# Patient Record
Sex: FEMALE | Race: BLACK OR AFRICAN AMERICAN | NOT HISPANIC OR LATINO | Employment: OTHER | URBAN - METROPOLITAN AREA
[De-identification: names, ages, dates, MRNs, and addresses within clinical notes are randomized per-mention and may not be internally consistent; named-entity substitution may affect disease eponyms.]

---

## 2015-03-16 LAB
HPV, HIGH-RISK: NEGATIVE
PAP SMEAR: NORMAL

## 2017-02-16 ENCOUNTER — HOSPITAL ENCOUNTER (EMERGENCY)
Facility: HOSPITAL | Age: 32
Discharge: HOME | End: 2017-02-17
Attending: EMERGENCY MEDICINE
Payer: MEDICARE

## 2017-02-16 DIAGNOSIS — J20.8 ACUTE VIRAL BRONCHITIS: Primary | ICD-10-CM

## 2017-02-16 LAB
B-HCG UR QL: NEGATIVE
BASOPHILS # BLD AUTO: 0.02 K/UL
BASOPHILS NFR BLD: 0.3 %
BILIRUB UR QL STRIP: NEGATIVE
CLARITY UR REFRACT.AUTO: ABNORMAL
COLOR UR AUTO: YELLOW
CTP QC/QA: YES
DIFFERENTIAL METHOD: ABNORMAL
EOSINOPHIL # BLD AUTO: 0 K/UL
EOSINOPHIL NFR BLD: 0.1 %
ERYTHROCYTE [DISTWIDTH] IN BLOOD BY AUTOMATED COUNT: 13.7 %
GLUCOSE UR QL STRIP: NEGATIVE
HCT VFR BLD AUTO: 43.2 %
HGB BLD-MCNC: 14.4 G/DL
HGB UR QL STRIP: NEGATIVE
KETONES UR QL STRIP: NEGATIVE
LEUKOCYTE ESTERASE UR QL STRIP: ABNORMAL
LYMPHOCYTES # BLD AUTO: 0.8 K/UL
LYMPHOCYTES NFR BLD: 12.3 %
MCH RBC QN AUTO: 29.9 PG
MCHC RBC AUTO-ENTMCNC: 33.3 %
MCV RBC AUTO: 90 FL
MICROSCOPIC COMMENT: NORMAL
MONOCYTES # BLD AUTO: 0.5 K/UL
MONOCYTES NFR BLD: 7.3 %
NEUTROPHILS # BLD AUTO: 5.4 K/UL
NEUTROPHILS NFR BLD: 79.6 %
NITRITE UR QL STRIP: NEGATIVE
PH UR STRIP: 7 [PH] (ref 5–8)
PLATELET # BLD AUTO: 210 K/UL
PMV BLD AUTO: 11 FL
PROT UR QL STRIP: NEGATIVE
RBC # BLD AUTO: 4.82 M/UL
RBC #/AREA URNS AUTO: 1 /HPF (ref 0–4)
SP GR UR STRIP: 1.01 (ref 1–1.03)
SQUAMOUS #/AREA URNS AUTO: 8 /HPF
URN SPEC COLLECT METH UR: ABNORMAL
UROBILINOGEN UR STRIP-ACNC: NEGATIVE EU/DL
WBC # BLD AUTO: 6.84 K/UL
WBC #/AREA URNS AUTO: 2 /HPF (ref 0–5)

## 2017-02-16 PROCEDURE — 99284 EMERGENCY DEPT VISIT MOD MDM: CPT | Mod: 25

## 2017-02-16 PROCEDURE — 96374 THER/PROPH/DIAG INJ IV PUSH: CPT

## 2017-02-16 PROCEDURE — 81001 URINALYSIS AUTO W/SCOPE: CPT

## 2017-02-16 PROCEDURE — 87400 INFLUENZA A/B EACH AG IA: CPT

## 2017-02-16 PROCEDURE — 63600175 PHARM REV CODE 636 W HCPCS: Performed by: EMERGENCY MEDICINE

## 2017-02-16 PROCEDURE — 85025 COMPLETE CBC W/AUTO DIFF WBC: CPT

## 2017-02-16 PROCEDURE — 80048 BASIC METABOLIC PNL TOTAL CA: CPT

## 2017-02-16 PROCEDURE — 25000003 PHARM REV CODE 250: Performed by: EMERGENCY MEDICINE

## 2017-02-16 PROCEDURE — 81025 URINE PREGNANCY TEST: CPT | Performed by: EMERGENCY MEDICINE

## 2017-02-16 PROCEDURE — 99285 EMERGENCY DEPT VISIT HI MDM: CPT | Mod: ,,, | Performed by: EMERGENCY MEDICINE

## 2017-02-16 PROCEDURE — 96361 HYDRATE IV INFUSION ADD-ON: CPT

## 2017-02-16 RX ORDER — KETOROLAC TROMETHAMINE 30 MG/ML
15 INJECTION, SOLUTION INTRAMUSCULAR; INTRAVENOUS
Status: COMPLETED | OUTPATIENT
Start: 2017-02-16 | End: 2017-02-16

## 2017-02-16 RX ORDER — ACETAMINOPHEN 500 MG
1000 TABLET ORAL
Status: COMPLETED | OUTPATIENT
Start: 2017-02-16 | End: 2017-02-16

## 2017-02-16 RX ADMIN — ACETAMINOPHEN 1000 MG: 500 TABLET, FILM COATED ORAL at 11:02

## 2017-02-16 RX ADMIN — KETOROLAC TROMETHAMINE 15 MG: 30 INJECTION, SOLUTION INTRAMUSCULAR at 11:02

## 2017-02-16 RX ADMIN — SODIUM CHLORIDE 1000 ML: 0.9 INJECTION, SOLUTION INTRAVENOUS at 11:02

## 2017-02-16 NOTE — ED AVS SNAPSHOT
[facility]  [address]  [facility] [address]               Prisca Zhu   2017 10:31 PM   ED    Description:  Female : 1985   Department:  [facility]           Your Care was Coordinated By:     Provider Role From To    Matthieu Tejada MD Attending Provider 17 [id number] --      Reason for Visit     Flu-Like Symptoms           Diagnoses this Visit        Comments    Acute viral bronchitis    -  Primary       ED Disposition     None           To Do List           Follow-up Information     Follow up with [facility].    Specialty:  Emergency Medicine    Why:  If symptoms worsen    Contact information:    [address]  [facility] [id number] [phone]        Schedule an appointment as soon as possible for a visit with Cristi Ledbetter - Internal Medicine.    Specialty:  Internal Medicine    Contact information:    [address] [facility] [id number] [phone]    Additional information:    [facility]      [facility]     [facility] -  Assistance  Registered nurses in the [facility] provide clinical advisement, health education, appointment booking, and other advisory services.  Call for this free service at [phone].             Medications           Message regarding Medications     Verify the changes and/or additions to your medication regime listed below are the same as discussed with your clinician today.  If any of these changes or additions are incorrect, please notify your healthcare provider.        These medications were administered today        Dose Freq    sodium chloride 0.9% bolus 1,000 mL 1,000 mL Once    Sig: Inject 1,000 mLs into the vein once.    Class: Normal    Route: Intravenous    acetaminophen tablet 1,000 mg 1,000 mg ED 1 Time    Sig: Take 2 tablets (1,000 mg total) by mouth ED 1  "Time.    Class: Normal    Route: Oral    ketorolac injection 15 mg 15 mg ED 1 Time    Sig: Inject 15 mg into the vein ED 1 Time.    Class: Normal    Route: Intravenous           Verify that the below list of medications is an accurate representation of the medications you are currently taking.  If none reported, the list may be blank. If incorrect, please contact your healthcare provider. Carry this list with you in case of emergency.           Current Medications            Clinical Reference Information           Your Vitals Were     BP Pulse Temp Resp Height Weight    134/73 (BP Location: Left arm, Patient Position: Sitting, BP Method: Automatic) 104 100.7 °F (38.2 °C) (Oral) 20 4' 9" (1.448 m) 99.8 kg (220 lb)    SpO2 BMI             97% 47.61 kg/m2         Allergies as of 2/17/2017     No Known Allergies      Immunizations Administered on Date of Encounter - 2/17/2017     None      ED Micro, Lab, POCT     Start Ordered       Status Ordering Provider    02/16/17 2308 02/16/17 2307  CBC auto differential  STAT      Final result     02/16/17 2308 02/16/17 2307  Basic metabolic panel  STAT      Final result     02/16/17 2308 02/16/17 2307  Urinalysis - Clean Catch  STAT      Final result     02/16/17 2308 02/16/17 2307  Influenza antigen Nasopharyngeal Swab  STAT      Final result     02/16/17 2308 02/16/17 2307  POCT urine pregnancy  Once      Final result     02/16/17 2307 02/16/17 2307  Urinalysis Microscopic  Once      Final result       ED Imaging Orders     Start Ordered       Status Ordering Provider    02/16/17 2308 02/16/17 2307  X-Ray Chest PA And Lateral  1 time imaging      Final result         Discharge Instructions         Bronchitis, Viral (Adult)    You have a viral bronchitis. Bronchitis is inflammation and swelling of the lining of the lungs. This is often caused by an infection. Symptoms include a dry, hacking cough that is worse at night. The cough may bring up yellow-green mucus. You may also " feel short of breath or wheeze. Other symptoms may include tiredness, chest discomfort, and chills.  Bronchitis that is caused by a virus is not treated with antibiotics. Instead, medicines may be given to help relieve symptoms. Symptoms can last up to 2 weeks, although the cough may last much longer.  This illness is contagious during the first few days and is spread through the air by coughing and sneezing, or by direct contact (touching the sick person and then touching your own eyes, nose, or mouth).  Most viral illnesses resolve within 10 to 14 days with rest and simple home remedies, although they may sometimes last for several weeks.  Home care  · If symptoms are severe, rest at home for the first 2 to 3 days. When you go back to your usual activities, don't let yourself get too tired.  · Do not smoke. Also avoid being exposed to secondhand smoke.  · You may use over-the-counter medicine to control fever or pain, unless another pain medicine was prescribed. (Note: If you have chronic liver or kidney disease or have ever had a stomach ulcer or gastrointestinal bleeding, talk with your healthcare provider before using these medicines. Also talk to your provider if you are taking medicine to prevent blood clots.) Aspirin should never be given to anyone younger than 18 years of age who is ill with a viral infection or fever. It may cause severe liver or brain damage.  · Your appetite may be poor, so a light diet is fine. Avoid dehydration by drinking 6 to 8 glasses of fluids per day (such as water, soft drinks, sports drinks, juices, tea, or soup). Extra fluids will help loosen secretions in the nose and lungs.  · Over-the-counter cough, cold, and sore-throat medicines will not shorten the length of the illness, but they may help to reduce symptoms. (Note: Do not use decongestants if you have high blood pressure.)  Follow-up care  Follow up with your healthcare provider, or as advised. If you had an X-ray or ECG  (electrocardiogram), a specialist will review it. You will be notified of any new findings that may affect your care.  Note: If you are age 65 or older, or if you have a chronic lung disease or condition that affects your immune system, or you smoke, talk to your healthcare provider about having pneumococcal vaccinations and a yearly influenza vaccination (flu shot).  When to seek medical advice  Call your healthcare provider right away if any of these occur:  · Fever of 100.4°F (38°C) or higher  · Coughing up increased amounts of colored sputum  · Weakness, drowsiness, headache, facial pain, ear pain, or a stiff neck  Call 911, or get immediate medical care  Contact emergency services right away if any of these occur:  · Coughing up blood  · Worsening weakness, drowsiness, headache, or stiff neck  · Trouble breathing, wheezing, or pain with breathing  Date Last Reviewed: 9/13/2015  © 5265-9275 [employer]. [address] [place] [address]. All rights reserved. This information is not intended as a substitute for professional medical care. Always follow your healthcare professional's instructions.          [facility] Sign-Up     Activating your [facility] account is as easy as 1-2-3!     1) Visit [email or web address], select Sign Up Now, enter this activation code and your date of birth, then select Next.  [email or web address]  Expires: 4/3/2017 12:20 AM      2) Create a username and password to use when you visit [facility] in the future and select a security question in case you lose your password and select Next.    3) Enter your e-mail address and click Sign Up!    Additional Information  If you have questions, please e-mail [email or web address] or call [phone] to talk to our [facility] staff. Remember, [facility] is NOT to be used for urgent needs. For medical emergencies, dial 911.          [facility] complies with applicable Federal civil rights laws and does not  discriminate on the basis of race, color, national origin, age, disability, or sex.        Language Assistance Services     ATTENTION: Language assistance services are available, free of charge. Please call [phone].      ATENCIÓN: Si chele morales, tiene a cervantes disposición servicios gratuitos de asistencia lingüística. Llame al [phone].     CHÚ Ý: N?u b?n nói Ti?ng Vi?t, có các d?ch v? h? tr? ngôn ng? mi?n phí dành cho b?n. G?i s? [phone].

## 2017-02-17 VITALS
TEMPERATURE: 100 F | OXYGEN SATURATION: 97 % | DIASTOLIC BLOOD PRESSURE: 57 MMHG | RESPIRATION RATE: 18 BRPM | HEART RATE: 92 BPM | BODY MASS INDEX: 47.46 KG/M2 | HEIGHT: 57 IN | SYSTOLIC BLOOD PRESSURE: 107 MMHG | WEIGHT: 220 LBS

## 2017-02-17 LAB
ANION GAP SERPL CALC-SCNC: 8 MMOL/L
BUN SERPL-MCNC: 9 MG/DL
CALCIUM SERPL-MCNC: 9.5 MG/DL
CHLORIDE SERPL-SCNC: 102 MMOL/L
CO2 SERPL-SCNC: 22 MMOL/L
CREAT SERPL-MCNC: 1 MG/DL
EST. GFR  (AFRICAN AMERICAN): >60 ML/MIN/1.73 M^2
EST. GFR  (NON AFRICAN AMERICAN): >60 ML/MIN/1.73 M^2
FLUAV AG SPEC QL IA: NEGATIVE
FLUBV AG SPEC QL IA: NEGATIVE
GLUCOSE SERPL-MCNC: 87 MG/DL
POTASSIUM SERPL-SCNC: 3.9 MMOL/L
SODIUM SERPL-SCNC: 132 MMOL/L
SPECIMEN SOURCE: NORMAL

## 2017-02-17 NOTE — ED PROVIDER NOTES
Encounter Date: 2/16/2017    SCRIBE #1 NOTE: I, Loretta Trujillo, am scribing for, and in the presence of,  Dr. Tejada. I have scribed the entire note.       History     Chief Complaint   Patient presents with    Flu-Like Symptoms     Reports temp of 102 PTA, cough, generalized body aches, and chills. Hx of osteogenesis imperfecta.     Review of patient's allergies indicates:  No Known Allergies  HPI Comments: Time seen by provider: 10:59 PM    This is a 31 y.o. female with history of osteogenesis imperfecta who presents with complaint of acute cough, diaphoresis, and myalgias beginning this morning. Pt notes she has not received the flu shot. Pt endorses fever, weakness, and chills.     The history is provided by the patient.     Past Medical History   Diagnosis Date    OI (osteogenesis imperfecta)      No past medical history pertinent negatives.  History reviewed. No pertinent past surgical history.  No family history on file.  Social History   Substance Use Topics    Smoking status: Never Smoker    Smokeless tobacco: None    Alcohol use No     Review of Systems   Constitutional: Positive for chills, diaphoresis and fever.   HENT: Negative for nosebleeds.    Eyes: Negative for visual disturbance.   Respiratory: Positive for cough.    Cardiovascular: Negative for chest pain.   Gastrointestinal: Negative for abdominal pain.   Genitourinary: Negative for dysuria.   Musculoskeletal: Positive for myalgias.   Skin: Negative for rash.   Neurological: Positive for weakness.       Physical Exam   Initial Vitals   BP Pulse Resp Temp SpO2   02/16/17 2106 02/16/17 2106 02/16/17 2106 02/16/17 2106 02/16/17 2106   139/79 110 20 102.7 °F (39.3 °C) 95 %     Physical Exam    Nursing note and vitals reviewed.  Constitutional: She appears well-developed and well-nourished. She is not diaphoretic. No distress.   Pt looks uncomfortable.    HENT:   Head: Normocephalic and atraumatic.   Mouth/Throat: Oropharynx is clear and moist.    Eyes: Conjunctivae and EOM are normal. Pupils are equal, round, and reactive to light.   Neck: Normal range of motion. Neck supple. No JVD present.   Cardiovascular: Normal rate, regular rhythm and normal heart sounds.   No murmur heard.  Pulmonary/Chest: Breath sounds normal. No respiratory distress. She has no wheezes. She has no rhonchi. She has no rales.   Abdominal: Soft. Bowel sounds are normal. She exhibits no distension and no mass. There is no tenderness. There is no rebound and no guarding.   Musculoskeletal: Normal range of motion. She exhibits no edema or tenderness.   Extremities are atraumatic.   Neurological: She is alert and oriented to person, place, and time. She has normal strength. No cranial nerve deficit or sensory deficit.   Skin: Skin is warm and dry. No rash noted.   Psychiatric: She has a normal mood and affect.         ED Course   Procedures  Labs Reviewed   CBC W/ AUTO DIFFERENTIAL - Abnormal; Notable for the following:        Result Value    Lymph # 0.8 (*)     Gran% 79.6 (*)     Lymph% 12.3 (*)     All other components within normal limits   BASIC METABOLIC PANEL - Abnormal; Notable for the following:     Sodium 132 (*)     CO2 22 (*)     All other components within normal limits   URINALYSIS - Abnormal; Notable for the following:     Appearance, UA Hazy (*)     Leukocytes, UA Trace (*)     All other components within normal limits   INFLUENZA A AND B ANTIGEN   URINALYSIS MICROSCOPIC   POCT URINE PREGNANCY          X-Rays:   Independently Interpreted Readings:   Chest X-Ray: Normal heart, normal lungs.      Medical Decision Making:   History:   Old Medical Records: I decided to obtain old medical records.  Initial Assessment:   Pt with cough, congestion, and fever. Will screen for flu and PNA. Doubt meningitis. Reviewed labs, normal white count, normal chemistries.     Flu test negative. Viral syndrome, will discharge home.   Independently Interpreted Test(s):   I have ordered and  independently interpreted X-rays - see prior notes.  Clinical Tests:   Lab Tests: Ordered and Reviewed  Radiological Study: Ordered and Reviewed            Scribe Attestation:   Scribe #1: I performed the above scribed service and the documentation accurately describes the services I performed. I attest to the accuracy of the note.    Attending Attestation:           Physician Attestation for Scribe:  Physician Attestation Statement for Scribe #1: I, Dr. Tejada, reviewed documentation, as scribed by Loretta Trujillo in my presence, and it is both accurate and complete.         Attending ED Notes:   12:25 AM   Reassessed. Pt feels much better. Fever is down and breathing comfortably. Will discharge home.           ED Course     Clinical Impression:   The encounter diagnosis was Acute viral bronchitis.    Disposition:   Disposition: Discharged  Condition: Stable       Matthieu Tejada MD  02/17/17 0106

## 2017-02-17 NOTE — DISCHARGE INSTRUCTIONS
Bronchitis, Viral (Adult)    You have a viral bronchitis. Bronchitis is inflammation and swelling of the lining of the lungs. This is often caused by an infection. Symptoms include a dry, hacking cough that is worse at night. The cough may bring up yellow-green mucus. You may also feel short of breath or wheeze. Other symptoms may include tiredness, chest discomfort, and chills.  Bronchitis that is caused by a virus is not treated with antibiotics. Instead, medicines may be given to help relieve symptoms. Symptoms can last up to 2 weeks, although the cough may last much longer.  This illness is contagious during the first few days and is spread through the air by coughing and sneezing, or by direct contact (touching the sick person and then touching your own eyes, nose, or mouth).  Most viral illnesses resolve within 10 to 14 days with rest and simple home remedies, although they may sometimes last for several weeks.  Home care  · If symptoms are severe, rest at home for the first 2 to 3 days. When you go back to your usual activities, don't let yourself get too tired.  · Do not smoke. Also avoid being exposed to secondhand smoke.  · You may use over-the-counter medicine to control fever or pain, unless another pain medicine was prescribed. (Note: If you have chronic liver or kidney disease or have ever had a stomach ulcer or gastrointestinal bleeding, talk with your healthcare provider before using these medicines. Also talk to your provider if you are taking medicine to prevent blood clots.) Aspirin should never be given to anyone younger than 18 years of age who is ill with a viral infection or fever. It may cause severe liver or brain damage.  · Your appetite may be poor, so a light diet is fine. Avoid dehydration by drinking 6 to 8 glasses of fluids per day (such as water, soft drinks, sports drinks, juices, tea, or soup). Extra fluids will help loosen secretions in the nose and lungs.  · Over-the-counter  cough, cold, and sore-throat medicines will not shorten the length of the illness, but they may help to reduce symptoms. (Note: Do not use decongestants if you have high blood pressure.)  Follow-up care  Follow up with your healthcare provider, or as advised. If you had an X-ray or ECG (electrocardiogram), a specialist will review it. You will be notified of any new findings that may affect your care.  Note: If you are age 65 or older, or if you have a chronic lung disease or condition that affects your immune system, or you smoke, talk to your healthcare provider about having pneumococcal vaccinations and a yearly influenza vaccination (flu shot).  When to seek medical advice  Call your healthcare provider right away if any of these occur:  · Fever of 100.4°F (38°C) or higher  · Coughing up increased amounts of colored sputum  · Weakness, drowsiness, headache, facial pain, ear pain, or a stiff neck  Call 911, or get immediate medical care  Contact emergency services right away if any of these occur:  · Coughing up blood  · Worsening weakness, drowsiness, headache, or stiff neck  · Trouble breathing, wheezing, or pain with breathing  Date Last Reviewed: 9/13/2015  © 1430-0469 [employer]. [address] [place] [address], [place] [address]. All rights reserved. This information is not intended as a substitute for professional medical care. Always follow your healthcare professional's instructions.

## 2017-02-17 NOTE — ED TRIAGE NOTES
Pt reports she woke up today with coughing and sweating. Pt reports her body is aching and she is freezing.

## 2017-04-04 ENCOUNTER — HOSPITAL ENCOUNTER (OUTPATIENT)
Dept: RADIOLOGY | Facility: HOSPITAL | Age: 32
Discharge: HOME | End: 2017-04-04
Attending: SURGERY
Payer: MEDICARE

## 2017-04-04 DIAGNOSIS — Z12.31 SCREENING MAMMOGRAM FOR HIGH-RISK PATIENT: ICD-10-CM

## 2017-04-04 DIAGNOSIS — N63.0 LUMP OR MASS IN BREAST: ICD-10-CM

## 2017-04-04 PROCEDURE — 76641 ULTRASOUND BREAST COMPLETE: CPT | Mod: 26,RT,, | Performed by: RADIOLOGY

## 2017-04-04 PROCEDURE — 76641 ULTRASOUND BREAST COMPLETE: CPT | Mod: TC,RT

## 2017-04-04 PROCEDURE — 77066 DX MAMMO INCL CAD BI: CPT | Mod: 26,,, | Performed by: RADIOLOGY

## 2017-04-04 PROCEDURE — 77062 BREAST TOMOSYNTHESIS BI: CPT | Mod: 26,,, | Performed by: RADIOLOGY

## 2017-04-04 PROCEDURE — 77066 DX MAMMO INCL CAD BI: CPT | Mod: TC

## 2018-03-13 ENCOUNTER — HOSPITAL ENCOUNTER (EMERGENCY)
Facility: HOSPITAL | Age: 33
Discharge: HOME | End: 2018-03-13
Attending: EMERGENCY MEDICINE
Payer: MEDICARE

## 2018-03-13 VITALS
BODY MASS INDEX: 47.46 KG/M2 | RESPIRATION RATE: 16 BRPM | HEIGHT: 57 IN | HEART RATE: 60 BPM | WEIGHT: 220 LBS | TEMPERATURE: 98 F | OXYGEN SATURATION: 98 % | DIASTOLIC BLOOD PRESSURE: 76 MMHG | SYSTOLIC BLOOD PRESSURE: 121 MMHG

## 2018-03-13 DIAGNOSIS — M25.512 LEFT SHOULDER PAIN: Primary | ICD-10-CM

## 2018-03-13 LAB
B-HCG UR QL: NEGATIVE
CTP QC/QA: YES

## 2018-03-13 PROCEDURE — 25000003 PHARM REV CODE 250: Performed by: PHYSICIAN ASSISTANT

## 2018-03-13 PROCEDURE — 63600175 PHARM REV CODE 636 W HCPCS: Performed by: PHYSICIAN ASSISTANT

## 2018-03-13 PROCEDURE — 81025 URINE PREGNANCY TEST: CPT | Performed by: PHYSICIAN ASSISTANT

## 2018-03-13 PROCEDURE — 96372 THER/PROPH/DIAG INJ SC/IM: CPT

## 2018-03-13 PROCEDURE — 99284 EMERGENCY DEPT VISIT MOD MDM: CPT | Mod: 25

## 2018-03-13 RX ORDER — ACETAMINOPHEN 500 MG
1000 TABLET ORAL
Status: COMPLETED | OUTPATIENT
Start: 2018-03-13 | End: 2018-03-13

## 2018-03-13 RX ORDER — METHOCARBAMOL 500 MG/1
500 TABLET, FILM COATED ORAL 3 TIMES DAILY
Qty: 15 TABLET | Refills: 0 | Status: SHIPPED | OUTPATIENT
Start: 2018-03-13 | End: 2018-03-18

## 2018-03-13 RX ORDER — TRAMADOL HYDROCHLORIDE 50 MG/1
50 TABLET ORAL EVERY 6 HOURS PRN
Qty: 12 TABLET | Refills: 0 | Status: SHIPPED | OUTPATIENT
Start: 2018-03-13 | End: 2018-03-23

## 2018-03-13 RX ORDER — IBUPROFEN 600 MG/1
600 TABLET ORAL EVERY 6 HOURS PRN
Qty: 20 TABLET | Refills: 0 | Status: SHIPPED | OUTPATIENT
Start: 2018-03-13 | End: 2019-01-29

## 2018-03-13 RX ORDER — KETOROLAC TROMETHAMINE 30 MG/ML
10 INJECTION, SOLUTION INTRAMUSCULAR; INTRAVENOUS
Status: COMPLETED | OUTPATIENT
Start: 2018-03-13 | End: 2018-03-13

## 2018-03-13 RX ORDER — IBUPROFEN 200 MG
200 TABLET ORAL EVERY 6 HOURS PRN
COMMUNITY
End: 2019-02-27

## 2018-03-13 RX ADMIN — ACETAMINOPHEN 1000 MG: 500 TABLET ORAL at 06:03

## 2018-03-13 RX ADMIN — KETOROLAC TROMETHAMINE 10 MG: 30 INJECTION, SOLUTION INTRAMUSCULAR; INTRAVENOUS at 03:03

## 2018-03-13 NOTE — ED PROVIDER NOTES
Encounter Date: 3/13/2018    SCRIBE #1 NOTE: I, Zane Gonzalez, am scribing for, and in the presence of,  Joe Mccrary PA-C. I have scribed the following portions of the note - Other sections scribed: HPI and ROS.       History     Chief Complaint   Patient presents with    Shoulder Pain     left shoulder pain after lifting arm above head at work.     CC: Shoulder Pain    HPI: 32 y.o. female with history of osteogenesis imperfecta presents to ED c/o left shoulder pain. Patient reports that she hurt the shoulder while lifting a box. She has dislocated the shoulder in the past. Denies fracture. No further complaints. She is allergic to penicillin.           Review of patient's allergies indicates:   Allergen Reactions    Pcn [penicillins] Shortness Of Breath     Past Medical History:   Diagnosis Date    OI (osteogenesis imperfecta)      Past Surgical History:   Procedure Laterality Date    FRACTURE SURGERY      femur     History reviewed. No pertinent family history.  Social History   Substance Use Topics    Smoking status: Never Smoker    Smokeless tobacco: Never Used    Alcohol use No     Review of Systems   Constitutional: Negative for chills and diaphoresis.   Eyes: Negative for visual disturbance.   Respiratory: Negative for cough.    Gastrointestinal: Negative for abdominal pain, diarrhea, nausea and vomiting.   Genitourinary: Negative for dysuria.   Musculoskeletal:        (+) left shoulder pain   Neurological: Negative for headaches.       Physical Exam     Initial Vitals [03/13/18 1438]   BP Pulse Resp Temp SpO2   (!) 166/90 (!) 58 16 98 °F (36.7 °C) 100 %      MAP       115.33         Physical Exam    Vitals reviewed.  Constitutional: She appears well-developed and well-nourished. She is not diaphoretic. No distress.   HENT:   Head: Normocephalic and atraumatic.   Right Ear: External ear normal.   Left Ear: External ear normal.   Nose: Nose normal.   Eyes: Conjunctivae are normal. No scleral icterus.    Neck: Normal range of motion. Neck supple.   Cardiovascular: Normal rate, regular rhythm, normal heart sounds and intact distal pulses.   Pulmonary/Chest: Breath sounds normal. No respiratory distress. She has no wheezes. She has no rhonchi. She has no rales. She exhibits no tenderness.   Musculoskeletal:        Left shoulder: She exhibits decreased range of motion, tenderness, crepitus and pain.   Neurological: She is alert and oriented to person, place, and time.   Skin: Skin is warm and dry.         ED Course   Procedures  Labs Reviewed   POCT URINE PREGNANCY          X-Rays:   Independently Interpreted Readings:   Other Readings:  CT shoulder shows no obvious fracture or destructive osseous process.    Medical Decision Making:   Initial Assessment:   32-year-old female with osteogenesis imperfecta complains of left shoulder pain after lifting above her head yesterday.  She reports frequent popping noises when she moves her shoulder, which was present prior to lifting yesterday.  She denies numbness or weakness in the arm but has reduced range of motion secondary to pain.  She presents well-appearing in no distress, slightly hypertensive with otherwise reassuring vital signs.  She has tenderness to the anterior aspect of the glenohumeral joint with slight opening of the glenohumeral joint palpated with movement of the shoulder.  Her arm is neurovascularly intact.  There is no edema or erythema.  Differential Diagnosis:   Shoulder pathologic fracture, dislocation, rotator cuff injury, tendinopathy, arthritis  ED Management:  X-rays and CT are negative for fracture.  Exam without evidence for infection.  Patient will need orthopedic follow-up, and likely an MRI.  She was treated with analgesics.  She has an orthopedic surgeon that she will make an appointment with.  She was given a sling for comfort.  She was discharged with return precautions.  She verbalized understanding and agreed with plan.            Scribe  Attestation:   Scribe #1: I performed the above scribed service and the documentation accurately describes the services I performed. I attest to the accuracy of the note.    Attending Attestation:           Physician Attestation for Scribe:  Physician Attestation Statement for Scribe #1: I, Joe Mccrary PA-C, reviewed documentation, as scribed by Zane Gonzalez in my presence, and it is both accurate and complete.                    Clinical Impression:   The encounter diagnosis was Left shoulder pain.                           Joe Mccrary PA-C  03/13/18 1824

## 2018-03-13 NOTE — ED TRIAGE NOTES
"Pt with c/o left shoulder pain that increased today after picking up a box at work. Pt states, "My shoulder keeps popping out of place."   "

## 2019-01-22 ENCOUNTER — CLINICAL SUPPORT (OUTPATIENT)
Dept: BARIATRICS | Facility: CLINIC | Age: 34
End: 2019-01-22
Payer: MEDICARE

## 2019-01-22 VITALS — HEIGHT: 57 IN | WEIGHT: 231.25 LBS | BODY MASS INDEX: 49.89 KG/M2

## 2019-01-22 PROCEDURE — 99499 NO LOS: ICD-10-PCS | Mod: S$PBB,,, | Performed by: DIETITIAN, REGISTERED

## 2019-01-22 PROCEDURE — 99999 PR PBB SHADOW E&M-EST. PATIENT-LVL II: ICD-10-PCS | Mod: PBBFAC,,, | Performed by: DIETITIAN, REGISTERED

## 2019-01-22 PROCEDURE — 99212 OFFICE O/P EST SF 10 MIN: CPT | Mod: PBBFAC | Performed by: DIETITIAN, REGISTERED

## 2019-01-22 PROCEDURE — 99999 PR PBB SHADOW E&M-EST. PATIENT-LVL II: CPT | Mod: PBBFAC,,, | Performed by: DIETITIAN, REGISTERED

## 2019-01-22 PROCEDURE — 99499 UNLISTED E&M SERVICE: CPT | Mod: S$PBB,,, | Performed by: DIETITIAN, REGISTERED

## 2019-01-22 NOTE — PROGRESS NOTES
"NUTRITIONAL CONSULT    Referring Physician: Guillermo Villanueva M.D.  Reason for MNT Referral: Initial assessment for laparoscopic Symone-en-Y work-up    PAST MEDICAL HISTORY:   33 y.o. female  Body mass index is 50.04 kg/m²..  Weight history includes Same weight between 220 to 230 since 16 year, loss a few pounds and then regain weight back  Dieting attempts include apple cider vinegar, then binge eat at night, OTC diet pills, working out-has membership to gym but does not go any more.    Past Medical History:   Diagnosis Date    OI (osteogenesis imperfecta)        CLINICAL DATA:  33 y.o.-year-old Black or  female.  Height: 4'9"  Weight: 231 lbs  IBW: 119 lbs  BMI: 50.04  The patient's goal weight (50% EBW): 175 lbs  Personal goal weight: Under 200 lbs    Goal for Bariatric Surgery: to improve health, to improve quality of life and to lose weight    NUTRITIONAL NEEDS:  1200 Calories  Per day  65- -81 Grams Protein (1.2 -1.5 gms/kg IBW per day)    NUTRITION & HEALTH HISTORY:  Greatest challenge: fast foods and binging at night    Current diet recall:   Breakfast: banana, apple, apple juice  Snack: BK Roby burger, fries and coke  Lunch: No lunch  Dinner: Red beans rice soft drink  Snacks: cookies cream-bag (1/2 bag)    Current Diet:  Meal pattern: Eat more in evening   Protein supplements:   Snacking: at night when trying to diet binges on sweets at night / day  Vegetables: Likes a variety. Eats rarely.  Fruits: Likes a variety. Eats 2-3 times per week.  Beverages: soda, sugary beverages, sweet tea and whole milk  Dining out: Weekly.once a week Mostly fast food.  Cooking at home: Weekly. 3 times per week and eat leftoverMostly baked, grilled, smothered, fried and pasta meat, fish, starchy CHO and pasta , red beans etc.    Exercise:  Past exercise: Adequate walk into track- last year    Current exercise: None  Restrictions to exercise: none    Vitamins / Minerals / Herbs: none  Labs:   no recent  Food " Allergies:   none  Social:  Disabled.  Lives with 2 children 9 years old and 5 years old.  Grocery shopping and food prep self  Patient believes the household will be supportive after surgery.  Mother is also in work up for bariatric surgery  Alcohol: weekend- daiquir or amaretto, or hard liquir whot.  Smoking: None.    ASSESSMENT:  · Patient reports attempts at weight loss, only to regain lost weight.  · Patient demonstrated knowledge of healthy eating behaviors and exercise patterns; admits to not eating healthy and not exercising at this point.  · Patient states willingness to change lifestyle and make behavior modifications as evidenced by good exercise in past.    Insurance requires  0 medically supervised diet prior to consideration for bariatric surgery.    Barriers to Education: none    Stage of change: determination    NUTRITION DIAGNOSIS:    Obesity related to Excessive carbohydrate intake, Excessive calorie intake, Inadequate protein intake and Physical inactivity as evidence by BMI.    BARIATRIC DIET DISCUSSION/PLAN:  Discussed diet after surgery and related to patient's food record.  Reviewed nutrition guidelines for before and after surgery.  Answered all questions.  Work on Bariatric Nutrition Checklist.  Work on gradually cutting back on starchy CHO in the diet.  Begin trying various protein supplements to determine preference.  1200-calorie diet.  5-6 meals per day.  Reduce frequency of dining out.  More grocery shopping and meal preparation at home.  Increase exercise.  Return to clinic.    RECOMMENDATIONS:  Patient is a potential candidate for bariatric surgery.    Needs at least 1 additional visit(s) with RD.    Patient verbalized understanding.    Expect good  compliance after surgery at this time.    Communicated nutrition plan with bariatric team.    SESSION TIME:  60 minutes

## 2019-01-22 NOTE — PATIENT INSTRUCTIONS
Bariatric Diet Grocery List      High in Protein:   ? Canned tuna or chicken (packed in water)  ? Lean ground turkey breast or ground round  ? Turkey or chicken (no skin)  ? Lean pork or beef   ? Scrambled, poached, or boiled eggs  ? Baked or broiled fish and seafood (not fried!)  ? Beans, edamame and lentils  ? Low fat deli meats: turkey, chicken, ham, roast beef  ? 1% or Skim Milk, Lactaid, or Soymilk  ? Low-fat cheese, cottage cheese, mozzarella string cheese, ricotta  ? Light yogurt, FF/SF frozen yogurt, custard, SF pudding  ? Protein drinks and protein bars with 0-4 grams sugar     Fruits, Vegetables and Snacks   - Green beans, broccoli, cauliflower, spinach, asparagus, carrots, lima beans, yellow squash, zucchini, etc.  - Apple, pineapple, peach, grapes, banana, watermelon, oranges, etc.  - Fruit canned in its own juice or in water (not in syrup)  - Raw veggies  - Lettuce: dark greens like spinach and Fabián  - Unsalted Nuts  - Cesar Links beef jerky     Fluids:   Skim/1% milk, Lactaid, Soymilk  Sugar-free beverages  (decaf and non-carbonated)  Decaf coffee & decaf tea   Water       Eating healthy on the go:     Fast Food Restaurant/Item Calories Protein    [employer] Premium Grilled Chicken Classic [place], no bun < 350 28   [employer] Premium Salads with Grilled Chicken, no dressing  190-290 27-30   M[employer]ories Ultimate Chicken [place], no bun < 390 34   Mallories Half-Size Salads 290-440 19-23   [employer]ories Large [place]  270 19   [employer] Double Hamburger, no bun < 330 19   [employer] TENDERGRILL Chicken [place], no bun or sanchez < 360 36   [employer] Veggie Burger, no bun or sanchez < 320 22   [employer] Garden Fresh Salad Chicken Caesar with TENDERGRILL and dressing   (*Use ½ dressing packet to reduce calories)  490 41   Chick-kirby-A Chargrilled Chicken [place], no bun < 310 29   Chick-kirby-A Salads with Grilled Chicken, no dressing  (*Not the Diego Salad)  180-330 25-29   Sonic Jr. Asher, no bun < 330 15   Sonic  Classic [place] Glide Grilled, no bun  < 450 32   [place] Naked Tenders (3) with Regular Green Beans, no sauce 210 28   [facility]s Club [place], no sanchez 277 37   Chipotle Chicken, Steak, or Carnitas Salad with Black or [place] Beans, Tomato Salsa, and Fajita Veggies on Bed of Lettuce  335-360 33-42     Sit Down Restaurant/Item Calories Protein    Applebees Under 550 Calories Entrees, no potatoes  410-490 43-56   Applebees Weight Watchers Entrees, no potatoes 300-490 22-53   [employer]bees 7 oz. House Sirloin with seasonal vegetables 285-310 36-40   Applebees Bowl of [place] 400 29   [employer] Simple&Fit Spinach, Mushroom, and Tomato Omelette  330 29   [employer] Simple&Fit Grilled Balsamic-Glazed Chicken  440 39   [place] Garden Grilled Chicken [place] with Sweet Red Wine Sauce 472 37   [employer] Garden Sicilian Meatballs 360 23   [place] Garden Herb-Grilled [place] 480 56   Josues Fit Fare Omelette  390 34     1200- 1500 calorie Sample meal plan   80-120g protein per day.   Protein drinks and bars: 0-4 grams sugar   Drink lots of water throughout the day and exercise!   MENU # 1   Breakfast: 2 eggs, 1 turkey sausage hanna, 1 apple   Snack: P3 protein pack  Lunch: 2 roll-ups (sliced turkey, low-fat sliced cheese, mustard), 12 baby carrots dipped in 1 Tbsp natural peanut butter   Mid-Day Snack: ¼ cup unsalted almonds, ½ cup fruit   Dinner: 1 chicken thigh simmered in tomato sauce + 2 Tbsp mozzarella cheese, ½ cup black beans, 1/2 cup steamed carrots   Evening Snack: 1/2 cup grapes with 4 cubes low-fat cheddar cheese   ___________________________________________________   MENU # 2   Breakfast: 200 calorie protein drink   Mid-morning snack : ¼ cup unsalted nuts, medium banana   Lunch: 3oz tuna or chicken salad made with 2 tbsp light sanchez, over salad with tomatoes and cucumbers.   Snack: low-fat cheese stick   Dinner: 3oz hamburger hanna, slice of low-fat cheese, 1 cup yellow squash and zucchini sauteed in nonstick  cookspray  Snack: 6oz light yogurt   _______________________________________________________   Menu #3   Breakfast: 6oz plain Greek yogurt + fruit (½ banana OR ½ cup fruit - pineapple, blueberries, strawberries, peach), may add Splenda to ashwini.   Lunch: Grilled chicken breast w/ slice low-fat pepper tarun cheese, 1/2 cup grilled/baked asparagus and small salad with Salad Spritzer.   Mid-Day snack: 200 calorie protein drink   Dinner: 4oz Grilled fish, ½ cup white beans, ½ cup cooked spinach   Evening Snack: fudgsicle no-sugar-added   Menu # 4   Breakfast: 1 scoop vanilla protein powder + 4oz skim milk + 4oz coffee   Snack: Pure protein bar   Lunch: 2 Lettuce tacos: 3oz seasoned ground turkey wrapped in a Fabián lettuce leaves with 1 Tbsp shredded cheese and dollop low-fat sour cream   Snack: ½ cup cottage cheese, ½ cup pineapple chunks   Dinner: Shrimp omelet: 2 eggs, ½ cup shrimp, green onions, and shredded cheese   ______________________________________________________   Menu #5   Breakfast: 1 cup low-fat cottage cheese, ½ cup peaches (no sugar added)   Snack: 1 apple, 4 cubes cheese   Lunch: 3oz baked pork chop, 1 cup okra and tomato stew   Snack: 1/2 cup black beans + salsa + dollop light sour cream   Dinner: Caprese chicken salad: 3 oz chicken breast, 1oz fresh mozzarella, sliced tomato, 1 Tbsp olive oil, basil   Snack: sugar-free popsicle   _______________________________________________________  Menu #6   Breakfast: ½ cup part-skim ricotta cheese, 2 Tbsp sugar-free strawberry preserves, sprinkle of slivered almonds   Snack: 1 orange + string cheese  Lunch: 3 oz canned chicken, 1oz shredded cheddar cheese, ½ cup black beans, 2 Tbsp salsa   Snack: 200 calorie Protein drink   Dinner: 4 oz grilled salmon steak, over mixed green salad with 2 tbsp light dressing   _______________________________________________________  Menu #7  Breakfast: crust-less quiche (bake 1 egg mixed w/ low fat cheese, broccoli and low  sodium ham in a muffin cup until cooked inside)  Snack: 1 light yogurt  Lunch: protein shake (1 scoop powder + 8 oz skim milk, blended w/ ice)  Snack: small apple w/ 2 tbsp PB2 (powdered peanut butter)  Dinner: 2 Turkey meatballs w/ low sugar marinara sauce, 1/2 cup spiralized zucchini (sauteed on stove top w/ nonstick cookspray)

## 2019-01-29 ENCOUNTER — INITIAL CONSULT (OUTPATIENT)
Dept: BARIATRICS | Facility: CLINIC | Age: 34
End: 2019-01-29
Payer: MEDICARE

## 2019-01-29 ENCOUNTER — CLINICAL SUPPORT (OUTPATIENT)
Dept: BARIATRICS | Facility: CLINIC | Age: 34
End: 2019-01-29
Payer: MEDICARE

## 2019-01-29 VITALS
HEIGHT: 57 IN | WEIGHT: 233.44 LBS | SYSTOLIC BLOOD PRESSURE: 106 MMHG | HEART RATE: 83 BPM | BODY MASS INDEX: 50.36 KG/M2 | DIASTOLIC BLOOD PRESSURE: 68 MMHG

## 2019-01-29 DIAGNOSIS — Q78.2 OSTEOPETROSIS: ICD-10-CM

## 2019-01-29 DIAGNOSIS — E66.01 CLASS 3 SEVERE OBESITY DUE TO EXCESS CALORIES WITHOUT SERIOUS COMORBIDITY WITH BODY MASS INDEX (BMI) OF 50.0 TO 59.9 IN ADULT: Primary | ICD-10-CM

## 2019-01-29 DIAGNOSIS — G89.29 CHRONIC LEFT SHOULDER PAIN: ICD-10-CM

## 2019-01-29 DIAGNOSIS — M25.512 CHRONIC LEFT SHOULDER PAIN: ICD-10-CM

## 2019-01-29 DIAGNOSIS — Z79.899 OTHER LONG TERM (CURRENT) DRUG THERAPY: ICD-10-CM

## 2019-01-29 DIAGNOSIS — Q78.0 OI (OSTEOGENESIS IMPERFECTA): ICD-10-CM

## 2019-01-29 DIAGNOSIS — M77.9 TENDONITIS: ICD-10-CM

## 2019-01-29 PROCEDURE — 99499 UNLISTED E&M SERVICE: CPT | Mod: S$PBB,,, | Performed by: DIETITIAN, REGISTERED

## 2019-01-29 PROCEDURE — 99999 PR PBB SHADOW E&M-EST. PATIENT-LVL II: ICD-10-PCS | Mod: PBBFAC,,, | Performed by: DIETITIAN, REGISTERED

## 2019-01-29 PROCEDURE — 99205 OFFICE O/P NEW HI 60 MIN: CPT | Mod: S$PBB,,, | Performed by: NURSE PRACTITIONER

## 2019-01-29 PROCEDURE — 99212 OFFICE O/P EST SF 10 MIN: CPT | Mod: PBBFAC,27 | Performed by: DIETITIAN, REGISTERED

## 2019-01-29 PROCEDURE — 99999 PR PBB SHADOW E&M-EST. PATIENT-LVL IV: ICD-10-PCS | Mod: PBBFAC,,, | Performed by: NURSE PRACTITIONER

## 2019-01-29 PROCEDURE — 99499 NO LOS: ICD-10-PCS | Mod: S$PBB,,, | Performed by: DIETITIAN, REGISTERED

## 2019-01-29 PROCEDURE — 99214 OFFICE O/P EST MOD 30 MIN: CPT | Mod: PBBFAC | Performed by: NURSE PRACTITIONER

## 2019-01-29 PROCEDURE — 99999 PR PBB SHADOW E&M-EST. PATIENT-LVL II: CPT | Mod: PBBFAC,,, | Performed by: DIETITIAN, REGISTERED

## 2019-01-29 PROCEDURE — 99999 PR PBB SHADOW E&M-EST. PATIENT-LVL IV: CPT | Mod: PBBFAC,,, | Performed by: NURSE PRACTITIONER

## 2019-01-29 PROCEDURE — 99205 PR OFFICE/OUTPT VISIT, NEW, LEVL V, 60-74 MIN: ICD-10-PCS | Mod: S$PBB,,, | Performed by: NURSE PRACTITIONER

## 2019-01-29 NOTE — LETTER
[facility]  [address]  [place] [address]  Phone: [phone]  Fax: [phone] January 29, 2019      Little Nieves MD  [address] [place] [address]    Patient: Prisca Zhu   MR Number: [id number]   YOB: 1985   Date of Visit: 1/29/2019     Dear Dr. Nieves:    Thank you for referring Prisca Zhu to me for evaluation. Below are the relevant portions of my assessment and plan of care.    DIAGNOSIS:  1. Morbid Obesity with Body mass index is 50.52 kg/m². and difficulty with weight loss.  2. Co-morbidities: OI  3. PHQ9 13     PLAN:  The patient is a potential candidate for bariatric surgery. She is interested in laparoscopic Symone-en-Y with Dr. Hahn. The surgery and post-op care were discussed in detail with the patient. All questions were answered.     She understands that bariatric surgery is a tool to aid in weight loss and that she needs to be committed to the diet and exercise post-operatively for successful weight loss.  We discussed expected weight loss outcomes after surgery which is 50% of the excess weight on her frame. It was discussed with the patient that bariatric surgery is not the easy way out and that it will take plenty of dedication on the patient's part to be successful. We also discussed the possibility of weight regain if the patient strays from the diet guidelines or exercise requirements. The patient verbalized understanding and wishes to proceed with the work-up.     ORDERS:  1. Chest X-Ray, EKG and EGD  2. Psychological Consult, Bariatric Dietician Consult.  3. Bariatric Labs: BMP, CBC, Folate Serum, H. pylori, HgA1C, Hepatic Panel/LFT, Iron & TIBC, Lipid Profile, Magnesium, Phosphate, T3, T4, TSH, Free T4, Vitamin B12, and Vitamin B1.  4. Discussed the possibility of hyper-fertility surrounding 2 week liquid diet before and after surgery: advised patient to use 2 forms of birth control during this time period to avoid  conception.  Patient verbalized understanding and will discuss options with her GYN.  5. No NSAIDS after surgery due to increased risk of stomach ulcers. Talk to your prescribing provider about alternative options for your pain.  6. Avoid greasy foods.     60 minute visit, over 50% of time spent counseling patient face to face on surgical options, risks, benefits, expected diet, recommended exercise regimen, and expected weight loss.     If you have questions, please do not hesitate to call me. I look forward to following Prisca along with you.    Sincerely,        DEANDRA Bonilla   Section of Bariatric Surgery  Department of Surgery  [facility]    NP/afw

## 2019-01-29 NOTE — PROGRESS NOTES
BARIATRIC NEW PATIENT EVALUATION    CHIEF COMPLAINT:   Morbid Obesity Body mass index is 50.52 kg/m². and difficulty with weight loss.     HISTORY OF PRESENT ILLNESS:  Prisca Zhu  is a 33 y.o. female presenting for morbid obesity Body mass index is 50.52 kg/m². and difficulty with weight loss. The patient has tried switching from white bread and rice to wheat bread and brown rice, Lipozene, apple cider vinegar. She reports she has not tried portion control or low CHO diets. Wt difficulty began in childhood. Her highest wt is 235 pounds.     She experiences heartburn twice weekly only when she eats greasy foods, treated effectively with Tums. If she eliminates these foods she does not have any symptoms. States she continues to eat them because they are good.    PAST MEDICAL HISTORY:  Past Medical History:   Diagnosis Date    Anemia     BMI 50.0-59.9, adult     History of blood transfusion     Obesity     OI (osteogenesis imperfecta)     Osteopetrosis     Tendonitis      PAST SURGICAL HISTORY:  Past Surgical History:   Procedure Laterality Date     SECTION      ,     FEMUR FRACTURE SURGERY Bilateral     hardware/rods in both legs    FRACTURE SURGERY      femur     FAMILY HISTORY:  Family History   Problem Relation Age of Onset    Hypertension Mother     Hyperlipidemia Mother     Asthma Mother     Anxiety disorder Mother     No Known Problems Father      SOCIAL HISTORY:  Social History     Socioeconomic History    Marital status: Single     Spouse name: Not on file    Number of children: Not on file    Years of education: Not on file    Highest education level: Not on file   Social Needs    Financial resource strain: Not on file    Food insecurity - worry: Not on file    Food insecurity - inability: Not on file    Transportation needs - medical: Not on file    Transportation needs - non-medical: Not on file   Occupational History    Not on file   Tobacco Use     "Smoking status: Never Smoker    Smokeless tobacco: Never Used   Substance and Sexual Activity    Alcohol use: No    Drug use: Yes    Sexual activity: Yes     Partners: Male     Birth control/protection: None, IUD   Other Topics Concern    Not on file   Social History Narrative    Disabled 2/2 chronic left shoulder pain     MEDICATIONS:  Current Outpatient Medications   Medication Sig Dispense Refill    ibuprofen (ADVIL,MOTRIN) 200 MG tablet Take 200 mg by mouth every 6 (six) hours as needed for Pain.       No current facility-administered medications for this visit.        ALLERGIES:  Review of patient's allergies indicates:   Allergen Reactions    Pcn [penicillins] Shortness Of Breath       ROS:  Review of Systems   Constitutional: Negative for chills, fever and malaise/fatigue.   Eyes: Negative for blurred vision and double vision.   Respiratory: Negative for cough, shortness of breath and wheezing.         No SOB climbing stairs or walking   Cardiovascular: Negative for chest pain and palpitations.   Gastrointestinal: Negative for abdominal pain, blood in stool, constipation, diarrhea, heartburn, melena, nausea and vomiting.   Genitourinary: Negative for dysuria, frequency and urgency.   Musculoskeletal: Positive for back pain and joint pain (left shoulder pain). Negative for myalgias and neck pain.   Neurological: Negative for dizziness, tingling and headaches.   Psychiatric/Behavioral: Negative for depression and suicidal ideas. The patient is not nervous/anxious.        PE:  Vitals:    01/29/19 1316   BP: 106/68   Pulse: 83   Weight: 105.9 kg (233 lb 7.5 oz)   Height: 4' 9" (1.448 m)       Physical Exam   Constitutional: She is oriented to person, place, and time. She appears well-developed and well-nourished. No distress.   Morbidly obese   HENT:   Head: Normocephalic and atraumatic.   Eyes: Conjunctivae are normal. Right eye exhibits no discharge. Left eye exhibits no discharge. No scleral icterus. "   Neck: Neck supple.   Cardiovascular: Normal rate, regular rhythm, normal heart sounds and intact distal pulses. Exam reveals no gallop and no friction rub.   No murmur heard.  Pulmonary/Chest: Effort normal and breath sounds normal. No respiratory distress. She has no wheezes. She has no rales.   Abdominal: Soft. Bowel sounds are normal. She exhibits no distension and no mass. There is no tenderness. There is no rebound and no guarding. No hernia.   Musculoskeletal: Normal range of motion. She exhibits no edema.   Neurological: She is alert and oriented to person, place, and time.   Skin: Skin is warm, dry and intact. Capillary refill takes less than 2 seconds. No rash noted. She is not diaphoretic. No erythema. No pallor.   Psychiatric: She has a normal mood and affect. Her speech is normal and behavior is normal. Judgment and thought content normal.   Nursing note and vitals reviewed.       DIAGNOSIS:  1. Morbid Obesity with Body mass index is 50.52 kg/m². and difficulty with weight loss.  2. Co-morbidities: OI  3. PHQ9 13    PLAN:  The patient is a potential candidate for Bariatric Surgery. she is interested in laparoscopic Symone-en-Y with Dr. Hahn. The surgery and post-op care were discussed in detail with the patient. All questions were answered.    she understands that bariatric surgery is a tool to aid in weight loss and that she needs to be committed to the diet and exercise post-operatively for successful weight loss.  Discussed expected weight loss outcomes after surgery which is 50% of the excess weight on her frame. Discussed with patient that bariatric surgery is not the easy way out and that it will take plenty of dedication on the patient's part to be successful. Also discussed the possibility of weight regain if the patient strays from the diet guidelines or exercise requirements. Patient verbalized understanding and wishes to proceed with the work-up.    ORDERS:  1. Chest X-Ray, EKG and  EGD  2. Psychological Consult, Bariatric Dietician Consult.  3. Bariatric Labs: BMP, CBC, Folate Serum, H. pylori, HgA1C, Hepatic Panel/LFT, Iron & TIBC, Lipid Profile, Magnesium, Phosphate, T3, T4, TSH, Free T4, Vitamin B12, and Vitamin B1.  4. Discussed the possibility of hyper-fertility surrounding 2 week liquid diet before and after surgery: advised patient to use 2 forms of birth control during this time period to avoid conception.  Patient verbalized understanding and will discuss options with her GYN.  5. No NSAIDS after surgery due to increased risk of stomach ulcers. Talk to your prescribing provider about alternative options for your pain.  6. Avoid greasy foods.    Primary Physician: Little Nieves MD  RTC: As scheduled.    60 minute visit, over 50% of time spent counseling patient face to face on surgical options, risks, benefits, expected diet, recommended exercise regimen, and expected weight loss.

## 2019-01-29 NOTE — PROGRESS NOTES
NUTRITION NOTE    Referring Physician: Ramon Hahn M.D.  Reason for MNT Referral: Follow up MSD for possible Gastric Bypass    Patient presents for 2nd visit for MSD with weight gain over the past month; Pt has not made any changes since last visit    CLINICAL DATA:  33 y.o. female.    Current Weight: 233.5 lbs  Weight Change Since Initial Visit: Gain of 2 lbs  Ideal Body Weight: 119 lbs  There is no height or weight on file to calculate BMI.    NUTRITIONAL NEEDS:  1200 Calories  Per day  65- -81 Grams Protein (1.2 -1.5 gms/kg IBW per day)  CURRENT DIET:  Regular diet.  Diet Recall: Food records are not present.  Breakfast: Skips at times  Lunch: skips  Dinner: skips  Snacks: crackers, vitamin water,   Other day pizza and rice    Diet Includes: snacking at times throughout the day with no meal consumed or take out pizza  Meal Pattern: Irregular.  Protein Supplements: 0 per day.  Snacking: Excess. Snacks include starches or sugary beverages.  Vegetables: Likes a variety. Eats rarely.  Fruits: Likes a variety. Eats rarely.  Beverages: whole milk and Koolaid  Dining Out:  Weekly. once a week Mostly take-out. Chinese  Cooking at home: Weekly. Twice a week Mostly pasta pasta and chicken or shrimp or crabmeat.    CURRENT EXERCISE:  None  Will join gym with mom    Vitamins / Minerals / Herbs:   none    Food Allergies:   None reported    Social:  Disabled.  Alcohol: None.  Smoking: None.    ASSESSMENT:  Patient demonstrates some willingness to change lifestyle habits as evidenced by being able to name which starchy carbs she should avoid.    Doing poorly with working on greatest challenges (irregular meal patterns).    Barriers to Education:  physical or mental condition  Stage of Change:  determination    NUTRITION DIAGNOSIS:  Obesity related to Food and nutrition related knowledge deficit, Excessive carbohydrate intake, Inadequate protein intake and Physical inactivity as evidence by BMI.  Status: Same    PLAN:  Pt  is potential candidate for surgery.    Diet: Adjust diet plan.  Work on Bariatric Nutrition Checklist.  Work on expanding variety of vegetables.  Work on gradually cutting back on starchy CHO in the diet.  1200-calorie diet.  1500-calorie diet.  5-6 meals per day.  Start including protein supplements in the diet plan daily.  Reduce frequency of dining out.  More grocery shopping and meal preparation at home.  Increase exercise.  Return to clinic.    Exercise: BEGIN.    Behavior Modification: Begin to document food & activity logs daily.      Return to clinic in one month.  Needs additional visit(s) with RD.    Communicated nutrition plan with bariatric team.    SESSION TIME:  30 minutes

## 2019-01-29 NOTE — PATIENT INSTRUCTIONS
Prior to surgery you will need to complete:  - Dietitian consult and follow up appointments as needed  - Labs  - Chest X-ray  - EKG  - Psychological evaluation, Please call psychiatry [phone] to schedule  - EGD    In preparation for bariatric surgery, please complete the following:   · Discuss your current medications with your primary care provider, remember your medications will need to be crushed, chewable, or in liquid form for the first 3-6 months after a gastric bypass or sleeve.  For a gastric band, your medications will need to be crushed indefinitely.    · If you take a blood thinner such as: Coumadin (warfarin), Pradaxa (dabigatran), or Plavix (clopidogrel), you will need to speak with your prescribing provider on how or if this medication can be stopped before surgery.   · If you take a medication for depression or anxiety, you will need to begin crushing or opening the capsule 1-3 months prior to surgery.  Remember to discuss this with the psychologist or psychiatrist that you see.   · If you take medication for arthritis on a daily basis that is considered a non-steroidal anti-inflammatory (NSAID), please discuss with your prescribing physician an alternative medication.  After having gastric bypass or gastric sleeve, this group of medications is not appropriate to take due to increased risk of bleeding stomach ulcers.  · Discussed the possibility of hyper-fertility surrounding 2 week liquid diet before and after surgery: advised patient to use 2 forms of birth control during this time period to avoid conception.  Patient verbalized understanding and will discuss options with her GYN.  · Discussed the possibility of hyper-fertility surrounding 2 week liquid diet before and after surgery: advised patient to use 2 forms of birth control during this time period to avoid conception.  Patient verbalized understanding and will discuss options with her GYN.        DEFINITIONS  Appointments: Pre-scheduled  meetings or consultations with any physician, advanced practice provider, dietitian, or psychologist, and labs, imaging studies, sleep studies, etc.   Late cancellation: Cancelling an appointment 24-48 hours prior to scheduled time.  No-Show appointment:  is when    You do NOT arrive to your appointment at the time its scheduled.   You call to cancel or cancel via [employer] less than 24 hours in advance of your scheduled appointment   You show up 15 minutes AFTER your scheduled appointment time without any notification of being late.     POLICY  1. You are allowed up to 3 cancellations for appointments.    After 3 cancellations your case will be placed on hold for 2 months and after that time you can resume the program.   2. You are allowed only 1 no-show for an appointment.    You will be re-scheduled one time and if there is a 2nd no-show at any point, your case will be placed on hold for 3 months.  After 3 months you can resume the program.     3. Upon resuming the program after being placed on hold for either above mentioned reasons, if you have a late cancel or no show for any appointment, the bariatric team will review if youre an appropriate candidate for surgery at the monthly meeting.

## 2019-01-29 NOTE — PATIENT INSTRUCTIONS
Bariatric Diet Grocery List      High in Protein:   ? Canned tuna or chicken (packed in water)  ? Lean ground turkey breast or ground round  ? Turkey or chicken (no skin)  ? Lean pork or beef   ? Scrambled, poached, or boiled eggs  ? Baked or broiled fish and seafood (not fried!)  ? Beans, edamame and lentils  ? Low fat deli meats: turkey, chicken, ham, roast beef  ? 1% or Skim Milk, Lactaid, or Soymilk  ? Low-fat cheese, cottage cheese, mozzarella string cheese, ricotta  ? Light yogurt, FF/SF frozen yogurt, custard, SF pudding  ? Protein drinks and protein bars with 0-4 grams sugar     Fruits, Vegetables and Snacks   - Green beans, broccoli, cauliflower, spinach, asparagus, carrots, lima beans, yellow squash, zucchini, etc.  - Apple, pineapple, peach, grapes, banana, watermelon, oranges, etc.  - Fruit canned in its own juice or in water (not in syrup)  - Raw veggies  - Lettuce: dark greens like spinach and Fabián  - Unsalted Nuts  - Cesar Links beef jerky     Fluids:   Skim/1% milk, Lactaid, Soymilk  Sugar-free beverages  (decaf and non-carbonated)  Decaf coffee & decaf tea   Water     Eating healthy on the go:     Fast Food Restaurant/Item Calories Protein    Xochitl (So-Shee) [employer] Premium Grilled Chicken Classic [place], no bun < 350 28   Xochitl (So[employer]Shee) [employer] Premium Salads with Grilled Chicken, no dressing  190-290 27-30   Mallories Ultimate Chicken Trail Side, no bun < 390 34   Mallories Half-Size Salads 290-440 19-23   Mallories Large [place]  270 19   [employer] Double Hamburger, no bun < 330 19   [employer] TENDERGRILL Chicken [place], no bun or sanchez < 360 36   [employer] Veggie Burger, no bun or sanchez < 320 22   [employer] Garden Fresh Salad Chicken Caesar with TENDERGRILL and dressing   (*Use ½ dressing packet to reduce calories)  490 41   Chick-kirby-A Chargrilled Chicken [place], no bun < 310 29   Chick-kirby-A Salads with Grilled Chicken, no dressing  (*Not the Diego Salad)  180-330 25-29   Sonic Jr. Asher, no bun < 330 15   Sonic  Classic Chiken Anita Grilled, no bun  < 450 32   P[place] Naked Tenders (3) with Regular Green Beans, no sauce 210 28   [facility]s Club [place], no sanchez 277 37   Chipotle Chicken, Steak, or Carnitas Salad with Black or [place] Beans, Tomato Salsa, and Fajita Veggies on Bed of Lettuce  335-360 33-42     Sit Down Restaurant/Item Calories Protein    Applebees Under 550 Calories Entrees, no potatoes  [phone] 43-56   Applebees Weight Watchers Entrees, no potatoes 300-490 22-53   Applebees 7 oz. House Sirloin with seasonal vegetables 285-310 36-40   Applebees Bowl of [place] 400 29   [employer] Simple&Fit Spinach, Mushroom, and Tomato Omelette  330 29   [employer] Simple[employer]Fit Grilled Balsamic-Glazed Chicken  440 39   [employer] Grilled Chicken Sp[place]ini with Sweet Red Wine Sauce 472 37   [employer] Sicilian Meatballs 360 23   [employer] Herb-Grilled [place] 480 56   Josues Fit Fare Omelette  390 34     1200- 1500 calorie Sample meal plan   80-120g protein per day.   Protein drinks and bars: 0-4 grams sugar   Drink lots of water throughout the day and exercise!   MENU # 1   Breakfast: 2 eggs, 1 turkey sausage hanna, 1 apple   Snack: P3 protein pack  Lunch: 2 roll-ups (sliced turkey, low-fat sliced cheese, mustard), 12 baby carrots dipped in 1 Tbsp natural peanut butter   Mid-Day Snack: ¼ cup unsalted almonds, ½ cup fruit   Dinner: 1 chicken thigh simmered in tomato sauce + 2 Tbsp mozzarella cheese, ½ cup black beans, 1/2 cup steamed carrots   Evening Snack: 1/2 cup grapes with 4 cubes low-fat cheddar cheese   ___________________________________________________   MENU # 2   Breakfast: 200 calorie protein drink   Mid-morning snack : ¼ cup unsalted nuts, medium banana   Lunch: 3oz tuna or chicken salad made with 2 tbsp light sanchez, over salad with tomatoes and cucumbers.   Snack: low-fat cheese stick   Dinner: 3oz hamburger hanna, slice of low-fat cheese, 1 cup yellow squash and zucchini sauteed in nonstick  cookspray  Snack: 6oz light yogurt   _______________________________________________________   Menu #3   Breakfast: 6oz plain Greek yogurt + fruit (½ banana OR ½ cup fruit - pineapple, blueberries, strawberries, peach), may add Splenda to ashwini.   Lunch: Grilled chicken breast w/ slice low-fat pepper tarun cheese, 1/2 cup grilled/baked asparagus and small salad with Salad Spritzer.   Mid-Day snack: 200 calorie protein drink   Dinner: 4oz Grilled fish, ½ cup white beans, ½ cup cooked spinach   Evening Snack: fudgsicle no-sugar-added   Menu # 4   Breakfast: 1 scoop vanilla protein powder + 4oz skim milk + 4oz coffee   Snack: Pure protein bar   Lunch: 2 Lettuce tacos: 3oz seasoned ground turkey wrapped in a Fabián lettuce leaves with 1 Tbsp shredded cheese and dollop low-fat sour cream   Snack: ½ cup cottage cheese, ½ cup pineapple chunks   Dinner: Shrimp omelet: 2 eggs, ½ cup shrimp, green onions, and shredded cheese   ______________________________________________________   Menu #5   Breakfast: 1 cup low-fat cottage cheese, ½ cup peaches (no sugar added)   Snack: 1 apple, 4 cubes cheese   Lunch: 3oz baked pork chop, 1 cup okra and tomato stew   Snack: 1/2 cup black beans + salsa + dollop light sour cream   Dinner: Caprese chicken salad: 3 oz chicken breast, 1oz fresh mozzarella, sliced tomato, 1 Tbsp olive oil, basil   Snack: sugar-free popsicle   _______________________________________________________  Menu #6   Breakfast: ½ cup part-skim ricotta cheese, 2 Tbsp sugar-free strawberry preserves, sprinkle of slivered almonds   Snack: 1 orange + string cheese  Lunch: 3 oz canned chicken, 1oz shredded cheddar cheese, ½ cup black beans, 2 Tbsp salsa   Snack: 200 calorie Protein drink   Dinner: 4 oz grilled salmon steak, over mixed green salad with 2 tbsp light dressing   _______________________________________________________  Menu #7  Breakfast: crust-less quiche (bake 1 egg mixed w/ low fat cheese, broccoli and low  sodium ham in a muffin cup until cooked inside)  Snack: 1 light yogurt  Lunch: protein shake (1 scoop powder + 8 oz skim milk, blended w/ ice)  Snack: small apple w/ 2 tbsp PB2 (powdered peanut butter)  Dinner: 2 Turkey meatballs w/ low sugar marinara sauce, 1/2 cup spiralized zucchini (sauteed on stove top w/ nonstick cookspray)    Eating Protein after Bariatric Surgery  After having Bariatric Surgery it can get confusing which foods are the best to eat, especially when it comes to getting in enough protein when you are on the go. Here are a few ideas on how to get in the best quality of protein when youre having a busy day.    Protein bars can be a great way to get in the calories and protein you need. NOTICE: Protein bars are high in calories and should be used as a meal replacement. You should purchase protein bars with at least 20g of protein and no more then 4g of sugar. A few bars that meet these guidelines are:    Product Name Serving Size Calories Protein Sugar   Atkins Advantage 1 bar (1.6 oz) 150-200 kcal 10-15g protein 1g sugar   Pure Protein 1  bar   (1.76 oz) 180 kcal   19g protein 2g sugar   Think Thin 1 bar     (2.1 oz) 240 kcal 20g protein 0g sugar   EAS Myoplex Carb Control 1 bar    (2.46 oz ) 260 kcal 25g protein 1g sugar   Power Bar Protein Plus Reduced Sugar 1 bar     (2.57 oz) 270 kcal 22g protein 1g sugar   Protein Revolution 1 bar       (2.75 oz) 280 kcal 32g protein 2g sugar   MET-RX Protein Plus 1 bar     (3.0 oz) 300 kcal 32g protein 3g sugar   Pure Protein 1 bar    (2.75 oz) 310 kcal 31g protein 3g sugar   Detour Lean Muscle 1 bar     (3.2 oz) 370 kcal 32g protein, 3g sugar

## 2019-02-04 ENCOUNTER — HOSPITAL ENCOUNTER (OUTPATIENT)
Dept: CARDIOLOGY | Facility: CLINIC | Age: 34
Discharge: HOME | End: 2019-02-04
Payer: MEDICARE

## 2019-02-04 ENCOUNTER — HOSPITAL ENCOUNTER (OUTPATIENT)
Dept: RADIOLOGY | Facility: HOSPITAL | Age: 34
Discharge: HOME | End: 2019-02-04
Attending: NURSE PRACTITIONER
Payer: MEDICARE

## 2019-02-04 DIAGNOSIS — E66.01 CLASS 3 SEVERE OBESITY DUE TO EXCESS CALORIES WITHOUT SERIOUS COMORBIDITY WITH BODY MASS INDEX (BMI) OF 50.0 TO 59.9 IN ADULT: ICD-10-CM

## 2019-02-04 DIAGNOSIS — Z79.899 OTHER LONG TERM (CURRENT) DRUG THERAPY: ICD-10-CM

## 2019-02-04 PROCEDURE — 93005 ELECTROCARDIOGRAM TRACING: CPT | Mod: PBBFAC | Performed by: INTERNAL MEDICINE

## 2019-02-04 PROCEDURE — 93010 EKG 12-LEAD: ICD-10-PCS | Mod: S$PBB,,, | Performed by: INTERNAL MEDICINE

## 2019-02-04 PROCEDURE — 71046 X-RAY EXAM CHEST 2 VIEWS: CPT | Mod: 26,,, | Performed by: RADIOLOGY

## 2019-02-04 PROCEDURE — 71046 X-RAY EXAM CHEST 2 VIEWS: CPT | Mod: TC,FY

## 2019-02-04 PROCEDURE — 71046 XR CHEST PA AND LATERAL: ICD-10-PCS | Mod: 26,,, | Performed by: RADIOLOGY

## 2019-02-04 PROCEDURE — 93010 ELECTROCARDIOGRAM REPORT: CPT | Mod: S$PBB,,, | Performed by: INTERNAL MEDICINE

## 2019-02-20 ENCOUNTER — TELEPHONE (OUTPATIENT)
Dept: BARIATRICS | Facility: CLINIC | Age: 34
End: 2019-02-20

## 2019-02-20 NOTE — TELEPHONE ENCOUNTER
Called patient to obtain a preferred pharmacy. Patient understands to  her RX and how to take it.

## 2019-02-22 ENCOUNTER — DOCUMENTATION ONLY (OUTPATIENT)
Dept: BARIATRICS | Facility: CLINIC | Age: 34
End: 2019-02-22

## 2019-02-26 RX ORDER — CLARITHROMYCIN 500 MG/1
500 TABLET, FILM COATED ORAL EVERY 12 HOURS
Qty: 28 TABLET | Refills: 0 | Status: SHIPPED | OUTPATIENT
Start: 2019-02-26 | End: 2019-03-12

## 2019-02-26 RX ORDER — METRONIDAZOLE 500 MG/1
500 TABLET ORAL EVERY 8 HOURS
Qty: 42 TABLET | Refills: 0 | Status: SHIPPED | OUTPATIENT
Start: 2019-02-26 | End: 2019-03-12

## 2019-02-26 RX ORDER — OMEPRAZOLE 20 MG/1
20 CAPSULE, DELAYED RELEASE ORAL 2 TIMES DAILY
Qty: 28 CAPSULE | Refills: 0 | Status: SHIPPED | OUTPATIENT
Start: 2019-02-26 | End: 2019-03-12

## 2019-02-26 NOTE — PROGRESS NOTES
Spoke with pt regarding + H pylori and treatment regimen. Requested she call when complete for stool testing instructions.   She denies pregnancy. Notes she has an IUD.   She reports that she has been having lower abd pain that is new since her surgery consult. She reports nausea in the mornings with light vomiting. She denies constipation, diarrhea or fevers. She is sexually active. She was directed to see her PCP for evaluation, ER if urgent. We discussed and she verbalized that she is not to take any H pylori treatment if she is pregnant. She reports she will rule out pregnancy before starting any treatment. She also reports plan to see PCP today.

## 2019-02-27 ENCOUNTER — OFFICE VISIT (OUTPATIENT)
Dept: FAMILY MEDICINE | Facility: CLINIC | Age: 34
End: 2019-02-27
Payer: MEDICARE

## 2019-02-27 VITALS
HEART RATE: 83 BPM | OXYGEN SATURATION: 97 % | TEMPERATURE: 99 F | BODY MASS INDEX: 51.45 KG/M2 | WEIGHT: 237.75 LBS | DIASTOLIC BLOOD PRESSURE: 80 MMHG | SYSTOLIC BLOOD PRESSURE: 124 MMHG

## 2019-02-27 DIAGNOSIS — J01.90 ACUTE RHINOSINUSITIS: ICD-10-CM

## 2019-02-27 DIAGNOSIS — E55.9 VITAMIN D DEFICIENCY: ICD-10-CM

## 2019-02-27 DIAGNOSIS — A04.8 HELICOBACTER PYLORI INFECTION: Primary | ICD-10-CM

## 2019-02-27 PROCEDURE — 99213 OFFICE O/P EST LOW 20 MIN: CPT | Mod: PBBFAC,PO | Performed by: INTERNAL MEDICINE

## 2019-02-27 PROCEDURE — 99204 PR OFFICE/OUTPT VISIT, NEW, LEVL IV, 45-59 MIN: ICD-10-PCS | Mod: S$PBB,,, | Performed by: INTERNAL MEDICINE

## 2019-02-27 PROCEDURE — 99999 PR PBB SHADOW E&M-EST. PATIENT-LVL III: ICD-10-PCS | Mod: PBBFAC,,, | Performed by: INTERNAL MEDICINE

## 2019-02-27 PROCEDURE — 99204 OFFICE O/P NEW MOD 45 MIN: CPT | Mod: S$PBB,,, | Performed by: INTERNAL MEDICINE

## 2019-02-27 PROCEDURE — 99999 PR PBB SHADOW E&M-EST. PATIENT-LVL III: CPT | Mod: PBBFAC,,, | Performed by: INTERNAL MEDICINE

## 2019-02-27 RX ORDER — ERGOCALCIFEROL 1.25 MG/1
50000 CAPSULE ORAL
Qty: 4 CAPSULE | Refills: 2 | Status: SHIPPED | OUTPATIENT
Start: 2019-02-27 | End: 2020-01-20

## 2019-02-27 RX ORDER — PROMETHAZINE HYDROCHLORIDE AND DEXTROMETHORPHAN HYDROBROMIDE 6.25; 15 MG/5ML; MG/5ML
5 SYRUP ORAL EVERY 12 HOURS PRN
Qty: 180 ML | Refills: 0 | Status: SHIPPED | OUTPATIENT
Start: 2019-02-27 | End: 2019-03-09

## 2019-02-27 NOTE — Clinical Note
Eze Olivares - could you contact her re: scheduling a Pap with me or Eliz in a few weeks? (she does not utilize [employer] to view messages currently) - sorry I forgot to schedule her previously!JESSICA

## 2019-02-27 NOTE — PROGRESS NOTES
Subjective:     Chief Complaint  Chief Complaint   Patient presents with    Sore Throat    Cough    Abdominal Pain    abnormal lab results       HPI  Prisca Zhu is a 33 y.o. female with medical diagnoses as listed in the medical history and problem list that presents for multiple complaints. Patient is new to me.      H pylori gastritis   Several month history of epigastric and right sided abdominal pain/discomfort accompanied by nausea, no bowel changes  She had a stool test recently which was + for H pylori and treatment regimen for eradication was prescribed - has not started as of yet    Cough with some fever/chills of 1 days' duration  Took Tylenol Cold/Flu     Patient Care Team:  Mathew Carpenter MD as PCP - General (Internal Medicine)      PAST MEDICAL HISTORY:  Past Medical History:   Diagnosis Date    Anemia     BMI 50.0-59.9, adult     History of blood transfusion     Obesity     OI (osteogenesis imperfecta)     Osteopetrosis     Tendonitis        PAST SURGICAL HISTORY:  Past Surgical History:   Procedure Laterality Date     SECTION      ,     FEMUR FRACTURE SURGERY Bilateral     hardware/rods in both legs    FRACTURE SURGERY      femur       SOCIAL HISTORY:  Social History     Socioeconomic History    Marital status: Single     Spouse name: Not on file    Number of children: Not on file    Years of education: Not on file    Highest education level: Not on file   Social Needs    Financial resource strain: Not on file    Food insecurity - worry: Not on file    Food insecurity - inability: Not on file    Transportation needs - medical: Not on file    Transportation needs - non-medical: Not on file   Occupational History    Not on file   Tobacco Use    Smoking status: Never Smoker    Smokeless tobacco: Never Used   Substance and Sexual Activity    Alcohol use: No    Drug use: Yes    Sexual activity: Yes     Partners: Male     Birth control/protection:  None, IUD   Other Topics Concern    Not on file   Social History Narrative    Disabled 2/2 chronic left shoulder pain       FAMILY HISTORY:  Family History   Problem Relation Age of Onset    Hypertension Mother     Hyperlipidemia Mother     Asthma Mother     Anxiety disorder Mother     No Known Problems Father        ALLERGIES AND MEDICATIONS: updated and reviewed.  Review of patient's allergies indicates:   Allergen Reactions    Pcn [penicillins] Shortness Of Breath     Current Outpatient Medications   Medication Sig Dispense Refill    clarithromycin (BIAXIN) 500 MG tablet Take 1 tablet (500 mg total) by mouth every 12 (twelve) hours. Please take with other medications: Amoxicillin and Omeprazole for 14 days 28 tablet 0    ergocalciferol (ERGOCALCIFEROL) 50,000 unit Cap Take 1 capsule (50,000 Units total) by mouth every 7 days. 4 capsule 2    metroNIDAZOLE (FLAGYL) 500 MG tablet Take 1 tablet (500 mg total) by mouth every 8 (eight) hours. Take with clarithromycin and omeprazole. for 14 days 42 tablet 0    omeprazole (PRILOSEC) 20 MG capsule Take 1 capsule (20 mg total) by mouth 2 (two) times daily. Take with amoxil and biaxin for 14 days 28 capsule 0    promethazine-dextromethorphan (PROMETHAZINE-DM) 6.25-15 mg/5 mL Syrp Take 5 mLs by mouth every 12 (twelve) hours as needed. 180 mL 0     No current facility-administered medications for this visit.          ROS:  Review of Systems   Constitutional: Negative for chills, diaphoresis and fever.   HENT: Positive for congestion, rhinorrhea, sneezing and sore throat. Negative for ear pain, sinus pressure and sinus pain.    Eyes: Negative for discharge and redness.   Respiratory: Positive for cough.    Cardiovascular: Negative for chest pain.   Gastrointestinal: Positive for abdominal pain. Negative for nausea and vomiting.   Musculoskeletal: Negative for arthralgias and myalgias.   Allergic/Immunologic: Negative for environmental allergies.   Neurological:  Negative for headaches.       Objective:       Physical Exam  Vitals:    02/27/19 1046   BP: 124/80   BP Location: Left arm   Patient Position: Sitting   BP Method: Large (Manual)   Pulse: 83   Temp: 99.2 °F (37.3 °C)   TempSrc: Oral   SpO2: 97%   Weight: 107.9 kg (237 lb 12.3 oz)    Body mass index is 51.45 kg/m².  Weight: 107.9 kg (237 lb 12.3 oz)       Physical Exam   Constitutional: She appears well-developed. No distress.   HENT:   Head: Normocephalic and atraumatic.   Mouth/Throat: Oropharynx is clear and moist.   Eyes: Conjunctivae and EOM are normal. Pupils are equal, round, and reactive to light.   Neck: Normal range of motion. Neck supple. No thyromegaly present.   Cardiovascular: Normal rate, normal heart sounds and intact distal pulses.   No murmur heard.  Pulmonary/Chest: Effort normal and breath sounds normal.   Abdominal: She exhibits no mass. There is tenderness (epigastric, periumbilical, LUQ). There is no rebound and no guarding.   Musculoskeletal: She exhibits no edema or deformity.   Lymphadenopathy:     She has no cervical adenopathy.   Neurological: She is alert. No cranial nerve deficit.   Skin: Skin is warm and dry.   Psychiatric: She has a normal mood and affect. Her behavior is normal.   Vitals reviewed.          Assessment:     1. Helicobacter pylori infection    2. Acute rhinosinusitis    3. BMI 50.0-59.9, adult    4. Vitamin D deficiency      Plan:     Prisca was seen today for sore throat, cough, abdominal pain and abnormal lab results.    Diagnoses and all orders for this visit:    Helicobacter pylori infection  Positive stool antigen with corresponding symptoms of gastritis/dyspepsia  Patient prescribed eradication regimen (triple therapy) for 2 weeks    Acute rhinosinusitis  Counseled regarding treatment of likely upper respiratory viral syndrome with increased fluid intake/hydration, OTC decongestants, mucolytic therapy and analgesics (e.g., ibuprofen, acetaminophen). Encouraged to  call/return to clinic if symptoms persist/worsening beyond 48-72 hours approximately.  Anti-tussive prescribed  -     promethazine-dextromethorphan (PROMETHAZINE-DM) 6.25-15 mg/5 mL Syrp; Take 5 mLs by mouth every 12 (twelve) hours as needed.    BMI 50.0-59.9, adult  Body mass index is 51.45 kg/m².  We discussed dietary interventions for the management of weight and reduction of risk for chronic metabolic disease  Following with Bariatric Surgery - considering       Vitamin D deficiency  25-OH vitamin D level 14 ng/dL  3 month vitamin D course prescribed  -     ergocalciferol (ERGOCALCIFEROL) 50,000 unit Cap; Take 1 capsule (50,000 Units total) by mouth every 7 days.      Health Maintenance       Date Due Completion Date    TETANUS VACCINE 08/04/2001 ---    Pap Smear with HPV Cotest 08/04/2004 ---            Health Maintenance reviewed and addressed as per orders - due for Pap screening    Follow-up if symptoms worsen or fail to improve.    The patient expressed understanding and no barriers to adherence were identified.     1. The patient indicates understanding of these issues and agrees with the plan. Brief care plan is updated and reviewed with the patient as applicable.     2. The patient is given an After Visit Summary that lists all medications with directions, allergies, orders placed during this encounter and follow-up instructions.     3. I have reviewed the patient's medical information including past medical, family, and social history sections including the medications and allergies.     4. We discussed the patient's current medications. I reconciled the patient's medication list and prepared and supplied needed refills.       Mathew Carpenter MD  Internal Medicine-Pediatrics

## 2019-02-28 ENCOUNTER — CLINICAL SUPPORT (OUTPATIENT)
Dept: BARIATRICS | Facility: CLINIC | Age: 34
End: 2019-02-28
Payer: MEDICARE

## 2019-02-28 ENCOUNTER — TELEPHONE (OUTPATIENT)
Dept: FAMILY MEDICINE | Facility: CLINIC | Age: 34
End: 2019-02-28

## 2019-02-28 ENCOUNTER — LAB VISIT (OUTPATIENT)
Dept: LAB | Facility: HOSPITAL | Age: 34
End: 2019-02-28
Payer: MEDICARE

## 2019-02-28 ENCOUNTER — TELEPHONE (OUTPATIENT)
Dept: BARIATRICS | Facility: CLINIC | Age: 34
End: 2019-02-28

## 2019-02-28 VITALS — HEIGHT: 57 IN | BODY MASS INDEX: 50.66 KG/M2 | WEIGHT: 234.81 LBS

## 2019-02-28 DIAGNOSIS — R11.0 NAUSEA: ICD-10-CM

## 2019-02-28 DIAGNOSIS — R11.10 VOMITING, INTRACTABILITY OF VOMITING NOT SPECIFIED, PRESENCE OF NAUSEA NOT SPECIFIED, UNSPECIFIED VOMITING TYPE: ICD-10-CM

## 2019-02-28 DIAGNOSIS — R10.30 LOWER ABDOMINAL PAIN: Primary | ICD-10-CM

## 2019-02-28 DIAGNOSIS — R10.30 LOWER ABDOMINAL PAIN: ICD-10-CM

## 2019-02-28 LAB — HCG INTACT+B SERPL-ACNC: <1.2 MIU/ML

## 2019-02-28 PROCEDURE — 99999 PR PBB SHADOW E&M-EST. PATIENT-LVL I: ICD-10-PCS | Mod: PBBFAC,,, | Performed by: DIETITIAN, REGISTERED

## 2019-02-28 PROCEDURE — 36415 COLL VENOUS BLD VENIPUNCTURE: CPT

## 2019-02-28 PROCEDURE — 99499 NO LOS: ICD-10-PCS | Mod: S$PBB,,, | Performed by: DIETITIAN, REGISTERED

## 2019-02-28 PROCEDURE — 99211 OFF/OP EST MAY X REQ PHY/QHP: CPT | Mod: PBBFAC | Performed by: DIETITIAN, REGISTERED

## 2019-02-28 PROCEDURE — 99499 UNLISTED E&M SERVICE: CPT | Mod: S$PBB,,, | Performed by: DIETITIAN, REGISTERED

## 2019-02-28 PROCEDURE — 84702 CHORIONIC GONADOTROPIN TEST: CPT

## 2019-02-28 PROCEDURE — 99999 PR PBB SHADOW E&M-EST. PATIENT-LVL I: CPT | Mod: PBBFAC,,, | Performed by: DIETITIAN, REGISTERED

## 2019-02-28 NOTE — TELEPHONE ENCOUNTER
Called pt to see if she is able to make her appt due to being sick.  Pt is on her way with her mother

## 2019-02-28 NOTE — TELEPHONE ENCOUNTER
----- Message from Mathew Carpenter MD sent at 2/27/2019 10:54 PM CST -----  Eze Olivares - could you contact her re: scheduling a Pap with me or Eliz in a few weeks? (she does not utilize [employer] to view messages currently) - sorry I forgot to schedule her previously!    JESSICA

## 2019-02-28 NOTE — TELEPHONE ENCOUNTER
Contact patient and scheduled her a  Well women exam appointment for 03/20/2019 at 9:40 a.m. Patient confirmed appointment date and time.

## 2019-02-28 NOTE — PROGRESS NOTES
NUTRITION NOTE    Referring Physician: Guillermo Villanueva M.D.  Reason for MNT Referral: Pre op follow up for possible Gastric Bypass for nutritional education.    Patient presents for 3rd visit for MSD with weight gain over the past month; Pt has been ill with URI and feeling nauseated in the morning.  Pt reports she took a home pregnancy test that was negative however when she was pregnant with her son, the OTC pregnancy test came back negative.  PA ordered blood pregnancy test    CLINICAL DATA:  33 y.o. female.    Current Weight: 235 lbs  Weight Change Since Initial Visit: Gain of 4 lbs  Ideal Body Weight: 119 lbs  Body mass index is 50.81 kg/m².    NUTRITIONAL NEEDS:  1200 Calories  Per day  65- -81 Grams Protein (1.2 -1.5 gms/kg IBW per day)    CURRENT DIET:  Regular diet.  Diet Recall: Food records are not present.  Breakfast: skips  Lunch: Shrimp tacos with bread  Dinner: Macaroni  Snacks: pt denies snacking    Diet Includes: high carb low protein meals usually 1 or 2 per day  Meal Pattern: Same.  Protein Supplements: 0 per day.  Snacking: Adequate. Snacks include none.  Vegetables: Likes a few. Eats rarely.  Fruits: Likes a variety. Eats daily.  Beverages: water, sugar-free beverages and diet V8 splash  Dining Out:  Weekly. none Mostly take-out.  Cooking at home: Weekly.twice Mostly baked and grilled meat, fish and vegetables in stew chicken.  rice    CURRENT EXERCISE:  Adequate   Elliptical 2 times per week 1 hr    Vitamins / Minerals / Herbs:   Vitamin D    Food Allergies:   None reported    Social:  Disabled.  Alcohol: None.  Smoking: None.    ASSESSMENT:  Patient demonstrates no  willingness to change lifestyle habits as evidenced by weight gain and no exercise.    Doing poorly with working on greatest challenges (starchy CHO and irregular meal patterns).    Barriers to Education:  none  Stage of Change:  determination    NUTRITION DIAGNOSIS:  Obesity related to Food and nutrition related knowledge  deficit, Excessive carbohydrate intake, Excessive calorie intake, Inadequate protein intake and Physical inactivity as evidence by BMI.  Status: Same    PLAN:  Pt is potential candidate for surgery.    Diet: Adjust diet plan.  Start including protein supplements in the diet plan daily.  More grocery shopping and meal preparation at home.  Increase exercise.   Reduce amount of starchy carbs in diet  Exercise: Start.    Behavior Modification: Begin to document food & activity logs daily.        Return to clinic in one month.  Needs additional visit(s) with RD.    Communicated nutrition plan with bariatric team.    SESSION TIME:  30 minutes

## 2019-03-01 ENCOUNTER — TELEPHONE (OUTPATIENT)
Dept: BARIATRICS | Facility: CLINIC | Age: 34
End: 2019-03-01

## 2019-03-01 NOTE — TELEPHONE ENCOUNTER
Please call patient.   Blood test does not indicate pregnancy.   She may take H pylori treatment.

## 2019-03-01 NOTE — TELEPHONE ENCOUNTER
Called pt but it was silent, no ringing. Went straight to voicemail. L/m to c/b . Stressed in message its nothing to be concerned about

## 2019-03-02 ENCOUNTER — HOSPITAL ENCOUNTER (EMERGENCY)
Facility: HOSPITAL | Age: 34
Discharge: HOME | End: 2019-03-02
Attending: EMERGENCY MEDICINE
Payer: MEDICARE

## 2019-03-02 VITALS
HEART RATE: 70 BPM | SYSTOLIC BLOOD PRESSURE: 118 MMHG | DIASTOLIC BLOOD PRESSURE: 78 MMHG | RESPIRATION RATE: 18 BRPM | OXYGEN SATURATION: 99 %

## 2019-03-02 DIAGNOSIS — N73.9 PELVIC INFLAMMATORY DISEASE: ICD-10-CM

## 2019-03-02 DIAGNOSIS — R05.9 COUGH: ICD-10-CM

## 2019-03-02 DIAGNOSIS — A04.8 HELICOBACTER PYLORI (H. PYLORI) INFECTION: Primary | ICD-10-CM

## 2019-03-02 DIAGNOSIS — J06.9 VIRAL URI WITH COUGH: ICD-10-CM

## 2019-03-02 LAB
B-HCG UR QL: NEGATIVE
BACTERIA #/AREA URNS HPF: NORMAL /HPF
BILIRUB UR QL STRIP: NEGATIVE
CLARITY UR: ABNORMAL
COLOR UR: ABNORMAL
CTP QC/QA: YES
GLUCOSE UR QL STRIP: NEGATIVE
HGB UR QL STRIP: NEGATIVE
KETONES UR QL STRIP: ABNORMAL
LEUKOCYTE ESTERASE UR QL STRIP: ABNORMAL
MICROSCOPIC COMMENT: NORMAL
NITRITE UR QL STRIP: NEGATIVE
PH UR STRIP: 5 [PH] (ref 5–8)
PROT UR QL STRIP: NEGATIVE
SP GR UR STRIP: 1.02 (ref 1–1.03)
SQUAMOUS #/AREA URNS HPF: 4 /HPF
URN SPEC COLLECT METH UR: ABNORMAL
UROBILINOGEN UR STRIP-ACNC: ABNORMAL EU/DL
WBC #/AREA URNS HPF: 2 /HPF (ref 0–5)

## 2019-03-02 PROCEDURE — 99284 EMERGENCY DEPT VISIT MOD MDM: CPT | Mod: 25

## 2019-03-02 PROCEDURE — 87491 CHLMYD TRACH DNA AMP PROBE: CPT

## 2019-03-02 PROCEDURE — 81025 URINE PREGNANCY TEST: CPT | Performed by: EMERGENCY MEDICINE

## 2019-03-02 PROCEDURE — 81000 URINALYSIS NONAUTO W/SCOPE: CPT

## 2019-03-02 PROCEDURE — 87086 URINE CULTURE/COLONY COUNT: CPT

## 2019-03-02 PROCEDURE — 96365 THER/PROPH/DIAG IV INF INIT: CPT

## 2019-03-02 PROCEDURE — 63600175 PHARM REV CODE 636 W HCPCS: Performed by: EMERGENCY MEDICINE

## 2019-03-02 PROCEDURE — 25000003 PHARM REV CODE 250: Performed by: EMERGENCY MEDICINE

## 2019-03-02 RX ORDER — PNV NO.95/FERROUS FUM/FOLIC AC 28MG-0.8MG
100 TABLET ORAL EVERY MORNING
COMMUNITY
End: 2023-08-22 | Stop reason: SDUPTHER

## 2019-03-02 RX ORDER — AZITHROMYCIN 250 MG/1
1000 TABLET, FILM COATED ORAL
Status: COMPLETED | OUTPATIENT
Start: 2019-03-02 | End: 2019-03-02

## 2019-03-02 RX ADMIN — CEFTRIAXONE 1 G: 1 INJECTION, SOLUTION INTRAVENOUS at 10:03

## 2019-03-02 RX ADMIN — AZITHROMYCIN 1000 MG: 250 TABLET, FILM COATED ORAL at 10:03

## 2019-03-03 LAB
C TRACH DNA SPEC QL NAA+PROBE: NOT DETECTED
N GONORRHOEA DNA SPEC QL NAA+PROBE: NOT DETECTED

## 2019-03-03 NOTE — ED PROVIDER NOTES
"Encounter Date: 3/2/2019    This is a SORT/MSE of a 33 y.o. female presenting to the ED with c/o hematuria with abdominal pain. Receiving abx for 'infection' possibly H. Pylori on records review.Care will be transferred to an alternate provider when patient is roomed for a full evaluation and final disposition. CONNIE Kilgore, FNP-C 3/2/2019 7:15 PM    SCRIBE #1 NOTE: I, Mukesh Barron, am scribing for, and in the presence of,  Boom Wiley MD. I have scribed the following portions of the note - Other sections scribed: HPI, ROS.       History     Chief Complaint   Patient presents with    Hematuria     pt complains of seeing blood in urine, pain & burning w/ urination all since this am    Abdominal Pain     pt states she is being treated by pcp with antibiotics for " infection in her stomac"     CC: Abdominal Pain; Hematuria; Dysuria    HPI: This 33 y.o female with medical hx of Obesity, Anemia, Blood transfusion,  presents to the ED via personal transport c/o acute onset, lower abdominal pain, dysuria, hematuria, and decreased frequency that began this morning (3/2/19). Pt reports that she was recently dx stomach infection 2 days ago and was placed on antibiotics (Clarithromycin and Flagyl) which she reports compliance with. In addition to her sx, she states that she has been experiencing cold-like sx of subjective fever, chills, bi-temporal HA, and N/V (x2 episodes) that all began today as well. No cough, ear pain, sore throat, CP, SOB, diarrhea, or rashes.      The history is provided by the patient. No  was used.     Review of patient's allergies indicates:   Allergen Reactions    Pcn [penicillins] Shortness Of Breath     Past Medical History:   Diagnosis Date    Anemia     BMI 50.0-59.9, adult     History of blood transfusion     Obesity     OI (osteogenesis imperfecta)     Osteopetrosis     Tendonitis      Past Surgical History:   Procedure Laterality Date    "  SECTION      2016    FEMUR FRACTURE SURGERY Bilateral     hardware/rods in both legs    FRACTURE SURGERY      femur    WISDOM TOOTH EXTRACTION       Family History   Problem Relation Age of Onset    Hypertension Mother     Hyperlipidemia Mother     Asthma Mother     Anxiety disorder Mother     No Known Problems Father      Social History     Tobacco Use    Smoking status: Never Smoker    Smokeless tobacco: Never Used   Substance Use Topics    Alcohol use: No    Drug use: Yes     Review of Systems   Constitutional: Positive for chills and fever.   HENT: Negative for congestion, ear pain, rhinorrhea and sore throat.    Eyes: Negative for pain and visual disturbance.   Respiratory: Negative for cough and shortness of breath.    Cardiovascular: Negative for chest pain.   Gastrointestinal: Positive for abdominal pain (lower region), nausea and vomiting (x2 episodes today). Negative for diarrhea.   Genitourinary: Positive for dysuria, frequency (decreased) and hematuria.   Musculoskeletal: Negative for back pain and neck pain.   Skin: Negative for rash.   Neurological: Positive for headaches (bi-temporal).   All other systems reviewed and are negative.      Physical Exam     Initial Vitals [19]   BP Pulse Resp Temp SpO2   133/77 87 18 -- 100 %      MAP       --         Physical Exam  The patient was examined specifically for the following:   General:No significant distress, Good color, Warm and dry. Head and neck:Scalp atraumatic, Neck supple. Neurological:Appropriate conversation, Gross motor deficits. Eyes:Conjugate gaze, Clear corneas. ENT: No epistaxis. Cardiac: Regular rate and rhythm, Grossly normal heart tones. Pulmonary: Wheezing, Rales. Gastrointestinal: Abdominal tenderness, Abdominal distention. Musculoskeletal: Extremity deformity, Apparent pain with range of motion of the joints. Skin: Rash.   The findings on examination were normal except for the following:  The patient  has mild suprapubic abdominal tenderness.  Lungs are clear.  The heart tones are normal.  The abdomen is soft.  Extremities are nontender.   ED Course   Procedures  Labs Reviewed   URINALYSIS - Abnormal; Notable for the following components:       Result Value    Appearance, UA Cloudy (*)     Ketones, UA 1+ (*)     Urobilinogen, UA 2.0-3.0 (*)     Leukocytes, UA 1+ (*)     All other components within normal limits   CULTURE, URINE    Narrative:     Indicated criteria for high risk culture:->Other  Other (specify):->On antibiotics with dysuria and hematuria   C. TRACHOMATIS/N. GONORRHOEAE BY AMP DNA    Narrative:     Resulting Location->[facility]   URINALYSIS MICROSCOPIC   POCT URINE PREGNANCY          Imaging Results    None       Medical decision making:  Given the above, this patient presents to the emergency room with midline suprapubic pain that is worse with coughing.  The patient has scant yellow discharge on pelvic examination from a closed os.  There is uterine and cervical motion tenderness. I am concerned for pelvic inflammatory disease.  I will treat the patient with IV Rocephin I will treat with Zithromax in the emergency room.  I will have the patient continue her clarithromycin and Flagyl as an outpatient.  I will have her follow up with OBGYN.  She is on those antibiotics for Helicobacter pylori.  I will have her return if she gets worse or if new problems develop.  She has a cough and negative chest x-ray.  I do not find any evidence of pneumonia.  I specifically doubt peritonitis appendicitis.  The pelvic discomfort is midline bilateral and symmetrical. I did not find any evidence of hematuria or urinary tract infection.                Scribe Attestation:   Scribe #1: I performed the above scribed service and the documentation accurately describes the services I performed. I attest to the accuracy of the note.    Attending Attestation:           Physician Attestation for Scribe:  Physician Attestation  Statement for Scribe #1: I, Boom Wiley MD, reviewed documentation, as scribed by Mukesh Barron in my presence, and it is both accurate and complete.                    Clinical Impression:       ICD-10-CM ICD-9-CM   1. Helicobacter pylori (H. pylori) infection A04.8 041.86   2. Cough R05 786.2   3. Viral URI with cough J06.9 465.9    B97.89    4. Pelvic inflammatory disease N73.9 614.9                                Boom Wiley MD  03/04/19 2132

## 2019-03-03 NOTE — DISCHARGE INSTRUCTIONS
No sex until you are evaluated by OBGYN doctor.  Antibiotics as directed.  Please continue your clarithromycin and Flagyl.  Return immediately if you get worse or if new problems develop.  Phenergan DM for cough.    Tylenol for pain. Rest..  Rest.

## 2019-03-03 NOTE — ED TRIAGE NOTES
Patient presents to the ER via personal vehicle. Patient presents with blood in urine and abdominal pain (sharp). Reports burning with urination. States she was diagnosed with an infection in her stomach and was placed on antibiotics then started having a cold, and blood in her urine that started today. 7/10 pain

## 2019-03-04 LAB — BACTERIA UR CULT: NO GROWTH

## 2019-03-06 ENCOUNTER — PATIENT OUTREACH (OUTPATIENT)
Dept: ADMINISTRATIVE | Facility: HOSPITAL | Age: 34
End: 2019-03-06

## 2019-03-06 RX ORDER — PHENOL/SODIUM PHENOLATE
AEROSOL, SPRAY (ML) MUCOUS MEMBRANE
Refills: 0 | COMMUNITY
Start: 2019-02-26 | End: 2019-08-07 | Stop reason: SDUPTHER

## 2019-03-20 ENCOUNTER — OFFICE VISIT (OUTPATIENT)
Dept: FAMILY MEDICINE | Facility: CLINIC | Age: 34
End: 2019-03-20
Payer: MEDICARE

## 2019-03-20 VITALS
BODY MASS INDEX: 50.56 KG/M2 | HEIGHT: 57 IN | SYSTOLIC BLOOD PRESSURE: 100 MMHG | DIASTOLIC BLOOD PRESSURE: 72 MMHG | HEART RATE: 71 BPM | OXYGEN SATURATION: 96 % | TEMPERATURE: 99 F | WEIGHT: 234.38 LBS

## 2019-03-20 DIAGNOSIS — R10.2 PELVIC PAIN: ICD-10-CM

## 2019-03-20 DIAGNOSIS — Z12.4 ENCOUNTER FOR PAP SMEAR OF CERVIX WITH HPV DNA COTESTING: Primary | ICD-10-CM

## 2019-03-20 DIAGNOSIS — Z23 NEED FOR TDAP VACCINATION: ICD-10-CM

## 2019-03-20 DIAGNOSIS — T83.32XS MALPOSITIONED INTRAUTERINE DEVICE (IUD), SEQUELA: ICD-10-CM

## 2019-03-20 DIAGNOSIS — Z11.3 SCREENING FOR STD (SEXUALLY TRANSMITTED DISEASE): ICD-10-CM

## 2019-03-20 PROCEDURE — 87480 CANDIDA DNA DIR PROBE: CPT

## 2019-03-20 PROCEDURE — 88175 CYTOPATH C/V AUTO FLUID REDO: CPT

## 2019-03-20 PROCEDURE — 87624 HPV HI-RISK TYP POOLED RSLT: CPT

## 2019-03-20 PROCEDURE — 99213 OFFICE O/P EST LOW 20 MIN: CPT | Mod: 25,S$GLB,, | Performed by: INTERNAL MEDICINE

## 2019-03-20 PROCEDURE — 99999 PR PBB SHADOW E&M-EST. PATIENT-LVL IV: ICD-10-PCS | Mod: PBBFAC,,, | Performed by: INTERNAL MEDICINE

## 2019-03-20 PROCEDURE — 99213 PR OFFICE/OUTPT VISIT, EST, LEVL III, 20-29 MIN: ICD-10-PCS | Mod: 25,S$GLB,, | Performed by: INTERNAL MEDICINE

## 2019-03-20 PROCEDURE — 99999 PR PBB SHADOW E&M-EST. PATIENT-LVL IV: CPT | Mod: PBBFAC,,, | Performed by: INTERNAL MEDICINE

## 2019-03-20 PROCEDURE — 99214 OFFICE O/P EST MOD 30 MIN: CPT | Mod: PBBFAC,PO | Performed by: INTERNAL MEDICINE

## 2019-03-20 RX ORDER — AMOXICILLIN 500 MG
CAPSULE ORAL EVERY MORNING
COMMUNITY

## 2019-03-20 NOTE — PROGRESS NOTES
"Subjective:       Prisca Zhu is a 33 y.o. woman who comes in today for a  pap smear. Her most recent Pap smear was in 2015 and showed no abnormalities. Previous abnormal Pap smears: yes - 8/2015. Contraception: mirena - states difficult to locate strings presently    The following portions of the patient's history were reviewed and updated as appropriate: allergies, current medications, past family history, past medical history, past social history, past surgical history and problem list.    Review of Systems  Gastrointestinal: positive for abdominal pain  Genitourinary:positive for vaginal discharge     Objective:      /72 (BP Location: Right arm, Patient Position: Sitting, BP Method: Large (Manual))   Pulse 71   Temp 98.6 °F (37 °C) (Oral)   Ht 4' 9" (1.448 m)   Wt 106.3 kg (234 lb 5.6 oz)   LMP  (LMP Unknown)   SpO2 96%   BMI 50.71 kg/m²   Pelvic Exam: external genitalia normal, no adnexal masses or tenderness, positive findings: cervical polyp and vagina normal without discharge. Pap smear obtained.     Assessment:      Screening pap smear.    Pelvic pain  Likely malpositioned IUD    Plan:      Follow up in 3 - 5 years, or as indicated by Pap results.      Gynecology referral for IUD retrieval and replacement  "

## 2019-03-21 PROBLEM — R10.2 PELVIC PAIN: Status: ACTIVE | Noted: 2019-03-21

## 2019-03-21 PROBLEM — T83.32XA MALPOSITIONED INTRAUTERINE DEVICE: Status: ACTIVE | Noted: 2019-03-21

## 2019-03-21 LAB
BACTERIAL VAGINOSIS DNA: NEGATIVE
CANDIDA GLABRATA DNA: NEGATIVE
CANDIDA KRUSEI DNA: NEGATIVE
CANDIDA RRNA VAG QL PROBE: NEGATIVE
T VAGINALIS RRNA GENITAL QL PROBE: NEGATIVE

## 2019-03-22 LAB
HPV HR 12 DNA CVX QL NAA+PROBE: NEGATIVE
HPV16 AG SPEC QL: NEGATIVE
HPV18 DNA SPEC QL NAA+PROBE: NEGATIVE

## 2019-03-29 ENCOUNTER — TELEPHONE (OUTPATIENT)
Dept: BARIATRICS | Facility: CLINIC | Age: 34
End: 2019-03-29

## 2019-03-29 NOTE — TELEPHONE ENCOUNTER
Check on diet.  Pt reports she has been keeping starchy carbs to a limit in her diet    Beverages include water, diet V-8 splash and protein water.  Scheduled appt for 4/8/19 at noon.  ----- Message from Maribel Noel RD sent at 3/28/2019  3:25 PM CDT -----  Call to schedule next appt

## 2019-04-02 ENCOUNTER — ANESTHESIA EVENT (OUTPATIENT)
Dept: ENDOSCOPY | Facility: HOSPITAL | Age: 34
End: 2019-04-02
Payer: MEDICARE

## 2019-04-02 ENCOUNTER — ANESTHESIA (OUTPATIENT)
Dept: ENDOSCOPY | Facility: HOSPITAL | Age: 34
End: 2019-04-02
Payer: MEDICARE

## 2019-04-02 ENCOUNTER — HOSPITAL ENCOUNTER (OUTPATIENT)
Facility: HOSPITAL | Age: 34
Discharge: HOME | End: 2019-04-02
Attending: INTERNAL MEDICINE | Admitting: INTERNAL MEDICINE
Payer: MEDICARE

## 2019-04-02 VITALS
HEART RATE: 75 BPM | DIASTOLIC BLOOD PRESSURE: 58 MMHG | HEIGHT: 57 IN | WEIGHT: 230 LBS | RESPIRATION RATE: 20 BRPM | OXYGEN SATURATION: 97 % | BODY MASS INDEX: 49.62 KG/M2 | TEMPERATURE: 98 F | SYSTOLIC BLOOD PRESSURE: 117 MMHG

## 2019-04-02 DIAGNOSIS — K21.9 GASTROESOPHAGEAL REFLUX DISEASE, ESOPHAGITIS PRESENCE NOT SPECIFIED: Primary | ICD-10-CM

## 2019-04-02 DIAGNOSIS — K21.9 GERD (GASTROESOPHAGEAL REFLUX DISEASE): ICD-10-CM

## 2019-04-02 LAB
B-HCG UR QL: NEGATIVE
CTP QC/QA: YES

## 2019-04-02 PROCEDURE — D9220A PRA ANESTHESIA: ICD-10-PCS | Mod: CRNA,,, | Performed by: NURSE ANESTHETIST, CERTIFIED REGISTERED

## 2019-04-02 PROCEDURE — D9220A PRA ANESTHESIA: Mod: CRNA,,, | Performed by: NURSE ANESTHETIST, CERTIFIED REGISTERED

## 2019-04-02 PROCEDURE — 81025 URINE PREGNANCY TEST: CPT | Performed by: INTERNAL MEDICINE

## 2019-04-02 PROCEDURE — 88305 TISSUE EXAM BY PATHOLOGIST: CPT | Performed by: PATHOLOGY

## 2019-04-02 PROCEDURE — 88341 IMHCHEM/IMCYTCHM EA ADD ANTB: CPT | Performed by: PATHOLOGY

## 2019-04-02 PROCEDURE — 43239 EGD BIOPSY SINGLE/MULTIPLE: CPT | Mod: ,,, | Performed by: INTERNAL MEDICINE

## 2019-04-02 PROCEDURE — 43239 PR EGD, FLEX, W/BIOPSY, SGL/MULTI: ICD-10-PCS | Mod: ,,, | Performed by: INTERNAL MEDICINE

## 2019-04-02 PROCEDURE — 88341 PR IHC OR ICC EACH ADD'L SINGLE ANTIBODY  STAINPR: ICD-10-PCS | Mod: 26,,, | Performed by: PATHOLOGY

## 2019-04-02 PROCEDURE — 88365 INSITU HYBRIDIZATION (FISH): CPT | Mod: 26,,, | Performed by: PATHOLOGY

## 2019-04-02 PROCEDURE — 27201012 HC FORCEPS, HOT/COLD, DISP: Performed by: INTERNAL MEDICINE

## 2019-04-02 PROCEDURE — 88342 IMHCHEM/IMCYTCHM 1ST ANTB: CPT | Mod: 26,,, | Performed by: PATHOLOGY

## 2019-04-02 PROCEDURE — 88305 TISSUE SPECIMEN TO PATHOLOGY - SURGERY: ICD-10-PCS | Mod: 26,,, | Performed by: PATHOLOGY

## 2019-04-02 PROCEDURE — 37000009 HC ANESTHESIA EA ADD 15 MINS: Performed by: INTERNAL MEDICINE

## 2019-04-02 PROCEDURE — 63600175 PHARM REV CODE 636 W HCPCS: Performed by: NURSE ANESTHETIST, CERTIFIED REGISTERED

## 2019-04-02 PROCEDURE — 88365 TISSUE SPECIMEN TO PATHOLOGY - SURGERY: ICD-10-PCS | Mod: 26,,, | Performed by: PATHOLOGY

## 2019-04-02 PROCEDURE — 43239 EGD BIOPSY SINGLE/MULTIPLE: CPT | Performed by: INTERNAL MEDICINE

## 2019-04-02 PROCEDURE — 88341 IMHCHEM/IMCYTCHM EA ADD ANTB: CPT | Mod: 26,,, | Performed by: PATHOLOGY

## 2019-04-02 PROCEDURE — 25000003 PHARM REV CODE 250: Performed by: INTERNAL MEDICINE

## 2019-04-02 PROCEDURE — D9220A PRA ANESTHESIA: ICD-10-PCS | Mod: ANES,,, | Performed by: ANESTHESIOLOGY

## 2019-04-02 PROCEDURE — D9220A PRA ANESTHESIA: Mod: ANES,,, | Performed by: ANESTHESIOLOGY

## 2019-04-02 PROCEDURE — 37000008 HC ANESTHESIA 1ST 15 MINUTES: Performed by: INTERNAL MEDICINE

## 2019-04-02 PROCEDURE — 25000003 PHARM REV CODE 250: Performed by: NURSE ANESTHETIST, CERTIFIED REGISTERED

## 2019-04-02 PROCEDURE — 88342 TISSUE SPECIMEN TO PATHOLOGY - SURGERY: ICD-10-PCS | Mod: 26,,, | Performed by: PATHOLOGY

## 2019-04-02 RX ORDER — GLYCOPYRROLATE 0.2 MG/ML
INJECTION INTRAMUSCULAR; INTRAVENOUS
Status: DISCONTINUED | OUTPATIENT
Start: 2019-04-02 | End: 2019-04-02

## 2019-04-02 RX ORDER — SODIUM CHLORIDE 9 MG/ML
INJECTION, SOLUTION INTRAVENOUS CONTINUOUS
Status: DISCONTINUED | OUTPATIENT
Start: 2019-04-02 | End: 2019-04-02 | Stop reason: HOSPADM

## 2019-04-02 RX ORDER — PROPOFOL 10 MG/ML
VIAL (ML) INTRAVENOUS
Status: DISCONTINUED | OUTPATIENT
Start: 2019-04-02 | End: 2019-04-02

## 2019-04-02 RX ORDER — SODIUM CHLORIDE 0.9 % (FLUSH) 0.9 %
3 SYRINGE (ML) INJECTION
Status: DISCONTINUED | OUTPATIENT
Start: 2019-04-02 | End: 2019-04-02 | Stop reason: HOSPADM

## 2019-04-02 RX ORDER — LIDOCAINE HCL/PF 100 MG/5ML
SYRINGE (ML) INTRAVENOUS
Status: DISCONTINUED | OUTPATIENT
Start: 2019-04-02 | End: 2019-04-02

## 2019-04-02 RX ORDER — PROPOFOL 10 MG/ML
VIAL (ML) INTRAVENOUS CONTINUOUS PRN
Status: DISCONTINUED | OUTPATIENT
Start: 2019-04-02 | End: 2019-04-02

## 2019-04-02 RX ADMIN — GLYCOPYRROLATE 0.1 MG: 0.2 INJECTION, SOLUTION INTRAMUSCULAR; INTRAVENOUS at 09:04

## 2019-04-02 RX ADMIN — LIDOCAINE HYDROCHLORIDE 100 MG: 20 INJECTION, SOLUTION INTRAVENOUS at 09:04

## 2019-04-02 RX ADMIN — SODIUM CHLORIDE: 0.9 INJECTION, SOLUTION INTRAVENOUS at 09:04

## 2019-04-02 RX ADMIN — PROPOFOL 50 MG: 10 INJECTION, EMULSION INTRAVENOUS at 10:04

## 2019-04-02 RX ADMIN — PROPOFOL 150 MCG/KG/MIN: 10 INJECTION, EMULSION INTRAVENOUS at 09:04

## 2019-04-02 NOTE — TRANSFER OF CARE
"Anesthesia Transfer of Care Note    Patient: Prisca Zhu    Procedure(s) Performed: Procedure(s) (LRB):  ESOPHAGOGASTRODUODENOSCOPY (EGD) (N/A)    Patient location: PACU    Anesthesia Type: general    Transport from OR: Transported from OR on 2-3 L/min O2 by NC with adequate spontaneous ventilation    Post pain: adequate analgesia    Post assessment: no apparent anesthetic complications    Post vital signs: stable    Level of consciousness: sedated    Nausea/Vomiting: no nausea/vomiting    Complications: none    Transfer of care protocol was followed      Last vitals:   Visit Vitals  /67 (BP Location: Left arm, Patient Position: Lying)   Pulse 70   Temp 36.7 °C (98 °F) (Temporal)   Resp 17   Ht 4' 9" (1.448 m)   Wt 104.3 kg (230 lb)   SpO2 100%   Breastfeeding? No   BMI 49.77 kg/m²     "

## 2019-04-02 NOTE — H&P
Short Stay Endoscopy History and Physical    PCP - Mathew Carpenter MD  Referring Physician - Segun Dominguez MD  [address] [place] [address]    Procedure - EGD  ASA - per anesthesia  Mallampati - per anesthesia  History of Anesthesia problems - no  Family history Anesthesia problems -  no   Plan of anesthesia - General    HPI  33 y.o. female with morbid obesity, here for presurgical evaluation with EGD. GERD. Never had EGD in the past.    Reason for procedure: Presurgical evaluation for bariatric surgery.    ROS:  Constitutional: No fevers, chills, No weight loss  CV: No chest pain  Pulm: No cough, No shortness of breath  GI: see HPI    Medical History:  has a past medical history of Anemia, BMI 50.0-59.9, adult, GERD (gastroesophageal reflux disease) (2019), History of blood transfusion (), Obesity, OI (osteogenesis imperfecta), Osteopetrosis, and Tendonitis.    Surgical History:  has a past surgical history that includes Fracture surgery;  section; Femur fracture surgery (Bilateral); and [place] tooth extraction.    Family History: family history includes Anxiety disorder in her mother; Asthma in her mother; Hyperlipidemia in her mother; Hypertension in her mother; No Known Problems in her father., see HPI    Social History:  reports that she has never smoked. She has never used smokeless tobacco. She reports that she has current or past drug history. She reports that she does not drink alcohol.    Review of patient's allergies indicates:   Allergen Reactions    Pcn [penicillins] Shortness Of Breath       Medications:   Medications Prior to Admission   Medication Sig Dispense Refill Last Dose    CALCIUM ORAL Take by mouth.   Taking    cyanocobalamin (VITAMIN B-12) 100 MCG tablet Take 100 mcg by mouth once daily.   Taking    ergocalciferol (ERGOCALCIFEROL) 50,000 unit Cap Take 1 capsule (50,000 Units total) by mouth every 7 days. 4 capsule 2 Taking    fish oil-omega-3 fatty  acids 300-1,000 mg capsule Take by mouth once daily.   Taking    omeprazole 20 mg TbEC   0 Taking       Physical Exam:    Vital Signs: There were no vitals filed for this visit.    General Appearance: Well appearing in no acute distress  Abdomen: Soft, non tender, non distended with normal bowel sounds, no masses    Labs:  Lab Results   Component Value Date    WBC 5.98 02/04/2019    HGB 13.7 02/04/2019    HCT 42.2 02/04/2019     02/04/2019    CHOL 176 02/04/2019    TRIG 71 02/04/2019    HDL 43 02/04/2019    ALT 13 02/04/2019    AST 19 02/04/2019     02/04/2019    K 3.8 02/04/2019     02/04/2019    CREATININE 0.8 02/04/2019    BUN 10 02/04/2019    CO2 28 02/04/2019    TSH 0.762 02/04/2019    HGBA1C 5.3 02/04/2019       I have explained the risks and benefits of this endoscopic procedure to the patient including but not limited to bleeding, inflammation, infection, perforation, and death.      Ephraim Babb MD

## 2019-04-02 NOTE — ANESTHESIA PREPROCEDURE EVALUATION
04/02/2019  Prisca Zhu is a 33 y.o., female.    Anesthesia Evaluation    I have reviewed the Patient Summary Reports.    I have reviewed the Nursing Notes.   I have reviewed the Medications.     Review of Systems  Anesthesia Hx:  No problems with previous Anesthesia  History of prior surgery of interest to airway management or planning: Denies Family Hx of Anesthesia complications.   Denies Personal Hx of Anesthesia complications.   Hematology/Oncology:  Hematology Normal   Oncology Normal     EENT/Dental:EENT/Dental Normal   Cardiovascular:  Cardiovascular Normal Exercise tolerance: good  ECG has been reviewed.    Pulmonary:  Pulmonary Normal    Renal/:  Renal/ Normal     Hepatic/GI:   GERD    Musculoskeletal:  Musculoskeletal Normal    Neurological:  Neurology Normal    Endocrine:  Endocrine Normal    Dermatological:  Skin Normal    Psych:  Psychiatric Normal           Physical Exam  General:  Morbid Obesity    Airway/Jaw/Neck:  Airway Findings: Mouth Opening: Normal Tongue: Normal  General Airway Assessment: Adult  Mallampati: II  TM Distance: Normal, at least 6 cm        Eyes/Ears/Nose:  EYES/EARS/NOSE FINDINGS: Normal   Dental:  DENTAL FINDINGS: Normal   Chest/Lungs:  Chest/Lungs Clear    Heart/Vascular:  Heart Findings: Normal Heart murmur: negative Vascular Findings: Normal    Abdomen:  Abdomen Findings: Normal    Musculoskeletal:  Musculoskeletal Findings: Normal   Skin:  Skin Findings: Normal    Mental Status:  Mental Status Findings: Normal        Anesthesia Plan  Type of Anesthesia, risks & benefits discussed:  Anesthesia Type:  general  Patient's Preference:   Intra-op Monitoring Plan: standard ASA monitors  Intra-op Monitoring Plan Comments:   Post Op Pain Control Plan:   Post Op Pain Control Plan Comments:   Induction:   IV  Beta Blocker:  Patient is not currently on a Beta-Blocker  (No further documentation required).       Informed Consent: Patient understands risks and agrees with Anesthesia plan.  Questions answered. Anesthesia consent signed with patient.  ASA Score: 3     Day of Surgery Review of History & Physical:    H&P update referred to the provider.         Ready For Surgery From Anesthesia Perspective.

## 2019-04-02 NOTE — DISCHARGE INSTRUCTIONS
Upper GI Endoscopy     During endoscopy, a long, flexible tube is used to view the inside of your upper GI tract.      Upper GI endoscopy allows your healthcare provider to look directly into the beginning of your gastrointestinal (GI) tract. The esophagus, stomach, and duodenum (the first part of the small intestine) make up the upper GI tract.   Before the exam  Follow these and any other instructions you are given before your endoscopy. If you dont follow the healthcare providers instructions carefully, the test may need to be canceled or done over:  · Don't eat or drink anything after midnight the night before your exam. If your exam is in the afternoon, drink only clear liquids in the morning. Don't eat or drink anything for 8 hours before the exam. In some cases, you may be able to take medicines with sips of water until 2 hours before the procedure. Speak with your healthcare provider about this.   · Bring your X-rays and any other test results you have.  · Because you will be sedated, arrange for an adult to drive you home after the exam.  · Tell your healthcare provider before the exam if you are taking any medicines or have any medical problems.  The procedure  Here is what to expect:  · You will lie on the endoscopy table. Usually patients lie on the left side.  · You will be monitored and given oxygen.  · Your throat may be numbed with a spray or gargle. You are given medicine through an intravenous (IV) line that will help you relax and remain comfortable. You may be awake or asleep during the procedure.  · The healthcare provider will put the endoscope in your mouth and down your esophagus. It is thinner than most pieces of food that you swallow. It will not affect your breathing. The medicine helps keep you from gagging.  · Air is put into your GI tract to expand it. It can make you burp.  · During the procedure, the healthcare provider can take biopsies (tissue samples), remove abnormalities, such  as polyps, or treat abnormalities through a variety of devices placed through the endoscope. You will not feel this.   · The endoscope carries images of your upper GI tract to a video screen. If you are awake, you may be able to look at the images.  · After the procedure is done, you will rest for a time. An adult must drive you home.  When to call your healthcare provider  Contact your healthcare provider if you have:  · Black or tarry stools, or blood in your stool  · Fever  · Pain in your belly that does not go away  · Nausea and vomiting, or vomiting blood   Date Last Reviewed: 7/1/2016 © 2000-2017 [employer]. [address] [place] [address], [place] [address]. All rights reserved. This information is not intended as a substitute for professional medical care. Always follow your healthcare professional's instructions.        Helicobacter Pylori Antibody  Does this test have other names?  H. pylori  What is this test?  This test measures the levels of Helicobacter pylori (H. pylori) antibodies in your blood.  H. pylori are bacteria that can invade your gut. H. pylori infection is one of the major causes of peptic ulcer disease. This happens when inflammation caused by the bacteria affects the mucus coating of your stomach or duodenum, the first section of your small intestine. This leads to sores called peptic ulcers on this lining.  This test can help your healthcare provider find out whether your peptic ulcers are caused by H. pylori. If antibodies are present, it may mean that they are there to fight H. pylori bacteria. Although H. pylori bacteria are a leading cause of peptic ulcers, these ulcers may also develop from taking too many nonsteroidal anti-inflammatory drugs.  Why do I need this test?  You may need this test if your healthcare provider suspects that you have peptic ulcer disease. Signs and symptoms include:  · Burning sensation in your abdomen  · Tenderness in your abdomen  · Gnawing pain  in your abdomen  · Intestinal bleeding  What other tests might I have along with this test?  Your healthcare provider may also order other tests to look for the actual presence of the H. pylori bacteria. These tests might include a stool sample test or an endoscopy, in which a thin tube with a camera on the end is passed down your throat and into your upper gastrointestinal tract. Using special instruments, your healthcare provider can then remove a small piece of tissue to look for H. pylori.  What do my test results mean?  Many things may affect your lab test results. These include the method each lab uses to do the test. Even if your test results are different from the normal value, you may not have a problem. To learn what the results mean for you, talk with your healthcare provider.  Normal results are negative, meaning that no H. pylori antibodies were found and that you don't have an infection with these bacteria.  Positive results mean that H. pylori antibodies were found. You don't necessarily have an infection with H. pylori, however. H. pylori antibodies may linger in your body long after the bacteria have been wiped out.  How is this test done?  The test requires a blood sample, which is drawn through a needle from a vein in your arm.  Does this test pose any risks?  Taking a blood sample with a needle carries risks that include bleeding, infection, bruising, or feeling dizzy. When the needle pricks your arm, you may feel a slight stinging sensation or pain. Afterward, the site may be slightly sore.  What might affect my test results?  Past infection with H. pylori can affect your results, giving you a false-positive.  How do I get ready for this test?  You don't need to prepare for this test. But be sure your healthcare provider knows about all medicines, herbs, vitamins, and supplements you are taking. This includes medicines that don't need a prescription and any illicit drugs you may use.  ©  7607-5257 [employer]. [address] [place] [address], [place] [address]. All rights reserved. This information is not intended as a substitute for professional medical care. Always follow your healthcare professional's instructions.        Discharge Instructions: After Your Surgery  Youve just had surgery. During surgery, you were given medicine called anesthesia to keep you relaxed and free of pain. After surgery, you may have some pain or nausea. This is common. Here are some tips for feeling better and getting well after surgery.     Stay on schedule with your medicine.   Going home  Your healthcare provider will show you how to take care of yourself when you go home. He or she will also answer your questions. Have an adult family member or friend drive you home. For the first 24 hours after your surgery:  · Do not drive or use heavy equipment.  · Do not make important decisions or sign legal papers.  · Do not drink alcohol.  · Have someone stay with you, if needed. He or she can watch for problems and help keep you safe.  Be sure to go to all follow-up visits with your healthcare provider. And rest after your surgery for as long as your healthcare provider tells you to.  Coping with pain  If you have pain after surgery, pain medicine will help you feel better. Take it as told, before pain becomes severe. Also, ask your healthcare provider or pharmacist about other ways to control pain. This might be with heat, ice, or relaxation. And follow any other instructions your surgeon or nurse gives you.  Tips for taking pain medicine  To get the best relief possible, remember these points:  · Pain medicines can upset your stomach. Taking them with a little food may help.  · Most pain relievers taken by mouth need at least 20 to 30 minutes to start to work.  · Taking medicine on a schedule can help you remember to take it. Try to time your medicine so that you can take it before starting an activity. This might  be before you get dressed, go for a walk, or sit down for dinner.  · Constipation is a common side effect of pain medicines. Call your healthcare provider before taking any medicines such as laxatives or stool softeners to help ease constipation. Also ask if you should skip any foods. Drinking lots of fluids and eating foods such as fruits and vegetables that are high in fiber can also help. Remember, do not take laxatives unless your surgeon has prescribed them.  · Drinking alcohol and taking pain medicine can cause dizziness and slow your breathing. It can even be deadly. Do not drink alcohol while taking pain medicine.  · Pain medicine can make you react more slowly to things. Do not drive or run machinery while taking pain medicine.  Your healthcare provider may tell you to take acetaminophen to help ease your pain. Ask him or her how much you are supposed to take each day. Acetaminophen or other pain relievers may interact with your prescription medicines or other over-the-counter (OTC) medicines. Some prescription medicines have acetaminophen and other ingredients. Using both prescription and OTC acetaminophen for pain can cause you to overdose. Read the labels on your OTC medicines with care. This will help you to clearly know the list of ingredients, how much to take, and any warnings. It may also help you not take too much acetaminophen. If you have questions or do not understand the information, ask your pharmacist or healthcare provider to explain it to you before you take the OTC medicine.  Managing nausea  Some people have an upset stomach after surgery. This is often because of anesthesia, pain, or pain medicine, or the stress of surgery. These tips will help you handle nausea and eat healthy foods as you get better. If you were on a special food plan before surgery, ask your healthcare provider if you should follow it while you get better. These tips may help:  · Do not push yourself to eat. Your body  will tell you when to eat and how much.  · Start off with clear liquids and soup. They are easier to digest.  · Next try semi-solid foods, such as mashed potatoes, applesauce, and gelatin, as you feel ready.  · Slowly move to solid foods. Dont eat fatty, rich, or spicy foods at first.  · Do not force yourself to have 3 large meals a day. Instead eat smaller amounts more often.  · Take pain medicines with a small amount of solid food, such as crackers or toast, to avoid nausea.     Call your surgeon if  · You still have pain an hour after taking medicine. The medicine may not be strong enough.  · You feel too sleepy, dizzy, or groggy. The medicine may be too strong.  · You have side effects like nausea, vomiting, or skin changes, such as rash, itching, or hives.       If you have obstructive sleep apnea  You were given anesthesia medicine during surgery to keep you comfortable and free of pain. After surgery, you may have more apnea spells because of this medicine and other medicines you were given. The spells may last longer than usual.   At home:  · Keep using the continuous positive airway pressure (CPAP) device when you sleep. Unless your healthcare provider tells you not to, use it when you sleep, day or night. CPAP is a common device used to treat obstructive sleep apnea.  · Talk with your provider before taking any pain medicine, muscle relaxants, or sedatives. Your provider will tell you about the possible dangers of taking these medicines.  Date Last Reviewed: 12/1/2016  © 0880-5321 [employer]. [address] [place] [address], [place] [address]. All rights reserved. This information is not intended as a substitute for professional medical care. Always follow your healthcare professional's instructions.

## 2019-04-02 NOTE — PLAN OF CARE
Patient arrived from Endoscopy with DAVID Gamez RN and GUS Ramos CRNA.  Patient stable.  Report received at this time.  Assumed care of patient at this time.

## 2019-04-02 NOTE — ANESTHESIA POSTPROCEDURE EVALUATION
Anesthesia Post Evaluation    Patient: Prisca Zhu    Procedure(s) Performed: Procedure(s) (LRB):  ESOPHAGOGASTRODUODENOSCOPY (EGD) (N/A)    Final Anesthesia Type: general  Patient location during evaluation: PACU  Patient participation: Yes- Able to Participate  Level of consciousness: awake and alert and oriented  Post-procedure vital signs: reviewed and stable  Pain management: adequate  Airway patency: patent  PONV status at discharge: No PONV  Anesthetic complications: no      Cardiovascular status: blood pressure returned to baseline  Respiratory status: unassisted, room air and spontaneous ventilation  Hydration status: euvolemic  Follow-up not needed.          Vitals Value Taken Time   /65 4/2/2019 11:17 AM   Temp 36.6 °C (97.9 °F) 4/2/2019 10:15 AM   Pulse 61 4/2/2019 11:24 AM   Resp 30 4/2/2019 11:24 AM   SpO2 99 % 4/2/2019 11:24 AM   Vitals shown include unvalidated device data.      No case tracking events are documented in the log.      Pain/Blake Score: Blake Score: 10 (4/2/2019 10:45 AM)

## 2019-04-02 NOTE — PLAN OF CARE
Patient and patient's boyfriend received discharge instructions and prescriptions.  Patient and patient's boyfriendverbalized understanding of all instructions given and all questions were addressed prior to patient's discharge.  Patient's vital signs are stable and within patient's baseline.  Patient tolerated clear liquids PO.  Patient denies pain.  Patient denies nausea and vomiting at this time.  Patient meets all criteria for discharge at this time.  All required consents present in patient's chart upon patient's discharge.

## 2019-04-02 NOTE — [employer] PATIENT INSTRUCTIONS
Discharge Summary/Instructions after an Endoscopic Procedure  Patient Name: Prisca Zhu  Patient MRN: [id number]  Patient YOB: 1985 Tuesday, April 02, 2019  Segun Dominguez MD  RESTRICTIONS:  During your procedure today, you received medications for sedation.  These   medications may affect your judgment, balance and coordination.  Therefore,   for 24 hours, you have the following restrictions:   - DO NOT drive a car, operate machinery, make legal/financial decisions,   sign important papers or drink alcohol.    ACTIVITY:  Today: no heavy lifting, straining or running due to procedural   sedation/anesthesia.  The following day: return to full activity including work.  DIET:  Eat and drink normally unless instructed otherwise.     TREATMENT FOR COMMON SIDE EFFECTS:  - Mild abdominal pain, nausea, belching, bloating or excessive gas:  rest,   eat lightly and use a heating pad.  - Sore Throat: treat with throat lozenges and/or gargle with warm salt   water.  - Because air was used during the procedure, expelling large amounts of air   from your rectum or belching is normal.  - If a bowel prep was taken, you may not have a bowel movement for 1-3 days.    This is normal.  SYMPTOMS TO WATCH FOR AND REPORT TO YOUR PHYSICIAN:  1. Abdominal pain or bloating, other than gas cramps.  2. Chest pain.  3. Back pain.  4. Signs of infection such as: chills or fever occurring within 24 hours   after the procedure.  5. Rectal bleeding, which would show as bright red, maroon, or black stools.   (A tablespoon of blood from the rectum is not serious, especially if   hemorrhoids are present.)  6. Vomiting.  7. Weakness or dizziness.  GO DIRECTLY TO THE NEAREST EMERGENCY ROOM IF YOU HAVE ANY OF THE FOLLOWING:      Difficulty breathing              Chills and/or fever over 101 F   Persistent vomiting and/or vomiting blood   Severe abdominal pain   Severe chest pain   Black, tarry stools   Bleeding- more than one  tablespoon   Any other symptom or condition that you feel may need urgent attention  Your doctor recommends these additional instructions:  If any biopsies were taken, your doctors clinic will contact you in 1 to 2   weeks with any results.  - Patient has a contact number available for emergencies.  The signs and   symptoms of potential delayed complications were discussed with the   patient.  Return to normal activities tomorrow.  Written discharge   instructions were provided to the patient.   - Discharge patient to home.   - Resume previous diet.   - Continue present medications.   - Await pathology results.   For questions, problems or results please call your physician - Segun Dominguez MD at Work:  [phone].  [facility], EMERGENCY ROOM PHONE NUMBER: [phone]  IF A COMPLICATION OR EMERGENCY SITUATION ARISES AND YOU ARE UNABLE TO REACH   YOUR PHYSICIAN - GO DIRECTLY TO THE EMERGENCY ROOM.  Segun Dominguez MD  4/2/2019 10:12:37 AM  This report has been verified and signed electronically.  [employer]

## 2019-04-04 ENCOUNTER — DOCUMENTATION ONLY (OUTPATIENT)
Dept: BARIATRICS | Facility: CLINIC | Age: 34
End: 2019-04-04

## 2019-04-08 ENCOUNTER — TELEPHONE (OUTPATIENT)
Dept: BARIATRICS | Facility: CLINIC | Age: 34
End: 2019-04-08

## 2019-04-10 ENCOUNTER — TELEPHONE (OUTPATIENT)
Dept: GASTROENTEROLOGY | Facility: HOSPITAL | Age: 34
End: 2019-04-10

## 2019-04-10 NOTE — TELEPHONE ENCOUNTER
Attempted to contact patient to inform her about biopsy results and positive H.pylori treatment. No response.  Will attempt later

## 2019-04-11 ENCOUNTER — OFFICE VISIT (OUTPATIENT)
Dept: OBSTETRICS AND GYNECOLOGY | Facility: CLINIC | Age: 34
End: 2019-04-11
Payer: MEDICARE

## 2019-04-11 VITALS
BODY MASS INDEX: 50.08 KG/M2 | WEIGHT: 232.13 LBS | SYSTOLIC BLOOD PRESSURE: 122 MMHG | DIASTOLIC BLOOD PRESSURE: 73 MMHG | HEIGHT: 57 IN

## 2019-04-11 DIAGNOSIS — T83.32XA INTRAUTERINE CONTRACEPTIVE DEVICE THREADS LOST, INITIAL ENCOUNTER: Primary | ICD-10-CM

## 2019-04-11 PROCEDURE — 99999 PR PBB SHADOW E&M-EST. PATIENT-LVL III: ICD-10-PCS | Mod: PBBFAC,,, | Performed by: OBSTETRICS & GYNECOLOGY

## 2019-04-11 PROCEDURE — 99999 PR PBB SHADOW E&M-EST. PATIENT-LVL III: CPT | Mod: PBBFAC,,, | Performed by: OBSTETRICS & GYNECOLOGY

## 2019-04-11 PROCEDURE — 3008F BODY MASS INDEX DOCD: CPT | Mod: CPTII,S$GLB,, | Performed by: OBSTETRICS & GYNECOLOGY

## 2019-04-11 PROCEDURE — 58301 PR REMOVE, INTRAUTERINE DEVICE: ICD-10-PCS | Mod: 52,S$GLB,, | Performed by: OBSTETRICS & GYNECOLOGY

## 2019-04-11 PROCEDURE — 99215 OFFICE O/P EST HI 40 MIN: CPT | Mod: 25,S$GLB,, | Performed by: OBSTETRICS & GYNECOLOGY

## 2019-04-11 PROCEDURE — 58301 REMOVE INTRAUTERINE DEVICE: CPT | Mod: 52,S$GLB,, | Performed by: OBSTETRICS & GYNECOLOGY

## 2019-04-11 PROCEDURE — 99215 PR OFFICE/OUTPT VISIT, EST, LEVL V, 40-54 MIN: ICD-10-PCS | Mod: 25,S$GLB,, | Performed by: OBSTETRICS & GYNECOLOGY

## 2019-04-11 PROCEDURE — 3008F PR BODY MASS INDEX (BMI) DOCUMENTED: ICD-10-PCS | Mod: CPTII,S$GLB,, | Performed by: OBSTETRICS & GYNECOLOGY

## 2019-04-11 NOTE — PROGRESS NOTES
"History & Physical  Gynecology      SUBJECTIVE:     Chief Complaint: Pelvic Pain and Contraception (wants it out)       History of Present Illness:  Ms. Zhu is a 34 y/o female who presents to clinic c/o pelvic pain and lost IUD strings. She reports that she has been having abdominal and pelvic pain for the last few months. She was recently treated for H.Pylori with abx and had EGD. She then reports that she has then saw her PCP for pap smear and IUD stings were not seen. She reports that she was seen at [facility] and had imaging which showed IUD in uterus but still no strings were found. She did not want to have procedure done with them to remove IUD.     She reports today that she continues to have "pinching like" midline pelvic pain. Patient denies vaginal discharge or bleeding. She denies nausea or vomiting. She would like to have IUD removed.       Review of patient's allergies indicates:   Allergen Reactions    Pcn [penicillins] Shortness Of Breath       Past Medical History:   Diagnosis Date    Anemia     BMI 50.0-59.9, adult     GERD (gastroesophageal reflux disease) 2019    History of blood transfusion 2001    Obesity     OI (osteogenesis imperfecta)     Osteopetrosis     Tendonitis      Past Surgical History:   Procedure Laterality Date     SECTION      , 2016    ESOPHAGOGASTRODUODENOSCOPY (EGD) N/A 2019    [facility]    FEMUR FRACTURE SURGERY Bilateral     hardware/rods in both legs    FRACTURE SURGERY      femur    WISDOM TOOTH EXTRACTION       OB History        2    Para        Term                AB        Living   2       SAB        TAB        Ectopic        Multiple        Live Births                   Family History   Problem Relation Age of Onset    Hypertension Mother     Hyperlipidemia Mother     Asthma Mother     Anxiety disorder Mother     No Known Problems Father      Social History "     Tobacco Use    Smoking status: Never Smoker    Smokeless tobacco: Never Used   Substance Use Topics    Alcohol use: No    Drug use: Never       Current Outpatient Medications   Medication Sig    CALCIUM ORAL Take by mouth.    cyanocobalamin (VITAMIN B-12) 100 MCG tablet Take 100 mcg by mouth once daily.    ergocalciferol (ERGOCALCIFEROL) 50,000 unit Cap Take 1 capsule (50,000 Units total) by mouth every 7 days.    fish oil-omega-3 fatty acids 300-1,000 mg capsule Take by mouth once daily.    omeprazole 20 mg TbEC      No current facility-administered medications for this visit.          Review of Systems:  Review of Systems   Constitutional: Negative for chills and fever.   Eyes: Negative for visual disturbance.   Respiratory: Negative for cough and wheezing.    Cardiovascular: Negative for chest pain and palpitations.   Gastrointestinal: Negative for abdominal pain, nausea and vomiting.   Genitourinary: Positive for pelvic pain. Negative for dysuria, frequency, hematuria, vaginal bleeding, vaginal discharge and vaginal pain.   Neurological: Negative for headaches.        OBJECTIVE:     Physical Exam:  Physical Exam   Constitutional: She is oriented to person, place, and time. She appears well-developed and well-nourished.   Cardiovascular: Normal rate.   Pulmonary/Chest: Effort normal. No respiratory distress.   Abdominal: Soft. She exhibits no distension. There is no tenderness.   Genitourinary:   Genitourinary Comments: No string visualized at cervical os. Pap brush used in the endometrial  Canal to evaluate if strings were displaced in the cervical canal but unsuccessful.      Musculoskeletal: Normal range of motion.   Neurological: She is alert and oriented to person, place, and time.   Skin: Skin is warm and dry.   Psychiatric: She has a normal mood and affect.   Nursing note and vitals reviewed.      Time Out performed  Cervix clean with betadine  Under US guidance Kesha clamp was used in the  cervical canal to grasp what appeared to be IUD on US  Procedure stopped as patient was unable to tolerate    ASSESSMENT:       ICD-10-CM ICD-9-CM    1. Intrauterine contraceptive device threads lost, initial encounter T83.32XA 996.32 US Pelvis Complete Non OB      Plan:      Prisca was seen today for pelvic pain and contraception.    Diagnoses and all orders for this visit:    Intrauterine contraceptive device threads lost, initial encounter  -     US Pelvis Complete Non OB; Future  - Discussed with patient that we will proceed with imaging to locate the IUD after which we can then proceed to book case in OR to retrieve displaced IUD.  - She agreed as she does not want to do while in office. All questions answered        Orders Placed This Encounter   Procedures    US Pelvis Complete Non OB       Follow up for s/p TVUS.    Counseling time: 30 minutes    Ashley Greenberg

## 2019-04-11 NOTE — LETTER
April 11, 2019      Mathew Carpenter MD  [address] [facility] [place] [place] [address]           [facility] [place] - OB/ GYN  [address] [facility] [address]  [facility] [address]  Phone: [phone]          Patient: Prisca Zhu   MR Number: [id number]   YOB: 1985   Date of Visit: 4/11/2019       Dear Dr. Mathew Carpenter:    Thank you for referring Prisca Zhu to me for evaluation. Attached you will find relevant portions of my assessment and plan of care.    If you have questions, please do not hesitate to call me. I look forward to following Prisca Zhu along with you.    Sincerely,    Ashley Greenberg MD    Enclosure  CC:  No Recipients    If you would like to receive this communication electronically, please contact [email or web address] or [phone] to request more information on [email or web address] Link access.    For providers and/or their staff who would like to refer a patient to [facility], please contact us through our one-stop-shop provider referral line, [facility], at [phone].    If you feel you have received this communication in error or would no longer like to receive these types of communications, please e-mail [email or web address]

## 2019-04-11 NOTE — PATIENT INSTRUCTIONS
Pelvic Laparoscopy    Laparoscopy is a type of surgery done using very small incisions. This type of surgery is possible because of the laparoscope (a long, slender tool with a camera and light). It lets your surgeon see inside the stomach. To perform the surgery, special instruments are inserted into the stomach through small incisions. Pelvic laparoscopy is often used to diagnose and treat the causes of pelvic problems, such as pain and infertility. Laparoscopy often involves:  · A short hospital stay. (You can most likely go home the same day.)  · A quick recovery  · Minimal anesthesia  · Small external scars  · Mild to moderate postoperative pain  Getting ready  To prepare for surgery:  · Tell your surgeon about any medicines you take. Include herbs, supplements, and over-the-counter medicines. You may need to stop taking certain medicines, such as aspirin, for 7 to 10 days before surgery.  · Do not eat or drink anything after the midnight before surgery.  · Arrange for a ride home after surgery.  Before the procedure  You will most likely be given general anesthesia to make you sleep during the procedure. A catheter may be inserted to drain urine from the bladder.   How pelvic laparoscopy is done  One or more small (quarter- or half-inch) incisions are made near the navel or the pubic hairline, or on either side of the lower belly. The laparoscope is inserted through an incision. It sends images to a video screen, allowing the surgeon a close-up view of the organs. Gas is used to inflate the belly, allowing the surgeon room to see and work. Depending on what is found, surgery to treat the problem may be done at this time.  After the procedure  · Youll be taken to a post-op area to wake up and recover from anesthesia.  · You may feel some shoulder pain. This is due to irritation from the gas used to inflate the belly.  · You may have some discharge from the vagina. If so, ask the nurse for a pad.  · You will  be asked to walk around to improve breathing and blood flow.  · If you had a catheter, it will most likely be removed before you go home.  · You can go home as soon as you recover from anesthesia and your condition is stable.  Your recovery  Recovery time depends on which procedure was done through the laparoscope. Your recovery from pelvic laparoscopy may take up to 6 weeks. If it was a simple procedure, like tubal ligation, then 2 weeks is reasonable. For laparoscopic hysterectomies, the recovery may be closer to 6 weeks. While you recover, be sure to follow your healthcare providers instructions. During this time:  · Take pain medicine as prescribed.  · Start eating solid food when you feel ready. To avoid constipation, eat fruits, vegetables, and whole grains. Drink plenty of fluids.  · Dont lift anything heavy until your healthcare provider says its safe.  · Take it easy for a few days. Ask your healthcare provider when you can return to work, exercise, and sex.  · Arrange for a follow-up visit with your healthcare provider to discuss the results of the procedure.  Call your healthcare provider  Contact your healthcare provider right away if you have any of the following:  · Have chills, or a fever of 100.4°F (38°C) or higher, or as directed by your healthcare provider  · Notice that the incision is red, swollen, or draining  · Have heavy, bright-red vaginal bleeding or a smelly discharge  · Have difficulty urinating  · Experience severe belly pain or bloating  · Have leg pain, redness, or swelling  · Have persistent nausea or vomiting  · Are not improving daily  · Fainting  · Trouble breathing   Date Last Reviewed: 12/1/2016 © 2000-2017 [email or web address]. [address] [place] [address], [place] [address]. All rights reserved. This information is not intended as a substitute for professional medical care. Always follow your healthcare professional's instructions.        Hysteroscopy    Hysteroscopy is a  procedure that is done to see inside your uterus. It can help find the cause of problems in the uterus. This helps your health care provider decide on the best treatment. In some cases, it can be used to perform treatment. Hysteroscopy may be done in your health care provider's office or in the hospital.  Why might I need hysteroscopy?  Hysteroscopy may be done based on the results of other tests. It can help find the cause of problems. These can include:  · Unusually heavy or long menstrual periods  · Bleeding between periods  · Postmenopausal bleeding  · Trouble becoming pregnant (infertility) or carrying a pregnancy to term  · To locate an intrauterine device (IUD)  · To perform sterilization  What are the risks and complications of hysteroscopy?  Problems with the procedure are rare. But all procedures have risks. Risks of hysteroscopy include:  · Infection  · Bleeding  · Tearing of the uterine wall  · Damage to internal organs  · Scarring of the uterus  · Fluid overload  · Problems with anesthesia (the medication that prevents pain during the procedure)  How do I get ready for hysteroscopy?  · Tell your health care provider if you have any health problems. These include diabetes, heart disease, or bleeding problems.  · Tell your health care provider about all the medicines you take. This includes any over-the-counter medications, herbs, or supplements.  · You may be told not to use vaginal creams or medication. And you may be told not to have sex or douche.  · You may be told not to eat or drink the night before the procedure.  · You may be tested for pregnancy and infection.  · You may be asked to sign a consent form.  · You may be given a pain reliever to take an hour before the procedure. This helps relieve cramping that may occur.  What happens during a hysteroscopy?  · Youll lie on an exam table with your feet in stirrups.  · You may be given general anesthesia or medicines to help you relax or sleep. In  some cases, an IV line will be put into a vein in your arm or hand. This line is then used to give fluids and medicines.  · A tool called a speculum is inserted into the vagina to hold it open. A tool called a dilator may be used to widen the cervix.  · Numbing medicine may be applied to the cervix.  · The hysteroscope (a long, thin lighted tube) is inserted through the vagina and into the uterus. It is used to see inside the uterus. Images of the uterus are viewed on a monitor.  · A gas or fluid may be injected into the uterus to expand it.  · Other tools may be put through the hysteroscope. These are used to take tissue samples, remove growths, or place implants for the purpose of sterilization.  What happens after hysteroscopy?  · You may have cramps and bleeding for 24 hours after the procedure. This is normal. Use pads instead of tampons.  · Do not douche or use tampons until your health care provider says its OK.  · Do not use any vaginal medicines until you are told its OK.  · Ask your health care provider when its OK to have sex again.  When should I call my health care provider?  Call your health care provider if you have:  · Heavy bleeding (more than 1 pad an hour for 2 or more hours)  · A fever over 100.4°F (38.0°C)  · Increasing abdominal pain or tenderness  · Foul-smelling discharge   Follow-up care  Schedule a follow-up visit with your health care provider. Based on the results of your test, you may need more treatment. Be sure to follow instructions and keep your appointments.  Date Last Reviewed: 5/12/2015 © 2000-2017 [employer]. [address] [place] [address], [place] [address]. All rights reserved. This information is not intended as a substitute for professional medical care. Always follow your healthcare professional's instructions.

## 2019-04-23 ENCOUNTER — TELEPHONE (OUTPATIENT)
Dept: GASTROENTEROLOGY | Facility: CLINIC | Age: 34
End: 2019-04-23

## 2019-04-23 NOTE — PROGRESS NOTES
Please notify patient, the biopsies were positive for H.pylori. Treatment per bariatrics department/PCP.

## 2019-04-23 NOTE — TELEPHONE ENCOUNTER
----- Message from Segun Dominguez MD sent at 4/23/2019  2:10 PM CDT -----  Please notify patient, the biopsies were positive for H.pylori. Treatment per bariatrics department/PCP.

## 2019-04-24 ENCOUNTER — HOSPITAL ENCOUNTER (OUTPATIENT)
Dept: RADIOLOGY | Facility: HOSPITAL | Age: 34
Discharge: HOME | End: 2019-04-24
Attending: OBSTETRICS & GYNECOLOGY
Payer: MEDICARE

## 2019-04-24 DIAGNOSIS — T83.32XA INTRAUTERINE CONTRACEPTIVE DEVICE THREADS LOST, INITIAL ENCOUNTER: ICD-10-CM

## 2019-04-24 PROCEDURE — 76830 TRANSVAGINAL US NON-OB: CPT | Mod: 26,,, | Performed by: RADIOLOGY

## 2019-04-24 PROCEDURE — 76830 TRANSVAGINAL US NON-OB: CPT | Mod: TC

## 2019-04-24 PROCEDURE — 76856 US PELVIS COMPLETE WITH TRANSVAG FOR IUD: ICD-10-PCS | Mod: 26,,, | Performed by: RADIOLOGY

## 2019-04-24 PROCEDURE — 76856 US EXAM PELVIC COMPLETE: CPT | Mod: 26,,, | Performed by: RADIOLOGY

## 2019-04-24 PROCEDURE — 76830 US PELVIS COMPLETE WITH TRANSVAG FOR IUD: ICD-10-PCS | Mod: 26,,, | Performed by: RADIOLOGY

## 2019-04-29 ENCOUNTER — OFFICE VISIT (OUTPATIENT)
Dept: PSYCHIATRY | Facility: CLINIC | Age: 34
End: 2019-04-29
Payer: MEDICARE

## 2019-04-29 ENCOUNTER — DOCUMENTATION ONLY (OUTPATIENT)
Dept: BARIATRICS | Facility: CLINIC | Age: 34
End: 2019-04-29

## 2019-04-29 DIAGNOSIS — Q78.2 OSTEOPETROSIS: ICD-10-CM

## 2019-04-29 DIAGNOSIS — Z01.818 PREOP EXAMINATION: Primary | ICD-10-CM

## 2019-04-29 DIAGNOSIS — M25.512 CHRONIC LEFT SHOULDER PAIN: ICD-10-CM

## 2019-04-29 DIAGNOSIS — E66.01 MORBID OBESITY DUE TO EXCESS CALORIES: ICD-10-CM

## 2019-04-29 DIAGNOSIS — M77.9 TENDONITIS: ICD-10-CM

## 2019-04-29 DIAGNOSIS — K21.9 GASTROESOPHAGEAL REFLUX DISEASE, ESOPHAGITIS PRESENCE NOT SPECIFIED: ICD-10-CM

## 2019-04-29 DIAGNOSIS — G89.29 CHRONIC LEFT SHOULDER PAIN: ICD-10-CM

## 2019-04-29 DIAGNOSIS — Q78.0 OI (OSTEOGENESIS IMPERFECTA): ICD-10-CM

## 2019-04-29 PROCEDURE — 96138 PSYCL/NRPSYC TECH 1ST: CPT | Mod: S$GLB,,, | Performed by: PSYCHOLOGIST

## 2019-04-29 PROCEDURE — 99499 RISK ADDL DX/OHS AUDIT: ICD-10-PCS | Mod: S$GLB,,, | Performed by: PSYCHOLOGIST

## 2019-04-29 PROCEDURE — 96131 PSYCL TST EVAL PHYS/QHP EA: CPT | Mod: S$GLB,,, | Performed by: PSYCHOLOGIST

## 2019-04-29 PROCEDURE — 99499 UNLISTED E&M SERVICE: CPT | Mod: S$GLB,,, | Performed by: PSYCHOLOGIST

## 2019-04-29 PROCEDURE — 96139 PSYCL/NRPSYC TST TECH EA: CPT | Mod: S$GLB,,, | Performed by: PSYCHOLOGIST

## 2019-04-29 PROCEDURE — 96139 PR PSYCH/NEUROPSYCH TEST ADMIN/SCORING, BY TECH, 2+ TESTS, EA ADDTL 30 MIN: ICD-10-PCS | Mod: S$GLB,,, | Performed by: PSYCHOLOGIST

## 2019-04-29 PROCEDURE — 90791 PR PSYCHIATRIC DIAGNOSTIC EVALUATION: ICD-10-PCS | Mod: S$GLB,,, | Performed by: PSYCHOLOGIST

## 2019-04-29 PROCEDURE — 96131 PR PSYCHOLOGIC TEST EVAL SVCS, EA ADDTL HR: ICD-10-PCS | Mod: S$GLB,,, | Performed by: PSYCHOLOGIST

## 2019-04-29 PROCEDURE — 96130 PR PSYCHOLOGIC TEST EVAL SVCS, 1ST HR: ICD-10-PCS | Mod: S$GLB,,, | Performed by: PSYCHOLOGIST

## 2019-04-29 PROCEDURE — 99999 PR PBB SHADOW E&M-EST. PATIENT-LVL I: CPT | Mod: PBBFAC,,, | Performed by: PSYCHOLOGIST

## 2019-04-29 PROCEDURE — 90791 PSYCH DIAGNOSTIC EVALUATION: CPT | Mod: S$GLB,,, | Performed by: PSYCHOLOGIST

## 2019-04-29 PROCEDURE — 96138 PR PSYCH/NEUROPSYCH TEST ADMIN/SCORING, BY TECH, 2+ TESTS, 1ST 30 MIN: ICD-10-PCS | Mod: S$GLB,,, | Performed by: PSYCHOLOGIST

## 2019-04-29 PROCEDURE — 99999 PR PBB SHADOW E&M-EST. PATIENT-LVL I: ICD-10-PCS | Mod: PBBFAC,,, | Performed by: PSYCHOLOGIST

## 2019-04-29 PROCEDURE — 96130 PSYCL TST EVAL PHYS/QHP 1ST: CPT | Mod: S$GLB,,, | Performed by: PSYCHOLOGIST

## 2019-04-29 NOTE — PSYCH TESTING
[facility] [address] [place]  [address]  [phone]    REPORT OF PSYCHOLOGICAL TESTING    NAME: Prisca Zhu  OC #: [id number]  : 1985    REFERRED BY:  Ramon Hahn Jr., M.D.    EVALUATED BY:  Briseyda Hill, Ph.D., Clinical Psychologist  LAURIE Chandler Psychometrician    DATES OF EVALUATION: 2019, 2019    EVALUATION PROCEDURES AND TIMES:  Conducted by Psychologist (2 hours):  Integration of patient data, interpretation of standardized test results and clinical data, clinical decision-making, treatment planning and report, and interactive feedback to the patient  CPT Codes:  92248 - 1 hour; 92326 - 1 hour  Conducted by Technician (2 hours, 12 mnutes):  Psychological test administration and scoring by technician, two or more tests, any method:  [place] Multiphasic Personality Inventory - 2 - Restructured Form (MMPI-2-RF); [facility] Diagnostic (MBMD)  CPT Codes:  48161 - 30 minutes; 11322 - 30 minutes, 58171 - 30 minutes, 51061 - 30 minutes (3 additional units)    EVALUATION FINDINGS:  Before this evaluation was initiated, the purposes and process of the assessment and the limits of confidentiality were discussed with the patient who expressed understanding of these issues and orally consented to proceed with the evaluation.    Ms. Prisca Zhu is a 32-year-old Black single female who is pursuing bariatric surgery to improve her health and quality of life.  She denied past psychiatric history and treatment.  She denied any history of suicidality or non-suicidal self-injury.  She does not report current psychiatric problems, adjustment issues, or eating disorder symptoms.      Ms. Zhu has struggled with weight since childhood.  Unhealthy foods and lack of routine meals have contributed to her weight gain over the years.  She acknowledged problems with late-night eating and emotional eating that are in early remission. She  is working on increasing her coping mechanisms for stress.  She has tried many weight loss methods on her own with some success, and believes that her biggest weight loss challenge is lack of motivation.  Her motivation for seeking surgery now is to reduce pain.  Her postsurgical goals include being more active with her children, reducing pain, and playing sports.    At her initial consultation with DEANDRA Bonilla, on 01/29/2019, her BMI was 50.52.  Her medical comorbidities include: OI (osteogenesis imperfecta); Osteopetrosis; Tendonitis; Other long term (current) drug therapy; Chronic left shoulder pain; GERD.  She completed a nutritional consultation with Maribel Noel RD, bariatric nutritionist, and reports that she has made many changes to her diet since beginning the program (with 4 lb. weight gain).  She has a good understanding regarding the risks and benefits of the procedure and appears motivated for change.  She was fully cooperative and engaged in the assessment process.  The psychometrician, Dimple Egan, indicated that Ms. Zhu did not appear to have problems with testing.    Ms. Zhu produced an interpretable MMPI-2-RF profile. Of note, validity scale results suggest Ms. Zhu tended to portray herself in an overly positive and well-adjusted presentation, which may indicate an underestimation of problems assessed by this test.  This profile should be interpreted with these issues in mind.  Ms. Zhu reports somatic complaints including neurological symptoms, physical health concerns, head pain, and developing physical symptoms in response to stress.  Although there are no indications of emotional problems, disordered thinking, or behavioral issues, these problems cannot be ruled-out due to indications of under-reporting described earlier.     The MBMD indicated that Ms. Zhu responded in a manner suggesting efforts to present a socially acceptable image with resistance  to admit personal problems.  Although scoring adjustments were made to correct for these tendencies, the following profile should be interpreted with these issues in mind.  Ms. Zhu is reporting heightened suspiciousness toward others, and may resist accepting the viewpoints of the healthcare team.  She can be quick-tempered and quarrelsome at times.  Typically she is confident and self-assured, and usually goes out of her way to exhibit responsible and efficient behavior.  It is important for her to conform to healthcare standards.  Based on her responses, her spiritual tara is likely to help her respond to demands of medical treatment.  Patients with similar profiles may experience slower recoveries due to suspiciousness, overeating, and lack of routine exercise; however, these issues may be mitigated by her strong spiritual tara.  Her responses suggest that, compared to other patients seeking bariatric surgery, she is likely to experience an improved quality of life after surgery, and she is likely to follow through on making behavioral changes that will lead to optimal surgery results.  Test data suggests Ms. Zhu would find it helpful to engage in pre-surgery psychosocial evaluation and supportive counseling.  She may possibly benefit from post-surgery bariatric support group, physical rehabilitation, and/or nutritional instruction plan.  Of note, Ms. Zhu reports that she trusts her healthcare providers and does not feel suspicious of them.    DIAGNOSTIC IMPRESSIONS:    ICD-10-CM ICD-9-CM   1. Preop examination Z01.818 V72.84   2. Morbid obesity due to excess calories E66.01 278.01   3. OI (osteogenesis imperfecta) Q78.0 756.51   4. Osteopetrosis Q78.2 756.52   5. Gastroesophageal reflux disease, esophagitis presence not specified K21.9 530.81   6. Tendonitis M77.9 726.90   7. Chronic left shoulder pain M25.512 719.41    G89.29 338.29   8. BMI 50.0-59.9, adult Z68.43 V85.43       SUMMARY AND  RECOMMENDATIONS:  Ms. Zhu has a long history of weight problems and is pursuing bariatric surgery at this time in an effort to improve her health and quality of life.  Test results should be considered interpretable, and indicate that she reports somatic complaints and interpersonal guardedness but no current psychiatric symptoms or adjustment problems.  In the clinical interview, Ms. Zhu denied past psychiatric history and treatment.  She reports good social support, demonstrates several characteristics that make her a good candidate for surgery, and testing suggests that she will benefit in multiple ways from bariatric surgery.  She does not appear to have significant problems with comprehension or health literacy, and provided adequate responses to interview questions and testing.  She may be more concrete than abstract, and would likely benefit from straightforward instructions and plans.  Overall, she appears to be a fine candidate for bariatric surgery.  Test results show NO overt psychological contraindications for surgery.

## 2019-04-29 NOTE — Clinical Note
CLEAR.  This patient is psychologically cleared to proceed with bariatric surgery.  She does not appear to have significant problems with comprehension or health literacy, and provided adequate responses to interview questions and testing.  She acknowledged a history of special education due to missing school for health reasons.  The psychometrician did not indicate any significant problems with comprehension during testing (but did with her mother).  Of note, she may be more concrete than abstract, and would likely benefit from straightforward instructions and plans.  Please do not hesitate to contact me with any questions or concerns.  JR Ede.

## 2019-04-29 NOTE — PROGRESS NOTES
Psychiatry Initial Visit (PhD/LCSW)   Diagnostic Interview - CPT 02769     Date: 04/29/2019    Site: [facility]     Referral source: Ramon Hahn Jr., M.D.    Clinical status of patient: Outpatient     Ms. Prisca Zhu, a 32-year-old Black single female, presented for initial evaluation visit. Before this evaluation was initiated, the purposes and process of the assessment and the limits of confidentiality were discussed with the patient who expressed understanding of these issues and orally consented to proceed with the evaluation.     Chief complaint/reason for encounter: Routine psychological evaluation prior to bariatric surgery.     Type of surgery sought:  Symone-en-Y Bypass    History of present illness:  Ms. Prisca Zhu is a 32-year-old Black single female who is pursuing bariatric surgery to improve her health and quality of life.  She denied past psychiatric history and treatment.  She denied any history of suicidality or non-suicidal self-injury.  She does not report current psychiatric problems, adjustment issues, or eating disorder symptoms.  She has attempted making positive lifestyle changes in anticipation for surgery, with some benefit.  The patient has a Body Mass Index of 50.52 as documented by the referring provider.    Ms. Zhu has struggled with weight since childhood.  She reports, Me being inside all the time and not outside like most kids, I turned to eating.  Factors that have contributed to her weight gain over the years include:  Fast food, junk food, chips, processed food, and lack of routine meals.  She acknowledged problems with late-night eating at 11:00 PM or 12:00 AM (large meals).  She stopped late-night eating when she entered the bariatric program (she cuts off her food intake by 8:00 PM).  She denied problems with grazing.  Ms. Zhu acknowledges that she is an emotional eater, with de-stressing as her primary emotional trigger to overeat.  The  last time she engaged in emotional eating was four months ago.  She is working on increasing her coping mechanisms for stress, including going to the gym.  She has tried many weight loss methods on her own (i.e., diet pills, liposine, Hydroxycut, exercise, elliptical at home) with some success (weight loss with regain), and believes that her biggest weight loss challenge is lack of motivation.  Her motivation for seeking surgery now is myself - Im ready because its putting a toll on my body because its constantly in pain.  Her postsurgical goals include being more active with her children, reducing pain, and playing sports.    Ms. Zhu has met with bariatric nutritionist Maribel Noel RD, and reports that she has made the following lifestyle changes since beginning the bariatric program:  Cutting out late-night eating, reducing sweets and sugar, eating more eggs, increasing protein bars, increasing vegetables, and exercising (with 4 lb. weight gain).  She must continue meeting with Ms. Noel to demonstrate the implementation of lifestyle changes prior to clearance for bariatric surgery.  She chose the gastric bypass because it will help me not to eat so much on my portion size it seems easier than the other surgeries.  She understood that she needs to focus on protein intake after surgery, which includes fish, eggs, vegetables, bars, shakes.  She noted that she will need to stay away from snacks, sugars, grains, processed food, rice after surgery.  She hopes to lose 50 lb. and expects it will take six months.  She plans to take whatever time is needed to recover after surgery.  Her sister will be available to help her during recovery.    Medical History:  OI (osteogenesis imperfecta); Osteopetrosis; Tendonitis; Other long term (current) drug therapy; Chronic left shoulder pain; GERD    Current medications and drug reactions:  see medication list.    Pain:  7/10 (my back, as  usual)    Psychiatric Symptoms:  Depression:  Denied.  She denied episodes of depressed mood or depression-related anhedonia.  Based on her reports, her symptoms do not meet full criteria for Major Depressive Disorder.  Loraine/Hypomania:  Denied.  She denied periods of elevated mood or abnormally increased energy or goal-directed activity.  Anxiety:  Denied.  She denied experiencing excessive, exaggerated anxiety that was unmanageable.  Based on her reports, her symptoms do not meet full criteria for Generalized Anxiety Disorder.  Thoughts:  Denied delusions, hallucinations.  Suicidal thoughts/behaviors:  Denied.  Self-injury:  Denied.  Sleep:  Denied problems.  Cognitive functioning:  Denied problems.    Current psychosocial stressors:  The pain - my bodys pain it affects the way I work and the way I want to do things my bones start to hurt.  Report of coping skills:  I just deal with it.  I ignore it.  I dont complain about how Im feeling and stuff like that.  If other people complaining about how they feel a certain way, I just encourage them.  But me, I see they have more problems than I do!  She enjoys traveling, taking the kids to the beach, and playing sports.  Support system:  My family - my sister, my brothers.  Strengths and liabilities:  Strength: Patient accepts guidance/feedback, Strength: Patient is expressive/articulate., Strength: Patient is motivated for change., Strength: Patient has positive support network., Strength: Patient has reasonable judgment., Strength: Patient is stable., Liability: Patient has poor health., Liability: Patient lacks coping skills.    Current and past substance use:  Alcohol: Denied current use; denied history of abuse or dependency.   Drugs: Denied current use; denied history of abuse or dependency.  Tobacco: None.   Caffeine: She drinks one cup of coffee each week.  She drinks four cups of sweet and unsweet tea each day.    Current Psychiatric  Treatment:  Medications:  Denied.  Psychotherapy:  Denied.    Psychiatric History:  Previous diagnosis:  Denied.  Previous hospitalizations:  Denied.  History of outpatient treatment:  Denied.  Previous suicide attempt:  Denied.  Family history of psychiatric illness:  She reports her mother has anxiety and depression.    Trauma history:  Denied.    History of eating disorders:  History of bulimia:  She denied recurrent episodes of binge eating and inappropriate compensatory behaviors.  History of binge-eating episodes:  When she engaged in late-night eating, she would eat a large plate of chicken, rice, gravy, and cornbread and a large bowl of ice cream and cake.  This would be the only meal she had during the day after completing work and house chores.  She described herself as a fast eater in general, but was not eating more rapidly than typical for her.  She did eat past the point of being full in which she was uncomfortably full; however, she initially started eating when she was hungry.  She did not eat alone due to embarrassment.  She denied negative emotions afterwards.  The last time she engaged in one of these episodes was two months ago before she started the bariatric program. It appears she does not meet criteria for a binge eating episode due to insufficient symptoms (only one criterion symptom).    Social history (marriage, employment, etc.):  Ms. Zhu was born in [place], [place] and raised in [place] by her biological mother along with three siblings, of whom she is the third oldest.  She described her childhood as good.  She denied childhood trauma, abuse, and neglect.  She did good in school.  She was enrolled in special education course in middle school because I was breaking my bones a lot I was mostly in hospitals in the years I was in a wheelchair also.  She was never held back in any grade.  She denied problems at school like detentions, suspensions, or expulsions.  She  earned a high school diploma.  She stopped working at [employer] in the [employer] and in [employer] work three months ago because of my bones hurting, and Ive been going to different doctors for my dislocated back and pinched nerves.  She has been on disability since she was a child (due to OI).  Finances are stable for her.  She is currently dating her boyfriend of three years.  She has two children (ages 10 and 5).  She currently lives with her children.  Buddhism is not important to her.    Legal history: Denied.    Mental Status Exam:   General appearance:  appears stated age, neatly dressed, well groomed  Speech:  normal rate and tone  Level of cooperation:  cooperative  Thought processes:  logical, goal-directed  Mood:  euthymic (Its good)  Thought content:  no illusions, no visual hallucinations, no auditory hallucinations, no delusions, no active or passive homicidal thoughts, no active or passive suicidal ideation, no obsessions, no compulsions, no violence  Affect:  appropriate  Orientation:  oriented to person, place, and date (04/28/2019)  Memory:  Recent memory:  3 of 3 objects after brief delay.    Remote memory - intact  Attention span and concentration:  spelled HOUSE forward only (backwards S-[id number])  Fund of general knowledge: 3 of 4 recent presidents   Abstract reasoning:    Similarities: concrete.  (pencil and pen:  both start with a P)  Proverbs: concrete. (spilled milk:  you can always get more)  Judgment and insight: fair  Language:  intact    Diagnostic Impression:    ICD-10-CM ICD-9-CM   1. Preop examination Z01.818 V72.84   2. Morbid obesity due to excess calories E66.01 278.01   3. OI (osteogenesis imperfecta) Q78.0 756.51   4. Osteopetrosis Q78.2 756.52   5. Gastroesophageal reflux disease, esophagitis presence not specified K21.9 530.81   6. Tendonitis M77.9 726.90   7. Chronic left shoulder pain M25.512 719.41    G89.29 338.29   8. BMI 50.0-59.9, adult Z68.43  V85.43       Summary/Conclusion:   There are no overt psychological contraindications for proceeding with bariatric surgery.  The patient has no significant psychiatric history, and reports no current psychiatric problems or major adjustment issues.  The patient has reasonable expectations for surgery, good social support, and has already begun making appropriate lifestyle changes in anticipation for surgery. The patient has verbalized appropriate awareness and commitment to the necessary behavioral changes associated with bariatric surgery and appears willing to comply with long-term lifestyle changes. There are no recommendations for psychological treatment at this time. The patient is aware of resources available should her needs change in the future.    Recommendations:    Clear   This patient is psychologically cleared to proceed with bariatric surgery.  She does not appear to have significant problems with comprehension or health literacy, and provided adequate responses to interview questions and testing.  She may be more concrete than abstract, and would likely benefit from straightforward instructions and plans.     Crush medications for a minimum of one month    Crush medications for a minimum of three months    Crush medications for a minimum of six months    Recommendations    Conditions need to be met    Not a candidate        Please see Psychological Testing report available in Notes tab of Chart Review in Epic for results of psychological testing.      Length of Session:  45 minutes

## 2019-05-01 ENCOUNTER — TELEPHONE (OUTPATIENT)
Dept: BARIATRICS | Facility: CLINIC | Age: 34
End: 2019-05-01

## 2019-05-02 ENCOUNTER — TELEPHONE (OUTPATIENT)
Dept: BARIATRICS | Facility: CLINIC | Age: 34
End: 2019-05-02

## 2019-05-03 ENCOUNTER — TELEPHONE (OUTPATIENT)
Dept: BARIATRICS | Facility: CLINIC | Age: 34
End: 2019-05-03

## 2019-05-03 NOTE — TELEPHONE ENCOUNTER
Called pt to set up next appt.  No answer. Left msg----- Message from Brant Ugarte sent at 5/3/2019  9:45 AM CDT -----  Contact: Pt  Needs Advice    Reason for call:The Pt states that she was told by you to call you and make an appt.        Communication Preference:[phone]    Additional Information:

## 2019-05-06 ENCOUNTER — TELEPHONE (OUTPATIENT)
Dept: BARIATRICS | Facility: CLINIC | Age: 34
End: 2019-05-06

## 2019-05-06 NOTE — TELEPHONE ENCOUNTER
Called and left another msg for pt concerning scheduling a  nutrition appt to see if she can be cleared.

## 2019-05-07 ENCOUNTER — TELEPHONE (OUTPATIENT)
Dept: BARIATRICS | Facility: CLINIC | Age: 34
End: 2019-05-07

## 2019-05-07 NOTE — TELEPHONE ENCOUNTER
"Called patient to inquire about an appointment scheduled with the note attached "stas". Patient states she is looking for surgery and someone told her she needed an appt. Informed patient that she does not see us again until she is post op and she has an appt with RD that was scheduled correctly. Patient understands that the appt is canceled and will come in for the RD visit only.   "

## 2019-05-09 ENCOUNTER — OFFICE VISIT (OUTPATIENT)
Dept: OBSTETRICS AND GYNECOLOGY | Facility: CLINIC | Age: 34
End: 2019-05-09
Payer: MEDICARE

## 2019-05-09 VITALS
BODY MASS INDEX: 50.7 KG/M2 | HEIGHT: 57 IN | WEIGHT: 235 LBS | SYSTOLIC BLOOD PRESSURE: 115 MMHG | DIASTOLIC BLOOD PRESSURE: 68 MMHG

## 2019-05-09 DIAGNOSIS — N89.8 VAGINAL IRRITATION: ICD-10-CM

## 2019-05-09 DIAGNOSIS — T83.32XD INTRAUTERINE CONTRACEPTIVE DEVICE THREADS LOST, SUBSEQUENT ENCOUNTER: Primary | ICD-10-CM

## 2019-05-09 PROCEDURE — 3008F BODY MASS INDEX DOCD: CPT | Mod: CPTII,S$GLB,, | Performed by: OBSTETRICS & GYNECOLOGY

## 2019-05-09 PROCEDURE — 99215 PR OFFICE/OUTPT VISIT, EST, LEVL V, 40-54 MIN: ICD-10-PCS | Mod: S$GLB,,, | Performed by: OBSTETRICS & GYNECOLOGY

## 2019-05-09 PROCEDURE — 99999 PR PBB SHADOW E&M-EST. PATIENT-LVL III: ICD-10-PCS | Mod: PBBFAC,,, | Performed by: OBSTETRICS & GYNECOLOGY

## 2019-05-09 PROCEDURE — 3008F PR BODY MASS INDEX (BMI) DOCUMENTED: ICD-10-PCS | Mod: CPTII,S$GLB,, | Performed by: OBSTETRICS & GYNECOLOGY

## 2019-05-09 PROCEDURE — 87510 GARDNER VAG DNA DIR PROBE: CPT

## 2019-05-09 PROCEDURE — 99999 PR PBB SHADOW E&M-EST. PATIENT-LVL III: CPT | Mod: PBBFAC,,, | Performed by: OBSTETRICS & GYNECOLOGY

## 2019-05-09 PROCEDURE — 99215 OFFICE O/P EST HI 40 MIN: CPT | Mod: S$GLB,,, | Performed by: OBSTETRICS & GYNECOLOGY

## 2019-05-09 PROCEDURE — 87480 CANDIDA DNA DIR PROBE: CPT

## 2019-05-09 NOTE — PATIENT INSTRUCTIONS
Vaginal Infection: Yeast (Candidiasis)  Yeast infection occurs when yeast in the vagina increase and attacks the vaginal tissues. Yeast is a type of fungus. These infections are often caused by a type of yeast called Candida albicans. Other species of yeast can also cause infections. Factors that may make infection more likely include recent antibiotic use, douching, or increased sex. Yeast infections are more common in women who have diabetes, or are obese or pregnant, or have a weak immune system.  Symptoms of yeast infection  · Clumpy or thin, white discharge, which may look like cottage cheese  · No odor or minimal odor  · Severe vaginal itching or burning  · Burning with urination  · Swelling, redness of vulva  · Pain during sex  Treating yeast infection  Yeast infection is treated with a vaginal antifungal cream. In some cases, antifungal pills are prescribed instead. During treatment:  · Finish all of your medicine, even if your symptoms go away.  · Apply the cream before going to bed. Lie flat after applying so that it doesn't drip out.  · Do not douche or use tampons.  · Don't rely on a diaphragm or condoms, since the cream may weaken them.  · Avoid intercourse if advised by your healthcare provider.     Should I treat a yeast infection myself?  Discuss with your healthcare provider whether you should use over-the-counter medicines to treat a yeast infection. Self-treatment may depend on whether:  · You've had a yeast infection in the past.  · You're at risk for STDs.  Call your healthcare provider if symptoms do not go away or come back after treatment.   Date Last Reviewed: 3/1/2017  © 5675-4373 [employer]. [address] [place] [address], [place] [address]. All rights reserved. This information is not intended as a substitute for professional medical care. Always follow your healthcare professional's instructions.

## 2019-05-09 NOTE — PROGRESS NOTES
History & Physical  Gynecology      SUBJECTIVE:     Chief Complaint: surgery consult and Routine Prenatal Visit       History of Present Illness:  Ms. Zhu is a 32 y/o female who present to discuss surgery. She was initially seen on 2019 c/o pelvic pain and lost IUD. She reports that she had IUD in place and but no strings in cervix. Removal was attempted in office but patient was unable to tolerate procedure. TVUS was done to ensure IUD was in uterus. Patient would like to move forth with EUA/IUD removal/Hysteroscopy D&C in the OR.     Patient also reports that she thinks that she has a vaginal infection. She reports that she has been having vaginal irritation and discharge for the last few weeks. She reports that she would like to have discharge checked.     TVUS  FINDINGS:  Uterus measures 10.7 x 3.8 x 6.1 cm.  Uterus demonstrates no evidence of uterine fibroids.  Endometrium measures 7 mm.  Intrauterine device is visualized in satisfactory position.  A small nabothian cyst is noted measuring 1.9 cm.    Right ovary is normal in appearance measuring 2.9 x 2.1 x 2.9 cm with normal vascular flow.    Left ovary is normal in appearance measuring 3.2 x 2.1 x 3.0 cm with normal vascular flow.    No evidence of free fluid.      Impression       IUD in satisfactory position.         Review of patient's allergies indicates:   Allergen Reactions    Pcn [penicillins] Shortness Of Breath       Past Medical History:   Diagnosis Date    Anemia     BMI 50.0-59.9, adult     GERD (gastroesophageal reflux disease) 2019    History of blood transfusion     Obesity     OI (osteogenesis imperfecta)     Osteopetrosis     Tendonitis      Past Surgical History:   Procedure Laterality Date     SECTION      ,     ESOPHAGOGASTRODUODENOSCOPY (EGD) N/A 2019    Performed by [facility]un [facility] at [facility] (2ND FLR)    FEMUR FRACTURE SURGERY Bilateral     hardware/rods in both legs     FRACTURE SURGERY      femur    WISDOM TOOTH EXTRACTION       OB History        2    Para        Term                AB        Living   2       SAB        TAB        Ectopic        Multiple        Live Births                   Family History   Problem Relation Age of Onset    Hypertension Mother     Hyperlipidemia Mother     Asthma Mother     Anxiety disorder Mother     No Known Problems Father      Social History     Tobacco Use    Smoking status: Never Smoker    Smokeless tobacco: Never Used   Substance Use Topics    Alcohol use: No    Drug use: Never       Current Outpatient Medications   Medication Sig    CALCIUM ORAL Take by mouth.    cyanocobalamin (VITAMIN B-12) 100 MCG tablet Take 100 mcg by mouth once daily.    ergocalciferol (ERGOCALCIFEROL) 50,000 unit Cap Take 1 capsule (50,000 Units total) by mouth every 7 days.    fish oil-omega-3 fatty acids 300-1,000 mg capsule Take by mouth once daily.    omeprazole 20 mg TbEC      No current facility-administered medications for this visit.      Review of Systems:  Review of Systems   Constitutional: Negative for appetite change, chills and fever.   Respiratory: Negative for cough and wheezing.    Cardiovascular: Negative for chest pain and palpitations.   Gastrointestinal: Negative for abdominal pain, nausea and vomiting.   Genitourinary: Positive for pelvic pain, vaginal discharge and vaginal odor. Negative for dysuria, frequency, hematuria, vaginal bleeding and vaginal pain.        OBJECTIVE:     Physical Exam:  Physical Exam   Constitutional: She is oriented to person, place, and time. She appears well-developed and well-nourished.   Cardiovascular: Normal rate.   Pulmonary/Chest: Effort normal.   Genitourinary: Vaginal discharge found.   Genitourinary Comments: No IUD strings seen nor could they be extracted using pap brush in cervix   Neurological: She is alert and oriented to person, place, and time.   Skin: Skin is warm and dry.    Psychiatric: She has a normal mood and affect.   Nursing note and vitals reviewed.    ASSESSMENT:       ICD-10-CM ICD-9-CM    1. Intrauterine contraceptive device threads lost, subsequent encounter T83.32XD V58.89 Case Request Operating Room: HYSTEROSCOPY, WITH DILATION AND CURETTAGE OF UTERUS     996.32    2. Vaginal irritation N89.8 623.9 Vaginosis Screen by DNA Probe      Plan:      Prisca was seen today for surgery consult and routine prenatal visit.    Diagnoses and all orders for this visit:    Intrauterine contraceptive device threads lost, subsequent encounter  -     Case Request Operating Room: HYSTEROSCOPY, WITH DILATION AND CURETTAGE OF UTERUS.  - TVUS report and pictures reviewed with patient   - Discussed with patient that loss of IUD strings in not uncommon and that IUD is still functional although strings are not visible. Patient reports pain and would like IUD removed at this time    Vaginal irritation  -     Vaginosis Screen by DNA Probe  - Discussed with patient how douching/body wash/scented soaps/bubble baths can shift her vaginal marshal and natural vaginal pH which can cause overgrowth of yeast or gardnerella which is the bacteria that causes BV. Discussed with patient to use only mild, unscented soaps such as Dove unscented and/or Dial and water to wash her vagina.     Orders Placed This Encounter   Procedures    Vaginosis Screen by DNA Probe       Follow up in about 2 weeks (around 5/23/2019) for preop .    Counseling time: 15 minutes    Ashley Greenberg

## 2019-05-10 ENCOUNTER — LAB VISIT (OUTPATIENT)
Dept: LAB | Facility: HOSPITAL | Age: 34
End: 2019-05-10
Payer: MEDICARE

## 2019-05-10 ENCOUNTER — CLINICAL SUPPORT (OUTPATIENT)
Dept: BARIATRICS | Facility: CLINIC | Age: 34
End: 2019-05-10
Payer: MEDICARE

## 2019-05-10 VITALS — WEIGHT: 234.13 LBS | BODY MASS INDEX: 50.51 KG/M2 | HEIGHT: 57 IN

## 2019-05-10 DIAGNOSIS — R11.0 NAUSEA: ICD-10-CM

## 2019-05-10 DIAGNOSIS — E66.01 MORBID OBESITY: ICD-10-CM

## 2019-05-10 DIAGNOSIS — Z79.899 OTHER LONG TERM (CURRENT) DRUG THERAPY: ICD-10-CM

## 2019-05-10 DIAGNOSIS — Z71.3 NUTRITIONAL COUNSELING: ICD-10-CM

## 2019-05-10 DIAGNOSIS — K21.9 GASTROESOPHAGEAL REFLUX DISEASE, ESOPHAGITIS PRESENCE NOT SPECIFIED: ICD-10-CM

## 2019-05-10 DIAGNOSIS — E66.01 MORBID OBESITY WITH BODY MASS INDEX OF 50 OR HIGHER: ICD-10-CM

## 2019-05-10 DIAGNOSIS — Q78.0 OI (OSTEOGENESIS IMPERFECTA): ICD-10-CM

## 2019-05-10 DIAGNOSIS — Z01.818 PREOP TESTING: ICD-10-CM

## 2019-05-10 LAB
ALBUMIN SERPL BCP-MCNC: 3.7 G/DL (ref 3.5–5.2)
ALP SERPL-CCNC: 79 U/L (ref 55–135)
ALT SERPL W/O P-5'-P-CCNC: 22 U/L (ref 10–44)
ANION GAP SERPL CALC-SCNC: 9 MMOL/L (ref 8–16)
AST SERPL-CCNC: 22 U/L (ref 10–40)
BACTERIAL VAGINOSIS DNA: NEGATIVE
BASOPHILS # BLD AUTO: 0.03 K/UL (ref 0–0.2)
BASOPHILS NFR BLD: 0.3 % (ref 0–1.9)
BILIRUB SERPL-MCNC: 0.4 MG/DL (ref 0.1–1)
BUN SERPL-MCNC: 10 MG/DL (ref 6–20)
CALCIUM SERPL-MCNC: 9.6 MG/DL (ref 8.7–10.5)
CANDIDA GLABRATA DNA: NEGATIVE
CANDIDA KRUSEI DNA: NEGATIVE
CANDIDA RRNA VAG QL PROBE: POSITIVE
CHLORIDE SERPL-SCNC: 105 MMOL/L (ref 95–110)
CO2 SERPL-SCNC: 23 MMOL/L (ref 23–29)
CREAT SERPL-MCNC: 0.8 MG/DL (ref 0.5–1.4)
DIFFERENTIAL METHOD: ABNORMAL
EOSINOPHIL # BLD AUTO: 0.1 K/UL (ref 0–0.5)
EOSINOPHIL NFR BLD: 1.4 % (ref 0–8)
ERYTHROCYTE [DISTWIDTH] IN BLOOD BY AUTOMATED COUNT: 13.5 % (ref 11.5–14.5)
EST. GFR  (AFRICAN AMERICAN): >60 ML/MIN/1.73 M^2
EST. GFR  (NON AFRICAN AMERICAN): >60 ML/MIN/1.73 M^2
ESTIMATED AVG GLUCOSE: 103 MG/DL (ref 68–131)
GLUCOSE SERPL-MCNC: 78 MG/DL (ref 70–110)
HBA1C MFR BLD HPLC: 5.2 % (ref 4–5.6)
HCT VFR BLD AUTO: 44.4 % (ref 37–48.5)
HGB BLD-MCNC: 13.8 G/DL (ref 12–16)
IMM GRANULOCYTES # BLD AUTO: 0.03 K/UL (ref 0–0.04)
IMM GRANULOCYTES NFR BLD AUTO: 0.3 % (ref 0–0.5)
LYMPHOCYTES # BLD AUTO: 2.4 K/UL (ref 1–4.8)
LYMPHOCYTES NFR BLD: 25.9 % (ref 18–48)
MCH RBC QN AUTO: 28.8 PG (ref 27–31)
MCHC RBC AUTO-ENTMCNC: 31.1 G/DL (ref 32–36)
MCV RBC AUTO: 93 FL (ref 82–98)
MONOCYTES # BLD AUTO: 0.6 K/UL (ref 0.3–1)
MONOCYTES NFR BLD: 6.7 % (ref 4–15)
NEUTROPHILS # BLD AUTO: 6 K/UL (ref 1.8–7.7)
NEUTROPHILS NFR BLD: 65.4 % (ref 38–73)
NRBC BLD-RTO: 0 /100 WBC
PLATELET # BLD AUTO: 261 K/UL (ref 150–350)
PMV BLD AUTO: 10.5 FL (ref 9.2–12.9)
POTASSIUM SERPL-SCNC: 3.8 MMOL/L (ref 3.5–5.1)
PROT SERPL-MCNC: 8.1 G/DL (ref 6–8.4)
RBC # BLD AUTO: 4.79 M/UL (ref 4–5.4)
SODIUM SERPL-SCNC: 137 MMOL/L (ref 136–145)
T VAGINALIS RRNA GENITAL QL PROBE: NEGATIVE
WBC # BLD AUTO: 9.12 K/UL (ref 3.9–12.7)

## 2019-05-10 PROCEDURE — 97803 MED NUTRITION INDIV SUBSEQ: CPT | Mod: S$GLB,,, | Performed by: DIETITIAN, REGISTERED

## 2019-05-10 PROCEDURE — 85025 COMPLETE CBC W/AUTO DIFF WBC: CPT

## 2019-05-10 PROCEDURE — 36415 COLL VENOUS BLD VENIPUNCTURE: CPT

## 2019-05-10 PROCEDURE — 99499 UNLISTED E&M SERVICE: CPT | Mod: S$GLB,,, | Performed by: DIETITIAN, REGISTERED

## 2019-05-10 PROCEDURE — 99999 PR PBB SHADOW E&M-EST. PATIENT-LVL I: CPT | Mod: PBBFAC,,, | Performed by: DIETITIAN, REGISTERED

## 2019-05-10 PROCEDURE — 83036 HEMOGLOBIN GLYCOSYLATED A1C: CPT

## 2019-05-10 PROCEDURE — 80053 COMPREHEN METABOLIC PANEL: CPT

## 2019-05-10 PROCEDURE — 97803 PR MED NUTR THER, SUBSQ, INDIV, EA 15 MIN: ICD-10-PCS | Mod: S$GLB,,, | Performed by: DIETITIAN, REGISTERED

## 2019-05-10 PROCEDURE — 99499 NO LOS: ICD-10-PCS | Mod: S$GLB,,, | Performed by: DIETITIAN, REGISTERED

## 2019-05-10 PROCEDURE — 99999 PR PBB SHADOW E&M-EST. PATIENT-LVL I: ICD-10-PCS | Mod: PBBFAC,,, | Performed by: DIETITIAN, REGISTERED

## 2019-05-10 NOTE — PROGRESS NOTES
NUTRITION NOTE    Referring Physician: Guillermo Villanueva M.D.  Reason for MNT Referral: Nutrition follow up pending Gastric Bypass    Patient presents for 4th visit for [facility] with weight loss over the past month; total weight loss by making numerous dietary and lifestyle changes.  Pt has made significant changes to her meal plan.  Eating high protein low carb foods and incorporating multiple vegetables and some fruit into her meal plan daily.  Pt is exercising on a regular basis.    CLINICAL DATA:  33 y.o. female.    Current Weight: 234 lbs  Weight Change Since Initial Visit: Gain of 4 lbs  Ideal Body Weight: 119 lbs  Body mass index is 50.66 kg/m².    NUTRITIONAL NEEDS:  1200 Calories  Per day  65- -81 Grams Protein (1.2 -1.5 gms/kg IBW per day)  CURRENT DIET:    Reduced-calorie diet.  Diet Recall: Food records are not present.    Diet Includes: High protein low fat salads and 2 protein shakes per day  Meal Pattern: Improved.  Salad with avocado, spinach or lettuce, garlic powder, mushrooms and cucumber  With shredded chicken or beef  Protein Supplements: 2 per day.  Powdered protein shake frozen carrots, pineapple, kale, spinach, ice and some water- 1 scoop genepro  1 in morning and 1 in afternoon  Frozen vegetables with cheese and fish  Snacking: Adequate. Snacks include healthy choices.  Vegetables: Likes a variety. Eats daily.  Fruits: Likes a variety. Eats daily.  Beverages: water  Dining Out:  none Mostly none.  Cooking at home: Daily. Mostly baked and grilled meat, fish and vegetables.    CURRENT EXERCISE:  Adequate walk at park and gym elliptical and weights most day    Vitamins / Minerals / Herbs:   Fish oil   Vitamin D  Multivitamin    Food Allergies:   None reported    Social:  No work.  Alcohol: None.  Smoking: None.    ASSESSMENT:  Patient demonstrates all willingness to change lifestyle habits as evidenced by weight loss, good exercise, dietary changes, including protein drinks, increased fruits,  increased vegetables, reduced dining out, more food preparation at jose, healthier cooking at home and healthier snacking at home.    Doing well with working on greatest challenges (starchy CHO and irregular meal patterns).    Barriers to Education:  none  Stage of Change:  action    NUTRITION DIAGNOSIS:  Obesity related to Excessive calorie intake as evidence by BMI.  Status: Improved    PLAN:  Pt is good candidate for surgery.    Diet: Maintain diet plan.  Start shopping for bariatric vitamins & minerals.    Exercise: Maintain.    Behavior Modification: Begin to document food & activity logs daily.      Communicated nutrition plan with bariatric team.    SESSION TIME:  30 minutes

## 2019-05-18 ENCOUNTER — HOSPITAL ENCOUNTER (EMERGENCY)
Facility: HOSPITAL | Age: 34
Discharge: HOME | End: 2019-05-18
Attending: EMERGENCY MEDICINE
Payer: MEDICARE

## 2019-05-18 VITALS
DIASTOLIC BLOOD PRESSURE: 80 MMHG | WEIGHT: 220 LBS | TEMPERATURE: 98 F | OXYGEN SATURATION: 96 % | HEART RATE: 62 BPM | SYSTOLIC BLOOD PRESSURE: 116 MMHG | RESPIRATION RATE: 16 BRPM | BODY MASS INDEX: 47.46 KG/M2 | HEIGHT: 57 IN

## 2019-05-18 DIAGNOSIS — K57.90 DIVERTICULOSIS OF INTESTINE WITHOUT BLEEDING, UNSPECIFIED INTESTINAL TRACT LOCATION: ICD-10-CM

## 2019-05-18 DIAGNOSIS — R10.32 LLQ PAIN: Primary | ICD-10-CM

## 2019-05-18 LAB
ALBUMIN SERPL BCP-MCNC: 3.7 G/DL (ref 3.5–5.2)
ALP SERPL-CCNC: 78 U/L (ref 55–135)
ALT SERPL W/O P-5'-P-CCNC: 24 U/L (ref 10–44)
ANION GAP SERPL CALC-SCNC: 7 MMOL/L (ref 8–16)
AST SERPL-CCNC: 26 U/L (ref 10–40)
B-HCG UR QL: NEGATIVE
BACTERIA GENITAL QL WET PREP: ABNORMAL
BASOPHILS # BLD AUTO: 0.02 K/UL (ref 0–0.2)
BASOPHILS NFR BLD: 0.2 % (ref 0–1.9)
BILIRUB SERPL-MCNC: 0.3 MG/DL (ref 0.1–1)
BILIRUB UR QL STRIP: NEGATIVE
BUN SERPL-MCNC: 9 MG/DL (ref 6–20)
CALCIUM SERPL-MCNC: 9.6 MG/DL (ref 8.7–10.5)
CHLORIDE SERPL-SCNC: 106 MMOL/L (ref 95–110)
CLARITY UR: CLEAR
CLUE CELLS VAG QL WET PREP: ABNORMAL
CO2 SERPL-SCNC: 26 MMOL/L (ref 23–29)
COLOR UR: YELLOW
CREAT SERPL-MCNC: 0.9 MG/DL (ref 0.5–1.4)
CTP QC/QA: YES
DIFFERENTIAL METHOD: NORMAL
EOSINOPHIL # BLD AUTO: 0.2 K/UL (ref 0–0.5)
EOSINOPHIL NFR BLD: 2 % (ref 0–8)
ERYTHROCYTE [DISTWIDTH] IN BLOOD BY AUTOMATED COUNT: 14 % (ref 11.5–14.5)
EST. GFR  (AFRICAN AMERICAN): >60 ML/MIN/1.73 M^2
EST. GFR  (NON AFRICAN AMERICAN): >60 ML/MIN/1.73 M^2
FILAMENT FUNGI VAG WET PREP-#/AREA: ABNORMAL
GLUCOSE SERPL-MCNC: 78 MG/DL (ref 70–110)
GLUCOSE UR QL STRIP: NEGATIVE
HCT VFR BLD AUTO: 42.9 % (ref 37–48.5)
HGB BLD-MCNC: 14 G/DL (ref 12–16)
HGB UR QL STRIP: NEGATIVE
KETONES UR QL STRIP: NEGATIVE
LEUKOCYTE ESTERASE UR QL STRIP: NEGATIVE
LIPASE SERPL-CCNC: 35 U/L (ref 4–60)
LYMPHOCYTES # BLD AUTO: 2.5 K/UL (ref 1–4.8)
LYMPHOCYTES NFR BLD: 27.7 % (ref 18–48)
MCH RBC QN AUTO: 28.9 PG (ref 27–31)
MCHC RBC AUTO-ENTMCNC: 32.6 G/DL (ref 32–36)
MCV RBC AUTO: 89 FL (ref 82–98)
MONOCYTES # BLD AUTO: 0.7 K/UL (ref 0.3–1)
MONOCYTES NFR BLD: 7.3 % (ref 4–15)
NEUTROPHILS # BLD AUTO: 5.5 K/UL (ref 1.8–7.7)
NEUTROPHILS NFR BLD: 62.6 % (ref 38–73)
NITRITE UR QL STRIP: NEGATIVE
PH UR STRIP: 5 [PH] (ref 5–8)
PLATELET # BLD AUTO: 288 K/UL (ref 150–350)
PMV BLD AUTO: 10.2 FL (ref 9.2–12.9)
POTASSIUM SERPL-SCNC: 4.4 MMOL/L (ref 3.5–5.1)
PROT SERPL-MCNC: 7.9 G/DL (ref 6–8.4)
PROT UR QL STRIP: NEGATIVE
RBC # BLD AUTO: 4.84 M/UL (ref 4–5.4)
SODIUM SERPL-SCNC: 139 MMOL/L (ref 136–145)
SP GR UR STRIP: 1.02 (ref 1–1.03)
SPECIMEN SOURCE: ABNORMAL
T VAGINALIS GENITAL QL WET PREP: ABNORMAL
URN SPEC COLLECT METH UR: NORMAL
UROBILINOGEN UR STRIP-ACNC: NEGATIVE EU/DL
WBC # BLD AUTO: 8.87 K/UL (ref 3.9–12.7)
WBC #/AREA VAG WET PREP: ABNORMAL
YEAST GENITAL QL WET PREP: ABNORMAL

## 2019-05-18 PROCEDURE — 81025 URINE PREGNANCY TEST: CPT | Performed by: PHYSICIAN ASSISTANT

## 2019-05-18 PROCEDURE — 87491 CHLMYD TRACH DNA AMP PROBE: CPT

## 2019-05-18 PROCEDURE — 99285 EMERGENCY DEPT VISIT HI MDM: CPT | Mod: 25

## 2019-05-18 PROCEDURE — 80053 COMPREHEN METABOLIC PANEL: CPT

## 2019-05-18 PROCEDURE — 25000003 PHARM REV CODE 250: Performed by: PHYSICIAN ASSISTANT

## 2019-05-18 PROCEDURE — 81003 URINALYSIS AUTO W/O SCOPE: CPT

## 2019-05-18 PROCEDURE — 25500020 PHARM REV CODE 255: Performed by: PHYSICIAN ASSISTANT

## 2019-05-18 PROCEDURE — 96375 TX/PRO/DX INJ NEW DRUG ADDON: CPT

## 2019-05-18 PROCEDURE — 87210 SMEAR WET MOUNT SALINE/INK: CPT

## 2019-05-18 PROCEDURE — 85025 COMPLETE CBC W/AUTO DIFF WBC: CPT

## 2019-05-18 PROCEDURE — 83690 ASSAY OF LIPASE: CPT

## 2019-05-18 PROCEDURE — 63600175 PHARM REV CODE 636 W HCPCS: Performed by: PHYSICIAN ASSISTANT

## 2019-05-18 PROCEDURE — 96374 THER/PROPH/DIAG INJ IV PUSH: CPT

## 2019-05-18 PROCEDURE — 96361 HYDRATE IV INFUSION ADD-ON: CPT

## 2019-05-18 RX ORDER — IBUPROFEN 600 MG/1
600 TABLET ORAL EVERY 6 HOURS PRN
Qty: 20 TABLET | Refills: 0 | Status: SHIPPED | OUTPATIENT
Start: 2019-05-18 | End: 2019-05-23

## 2019-05-18 RX ORDER — KETOROLAC TROMETHAMINE 30 MG/ML
15 INJECTION, SOLUTION INTRAMUSCULAR; INTRAVENOUS
Status: COMPLETED | OUTPATIENT
Start: 2019-05-18 | End: 2019-05-18

## 2019-05-18 RX ORDER — DICYCLOMINE HYDROCHLORIDE 20 MG/1
20 TABLET ORAL 4 TIMES DAILY
Qty: 28 TABLET | Refills: 0 | Status: SHIPPED | OUTPATIENT
Start: 2019-05-18 | End: 2019-05-25

## 2019-05-18 RX ORDER — MORPHINE SULFATE 10 MG/ML
4 INJECTION INTRAMUSCULAR; INTRAVENOUS; SUBCUTANEOUS
Status: COMPLETED | OUTPATIENT
Start: 2019-05-18 | End: 2019-05-18

## 2019-05-18 RX ADMIN — MORPHINE SULFATE 4 MG: 10 INJECTION INTRAVENOUS at 02:05

## 2019-05-18 RX ADMIN — SODIUM CHLORIDE 1000 ML: 0.9 INJECTION, SOLUTION INTRAVENOUS at 12:05

## 2019-05-18 RX ADMIN — IOHEXOL 100 ML: 350 INJECTION, SOLUTION INTRAVENOUS at 01:05

## 2019-05-18 RX ADMIN — KETOROLAC TROMETHAMINE 15 MG: 30 INJECTION, SOLUTION INTRAMUSCULAR; INTRAVENOUS at 12:05

## 2019-05-18 NOTE — DISCHARGE INSTRUCTIONS
Take until ibuprofen as prescribed for year abdominal pain.  Call your OBGYN and inform her the ER having severe abdominal pain in1-2 days.  Your CT showed diverticulosis without evidence of infection today.  You should follow up with GI.  Follow up with primary care.  Return to ER for worsening symptoms or as needed

## 2019-05-18 NOTE — ED TRIAGE NOTES
Patient presents to the ED via personal tranapsortation with boyfriend and son. Patient reports lower abdominal stabbing pains that has worsened over the last 2 days. Denies fever, chills, vomiting, diarrhea, constipation. Denies GI hx.

## 2019-05-18 NOTE — ED PROVIDER NOTES
Encounter Date: 2019       History     Chief Complaint   Patient presents with    Abdominal Pain     Patient reports sharp, left lower abdominal pains that started about a week ago. Patient denies fever, chills, nausea, vomiting, diarrhea, constipation. Denies GI hx.     CC: Abdominal Pain    HPI: 32 y/o female with history of GERD, anemia, osteogenesis imperfecta and IUD displacement scheduled for hysteroscopy on 2019 with Dr. Guzman presenting for evaluation of 1 day history of severe, sharp 9/10 constant left lower quadrant pain. Patient reports she has had this pain intermittently for the past year. Recently diagnosed with IUD (placed in ) displacement after having pelvic cramping and hematuria. However, she reports pain is not similar.  She denies fever, chills, nausea vomiting, diarrhea, urinary symptoms, pelvic pain, vaginal discharge, concern for STI vaginal bleeding.  No attempted treatment        Review of patient's allergies indicates:   Allergen Reactions    Pcn [penicillins] Shortness Of Breath     Past Medical History:   Diagnosis Date    Anemia     BMI 50.0-59.9, adult     GERD (gastroesophageal reflux disease) 2019    History of blood transfusion 2001    Obesity     OI (osteogenesis imperfecta)     Osteopetrosis     Tendonitis      Past Surgical History:   Procedure Laterality Date     SECTION      , 2016    ESOPHAGOGASTRODUODENOSCOPY (EGD) N/A 2019    Performed by Segun Dominguez MD at [facility] ([address] [place] [address])    FEMUR FRACTURE SURGERY Bilateral     hardware/rods in both legs    FRACTURE SURGERY      femur    WISDOM TOOTH EXTRACTION       Family History   Problem Relation Age of Onset    Hypertension Mother     Hyperlipidemia Mother     Asthma Mother     Anxiety disorder Mother     No Known Problems Father      Social History     Tobacco Use    Smoking status: Never Smoker    Smokeless tobacco: Never Used   Substance Use Topics    Alcohol use:  No    Drug use: Never     Review of Systems   Constitutional: Negative for chills and fever.   HENT: Negative for congestion and sore throat.    Eyes: Negative for redness.   Respiratory: Negative for cough.    Gastrointestinal: Positive for abdominal pain. Negative for blood in stool, constipation, diarrhea, nausea and vomiting.   Genitourinary: Negative for dysuria, frequency, hematuria, pelvic pain, urgency and vaginal discharge.   Musculoskeletal: Negative for back pain and neck pain.   Skin: Negative for rash.   Neurological: Negative for speech difficulty.   Psychiatric/Behavioral: Negative for confusion.       Physical Exam     Initial Vitals [05/18/19 1146]   BP Pulse Resp Temp SpO2   109/68 80 18 98.3 °F (36.8 °C) 100 %      MAP       --         Physical Exam    Nursing note and vitals reviewed.  Constitutional: She appears well-developed and well-nourished.   HENT:   Head: Normocephalic.   Right Ear: External ear normal.   Left Ear: External ear normal.   Nose: Nose normal.   Mouth/Throat: Oropharynx is clear and moist.   Eyes: Conjunctivae are normal.   Cardiovascular: Normal rate and regular rhythm. Exam reveals no gallop and no friction rub.    No murmur heard.  Pulmonary/Chest: Breath sounds normal. She has no wheezes. She has no rhonchi. She has no rales.   Abdominal: Soft. Bowel sounds are normal. She exhibits no distension. There is tenderness in the suprapubic area and left lower quadrant. There is guarding. There is no rigidity, no rebound, no CVA tenderness, no tenderness at McBurney's point and negative Bains's sign.   Palpation of suprapubic region causes pain to LLQ     Genitourinary:   Genitourinary Comments: Chaperoned by Hien Matias RN:   Scant amount of thin white discharge in vaginal vault. No cervical friability. No adnexal ttp or masses. No CMT.    Musculoskeletal: Normal range of motion.   Lymphadenopathy:     She has no cervical adenopathy.   Neurological: She is alert. She has  normal strength. No cranial nerve deficit or sensory deficit.   Skin: Skin is warm and dry.   Psychiatric: She has a normal mood and affect.         ED Course   Procedures  Labs Reviewed   COMPREHENSIVE METABOLIC PANEL - Abnormal; Notable for the following components:       Result Value    Anion Gap 7 (*)     All other components within normal limits   VAGINAL SCREEN - Abnormal; Notable for the following components:    WBC - Vaginal Screen Few (*)     Bacteria - Vaginal Screen Few (*)     All other components within normal limits   C. TRACHOMATIS/N. GONORRHOEAE BY AMP DNA   CBC W/ AUTO DIFFERENTIAL   LIPASE   URINALYSIS, REFLEX TO URINE CULTURE    Narrative:     Preferred Collection Type->Urine, Clean Catch   POCT URINE PREGNANCY          Imaging Results          CT Abdomen Pelvis With Contrast (Final result)  Result time 05/18/19 14:26:10    Final result by Jesús Fulton MD (05/18/19 14:26:10)                 Impression:      No acute findings.    Diverticulosis coli.      Electronically signed by: Jesús Fulton MD  Date:    05/18/2019  Time:    14:26             Narrative:    EXAMINATION:  CT ABDOMEN PELVIS WITH CONTRAST    CLINICAL HISTORY:  LLQ pain, suspect diverticulitis;    TECHNIQUE:  Low dose axial images, sagittal and coronal reformations were obtained from the lung bases to the pubic symphysis following the IV administration of 100 mL of Omnipaque 350    FINDINGS:  The visualized portion of the base of the lungs, visualized portion of the heart, and spleen are unremarkable.  There is a mild hiatal hernia however the stomach is otherwise unremarkable.  The pancreas and gallbladder are unremarkable.  The liver is significant for a subcentimeter focus of enhancement within the medial segment of the left lobe too small to characterize.  The adrenal glands, visualized portion of the aorta, and kidneys are unremarkable.  The bladder is unremarkable.  The uterus contains an IUD.  The bowel is significant for  diverticulosis coli.  The appendix has a normal appearance.  There is bilateral femoral fixation hardware.                                 Medical Decision Making:   Initial Assessment:   33-year-old female with history of IUD displacement presenting for evaluation of constant severe left lower quadrant pain since yesterday.  She denies any fever, chills, nausea vomiting, diarrhea, constipation, urinary symptoms.  She denies any pelvic pain or bleeding.  Exam above.  Significant tenderness to palpation in the left lower quadrant and suprapubic region with peritoneal signs.  Doubt ovarian torsion as patient appears comfortable and is not in pain other than with palpation.  IV toradol given for pain.   UPT is negative.  UA is negative for infection  CBC without leukocytosis doubt electrolyte abnormality.  Lipase within normal limits.  1330 pain now intermittent 7/10.  Patient  requesting additional medications for pain.  Morphine ordered.    Appears comfortable prior to discharge.   Ct negative for acute abnormality.   Pt reports pain is intermittent now occurs onnly with palpation of LLQ or movement.     Pelvic exam without evidence of PID, TOA or cervicitis.  Vaginal screen is negative.    Discussed findings with patient.  May be secondary to viral syndrome, secondary to IUD displacement.  No evidence of uterine perforation or emergent or surgical complications at this time.    Will have her return to the ER for worsening symptoms or as needed.    Discussed patient with Dr. Wiley who agrees with the assessment plan.                          Clinical Impression:       ICD-10-CM ICD-9-CM   1. LLQ pain R10.32 789.04   2. Diverticulosis of intestine without bleeding, unspecified intestinal tract location K57.90 562.10                                Edna Guevara PA-C  05/18/19 3651

## 2019-05-20 LAB
C TRACH DNA SPEC QL NAA+PROBE: NOT DETECTED
N GONORRHOEA DNA SPEC QL NAA+PROBE: NOT DETECTED

## 2019-05-23 ENCOUNTER — OFFICE VISIT (OUTPATIENT)
Dept: OBSTETRICS AND GYNECOLOGY | Facility: CLINIC | Age: 34
End: 2019-05-23
Payer: MEDICARE

## 2019-05-23 ENCOUNTER — HOSPITAL ENCOUNTER (OUTPATIENT)
Dept: PREADMISSION TESTING | Facility: HOSPITAL | Age: 34
Discharge: HOME | End: 2019-05-23
Attending: OBSTETRICS & GYNECOLOGY
Payer: MEDICARE

## 2019-05-23 VITALS
BODY MASS INDEX: 51.37 KG/M2 | WEIGHT: 238.13 LBS | DIASTOLIC BLOOD PRESSURE: 80 MMHG | HEIGHT: 57 IN | SYSTOLIC BLOOD PRESSURE: 122 MMHG

## 2019-05-23 VITALS
OXYGEN SATURATION: 98 % | DIASTOLIC BLOOD PRESSURE: 84 MMHG | BODY MASS INDEX: 51.13 KG/M2 | RESPIRATION RATE: 16 BRPM | SYSTOLIC BLOOD PRESSURE: 123 MMHG | WEIGHT: 237 LBS | HEIGHT: 57 IN | TEMPERATURE: 98 F | HEART RATE: 64 BPM

## 2019-05-23 DIAGNOSIS — T83.32XA IUD STRINGS LOST: ICD-10-CM

## 2019-05-23 DIAGNOSIS — T83.32XD INTRAUTERINE CONTRACEPTIVE DEVICE THREADS LOST, SUBSEQUENT ENCOUNTER: Primary | ICD-10-CM

## 2019-05-23 DIAGNOSIS — Z01.818 PRE-OP TESTING: Primary | ICD-10-CM

## 2019-05-23 PROCEDURE — 99215 PR OFFICE/OUTPT VISIT, EST, LEVL V, 40-54 MIN: ICD-10-PCS | Mod: S$GLB,,, | Performed by: OBSTETRICS & GYNECOLOGY

## 2019-05-23 PROCEDURE — 3008F BODY MASS INDEX DOCD: CPT | Mod: CPTII,S$GLB,, | Performed by: OBSTETRICS & GYNECOLOGY

## 2019-05-23 PROCEDURE — 99215 OFFICE O/P EST HI 40 MIN: CPT | Mod: S$GLB,,, | Performed by: OBSTETRICS & GYNECOLOGY

## 2019-05-23 PROCEDURE — 3008F PR BODY MASS INDEX (BMI) DOCUMENTED: ICD-10-PCS | Mod: CPTII,S$GLB,, | Performed by: OBSTETRICS & GYNECOLOGY

## 2019-05-23 PROCEDURE — 99999 PR PBB SHADOW E&M-EST. PATIENT-LVL III: ICD-10-PCS | Mod: PBBFAC,,, | Performed by: OBSTETRICS & GYNECOLOGY

## 2019-05-23 PROCEDURE — 99999 PR PBB SHADOW E&M-EST. PATIENT-LVL III: CPT | Mod: PBBFAC,,, | Performed by: OBSTETRICS & GYNECOLOGY

## 2019-05-23 NOTE — PATIENT INSTRUCTIONS
Hysteroscopy    Hysteroscopy is a procedure that is done to see inside your uterus. It can help find the cause of problems in the uterus. This helps your health care provider decide on the best treatment. In some cases, it can be used to perform treatment. Hysteroscopy may be done in your health care provider's office or in the hospital.  Why might I need hysteroscopy?  Hysteroscopy may be done based on the results of other tests. It can help find the cause of problems. These can include:  · Unusually heavy or long menstrual periods  · Bleeding between periods  · Postmenopausal bleeding  · Trouble becoming pregnant (infertility) or carrying a pregnancy to term  · To locate an intrauterine device (IUD)  · To perform sterilization  What are the risks and complications of hysteroscopy?  Problems with the procedure are rare. But all procedures have risks. Risks of hysteroscopy include:  · Infection  · Bleeding  · Tearing of the uterine wall  · Damage to internal organs  · Scarring of the uterus  · Fluid overload  · Problems with anesthesia (the medication that prevents pain during the procedure)  How do I get ready for hysteroscopy?  · Tell your health care provider if you have any health problems. These include diabetes, heart disease, or bleeding problems.  · Tell your health care provider about all the medicines you take. This includes any over-the-counter medications, herbs, or supplements.  · You may be told not to use vaginal creams or medication. And you may be told not to have sex or douche.  · You may be told not to eat or drink the night before the procedure.  · You may be tested for pregnancy and infection.  · You may be asked to sign a consent form.  · You may be given a pain reliever to take an hour before the procedure. This helps relieve cramping that may occur.  What happens during a hysteroscopy?  · Youll lie on an exam table with your feet in stirrups.  · You may be given general anesthesia or  medicines to help you relax or sleep. In some cases, an IV line will be put into a vein in your arm or hand. This line is then used to give fluids and medicines.  · A tool called a speculum is inserted into the vagina to hold it open. A tool called a dilator may be used to widen the cervix.  · Numbing medicine may be applied to the cervix.  · The hysteroscope (a long, thin lighted tube) is inserted through the vagina and into the uterus. It is used to see inside the uterus. Images of the uterus are viewed on a monitor.  · A gas or fluid may be injected into the uterus to expand it.  · Other tools may be put through the hysteroscope. These are used to take tissue samples, remove growths, or place implants for the purpose of sterilization.  What happens after hysteroscopy?  · You may have cramps and bleeding for 24 hours after the procedure. This is normal. Use pads instead of tampons.  · Do not douche or use tampons until your health care provider says its OK.  · Do not use any vaginal medicines until you are told its OK.  · Ask your health care provider when its OK to have sex again.  When should I call my health care provider?  Call your health care provider if you have:  · Heavy bleeding (more than 1 pad an hour for 2 or more hours)  · A fever over 100.4°F (38.0°C)  · Increasing abdominal pain or tenderness  · Foul-smelling discharge   Follow-up care  Schedule a follow-up visit with your health care provider. Based on the results of your test, you may need more treatment. Be sure to follow instructions and keep your appointments.  Date Last Reviewed: 5/12/2015  © 0074-6925 [employer]. [address] [place] [address], [place] [address]. All rights reserved. This information is not intended as a substitute for professional medical care. Always follow your healthcare professional's instructions.

## 2019-05-23 NOTE — DISCHARGE INSTRUCTIONS
Your surgery is scheduled for _Monday Corinne 3, 2019___________________.    Call [phone] between 2 p.m. and 5 p.m. on   _Friday 5-31-19__ to find out your arrival time for the day of your surgery.      Please report to SAME DAY SURGERY UNIT on the 2nd FLOOR at _______ a.m.  Use front door entrance. The doors open at 0530 am.          INSTRUCTIONS IMPORTANT!!!  ¨ Do not eat or drink after 12 midnight-including water. OK to brush teeth, no   gum, candy or mints!        _x___  Return to Hospital Lab on _Sat. June 1, 2019  At 11:00 AM__for additional blood test. ( LAB on main hallway 1st floor)    _x___  Prep instructions:    SHOWER     _x___  Please shower using Hibiclens soap the night before AND  the morning of   your surgery/procedure. Do not use Hibiclens on your face or genitals        x       If your surgery is around your belly button (Navel) be sure to wash inside your  belly button also. Rinse hibiclens off completely.  _x__  No shaving of procedural area at least 4-5 days before surgery due to increased risk of skin irritation and/or possible infection.  _x___  Do not wear makeup, including mascara. WEARING EYE MAKEUP MAY  LEAD TO SERIOUS EYE INJURY during surgery.  _x___  No powder, lotions or creams to your body.  _x___  You may wear only deodorant on the day of surgery.  _x___  Please remove all jewelry, including piercings and leave at home.  _x___  No money or valuables needed. Please leave at home.  You may bring your  cell phone.  ____  Please bring any documents given by your doctor.  _x___  If going home the same day, arrange for a ride home. You will not be able to   drive if Anesthesia was used.    _x___  Wear loose fitting clothing. Allow for dressings, bandages.  _x___  Stop Aspirin, Ibuprofen, Motrin and Aleve at least 7 days before  surgery, unless otherwise instructed by your doctor, or the nurse.      x        You MAY use Tylenol/acetaminophen until day of surgery.    _x___  Call MD for  temperature above 101 degrees.        _x___ Stop taking any Fish Oil supplement or any Vitamins that contain Vitamin E at least 5 days prior to surgery.              I have read or had read and explained to me, and understand the above information.  Additional comments or instructions:Please call   [phone] if you have any questions regarding the instructions above.

## 2019-05-28 ENCOUNTER — TELEPHONE (OUTPATIENT)
Dept: BARIATRICS | Facility: CLINIC | Age: 34
End: 2019-05-28

## 2019-05-28 NOTE — TELEPHONE ENCOUNTER
Please call pt to evaluate if she has started/completed triple therapy for H Pylori. Please route message back to me.

## 2019-06-01 ENCOUNTER — LAB VISIT (OUTPATIENT)
Dept: LAB | Facility: HOSPITAL | Age: 34
End: 2019-06-01
Attending: OBSTETRICS & GYNECOLOGY
Payer: MEDICARE

## 2019-06-01 DIAGNOSIS — Z01.818 PRE-OP TESTING: ICD-10-CM

## 2019-06-01 LAB
ABO + RH BLD: NORMAL
B-HCG UR QL: NEGATIVE
BLD GP AB SCN CELLS X3 SERPL QL: NORMAL
BLOOD GROUP ANTIBODIES SERPL: NORMAL

## 2019-06-01 PROCEDURE — 86870 RBC ANTIBODY IDENTIFICATION: CPT

## 2019-06-01 PROCEDURE — 81025 URINE PREGNANCY TEST: CPT

## 2019-06-01 PROCEDURE — 86850 RBC ANTIBODY SCREEN: CPT

## 2019-06-01 PROCEDURE — 86905 BLOOD TYPING RBC ANTIGENS: CPT

## 2019-06-01 PROCEDURE — 36415 COLL VENOUS BLD VENIPUNCTURE: CPT

## 2019-06-02 ENCOUNTER — ANESTHESIA EVENT (OUTPATIENT)
Dept: SURGERY | Facility: HOSPITAL | Age: 34
End: 2019-06-02
Payer: MEDICARE

## 2019-06-03 ENCOUNTER — HOSPITAL ENCOUNTER (OUTPATIENT)
Facility: HOSPITAL | Age: 34
Discharge: HOME | End: 2019-06-03
Attending: OBSTETRICS & GYNECOLOGY | Admitting: OBSTETRICS & GYNECOLOGY
Payer: MEDICARE

## 2019-06-03 ENCOUNTER — ANESTHESIA (OUTPATIENT)
Dept: SURGERY | Facility: HOSPITAL | Age: 34
End: 2019-06-03
Payer: MEDICARE

## 2019-06-03 VITALS
HEIGHT: 57 IN | TEMPERATURE: 98 F | WEIGHT: 237 LBS | BODY MASS INDEX: 51.13 KG/M2 | SYSTOLIC BLOOD PRESSURE: 127 MMHG | HEART RATE: 54 BPM | OXYGEN SATURATION: 95 % | DIASTOLIC BLOOD PRESSURE: 77 MMHG | RESPIRATION RATE: 17 BRPM

## 2019-06-03 DIAGNOSIS — Z30.432 ENCOUNTER FOR IUD REMOVAL: Primary | ICD-10-CM

## 2019-06-03 DIAGNOSIS — T83.32XD INTRAUTERINE CONTRACEPTIVE DEVICE THREADS LOST, SUBSEQUENT ENCOUNTER: ICD-10-CM

## 2019-06-03 DIAGNOSIS — T83.32XA IUD STRINGS LOST: ICD-10-CM

## 2019-06-03 LAB
POCT GLUCOSE: 79 MG/DL (ref 70–110)
POCT GLUCOSE: 79 MG/DL (ref 70–110)

## 2019-06-03 PROCEDURE — 88300 SURGICAL PATH GROSS: CPT | Mod: 26,,, | Performed by: PATHOLOGY

## 2019-06-03 PROCEDURE — 25000003 PHARM REV CODE 250: Performed by: NURSE ANESTHETIST, CERTIFIED REGISTERED

## 2019-06-03 PROCEDURE — 88300 TISSUE SPECIMEN TO PATHOLOGY - SURGERY: ICD-10-PCS | Mod: 26,,, | Performed by: PATHOLOGY

## 2019-06-03 PROCEDURE — 37000009 HC ANESTHESIA EA ADD 15 MINS: Performed by: OBSTETRICS & GYNECOLOGY

## 2019-06-03 PROCEDURE — 88300 SURGICAL PATH GROSS: CPT | Performed by: PATHOLOGY

## 2019-06-03 PROCEDURE — 63600175 PHARM REV CODE 636 W HCPCS: Performed by: NURSE ANESTHETIST, CERTIFIED REGISTERED

## 2019-06-03 PROCEDURE — 82962 GLUCOSE BLOOD TEST: CPT

## 2019-06-03 PROCEDURE — D9220A PRA ANESTHESIA: Mod: CRNA,,, | Performed by: NURSE ANESTHETIST, CERTIFIED REGISTERED

## 2019-06-03 PROCEDURE — D9220A PRA ANESTHESIA: Mod: ANES,,, | Performed by: ANESTHESIOLOGY

## 2019-06-03 PROCEDURE — 36000705 HC OR TIME LEV I EA ADD 15 MIN: Performed by: OBSTETRICS & GYNECOLOGY

## 2019-06-03 PROCEDURE — 25000003 PHARM REV CODE 250: Performed by: ANESTHESIOLOGY

## 2019-06-03 PROCEDURE — 36000704 HC OR TIME LEV I 1ST 15 MIN: Performed by: OBSTETRICS & GYNECOLOGY

## 2019-06-03 PROCEDURE — 58301 PR REMOVE, INTRAUTERINE DEVICE: ICD-10-PCS | Mod: ,,, | Performed by: OBSTETRICS & GYNECOLOGY

## 2019-06-03 PROCEDURE — 37000008 HC ANESTHESIA 1ST 15 MINUTES: Performed by: OBSTETRICS & GYNECOLOGY

## 2019-06-03 PROCEDURE — 58301 REMOVE INTRAUTERINE DEVICE: CPT | Mod: ,,, | Performed by: OBSTETRICS & GYNECOLOGY

## 2019-06-03 PROCEDURE — 71000033 HC RECOVERY, INTIAL HOUR: Performed by: OBSTETRICS & GYNECOLOGY

## 2019-06-03 PROCEDURE — D9220A PRA ANESTHESIA: ICD-10-PCS | Mod: CRNA,,, | Performed by: NURSE ANESTHETIST, CERTIFIED REGISTERED

## 2019-06-03 PROCEDURE — D9220A PRA ANESTHESIA: ICD-10-PCS | Mod: ANES,,, | Performed by: ANESTHESIOLOGY

## 2019-06-03 PROCEDURE — 71000015 HC POSTOP RECOV 1ST HR: Performed by: OBSTETRICS & GYNECOLOGY

## 2019-06-03 RX ORDER — IBUPROFEN 800 MG/1
800 TABLET ORAL EVERY 8 HOURS PRN
Qty: 40 TABLET | Refills: 0 | Status: SHIPPED | OUTPATIENT
Start: 2019-06-03 | End: 2021-01-05

## 2019-06-03 RX ORDER — DIPHENHYDRAMINE HCL 25 MG
25 CAPSULE ORAL EVERY 4 HOURS PRN
Status: DISCONTINUED | OUTPATIENT
Start: 2019-06-03 | End: 2019-06-03 | Stop reason: HOSPADM

## 2019-06-03 RX ORDER — SUCCINYLCHOLINE CHLORIDE 20 MG/ML
INJECTION INTRAMUSCULAR; INTRAVENOUS
Status: DISCONTINUED | OUTPATIENT
Start: 2019-06-03 | End: 2019-06-03

## 2019-06-03 RX ORDER — LIDOCAINE HCL/PF 100 MG/5ML
SYRINGE (ML) INTRAVENOUS
Status: DISCONTINUED | OUTPATIENT
Start: 2019-06-03 | End: 2019-06-03

## 2019-06-03 RX ORDER — MIDAZOLAM HYDROCHLORIDE 1 MG/ML
INJECTION, SOLUTION INTRAMUSCULAR; INTRAVENOUS
Status: DISCONTINUED | OUTPATIENT
Start: 2019-06-03 | End: 2019-06-03

## 2019-06-03 RX ORDER — IBUPROFEN 600 MG/1
600 TABLET ORAL EVERY 6 HOURS PRN
Status: DISCONTINUED | OUTPATIENT
Start: 2019-06-03 | End: 2019-06-03 | Stop reason: HOSPADM

## 2019-06-03 RX ORDER — ROCURONIUM BROMIDE 10 MG/ML
INJECTION, SOLUTION INTRAVENOUS
Status: DISCONTINUED | OUTPATIENT
Start: 2019-06-03 | End: 2019-06-03

## 2019-06-03 RX ORDER — HYDROCODONE BITARTRATE AND ACETAMINOPHEN 5; 325 MG/1; MG/1
1 TABLET ORAL EVERY 4 HOURS PRN
Status: DISCONTINUED | OUTPATIENT
Start: 2019-06-03 | End: 2019-06-03 | Stop reason: HOSPADM

## 2019-06-03 RX ORDER — DEXAMETHASONE SODIUM PHOSPHATE 4 MG/ML
INJECTION, SOLUTION INTRA-ARTICULAR; INTRALESIONAL; INTRAMUSCULAR; INTRAVENOUS; SOFT TISSUE
Status: DISCONTINUED | OUTPATIENT
Start: 2019-06-03 | End: 2019-06-03

## 2019-06-03 RX ORDER — SODIUM CHLORIDE, SODIUM LACTATE, POTASSIUM CHLORIDE, CALCIUM CHLORIDE 600; 310; 30; 20 MG/100ML; MG/100ML; MG/100ML; MG/100ML
INJECTION, SOLUTION INTRAVENOUS CONTINUOUS
Status: DISCONTINUED | OUTPATIENT
Start: 2019-06-03 | End: 2019-06-03 | Stop reason: HOSPADM

## 2019-06-03 RX ORDER — FENTANYL CITRATE 50 UG/ML
INJECTION, SOLUTION INTRAMUSCULAR; INTRAVENOUS
Status: DISCONTINUED | OUTPATIENT
Start: 2019-06-03 | End: 2019-06-03

## 2019-06-03 RX ORDER — ONDANSETRON 4 MG/1
8 TABLET, ORALLY DISINTEGRATING ORAL EVERY 8 HOURS PRN
Status: DISCONTINUED | OUTPATIENT
Start: 2019-06-03 | End: 2019-06-03 | Stop reason: HOSPADM

## 2019-06-03 RX ORDER — LIDOCAINE HYDROCHLORIDE 10 MG/ML
1 INJECTION, SOLUTION EPIDURAL; INFILTRATION; INTRACAUDAL; PERINEURAL ONCE
Status: DISCONTINUED | OUTPATIENT
Start: 2019-06-03 | End: 2019-06-03 | Stop reason: HOSPADM

## 2019-06-03 RX ORDER — ONDANSETRON 2 MG/ML
INJECTION INTRAMUSCULAR; INTRAVENOUS
Status: DISCONTINUED | OUTPATIENT
Start: 2019-06-03 | End: 2019-06-03

## 2019-06-03 RX ORDER — PROPOFOL 10 MG/ML
VIAL (ML) INTRAVENOUS
Status: DISCONTINUED | OUTPATIENT
Start: 2019-06-03 | End: 2019-06-03

## 2019-06-03 RX ORDER — SODIUM CHLORIDE 0.9 % (FLUSH) 0.9 %
3 SYRINGE (ML) INJECTION
Status: DISCONTINUED | OUTPATIENT
Start: 2019-06-03 | End: 2019-06-03 | Stop reason: HOSPADM

## 2019-06-03 RX ORDER — FENTANYL CITRATE 50 UG/ML
25 INJECTION, SOLUTION INTRAMUSCULAR; INTRAVENOUS EVERY 5 MIN PRN
Status: DISCONTINUED | OUTPATIENT
Start: 2019-06-03 | End: 2019-06-03 | Stop reason: HOSPADM

## 2019-06-03 RX ORDER — DIPHENHYDRAMINE HYDROCHLORIDE 50 MG/ML
25 INJECTION INTRAMUSCULAR; INTRAVENOUS EVERY 4 HOURS PRN
Status: DISCONTINUED | OUTPATIENT
Start: 2019-06-03 | End: 2019-06-03 | Stop reason: HOSPADM

## 2019-06-03 RX ORDER — ACETAMINOPHEN 10 MG/ML
INJECTION, SOLUTION INTRAVENOUS
Status: DISCONTINUED | OUTPATIENT
Start: 2019-06-03 | End: 2019-06-03

## 2019-06-03 RX ORDER — HYDROMORPHONE HYDROCHLORIDE 2 MG/ML
0.2 INJECTION, SOLUTION INTRAMUSCULAR; INTRAVENOUS; SUBCUTANEOUS EVERY 5 MIN PRN
Status: DISCONTINUED | OUTPATIENT
Start: 2019-06-03 | End: 2019-06-03 | Stop reason: HOSPADM

## 2019-06-03 RX ADMIN — DEXAMETHASONE SODIUM PHOSPHATE 4 MG: 4 INJECTION, SOLUTION INTRAMUSCULAR; INTRAVENOUS at 10:06

## 2019-06-03 RX ADMIN — FENTANYL CITRATE 25 MCG: 50 INJECTION INTRAMUSCULAR; INTRAVENOUS at 10:06

## 2019-06-03 RX ADMIN — MIDAZOLAM HYDROCHLORIDE 2 MG: 1 INJECTION, SOLUTION INTRAMUSCULAR; INTRAVENOUS at 10:06

## 2019-06-03 RX ADMIN — SODIUM CHLORIDE, SODIUM LACTATE, POTASSIUM CHLORIDE, AND CALCIUM CHLORIDE: .6; .31; .03; .02 INJECTION, SOLUTION INTRAVENOUS at 08:06

## 2019-06-03 RX ADMIN — LIDOCAINE HYDROCHLORIDE 80 MG: 20 INJECTION, SOLUTION INTRAVENOUS at 10:06

## 2019-06-03 RX ADMIN — SUCCINYLCHOLINE CHLORIDE 120 MG: 20 INJECTION, SOLUTION INTRAMUSCULAR; INTRAVENOUS at 10:06

## 2019-06-03 RX ADMIN — ROCURONIUM BROMIDE 5 MG: 10 INJECTION, SOLUTION INTRAVENOUS at 10:06

## 2019-06-03 RX ADMIN — ACETAMINOPHEN 1000 MG: 10 INJECTION, SOLUTION INTRAVENOUS at 10:06

## 2019-06-03 RX ADMIN — FENTANYL CITRATE 50 MCG: 50 INJECTION INTRAMUSCULAR; INTRAVENOUS at 10:06

## 2019-06-03 RX ADMIN — PROPOFOL 150 MG: 10 INJECTION, EMULSION INTRAVENOUS at 10:06

## 2019-06-03 RX ADMIN — ONDANSETRON 4 MG: 2 INJECTION, SOLUTION INTRAMUSCULAR; INTRAVENOUS at 10:06

## 2019-06-03 NOTE — DISCHARGE SUMMARY
[facility]  Obstetrics & Gynecology  Discharge Summary    Patient Name: Prisca Zhu  MRN: [id number]  Admission Date: 6/3/2019  Hospital Length of Stay: 0 days  Discharge Date and Time:  06/03/2019 10:55 AM  Attending Physician: Ashley Greenberg,*   Discharging Provider: Ashley Greenberg MD  Primary Care Provider: Mathew Carpenter MD      Hospital Course:   Ms. Zhu is a 33 year old female who presents for scheduled Dilation and IUD removal secondary to Lost IUD Strings. Procedure was without complications and the patient tolerated the procedure well. Please see op note for details. The patient was tolerating PO, voiding, ambulating without difficulty, and pain was well controlled after the procedure. Patient was discharged home on POD #0 with instructions to follow up in clinic in 6 weeks for postoperative check. Patient verbalized understanding.        Procedure(s) (LRB):  HYSTEROSCOPY, WITH DILATION AND CURETTAGE OF UTERUS (N/A)     Consults:     Significant Diagnostic Studies: Labs: None    Pending Diagnostic Studies:     None        Final Active Diagnoses:    Diagnosis Date Noted POA    PRINCIPAL PROBLEM:  Encounter for IUD removal [Z30.432] 05/23/2019 Not Applicable      Problems Resolved During this Admission:        Discharged Condition: good    Disposition:     Follow Up:  Follow-up Information     Ashley Greenberg MD In 4 weeks.    Specialty:  Obstetrics and Gynecology  Why:  post-op check  Contact information:  [address]  [address] [place] [address] [id number] [phone]                 Patient Instructions:      Diet general     Call MD for:  temperature >100.4     Call MD for:  persistent nausea and vomiting     Call MD for:  severe uncontrolled pain     Call MD for:  difficulty breathing, headache or visual disturbances     Call MD for:  redness, tenderness, or signs of infection (pain, swelling, redness, odor or green/yellow discharge around incision  site)     Call MD for:  hives     Call MD for:  persistent dizziness or light-headedness     Call MD for:  extreme fatigue     No dressing needed     Activity as tolerated     Medications:  Reconciled Home Medications:      Medication List      START taking these medications    ibuprofen 800 MG tablet  Commonly known as:  ADVIL,MOTRIN  Take 1 tablet (800 mg total) by mouth every 8 (eight) hours as needed for Pain.        CONTINUE taking these medications    CALCIUM ORAL  Take by mouth.     fish oil-omega-3 fatty acids 300-1,000 mg capsule  Take by mouth once daily.     omeprazole 20 mg Tbec     VITAMIN B-12 100 MCG tablet  Generic drug:  cyanocobalamin  Take 100 mcg by mouth once daily.            Ashley Greenberg MD  Obstetrics & Gynecology  [facility]

## 2019-06-03 NOTE — DISCHARGE INSTRUCTIONS
ACTIVITY LEVEL:  If you have received sedation or an anesthetic, you may feel sleepy for several hours. Rest until you are more awake. Gradually resume your normal activities. No driving or alcoholic beverages for 24 hours.  ? Pelvic rest- no sex, tampons or douching until follow up or instructed by doctor.  ? No driving, alcoholic beverages or signing legal documents for next 24 hours or while taking pain medication  DIET:  You may resume your home diet. If nausea is present, increase your diet gradually with fluids and bland foods.  Medications:  Pain medication should be taken only if needed and as directed. If antibiotics are prescribed, the medication should be taken until completed. You will be given an updated list of you medications.    CALL THE DOCTOR:          Shortness of breath, Coughing up Bloody Sputum or Pains or Swelling in your Calves.  Persistent pain or nausea not relieved by medication.  If vaginal bleeding is in excess of a normal period.  Problems urinating  Fever over 101.    If any unusual problems or difficulties occur contact your doctor. If you cannot contact your doctor but feel your signs and symptoms warrant a physicians attention return to the emergency room.  Fall Prevention  Millions of people fall every year and injure themselves. You may have had anesthesia or sedation which may increase your risk of falling. You may have health issues that put you at an increased risk of falling.     Here are ways to reduce your risk of falling.  ·   · Make your home safe by keeping walkways clear of objects you may trip over.  · Use non-slip pads under rugs. Do not use area rugs or small throw rugs.  · Use non-slip mats in bathtubs and showers.  · Install handrails and lights on staircases.  · Do not walk in poorly lit areas.  · Do not stand on chairs or wobbly ladders.  · Use caution when reaching overhead or looking upward. This position can cause a loss of balance.  · Be sure your shoes  fit properly, have non-slip bottoms and are in good condition.   · Wear shoes both inside and out. Avoid going barefoot or wearing slippers.  · Be cautious when going up and down stairs, curbs, and when walking on uneven sidewalks.  · If your balance is poor, consider using a cane or walker.  · If your fall was related to alcohol use, stop or limit alcohol intake.   · If your fall was related to use of sleeping medicines, talk to your doctor about this. You may need to reduce your dosage at bedtime if you awaken during the night to go to the bathroom.    · To reduce the need for nighttime bathroom trips:  ¨ Avoid drinking fluids for several hours before going to bed  ¨ Empty your bladder before going to bed  ¨ Men can keep a urinal at the bedside  · Stay as active as you can. Balance, flexibility, strength, and endurance all come from exercise. They all play a role in preventing falls. Ask your healthcare provider which types of activity are right for you.  · Get your vision checked on a regular basis.  · If you have pets, know where they are before you stand up or walk so you don't trip over them.  Use night lights.

## 2019-06-03 NOTE — PROGRESS NOTES
History & Physical  Gynecology      SUBJECTIVE:     Chief Complaint: Pre-op Exam       History of Present Illness:  Ms. Zhu is a 34 y/o female who presents to clinic female who presents to clinic for preop exam. Patient initially presented initially seen on 2019 c/o pelvic pain and lost IUD. She reports that she had IUD in place and but no strings in cervix. Removal was attempted in office but patient was unable to tolerate procedure. TVUS was done to ensure IUD was in uterus. Patient would like to move forth with EUA/IUD removal/Hysteroscopy D&C in the OR.        FINDINGS:  Uterus measures 10.7 x 3.8 x 6.1 cm.  Uterus demonstrates no evidence of uterine fibroids.  Endometrium measures 7 mm.  Intrauterine device is visualized in satisfactory position.  A small nabothian cyst is noted measuring 1.9 cm.    Right ovary is normal in appearance measuring 2.9 x 2.1 x 2.9 cm with normal vascular flow.    Left ovary is normal in appearance measuring 3.2 x 2.1 x 3.0 cm with normal vascular flow.    No evidence of free fluid.      Impression       IUD in satisfactory position.         Review of patient's allergies indicates:   Allergen Reactions    Pcn [penicillins] Shortness Of Breath       Past Medical History:   Diagnosis Date    Anemia     BMI 50.0-59.9, adult     GERD (gastroesophageal reflux disease) 2019    History of blood transfusion     Obesity     OI (osteogenesis imperfecta)     Osteopetrosis     Tendonitis      Past Surgical History:   Procedure Laterality Date     SECTION      ,     ESOPHAGOGASTRODUODENOSCOPY (EGD) N/A 2019    Performed by Segun Dominguez MD at [facility] ENDO (2ND FLR)    FEMUR FRACTURE SURGERY Bilateral     hardware/rods in both legs    FRACTURE SURGERY      femur    WISDOM TOOTH EXTRACTION       OB History        4    Para   2    Term   2            AB        Living   2       SAB        TAB        Ectopic        Multiple         Live Births                   Family History   Problem Relation Age of Onset    Hypertension Mother     Hyperlipidemia Mother     Asthma Mother     Anxiety disorder Mother     No Known Problems Father      Social History     Tobacco Use    Smoking status: Never Smoker    Smokeless tobacco: Never Used   Substance Use Topics    Alcohol use: No    Drug use: Never       Current Outpatient Medications   Medication Sig    CALCIUM ORAL Take by mouth.    cyanocobalamin (VITAMIN B-12) 100 MCG tablet Take 100 mcg by mouth once daily.    fish oil-omega-3 fatty acids 300-1,000 mg capsule Take by mouth once daily.    omeprazole 20 mg TbEC      No current facility-administered medications for this visit.          Review of Systems:  Review of Systems   Constitutional: Negative for chills and fever.   Respiratory: Negative for cough and wheezing.    Cardiovascular: Negative for chest pain and palpitations.   Gastrointestinal: Negative for abdominal pain, nausea and vomiting.   Genitourinary: Negative for dysuria, frequency, hematuria, pelvic pain, vaginal bleeding, vaginal discharge and vaginal pain.        OBJECTIVE:     Physical Exam:  Physical Exam   Constitutional: She is oriented to person, place, and time. She appears well-developed and well-nourished.   Cardiovascular: Normal rate.   Pulmonary/Chest: Effort normal.   Abdominal: Soft. She exhibits no distension. There is no tenderness.   Genitourinary: No vaginal discharge found.   Genitourinary Comments: No mirena strings seen   Neurological: She is alert and oriented to person, place, and time.   Skin: Skin is warm and dry.   Nursing note and vitals reviewed.        ASSESSMENT:       ICD-10-CM ICD-9-CM    1. Intrauterine contraceptive device threads lost, subsequent encounter T83.32XD V58.89 Vital signs     996.32 POCT glucose      Notify physician if BS > 180 for hysterectomy patients      Chlorhexidine (CHG) 2% Wipes      Notify Physician/Vital Signs  Parameters Urine output less than 0.5mL/kg/hr (with indwelling catheter) or 30 mL/hr (without indwelling catheter) or blood glucose greater than 200 mg/dL      Notify physician      Notify Physician - Potential Need of Opioid Reversal      Chlorohexidine Gluconate Bath      Full code      Place in Outpatient      Void on call to OR      Diet NPO      Place sequential compression device      Place MEGAN hose      POCT urine pregnancy      Type & Screen Pre Op      Notify Physician - Potential Need of Opioid Reversal      Full code   2. IUD strings lost T83.32XA 996.32           Plan:      Prisca was seen today for pre-op exam.    Diagnoses and all orders for this visit:    Intrauterine contraceptive device threads lost, subsequent encounter  -     Vital signs; Standing  -     POCT glucose; Standing  -     Notify physician if BS > 180 for hysterectomy patients; Standing  -     Chlorhexidine (CHG) 2% Wipes; Standing  -     Notify Physician/Vital Signs Parameters Urine output less than 0.5mL/kg/hr (with indwelling catheter) or 30 mL/hr (without indwelling catheter) or blood glucose greater than 200 mg/dL; Standing  -     Notify physician; Standing  -     Notify Physician - Potential Need of Opioid Reversal; Standing  -     Chlorohexidine Gluconate Bath; Standing  -     Full code; Standing  -     Place in Outpatient; Standing  -     Void on call to OR; Standing  -     Diet NPO; Standing  -     Place sequential compression device; Standing  -     Place MEGAN hose; Standing  -     POCT urine pregnancy; Standing  -     Type & Screen Pre Op; Standing  -     Notify Physician - Potential Need of Opioid Reversal  -     Full code    IUD strings lost    Other orders  -     IP VTE LOW RISK PATIENT; Standing  -     Progressive Mobility Protocol (mobilize patient to their highest level of functioning at least twice daily); Standing        Orders Placed This Encounter   Procedures    Notify Physician - Potential Need of Opioid Reversal     Full code       Patient is to have operative hysteroscopy for Displaced IUD  on (06/03/2019)  - Labs; Type and Screen done  - TVUS done to confirm IUD in uterus  - Wet prep- done; medication given  - Case request placed & pre-op orders completed  - To  pre-op  - Anticoagulation : Patient is not on antiocoagulation.    Follow up in about 5 weeks (around 6/27/2019) for postop.    Counseling time: 15 minutes    Ashley Greenberg

## 2019-06-03 NOTE — OP NOTE
Obstetrics & Gynecology  OPERATIVE REPORT     DATE: 06/03/2019    PREOPERATIVE DIAGNOSIS  1. Lost IUD Strings    POSTOPERATIVE DIAGNOSIS  1. Same    PROCEDURE:  1. Uterine Dilation   2. IUD Removal    SURGEON: Ashley Guzman MD    ANESTHESIA: General    COMPLICATIONS: None    EBL: Minimal      FINDINGS:   1. 10 week size, Anteverted Uterus.  2. Uterus was dilated up 16F with García Dilators  3. Initially, Kesha clamp was used to attempt grasp lost IUD in uterus but was unsuccessful  4.  The polyp forceps was then used to successful grasp the IUD and remove it from the uterus completely intact      PROCEDURE: Patient was taken to the operating room where general anesthesia was administered and found to be adequate.  She was prepped and draped in the dorsal lithotomy position.  A weighted sterile speculum was placed in the vagina.  The anterior lip of the cervix was grasped with a single tooth tenaculum.  The uterus was sounded to approximately 11 cm.    The uterus was dilated up 16F with García Dilators. Initially, Kesha clamp was used to attempt grasp lost IUD in uterus but was unsuccessful. The polyp forceps was then used to successful grasp the IUD and remove it from the uterus completely intact.    All instruments were removed.  Excellent hemostasis was noted at the puncture sites in the cervix.    Sponge, lap, needle counts were correct x 2.    Patient tolerated the procedure well and was taken to the recovery room in stable condition.        Ashley Guzman MD

## 2019-06-03 NOTE — H&P (VIEW-ONLY)
History & Physical  Gynecology      SUBJECTIVE:     Chief Complaint: Pre-op Exam       History of Present Illness:  Ms. Zhu is a 34 y/o female who presents to clinic female who presents to clinic for preop exam. Patient initially presented initially seen on 2019 c/o pelvic pain and lost IUD. She reports that she had IUD in place and but no strings in cervix. Removal was attempted in office but patient was unable to tolerate procedure. TVUS was done to ensure IUD was in uterus. Patient would like to move forth with EUA/IUD removal/Hysteroscopy D&C in the OR.        FINDINGS:  Uterus measures 10.7 x 3.8 x 6.1 cm.  Uterus demonstrates no evidence of uterine fibroids.  Endometrium measures 7 mm.  Intrauterine device is visualized in satisfactory position.  A small nabothian cyst is noted measuring 1.9 cm.    Right ovary is normal in appearance measuring 2.9 x 2.1 x 2.9 cm with normal vascular flow.    Left ovary is normal in appearance measuring 3.2 x 2.1 x 3.0 cm with normal vascular flow.    No evidence of free fluid.      Impression       IUD in satisfactory position.         Review of patient's allergies indicates:   Allergen Reactions    Pcn [penicillins] Shortness Of Breath       Past Medical History:   Diagnosis Date    Anemia     BMI 50.0-59.9, adult     GERD (gastroesophageal reflux disease) 2019    History of blood transfusion     Obesity     OI (osteogenesis imperfecta)     Osteopetrosis     Tendonitis      Past Surgical History:   Procedure Laterality Date     SECTION      ,     ESOPHAGOGASTRODUODENOSCOPY (EGD) N/A 2019    Performed by [facility]un [facility] MD at [facility] ENDO (2ND FLR)    FEMUR FRACTURE SURGERY Bilateral     hardware/rods in both legs    FRACTURE SURGERY      femur    WISDOM TOOTH EXTRACTION       OB History        4    Para   2    Term   2            AB        Living   2       SAB        TAB        Ectopic        Multiple         Live Births                   Family History   Problem Relation Age of Onset    Hypertension Mother     Hyperlipidemia Mother     Asthma Mother     Anxiety disorder Mother     No Known Problems Father      Social History     Tobacco Use    Smoking status: Never Smoker    Smokeless tobacco: Never Used   Substance Use Topics    Alcohol use: No    Drug use: Never       Current Outpatient Medications   Medication Sig    CALCIUM ORAL Take by mouth.    cyanocobalamin (VITAMIN B-12) 100 MCG tablet Take 100 mcg by mouth once daily.    fish oil-omega-3 fatty acids 300-1,000 mg capsule Take by mouth once daily.    omeprazole 20 mg TbEC      No current facility-administered medications for this visit.          Review of Systems:  Review of Systems   Constitutional: Negative for chills and fever.   Respiratory: Negative for cough and wheezing.    Cardiovascular: Negative for chest pain and palpitations.   Gastrointestinal: Negative for abdominal pain, nausea and vomiting.   Genitourinary: Negative for dysuria, frequency, hematuria, pelvic pain, vaginal bleeding, vaginal discharge and vaginal pain.        OBJECTIVE:     Physical Exam:  Physical Exam   Constitutional: She is oriented to person, place, and time. She appears well-developed and well-nourished.   Cardiovascular: Normal rate.   Pulmonary/Chest: Effort normal.   Abdominal: Soft. She exhibits no distension. There is no tenderness.   Genitourinary: No vaginal discharge found.   Genitourinary Comments: No mirena strings seen   Neurological: She is alert and oriented to person, place, and time.   Skin: Skin is warm and dry.   Nursing note and vitals reviewed.        ASSESSMENT:       ICD-10-CM ICD-9-CM    1. Intrauterine contraceptive device threads lost, subsequent encounter T83.32XD V58.89 Vital signs     996.32 POCT glucose      Notify physician if BS > 180 for hysterectomy patients      Chlorhexidine (CHG) 2% Wipes      Notify Physician/Vital Signs  Parameters Urine output less than 0.5mL/kg/hr (with indwelling catheter) or 30 mL/hr (without indwelling catheter) or blood glucose greater than 200 mg/dL      Notify physician      Notify Physician - Potential Need of Opioid Reversal      Chlorohexidine Gluconate Bath      Full code      Place in Outpatient      Void on call to OR      Diet NPO      Place sequential compression device      Place MEGAN hose      POCT urine pregnancy      Type & Screen Pre Op      Notify Physician - Potential Need of Opioid Reversal      Full code   2. IUD strings lost T83.32XA 996.32           Plan:      Prisca was seen today for pre-op exam.    Diagnoses and all orders for this visit:    Intrauterine contraceptive device threads lost, subsequent encounter  -     Vital signs; Standing  -     POCT glucose; Standing  -     Notify physician if BS > 180 for hysterectomy patients; Standing  -     Chlorhexidine (CHG) 2% Wipes; Standing  -     Notify Physician/Vital Signs Parameters Urine output less than 0.5mL/kg/hr (with indwelling catheter) or 30 mL/hr (without indwelling catheter) or blood glucose greater than 200 mg/dL; Standing  -     Notify physician; Standing  -     Notify Physician - Potential Need of Opioid Reversal; Standing  -     Chlorohexidine Gluconate Bath; Standing  -     Full code; Standing  -     Place in Outpatient; Standing  -     Void on call to OR; Standing  -     Diet NPO; Standing  -     Place sequential compression device; Standing  -     Place MEGAN hose; Standing  -     POCT urine pregnancy; Standing  -     Type & Screen Pre Op; Standing  -     Notify Physician - Potential Need of Opioid Reversal  -     Full code    IUD strings lost    Other orders  -     IP VTE LOW RISK PATIENT; Standing  -     Progressive Mobility Protocol (mobilize patient to their highest level of functioning at least twice daily); Standing        Orders Placed This Encounter   Procedures    Notify Physician - Potential Need of Opioid Reversal     Full code       Patient is to have operative hysteroscopy for Displaced IUD  on (06/03/2019)  - Labs; Type and Screen done  - TVUS done to confirm IUD in uterus  - Wet prep- done; medication given  - Case request placed & pre-op orders completed  - To  pre-op  - Anticoagulation : Patient is not on antiocoagulation.    Follow up in about 5 weeks (around 6/27/2019) for postop.    Counseling time: 15 minutes    Ashley Greenberg

## 2019-06-03 NOTE — TRANSFER OF CARE
"Anesthesia Transfer of Care Note    Patient: Prisca Zhu    Procedure(s) Performed: Procedure(s) (LRB):  HYSTEROSCOPY, WITH DILATION AND CURETTAGE OF UTERUS (N/A)    Patient location: PACU    Anesthesia Type: general    Transport from OR: Transported from OR on room air with adequate spontaneous ventilation    Post pain: adequate analgesia    Post assessment: no apparent anesthetic complications    Post vital signs: stable    Level of consciousness: sedated and responds to stimulation    Nausea/Vomiting: no nausea/vomiting    Complications: none    Transfer of care protocol was followed      Last vitals:   Visit Vitals  BP (!) 129/90 (BP Location: Right arm, Patient Position: Lying)   Pulse 74   Temp 37 °C (98.6 °F) (Oral)   Resp 14   Ht 4' 9" (1.448 m)   Wt 107.5 kg (236 lb 15.9 oz)   SpO2 100%   Breastfeeding? No   BMI 51.29 kg/m²     "

## 2019-06-03 NOTE — ANESTHESIA PREPROCEDURE EVALUATION
06/03/2019  Prisca Zhu is a 33 y.o., female.    Anesthesia Evaluation     I have reviewed the Nursing Notes.      Review of Systems  Anesthesia Hx:  No problems with previous Anesthesia   Social:  Non-Smoker    Cardiovascular:  Cardiovascular Normal     Pulmonary:  Pulmonary Normal    Renal/:  Renal/ Normal     Hepatic/GI:   No Bowel Prep. Denies PUD. GERD Denies Liver Disease. Denies Hepatitis.    Musculoskeletal:   Osteogenesis imperfecta   Neurological:  Neurology Normal    Endocrine:  Endocrine Normal        Physical Exam  General:  Morbid Obesity    Airway/Jaw/Neck:  AIRWAY FINDINGS: Normal M1  Excellent mouth opening  Denies loose dentition  FROM     Chest/Lungs:  Chest/Lungs Clear    Heart/Vascular:  Heart Findings: Normal       Mental Status:  Mental Status Findings: Normal        Anesthesia Plan  Type of Anesthesia, risks & benefits discussed:  Anesthesia Type:  general  Patient's Preference:   Intra-op Monitoring Plan: standard ASA monitors  Intra-op Monitoring Plan Comments:   Post Op Pain Control Plan:   Post Op Pain Control Plan Comments:   Induction:   IV  Beta Blocker:  Patient is not currently on a Beta-Blocker (No further documentation required).       Informed Consent: Patient understands risks and agrees with Anesthesia plan.  Questions answered. Anesthesia consent signed with patient.  ASA Score: 3     Day of Surgery Review of History & Physical:  There are no significant changes.  H&P update referred to the provider.     Anesthesia Plan Notes: Pt has excellent airway but extremely high bmi. Will position well, preox well, and have advanced airway equipment in the room.        Ready For Surgery From Anesthesia Perspective.

## 2019-06-03 NOTE — INTERVAL H&P NOTE
The patient has been examined and the H&P has been reviewed:    I concur with the findings and no changes have occurred since H&P was written. Procedure was confirmed with Ms. Zhu. She confirmed Hysteroscopy D&C. Patient denies any questions or concerns at this time. Consents in chart and Staff notified. Patient can proceed to OR.      Surgery risks, benefits and alternative options discussed and understood by patient/family.          Active Hospital Problems    Diagnosis  POA    IUD strings lost [T83.32XA]  Yes      Resolved Hospital Problems   No resolved problems to display.

## 2019-06-07 ENCOUNTER — PATIENT MESSAGE (OUTPATIENT)
Dept: BARIATRICS | Facility: CLINIC | Age: 34
End: 2019-06-07

## 2019-06-13 ENCOUNTER — TELEPHONE (OUTPATIENT)
Dept: BARIATRICS | Facility: CLINIC | Age: 34
End: 2019-06-13

## 2019-06-13 NOTE — TELEPHONE ENCOUNTER
----- Message from Brant Ugarte sent at 6/13/2019  2:29 PM CDT -----  Contact: Pt  Needs Advice    Reason for call:The Pt would like to speak to you about some treatment that you wanted her to have. Please contact the Pt.        Communication Preference:[phone]    Additional Information:

## 2019-06-18 ENCOUNTER — LAB VISIT (OUTPATIENT)
Dept: LAB | Facility: HOSPITAL | Age: 34
End: 2019-06-18
Payer: MEDICARE

## 2019-06-18 DIAGNOSIS — A04.8 POSITIVE H. PYLORI TEST: Primary | ICD-10-CM

## 2019-06-18 DIAGNOSIS — A04.8 POSITIVE H. PYLORI TEST: ICD-10-CM

## 2019-06-18 PROCEDURE — 87338 HPYLORI STOOL AG IA: CPT

## 2019-06-20 ENCOUNTER — OFFICE VISIT (OUTPATIENT)
Dept: OBSTETRICS AND GYNECOLOGY | Facility: CLINIC | Age: 34
End: 2019-06-20
Payer: MEDICARE

## 2019-06-20 VITALS
HEIGHT: 57 IN | DIASTOLIC BLOOD PRESSURE: 65 MMHG | SYSTOLIC BLOOD PRESSURE: 121 MMHG | BODY MASS INDEX: 50.87 KG/M2 | WEIGHT: 235.81 LBS

## 2019-06-20 DIAGNOSIS — Z09 POSTOP CHECK: Primary | ICD-10-CM

## 2019-06-20 DIAGNOSIS — Z30.011 ENCOUNTER FOR INITIAL PRESCRIPTION OF CONTRACEPTIVE PILLS: ICD-10-CM

## 2019-06-20 PROCEDURE — 3008F PR BODY MASS INDEX (BMI) DOCUMENTED: ICD-10-PCS | Mod: CPTII,S$GLB,, | Performed by: OBSTETRICS & GYNECOLOGY

## 2019-06-20 PROCEDURE — 3008F BODY MASS INDEX DOCD: CPT | Mod: CPTII,S$GLB,, | Performed by: OBSTETRICS & GYNECOLOGY

## 2019-06-20 PROCEDURE — 99213 PR OFFICE/OUTPT VISIT, EST, LEVL III, 20-29 MIN: ICD-10-PCS | Mod: S$GLB,,, | Performed by: OBSTETRICS & GYNECOLOGY

## 2019-06-20 PROCEDURE — 99213 OFFICE O/P EST LOW 20 MIN: CPT | Mod: S$GLB,,, | Performed by: OBSTETRICS & GYNECOLOGY

## 2019-06-20 PROCEDURE — 99999 PR PBB SHADOW E&M-EST. PATIENT-LVL III: ICD-10-PCS | Mod: PBBFAC,,, | Performed by: OBSTETRICS & GYNECOLOGY

## 2019-06-20 PROCEDURE — 99999 PR PBB SHADOW E&M-EST. PATIENT-LVL III: CPT | Mod: PBBFAC,,, | Performed by: OBSTETRICS & GYNECOLOGY

## 2019-06-20 RX ORDER — NORGESTIMATE AND ETHINYL ESTRADIOL 0.25-0.035
1 KIT ORAL DAILY
Qty: 90 TABLET | Refills: 3 | Status: SHIPPED | OUTPATIENT
Start: 2019-06-20 | End: 2019-08-07 | Stop reason: ALTCHOICE

## 2019-06-20 NOTE — PATIENT INSTRUCTIONS
Birth Control: The Pill    Birth control pills contain hormones that help prevent pregnancy. The pills are prescribed by your healthcare provider. There are many types of birth control pills available. If you have side effects from one type of pill, tell your healthcare provider. He or she may be able to prescribe a pill that works better for you.  Pregnancy rates  Talk to your healthcare provider about the effectiveness of this birth control method.  Using the pill  · Take one pill daily. Take it at around the same time each day.  · Follow your healthcare providers guidelines on when to start your first pack of pills. You may need to use another form of birth control for a week or more after you start.  · Know what to do if you forget to take a pill. (Consult your healthcare provider or check the package.) If you miss more than one pill, you may need to use a backup method of birth control for a week or more.  Pros  · Low pregnancy rate  · No interruption to sex  · Easy to use  · Can help make periods more regular  · May lower your risk of ovarian cysts and certain cancers  · May decrease menstrual cramps, menstrual flow, and acne  Cons  · Does not protect against sexually transmitted infection (STIs)  · Requires taking a pill on time each day  · May not work as well when taken with certain other medicines (check with your pharmacist)  · May cause side effects such as nausea, irregular bleeding, headaches, breast tenderness, fatigue, or mood changes (these often go away within 3 months)  · May increase the risk of blood clots, heart attack, and stroke  The pill may not be for you  The pill may not be for you if:  · You are a smoker and over age 35  · You have high blood pressure or gallbladder, liver, cerebrovascular  or heart disease  · You have diabetes, migraines, blood clot in the vein or artery, lupus, depression, certain lipid disorders, or take medicines that interfere with the pill  In these cases,  discuss the risks with your healthcare provider.  Date Last Reviewed: 3/1/2017  © 7343-4777 [employer]. [address] [place] [address], [place] [address]. All rights reserved. This information is not intended as a substitute for professional medical care. Always follow your healthcare professional's instructions.

## 2019-06-20 NOTE — PROGRESS NOTES
History & Physical  Gynecology      SUBJECTIVE:     Chief Complaint: Post-op Evaluation       History of Present Illness:  Postoperative Follow-up  Ms. Zhu is a 34 y/o female who presents to the clinic 2 weeks status post IUD removal for lost IUD strings. Eating a regular diet without difficulty. Bowel movements are normal. The patient is not having any pain.    Patient reports that she would like to start OCPs at this time for birth control. She reports that she thinks that she can take a pill every day. She denies h/o VTE, HTN, and smoking.      Review of patient's allergies indicates:   Allergen Reactions    Pcn [penicillins] Shortness Of Breath       Past Medical History:   Diagnosis Date    Anemia     BMI 50.0-59.9, adult     GERD (gastroesophageal reflux disease) 2019    History of blood transfusion     Obesity     OI (osteogenesis imperfecta)     Osteopetrosis     Tendonitis      Past Surgical History:   Procedure Laterality Date     SECTION      ,     ESOPHAGOGASTRODUODENOSCOPY (EGD) N/A 2019    Performed [facility]un [facility] at [facility] ENDO (2ND FLR)    FEMUR FRACTURE SURGERY Bilateral     hardware/rods in both legs    FRACTURE SURGERY      femur    REMOVAL, INTRAUTERINE DEVICE N/A 6/3/2019    Per[facility] by [facility] at [facility]    WISDOM TOOTH EXTRACTION       OB History        4    Para   2    Term   2            AB        Living   2       SAB        TAB        Ectopic        Multiple        Live Births                   Family History   Problem Relation Age of Onset    Hypertension Mother     Hyperlipidemia Mother     Asthma Mother     Anxiety disorder Mother     No Known Problems Father      Social History     Tobacco Use    Smoking status: Never Smoker    Smokeless tobacco: Never Used   Substance Use Topics    Alcohol use: No    Drug use: Never       Current Outpatient Medications   Medication Sig    CALCIUM ORAL  Take by mouth.    cyanocobalamin (VITAMIN B-12) 100 MCG tablet Take 100 mcg by mouth once daily.    fish oil-omega-3 fatty acids 300-1,000 mg capsule Take by mouth once daily.    ibuprofen (ADVIL,MOTRIN) 800 MG tablet Take 1 tablet (800 mg total) by mouth every 8 (eight) hours as needed for Pain.    omeprazole 20 mg TbEC     norgestimate-ethinyl estradiol (ORTHO-CYCLEN) 0.25-35 mg-mcg per tablet Take 1 tablet by mouth once daily.     No current facility-administered medications for this visit.      Review of Systems:  Review of Systems   Constitutional: Negative for chills, fever and unexpected weight change.   Respiratory: Negative for cough and wheezing.    Cardiovascular: Negative for chest pain and palpitations.   Gastrointestinal: Negative for abdominal pain, nausea and vomiting.   Genitourinary: Negative for dysuria, frequency, hematuria, pelvic pain, vaginal bleeding, vaginal discharge and vaginal pain.     OBJECTIVE:     Physical Exam:  Physical Exam   Constitutional: She is oriented to person, place, and time. She appears well-developed and well-nourished.   Cardiovascular: Normal rate.   Pulmonary/Chest: Effort normal. No respiratory distress.   Neurological: She is alert and oriented to person, place, and time.   Skin: Skin is warm and dry.   Psychiatric: She has a normal mood and affect.   Nursing note and vitals reviewed.    ASSESSMENT:       ICD-10-CM ICD-9-CM    1. Postop check Z09 V67.00    2. Encounter for initial prescription of contraceptive pills Z30.011 V25.01 norgestimate-ethinyl estradiol (ORTHO-CYCLEN) 0.25-35 mg-mcg per tablet     Plan:      Prisca was seen today for post-op evaluation.    Diagnoses and all orders for this visit:    Postop check  - Doing well  - No vaginal bleeding    Encounter for initial prescription of contraceptive pills  -     norgestimate-ethinyl estradiol (ORTHO-CYCLEN) 0.25-35 mg-mcg per tablet; Take 1 tablet by mouth once daily.  - Discussed with patient the  importance of taking her OCP everyday. She understands to start pills after her next period. She understands that if she skips one pill that she should take it as soon as possible but if she skips 2 pills she should resume pills as soon as possible and use condoms/abstience for the following 7 days. If she missed >2 doses than she should stop taking pills, have a period then start a new pack.      No orders of the defined types were placed in this encounter.      Follow up in about 1 year (around 6/20/2020) for WWE.     Counseling time: 15 minutes    Ashley Greenberg

## 2019-06-22 LAB — H PYLORI AG STL QL IA: DETECTED

## 2019-06-24 DIAGNOSIS — A04.8 POSITIVE H. PYLORI TEST: Primary | ICD-10-CM

## 2019-07-23 ENCOUNTER — DOCUMENTATION ONLY (OUTPATIENT)
Dept: BARIATRICS | Facility: CLINIC | Age: 34
End: 2019-07-23

## 2019-07-23 ENCOUNTER — PATIENT MESSAGE (OUTPATIENT)
Dept: BARIATRICS | Facility: CLINIC | Age: 34
End: 2019-07-23

## 2019-08-07 ENCOUNTER — OFFICE VISIT (OUTPATIENT)
Dept: FAMILY MEDICINE | Facility: CLINIC | Age: 34
End: 2019-08-07
Payer: MEDICARE

## 2019-08-07 ENCOUNTER — TELEPHONE (OUTPATIENT)
Dept: FAMILY MEDICINE | Facility: CLINIC | Age: 34
End: 2019-08-07

## 2019-08-07 VITALS
HEART RATE: 84 BPM | TEMPERATURE: 99 F | OXYGEN SATURATION: 99 % | SYSTOLIC BLOOD PRESSURE: 120 MMHG | HEIGHT: 57 IN | BODY MASS INDEX: 51.84 KG/M2 | WEIGHT: 240.31 LBS | DIASTOLIC BLOOD PRESSURE: 84 MMHG

## 2019-08-07 DIAGNOSIS — Z23 NEED FOR TD VACCINE: ICD-10-CM

## 2019-08-07 DIAGNOSIS — K21.9 GASTROESOPHAGEAL REFLUX DISEASE, ESOPHAGITIS PRESENCE NOT SPECIFIED: Primary | ICD-10-CM

## 2019-08-07 DIAGNOSIS — K21.9 GASTROESOPHAGEAL REFLUX DISEASE, ESOPHAGITIS PRESENCE NOT SPECIFIED: ICD-10-CM

## 2019-08-07 DIAGNOSIS — Z23 NEED FOR TDAP VACCINATION: ICD-10-CM

## 2019-08-07 DIAGNOSIS — Z30.41 ENCOUNTER FOR SURVEILLANCE OF CONTRACEPTIVE PILLS: ICD-10-CM

## 2019-08-07 DIAGNOSIS — R11.0 NAUSEA: Primary | ICD-10-CM

## 2019-08-07 PROBLEM — T83.32XA MALPOSITIONED INTRAUTERINE DEVICE: Status: RESOLVED | Noted: 2019-03-21 | Resolved: 2019-08-07

## 2019-08-07 PROBLEM — Z30.432 ENCOUNTER FOR IUD REMOVAL: Status: RESOLVED | Noted: 2019-05-23 | Resolved: 2019-08-07

## 2019-08-07 PROCEDURE — 3008F BODY MASS INDEX DOCD: CPT | Mod: CPTII,S$GLB,, | Performed by: INTERNAL MEDICINE

## 2019-08-07 PROCEDURE — 99214 PR OFFICE/OUTPT VISIT, EST, LEVL IV, 30-39 MIN: ICD-10-PCS | Mod: S$GLB,,, | Performed by: INTERNAL MEDICINE

## 2019-08-07 PROCEDURE — 99499 RISK ADDL DX/OHS AUDIT: ICD-10-PCS | Mod: S$GLB,,, | Performed by: INTERNAL MEDICINE

## 2019-08-07 PROCEDURE — 99214 OFFICE O/P EST MOD 30 MIN: CPT | Mod: S$GLB,,, | Performed by: INTERNAL MEDICINE

## 2019-08-07 PROCEDURE — 99999 PR PBB SHADOW E&M-EST. PATIENT-LVL III: CPT | Mod: PBBFAC,,, | Performed by: INTERNAL MEDICINE

## 2019-08-07 PROCEDURE — 3008F PR BODY MASS INDEX (BMI) DOCUMENTED: ICD-10-PCS | Mod: CPTII,S$GLB,, | Performed by: INTERNAL MEDICINE

## 2019-08-07 PROCEDURE — 99499 UNLISTED E&M SERVICE: CPT | Mod: S$GLB,,, | Performed by: INTERNAL MEDICINE

## 2019-08-07 PROCEDURE — 99999 PR PBB SHADOW E&M-EST. PATIENT-LVL III: ICD-10-PCS | Mod: PBBFAC,,, | Performed by: INTERNAL MEDICINE

## 2019-08-07 RX ORDER — OMEPRAZOLE 20 MG/1
20 CAPSULE, DELAYED RELEASE ORAL DAILY
Qty: 90 CAPSULE | Refills: 0 | Status: SHIPPED | OUTPATIENT
Start: 2019-08-07 | End: 2020-04-06

## 2019-08-07 RX ORDER — DROSPIRENONE AND ETHINYL ESTRADIOL 0.02-3(28)
1 KIT ORAL DAILY
Qty: 84 TABLET | Refills: 3 | Status: SHIPPED | OUTPATIENT
Start: 2019-08-07 | End: 2022-06-09

## 2019-08-07 RX ORDER — PHENOL/SODIUM PHENOLATE
20 AEROSOL, SPRAY (ML) MUCOUS MEMBRANE DAILY
Qty: 90 EACH | Refills: 0 | Status: SHIPPED | OUTPATIENT
Start: 2019-08-07 | End: 2019-08-07

## 2019-08-07 NOTE — TELEPHONE ENCOUNTER
Omeprazole not covered by insurance. Pharmacist asking for med to be changed to capsules instead of tablets. Please advise.

## 2019-08-07 NOTE — TELEPHONE ENCOUNTER
----- Message from Radha Jackman sent at 8/7/2019  9:21 AM CDT -----  Contact: David   Type: Patient Call Back    What is the request in detail: Pharmacist calling to speak to a nurse regarding pt ins not covering med below. Pharmacist would like to change it to capsules  instead of tablets.     omeprazole 20 mg TbEC      Can the clinic reply by [facility]? No     Would the patient rather a call back or a response via [facility]? Call back     Best call back number:   [id number] - [place] - [address] AT [address]  [place] [address]  Phone: [phone] Fax: [phone]

## 2019-08-07 NOTE — PROGRESS NOTES
Subjective:     Chief Complaint  Chief Complaint   Patient presents with    Emesis       HPI  Prisca Zhu is a 34 y.o. female with medical diagnoses as listed in the medical history and problem list that presents for nausea.  Last clinic visit was 3/20/2019.      Saw Dr Guzman in 2019 - removed IUD and started Ortho-cyclen 0.25-35 mg   Feeling bloating, upset stomach and vomiting episodically  No abdominal pain   Sensitive to certain smells  10 lbs weight gain since April (5 lbs since 2019)      Patient Care Team:  Mathew Carpenter MD as PCP - General (Internal Medicine)  Mildred Farias LPN as Licensed Practical Nurse      PAST MEDICAL HISTORY:  Past Medical History:   Diagnosis Date    Anemia     BMI 50.0-59.9, adult     Encounter for IUD removal 2019    GERD (gastroesophageal reflux disease) 2019    History of blood transfusion 2001    Malpositioned intrauterine device 3/21/2019    2019 OB/GYN - Dr Guzman - Discussed with patient that we will proceed with imaging to locate the IUD after which we can then proceed to book case in OR to retrieve displaced IUD    Obesity     OI (osteogenesis imperfecta)     Osteopetrosis     Tendonitis        PAST SURGICAL HISTORY:  Past Surgical History:   Procedure Laterality Date     SECTION      ,     ESOPHAGOGASTRODUODENOSCOPY (EGD) N/A 2019    Performed by [facility] at [facility] ENDO (2ND FLR)    FEMUR FRACTURE SURGERY Bilateral     hardware/rods in both legs    FRACTURE SURGERY      femur    REMOVAL, INTRAUTERINE DEVICE N/A 6/3/2019    [facility]    WISDOM TOOTH EXTRACTION         SOCIAL HISTORY:  Social History     Socioeconomic History    Marital status: Single     Spouse name: Not on file    Number of children: Not on file    Years of education: Not on file    Highest education level: Not on file   Occupational History    Not on file   Social Needs     Financial resource strain: Not on file    Food insecurity:     Worry: Not on file     Inability: Not on file    Transportation needs:     Medical: Not on file     Non-medical: Not on file   Tobacco Use    Smoking status: Never Smoker    Smokeless tobacco: Never Used   Substance and Sexual Activity    Alcohol use: No    Drug use: Never    Sexual activity: Yes     Partners: Male     Birth control/protection: None, IUD   Lifestyle    Physical activity:     Days per week: Not on file     Minutes per session: Not on file    Stress: Not on file   Relationships    Social connections:     Talks on phone: Not on file     Gets together: Not on file     Attends [employer] service: Not on file     Active member of club or organization: Not on file     Attends meetings of clubs or organizations: Not on file     Relationship status: Not on file   Other Topics Concern    Not on file   Social History Narrative    Disabled 2/2 chronic left shoulder pain       FAMILY HISTORY:  Family History   Problem Relation Age of Onset    Hypertension Mother     Hyperlipidemia Mother     Asthma Mother     Anxiety disorder Mother     No Known Problems Father        ALLERGIES AND MEDICATIONS: updated and reviewed.  Review of patient's allergies indicates:   Allergen Reactions    Pcn [penicillins] Shortness Of Breath     Current Outpatient Medications   Medication Sig Dispense Refill    CALCIUM ORAL Take by mouth.      cyanocobalamin (VITAMIN B-12) 100 MCG tablet Take 100 mcg by mouth once daily.      fish oil-omega-3 fatty acids 300-1,000 mg capsule Take by mouth once daily.      ibuprofen (ADVIL,MOTRIN) 800 MG tablet Take 1 tablet (800 mg total) by mouth every 8 (eight) hours as needed for Pain. 40 tablet 0    drospirenone-ethinyl estradiol (ROSALBA) 3-0.02 mg per tablet Take 1 tablet by mouth once daily. 84 tablet 3    omeprazole (PRILOSEC) 20 MG capsule Take 1 capsule (20 mg total) by mouth once daily. 90 capsule 0     No  "current facility-administered medications for this visit.          ROS:  Review of Systems   Constitutional: Positive for unexpected weight change (weight gain). Negative for appetite change, diaphoresis and fatigue.   HENT:        Dental pain   Gastrointestinal: Positive for nausea and vomiting. Negative for abdominal pain, blood in stool, constipation and diarrhea.        Heartburn   Genitourinary: Negative.    Musculoskeletal: Positive for arthralgias (shoulder pain (R)) and back pain.       Objective:       Physical Exam  Vitals:    08/07/19 0808   BP: 120/84   Pulse: 84   Temp: 98.6 °F (37 °C)   TempSrc: Oral   SpO2: 99%   Weight: 109 kg (240 lb 4.8 oz)   Height: 4' 9" (1.448 m)    Body mass index is 52 kg/m².  Weight: 109 kg (240 lb 4.8 oz)   Height: 4' 9" (144.8 cm)   Physical Exam   Constitutional: She appears well-developed. No distress.   HENT:   Head: Normocephalic and atraumatic.   Mouth/Throat: Oropharynx is clear and moist.   Eyes: Pupils are equal, round, and reactive to light. Conjunctivae and EOM are normal.   Neck: Normal range of motion. Neck supple. No thyromegaly present.   Cardiovascular: Normal rate, normal heart sounds and intact distal pulses.   No murmur heard.  Pulmonary/Chest: Effort normal and breath sounds normal.   Musculoskeletal: She exhibits no edema or deformity.   Lymphadenopathy:     She has no cervical adenopathy.   Neurological: She is alert. No cranial nerve deficit.   Skin: Skin is warm and dry.   Psychiatric: She has a normal mood and affect. Her behavior is normal.   Vitals reviewed.          Assessment:     1. Encounter for surveillance of contraceptive pills    2. Nausea    3. BMI 50.0-59.9, adult    4. Gastroesophageal reflux disease, esophagitis presence not specified    5. Need for Tdap vaccination    6. Need for Td vaccine      Plan:     Prisca was seen today for emesis.    Diagnoses and all orders for this visit:    Nausea  Encounter for surveillance of " contraceptive pills  Negative UPT  Likely secondary to OCP  Switched to lower estrogen formulation as noted  -     POCT Urine Pregnancy  -     drospirenone-ethinyl estradiol (ROSALBA) 3-0.02 mg per tablet; Take 1 tablet by mouth once daily.    BMI 50.0-59.9, adult  Body mass index is 52 kg/m².  Following with Bariatric Surgery pending intervention - discussed briefly    Gastroesophageal reflux disease, esophagitis presence not specified  Sent medication refill to patient's preferred pharmacy on file.  -      omeprazole 20 mg TbEC; Take 20 mg by mouth once daily.    Need for Td vaccine  Counseled regarding Td vaccination - ordered (patient left without receiving)  -     (In Office Administered) Td Vaccine - Preservative Free      Health Maintenance       Date Due Completion Date    TETANUS VACCINE 12/09/2009 12/9/1999    Influenza Vaccine (1) 08/01/2019 ---    Pap Smear with HPV Cotest 03/20/2022 3/20/2019            Health Maintenance reviewed and addressed as per orders    Follow up if symptoms worsen or fail to improve.    The patient expressed understanding and no barriers to adherence were identified.     1. The patient indicates understanding of these issues and agrees with the plan. Brief care plan is updated and reviewed with the patient as applicable.     2. The patient is given an After Visit Summary that lists all medications with directions, allergies, orders placed during this encounter and follow-up instructions.     3. I have reviewed the patient's medical information including past medical, family, and social history sections including the medications and allergies.     4. We discussed the patient's current medications. I reconciled the patient's medication list and prepared and supplied needed refills.       Mathew Carpenter MD  Internal Medicine-Pediatrics

## 2019-08-08 ENCOUNTER — PATIENT MESSAGE (OUTPATIENT)
Dept: FAMILY MEDICINE | Facility: CLINIC | Age: 34
End: 2019-08-08

## 2019-08-15 ENCOUNTER — PATIENT MESSAGE (OUTPATIENT)
Dept: ADMINISTRATIVE | Facility: OTHER | Age: 34
End: 2019-08-15

## 2019-08-16 ENCOUNTER — PATIENT MESSAGE (OUTPATIENT)
Dept: BARIATRICS | Facility: CLINIC | Age: 34
End: 2019-08-16

## 2019-08-16 ENCOUNTER — PATIENT MESSAGE (OUTPATIENT)
Dept: GASTROENTEROLOGY | Facility: CLINIC | Age: 34
End: 2019-08-16

## 2019-08-16 DIAGNOSIS — E55.9 VITAMIN D INSUFFICIENCY: Primary | ICD-10-CM

## 2019-08-20 ENCOUNTER — PATIENT OUTREACH (OUTPATIENT)
Dept: ADMINISTRATIVE | Facility: OTHER | Age: 34
End: 2019-08-20

## 2019-08-21 ENCOUNTER — OFFICE VISIT (OUTPATIENT)
Dept: OBSTETRICS AND GYNECOLOGY | Facility: CLINIC | Age: 34
End: 2019-08-21
Payer: MEDICARE

## 2019-08-21 ENCOUNTER — CLINICAL SUPPORT (OUTPATIENT)
Dept: OBSTETRICS AND GYNECOLOGY | Facility: CLINIC | Age: 34
End: 2019-08-21
Payer: MEDICARE

## 2019-08-21 ENCOUNTER — LAB VISIT (OUTPATIENT)
Dept: LAB | Facility: HOSPITAL | Age: 34
End: 2019-08-21
Payer: MEDICARE

## 2019-08-21 VITALS
SYSTOLIC BLOOD PRESSURE: 115 MMHG | DIASTOLIC BLOOD PRESSURE: 64 MMHG | BODY MASS INDEX: 51.14 KG/M2 | WEIGHT: 236.31 LBS | BODY MASS INDEX: 51.01 KG/M2 | WEIGHT: 236.44 LBS | HEIGHT: 57 IN

## 2019-08-21 DIAGNOSIS — Z30.42 ENCOUNTER FOR MANAGEMENT AND INJECTION OF DEPO-PROVERA: Primary | ICD-10-CM

## 2019-08-21 DIAGNOSIS — Z30.013 ENCOUNTER FOR INITIAL PRESCRIPTION OF INJECTABLE CONTRACEPTIVE: Primary | ICD-10-CM

## 2019-08-21 DIAGNOSIS — E55.9 VITAMIN D INSUFFICIENCY: Primary | ICD-10-CM

## 2019-08-21 DIAGNOSIS — E55.9 VITAMIN D INSUFFICIENCY: ICD-10-CM

## 2019-08-21 LAB — 25(OH)D3+25(OH)D2 SERPL-MCNC: 19 NG/ML (ref 30–96)

## 2019-08-21 PROCEDURE — 99999 PR PBB SHADOW E&M-EST. PATIENT-LVL III: ICD-10-PCS | Mod: PBBFAC,,, | Performed by: OBSTETRICS & GYNECOLOGY

## 2019-08-21 PROCEDURE — 99214 OFFICE O/P EST MOD 30 MIN: CPT | Mod: 25,S$GLB,, | Performed by: OBSTETRICS & GYNECOLOGY

## 2019-08-21 PROCEDURE — 3008F BODY MASS INDEX DOCD: CPT | Mod: CPTII,S$GLB,, | Performed by: OBSTETRICS & GYNECOLOGY

## 2019-08-21 PROCEDURE — 99999 PR PBB SHADOW E&M-EST. PATIENT-LVL II: CPT | Mod: PBBFAC,,,

## 2019-08-21 PROCEDURE — 36415 COLL VENOUS BLD VENIPUNCTURE: CPT

## 2019-08-21 PROCEDURE — 82306 VITAMIN D 25 HYDROXY: CPT

## 2019-08-21 PROCEDURE — 81025 URINE PREGNANCY TEST: CPT | Mod: S$GLB,,, | Performed by: OBSTETRICS & GYNECOLOGY

## 2019-08-21 PROCEDURE — 96372 PR INJECTION,THERAP/PROPH/DIAG2ST, IM OR SUBCUT: ICD-10-PCS | Mod: S$GLB,,, | Performed by: OBSTETRICS & GYNECOLOGY

## 2019-08-21 PROCEDURE — 99999 PR PBB SHADOW E&M-EST. PATIENT-LVL II: ICD-10-PCS | Mod: PBBFAC,,,

## 2019-08-21 PROCEDURE — 81025 PR  URINE PREGNANCY TEST: ICD-10-PCS | Mod: S$GLB,,, | Performed by: OBSTETRICS & GYNECOLOGY

## 2019-08-21 PROCEDURE — 99214 PR OFFICE/OUTPT VISIT, EST, LEVL IV, 30-39 MIN: ICD-10-PCS | Mod: 25,S$GLB,, | Performed by: OBSTETRICS & GYNECOLOGY

## 2019-08-21 PROCEDURE — 3008F PR BODY MASS INDEX (BMI) DOCUMENTED: ICD-10-PCS | Mod: CPTII,S$GLB,, | Performed by: OBSTETRICS & GYNECOLOGY

## 2019-08-21 PROCEDURE — 96372 THER/PROPH/DIAG INJ SC/IM: CPT | Mod: S$GLB,,, | Performed by: OBSTETRICS & GYNECOLOGY

## 2019-08-21 PROCEDURE — 99999 PR PBB SHADOW E&M-EST. PATIENT-LVL III: CPT | Mod: PBBFAC,,, | Performed by: OBSTETRICS & GYNECOLOGY

## 2019-08-21 RX ORDER — MEDROXYPROGESTERONE ACETATE 150 MG/ML
150 INJECTION, SUSPENSION INTRAMUSCULAR
Status: DISCONTINUED | OUTPATIENT
Start: 2019-08-21 | End: 2019-08-21 | Stop reason: SDUPTHER

## 2019-08-21 RX ORDER — ERGOCALCIFEROL 1.25 MG/1
50000 CAPSULE ORAL
Qty: 12 CAPSULE | Refills: 0 | Status: SHIPPED | OUTPATIENT
Start: 2019-08-21 | End: 2019-11-07

## 2019-08-21 RX ORDER — MEDROXYPROGESTERONE ACETATE 150 MG/ML
150 INJECTION, SUSPENSION INTRAMUSCULAR
Status: DISCONTINUED | OUTPATIENT
Start: 2019-08-21 | End: 2020-08-27

## 2019-08-21 RX ADMIN — MEDROXYPROGESTERONE ACETATE 150 MG: 150 INJECTION, SUSPENSION INTRAMUSCULAR at 10:08

## 2019-08-21 NOTE — TELEPHONE ENCOUNTER
Telephone call.   Completed H Pylori treatment and Vit D per directions.  She will come in today for repeat testing- stool and Vit D.  See result note 6/24/19 re: H. Pylori.

## 2019-08-21 NOTE — PROGRESS NOTES
History & Physical  Gynecology      SUBJECTIVE:     Chief Complaint: Follow-up (contraception )       History of Present Illness:  Ms. Zhu is a 33 y/o female who presents to clinic to discuss birth control. She reports that after she was prescribed the Ortho Cyclen she experienced nausea and vomiting. Her PCP then switched her to Camelia to help alleviate some of the symptoms. She also reports that she was also told by the Surgeon who is to perform her Gastric Bypass next month that he would like her to be on 2 forms of birth control. She reports that she would like to start the Depo Provera.       Review of patient's allergies indicates:   Allergen Reactions    Pcn [penicillins] Shortness Of Breath       Past Medical History:   Diagnosis Date    Anemia     BMI 50.0-59.9, adult     Encounter for IUD removal 2019    GERD (gastroesophageal reflux disease) 2019    History of blood transfusion 2001    Malpositioned intrauterine device 3/21/2019    2019 OB/GYN - Dr Guzman - Discussed with patient that we will proceed with imaging to locate the IUD after which we can then proceed to book case in OR to retrieve displaced IUD    Obesity     OI (osteogenesis imperfecta)     Osteopetrosis     Tendonitis      Past Surgical History:   Procedure Laterality Date     SECTION      ,     ESOPHAGOGASTRODUODENOSCOPY (EGD) N/A 2019    Performed by [facility] at [facility] ENDO ([facility] F[facility])    FEMUR FRACTURE SURGERY Bilateral     hardware/rods in both legs    FRACTURE SURGERY      femur    REMOVAL, INTRAUTERINE DEVICE N/A 6/3/2019    [facility]    WISDOM TOOTH EXTRACTION       OB History        4    Para   2    Term   2            AB        Living   2       SAB        TAB        Ectopic        Multiple        Live Births                   Family History   Problem Relation Age of Onset    Hypertension Mother     Hyperlipidemia  Mother     Asthma Mother     Anxiety disorder Mother     No Known Problems Father      Social History     Tobacco Use    Smoking status: Never Smoker    Smokeless tobacco: Never Used   Substance Use Topics    Alcohol use: No    Drug use: Never       Current Outpatient Medications   Medication Sig    CALCIUM ORAL Take by mouth.    cyanocobalamin (VITAMIN B-12) 100 MCG tablet Take 100 mcg by mouth once daily.    drospirenone-ethinyl estradiol (ROSALBA) 3-0.02 mg per tablet Take 1 tablet by mouth once daily.    fish oil-omega-3 fatty acids 300-1,000 mg capsule Take by mouth once daily.    ibuprofen (ADVIL,MOTRIN) 800 MG tablet Take 1 tablet (800 mg total) by mouth every 8 (eight) hours as needed for Pain.    omeprazole (PRILOSEC) 20 MG capsule Take 1 capsule (20 mg total) by mouth once daily.     Current Facility-Administered Medications   Medication    medroxyPROGESTERone (DEPO-PROVERA) syringe 150 mg       Review of Systems:  Review of Systems   Constitutional: Negative for chills and fever.   Respiratory: Negative for cough and wheezing.    Cardiovascular: Negative for chest pain and palpitations.   Gastrointestinal: Negative for abdominal pain, nausea and vomiting.   Genitourinary: Negative for dysuria, frequency, hematuria, pelvic pain, vaginal bleeding, vaginal discharge and vaginal pain.        OBJECTIVE:     Physical Exam:  Physical Exam   Constitutional: She is oriented to person, place, and time. She appears well-developed and well-nourished.   Cardiovascular: Normal rate.   Pulmonary/Chest: Effort normal.   Neurological: She is alert and oriented to person, place, and time.   Skin: Skin is warm and dry.   Psychiatric: She has a normal mood and affect.   Nursing note and vitals reviewed.      ASSESSMENT:       ICD-10-CM ICD-9-CM    1. Encounter for initial prescription of injectable contraceptive Z30.013 V25.02 medroxyPROGESTERone (DEPO-PROVERA) syringe 150 mg      POCT urine pregnancy      Plan:       Prisca was seen today for follow-up.    Diagnoses and all orders for this visit:    Encounter for initial prescription of injectable contraceptive  -     medroxyPROGESTERone (DEPO-PROVERA) syringe 150 mg  -     POCT urine pregnancy  - Depo Provera injection today. Discusses possible irregular bleeding for first 3-6 months with injections. Pain, swelling and redness at injection site. Also discussed the possibility of irregular periods with injections or the possibility of amenorrhea. Patient understands that she must return to injection center every 3 months (90 days) for repeat injection.    Orders Placed This Encounter   Procedures    POCT urine pregnancy       Follow up if symptoms worsen or fail to improve.    Counseling time: 15 minutes    Ashley Greenberg

## 2019-08-21 NOTE — PATIENT INSTRUCTIONS
Birth Control Methods  Birth control methods are used to help prevent pregnancy. There are many different methods to choose from. Talk to your healthcare provider about which method is right for you. Be sure to ask your provider about the effectiveness of each method. Also ask about the benefits, risks, and side effects of each method.  Hormones  Some birth control methods work by releasing hormones such as progestin and estrogen. These methods include: hormone implants, hormone shots, the vaginal ring, the patch, and birth control pills. They all work by stopping ovulation (release of the egg from the ovary). The implant is a small device that needs to be placed in the upper arm by a trained healthcare provider. It works for up to 3 years. Hormone injections must be repeated every 3 months. The vaginal ring must be replaced monthly (it can be removed during the fourth week of each cycle). The patch must be replaced weekly (it is not worn during the fourth week of each cycle). Birth control pills must be taken every day. Note that all of these methods are effective and can be stopped at any time.  Intrauterine Device (IUD)  An IUD is a small, T-shaped device. It must be placed in the uterus by a trained healthcare provider. There are different types of IUDs available. They work by causing changes in the uterus that make it harder for sperm to reach the egg. Depending on the type of IUD you have, it may work for several years or longer. The IUD is a reversible birth control method. This means it can be removed at any time.  Condom  A condom is a sheath that forms a thin barrier between the penis and the vagina. It helps prevent pregnancy by keeping sperm from entering the vagina. When latex condoms are used, they have the added benefit of protecting against most STDs (sexually transmitted diseases). Condoms should be discarded after each use. Ask your healthcare provider about the different types of condoms  available. These include both the male condom and female condom.  Spermicide  Spermicides come as foams, jellies, creams, suppositories, and tablets.  They help prevent pregnancy by killing sperm. When used alone they are not that reliable. They work best when combined with other birth control methods such as diaphragms and cervical caps.  Sponge, Diaphragm, and Cervical Cap  All of these methods help prevent pregnancy by covering the opening of the uterus (cervix). This prevents sperm from passing through.  The sponge contains spermicide. It can be bought over the counter. The sponge must be left in place for at least 6 hours after the last time you have sex. However, it should not stay in place for more than 24 hours. It should be discarded after it is used.  The diaphragm and cervical cap must be fitted and prescribed by your healthcare provider. Both are used with spermicide. The diaphragm must be left in place for at least 6 hours after sex. However, it should not stay in place for more than 24 hours. It can be washed and reused. The cervical cap must be left in place for at least 6 hours after sex. However, it should not stay in place for more than 48 hours. It can be washed and reused.  Withdrawal Method  This is when the man pulls his penis out of the vagina just before ejaculation (coming). This lowers the amount of sperm entering the vagina. Be aware that fluids released just before ejaculation often still contain some sperm, so this method is not as reliable as certain other methods.  Rhythm Method  This method requires that you know when in your menstrual cycle you are likely to become pregnant. Then, you avoid sex during those days. This requires careful planning and good discipline. Your healthcare provider can explain more about how this works.  Tubal Ligation and Vasectomy  These are surgical methods to prevent pregnancy. Tubal ligation is an option for women. The fallopian tubes are blocked or cut  (ligated). This keeps the egg from passing into the uterus or sperm from reaching the egg. Vasectomy is an option for men. The tubes that normally carry sperm to the penis are either closed or blocked. Both tubal ligation and vasectomy are permanent both control methods. This means reversal is either not possible or unlikely to work. They are good choices for women and men who know that they do not want to have children in the future.  Date Last Reviewed: 6/11/2015 © 2000-2017 [employer]. [address] [place] [address]. All rights reserved. This information is not intended as a substitute for professional medical care. Always follow your healthcare professional's instructions.        Medroxyprogesterone injection [Contraceptive]  What is this medicine?  MEDROXYPROGESTERONE (me DROX ee proe LINNEA te kaleb) contraceptive injections prevent pregnancy. They provide effective birth control for 3 months. Depo-subQ Provera 104 is also used for treating pain related to endometriosis.  How should I use this medicine?  Depo-Provera Contraceptive injection is given into a muscle. Depo-subQ Provera 104 injection is given under the skin. These injections are given by a health care professional. You must not be pregnant before getting an injection. The injection is usually given during the first 5 days after the start of a menstrual period or 6 weeks after delivery of a baby.  Talk to your pediatrician regarding the use of this medicine in children. Special care may be needed. These injections have been used in female children who have started having menstrual periods.  What side effects may I notice from receiving this medicine?  Side effects that you should report to your doctor or health care professional as soon as possible:  · allergic reactions like skin rash, itching or hives, swelling of the face, lips, or tongue  · breast tenderness or discharge  · breathing problems  · changes in  vision  · depression  · feeling faint or lightheaded, falls  · fever  · pain in the abdomen, chest, groin, or leg  · problems with balance, talking, walking  · unusually weak or tired  · yellowing of the eyes or skin  Side effects that usually do not require medical attention (report to your doctor or health care professional if they continue or are bothersome):  · acne  · fluid retention and swelling  · headache  · irregular periods, spotting, or absent periods  · temporary pain, itching, or skin reaction at site where injected  · weight gain  What may interact with this medicine?  Do not take this medicine with any of the following medications:  · bosentan  This medicine may also interact with the following medications:  · aminoglutethimide  · antibiotics or medicines for infections, especially rifampin, rifabutin, rifapentine, and griseofulvin  · aprepitant  · barbiturate medicines such as phenobarbital or primidone  · bexarotene  · carbamazepine  · medicines for seizures like ethotoin, felbamate, oxcarbazepine, phenytoin, topiramate  · modafinil  · Adalberto's wort  What if I miss a dose?  Try not to miss a dose. You must get an injection once every 3 months to maintain birth control. If you cannot keep an appointment, call and reschedule it. If you wait longer than 13 weeks between Depo-Provera contraceptive injections or longer than 14 weeks between Depo-subQ Provera 104 injections, you could get pregnant. Use another method for birth control if you miss your appointment. You may also need a pregnancy test before receiving another injection.  Where should I keep my medicine?  This does not apply. The injection will be given to you by a health care professional.  What should I tell my health care provider before I take this medicine?  They need to know if you have any of these conditions:  · frequently drink alcohol  · asthma  · blood vessel disease or a history of a blood clot in the lungs or legs  · bone  disease such as osteoporosis  · breast cancer  · diabetes  · eating disorder (anorexia nervosa or bulimia)  · high blood pressure  · HIV infection or AIDS  · kidney disease  · liver disease  · mental depression  · migraine  · seizures (convulsions)  · stroke  · tobacco smoker  · vaginal bleeding  · an unusual or allergic reaction to medroxyprogesterone, other hormones, medicines, foods, dyes, or preservatives  · pregnant or trying to get pregnant  · breast-feeding  What should I watch for while using this medicine?  This drug does not protect you against HIV infection (AIDS) or other sexually transmitted diseases.  Use of this product may cause you to lose calcium from your bones. Loss of calcium may cause weak bones (osteoporosis). Only use this product for more than 2 years if other forms of birth control are not right for you. The longer you use this product for birth control the more likely you will be at risk for weak bones. Ask your health care professional how you can keep strong bones.  You may have a change in bleeding pattern or irregular periods. Many females stop having periods while taking this drug.  If you have received your injections on time, your chance of being pregnant is very low. If you think you may be pregnant, see your health care professional as soon as possible.  Tell your health care professional if you want to get pregnant within the next year. The effect of this medicine may last a long time after you get your last injection.  NOTE:This sheet is a summary. It may not cover all possible information. If you have questions about this medicine, talk to your doctor, pharmacist, or health care provider. Copyright© 2017 [employer]

## 2019-08-21 NOTE — PROGRESS NOTES
PT ARRIVED @  1026 , PT'S ORIGINAL APPT WAS @  1115 , PT WAS ROOMED @  1032  (PT IS AN ADD-ON, PT HAS SEEN DR FIRST)    REVIEWED WITH PT DEPO PROVERA,  HAND OUT GIVEN TO PT ABOUT DEPO PROVERA, REVIEWED MEDICATION DOCUMENTATION TO CONFIRM CORRECT MEDICATION, INJECTION GIVEN VIA   L GLUTEAL W/O INCIDENT

## 2019-09-06 ENCOUNTER — PATIENT MESSAGE (OUTPATIENT)
Dept: BARIATRICS | Facility: CLINIC | Age: 34
End: 2019-09-06

## 2019-09-12 ENCOUNTER — TELEPHONE (OUTPATIENT)
Dept: BARIATRICS | Facility: CLINIC | Age: 34
End: 2019-09-12

## 2019-09-12 NOTE — TELEPHONE ENCOUNTER
Spoke to lab and the stool is in pregress it will be another week before we see any results put in. The stool has to [facility]

## 2019-09-13 DIAGNOSIS — A04.8 POSITIVE H. PYLORI TEST: Primary | ICD-10-CM

## 2019-10-10 ENCOUNTER — PATIENT OUTREACH (OUTPATIENT)
Dept: ADMINISTRATIVE | Facility: OTHER | Age: 34
End: 2019-10-10

## 2019-10-14 ENCOUNTER — OFFICE VISIT (OUTPATIENT)
Dept: OBSTETRICS AND GYNECOLOGY | Facility: CLINIC | Age: 34
End: 2019-10-14
Payer: MEDICARE

## 2019-10-14 VITALS
WEIGHT: 237.13 LBS | HEIGHT: 57 IN | DIASTOLIC BLOOD PRESSURE: 59 MMHG | BODY MASS INDEX: 51.16 KG/M2 | SYSTOLIC BLOOD PRESSURE: 110 MMHG

## 2019-10-14 DIAGNOSIS — N94.89 OTHER SPECIFIED CONDITIONS ASSOCIATED WITH FEMALE GENITAL ORGANS AND MENSTRUAL CYCLE: ICD-10-CM

## 2019-10-14 DIAGNOSIS — N89.8 VAGINAL DISCHARGE: Primary | ICD-10-CM

## 2019-10-14 PROCEDURE — 99213 PR OFFICE/OUTPT VISIT, EST, LEVL III, 20-29 MIN: ICD-10-PCS | Mod: S$GLB,,, | Performed by: OBSTETRICS & GYNECOLOGY

## 2019-10-14 PROCEDURE — 3008F BODY MASS INDEX DOCD: CPT | Mod: CPTII,S$GLB,, | Performed by: OBSTETRICS & GYNECOLOGY

## 2019-10-14 PROCEDURE — 87491 CHLMYD TRACH DNA AMP PROBE: CPT | Mod: 59

## 2019-10-14 PROCEDURE — 99999 PR PBB SHADOW E&M-EST. PATIENT-LVL III: ICD-10-PCS | Mod: PBBFAC,,, | Performed by: OBSTETRICS & GYNECOLOGY

## 2019-10-14 PROCEDURE — 99999 PR PBB SHADOW E&M-EST. PATIENT-LVL III: CPT | Mod: PBBFAC,,, | Performed by: OBSTETRICS & GYNECOLOGY

## 2019-10-14 PROCEDURE — 87801 DETECT AGNT MULT DNA AMPLI: CPT

## 2019-10-14 PROCEDURE — 87481 CANDIDA DNA AMP PROBE: CPT | Mod: 59

## 2019-10-14 PROCEDURE — 99213 OFFICE O/P EST LOW 20 MIN: CPT | Mod: S$GLB,,, | Performed by: OBSTETRICS & GYNECOLOGY

## 2019-10-14 PROCEDURE — 3008F PR BODY MASS INDEX (BMI) DOCUMENTED: ICD-10-PCS | Mod: CPTII,S$GLB,, | Performed by: OBSTETRICS & GYNECOLOGY

## 2019-10-14 NOTE — PROGRESS NOTES
History & Physical  Gynecology      SUBJECTIVE:     Chief Complaint: Vaginal Discharge       History of Present Illness:  Vaginal Discharge and Irritation  Ms. Zhu is 35 y/o female who presents to clinic for vaginal discharge check. She reports a foul smelling, white discharge. Sexual history reviewed with the patient. STD exposure: denies knowledge of risky exposure.  Previous history of STD:  none. Current symptoms include vaginal discharge: white and malodorous.  Contraception: none.      Review of patient's allergies indicates:   Allergen Reactions    Pcn [penicillins] Shortness Of Breath       Past Medical History:   Diagnosis Date    Anemia     BMI 50.0-59.9, adult     Encounter for IUD removal 2019    GERD (gastroesophageal reflux disease) 2019    History of blood transfusion 2001    Malpositioned intrauterine device 3/21/2019    2019 OB/GYN - Dr Guzman - Discussed with patient that we will proceed with imaging to locate the IUD after which we can then proceed to book case in OR to retrieve displaced IUD    Obesity     OI (osteogenesis imperfecta)     Osteopetrosis     Tendonitis      Past Surgical History:   Procedure Laterality Date     SECTION      ,     ESOPHAGOGASTRODUODENOSCOPY N/A 2019    Procedure: ESOPHAGOGASTRODUODENOSCOPY (EGD);  Surgeon: Segun Dominguez MD;  Location: [address]);  Service: Endoscopy;  Laterality: N/A;  BMI 51    FEMUR FRACTURE SURGERY Bilateral     hardware/rods in both legs    FRACTURE SURGERY      femur    REMOVAL OF INTRAUTERINE DEVICE (IUD) N/A 6/3/2019    Procedure: REMOVAL, INTRAUTERINE DEVICE;  Surgeon: Ashley Greenberg MD;  Location: [facility];  Service: OB/GYN;  Laterality: N/A;  RN PRE OP 19----BMI-51.29    WISDOM TOOTH EXTRACTION       OB History        4    Para   2    Term   2            AB        Living   2       SAB        TAB        Ectopic        Multiple        Live  Births                   Family History   Problem Relation Age of Onset    Hypertension Mother     Hyperlipidemia Mother     Asthma Mother     Anxiety disorder Mother     No Known Problems Father      Social History     Tobacco Use    Smoking status: Never Smoker    Smokeless tobacco: Never Used   Substance Use Topics    Alcohol use: No    Drug use: Never       Current Outpatient Medications   Medication Sig    CALCIUM ORAL Take by mouth.    cyanocobalamin (VITAMIN B-12) 100 MCG tablet Take 100 mcg by mouth once daily.    drospirenone-ethinyl estradiol (ROSALBA) 3-0.02 mg per tablet Take 1 tablet by mouth once daily.    ergocalciferol (ERGOCALCIFEROL) 50,000 unit Cap Take 1 capsule (50,000 Units total) by mouth every 7 days. for 12 doses    fish oil-omega-3 fatty acids 300-1,000 mg capsule Take by mouth once daily.    ibuprofen (ADVIL,MOTRIN) 800 MG tablet Take 1 tablet (800 mg total) by mouth every 8 (eight) hours as needed for Pain.    omeprazole (PRILOSEC) 20 MG capsule Take 1 capsule (20 mg total) by mouth once daily.     Current Facility-Administered Medications   Medication    medroxyPROGESTERone (DEPO-PROVERA) injection 150 mg         Review of Systems:  Review of Systems   Constitutional: Negative for chills and fever.   Respiratory: Negative for cough and wheezing.    Cardiovascular: Negative for chest pain and palpitations.   Gastrointestinal: Negative for abdominal pain, nausea and vomiting.   Genitourinary: Positive for vaginal discharge and vaginal odor. Negative for dysuria, frequency, hematuria, pelvic pain, vaginal bleeding and vaginal pain.        OBJECTIVE:     Physical Exam:  Physical Exam   Constitutional: She is oriented to person, place, and time. She appears well-developed and well-nourished.   Cardiovascular: Normal rate.   Pulmonary/Chest: Effort normal.   Genitourinary: Vaginal discharge found.   Musculoskeletal: Normal range of motion.   Neurological: She is alert and oriented  to person, place, and time.   Skin: Skin is warm and dry.   Psychiatric: She has a normal mood and affect.   Nursing note and vitals reviewed.        ASSESSMENT:       ICD-10-CM ICD-9-CM    1. Vaginal discharge N89.8 623.5 Vaginosis Screen by DNA Probe      C. trachomatis/N. gonorrhoeae by AMP DNA   2. Other specified conditions associated with female genital organs and menstrual cycle  N94.89 629.89 C. trachomatis/N. gonorrhoeae by AMP DNA          Plan:      Prisca was seen today for vaginal discharge.    Diagnoses and all orders for this visit:    Vaginal discharge  -     Vaginosis Screen by DNA Probe  -     C. trachomatis/N. gonorrhoeae by AMP DNA  - Discussed with patient how douching/body wash/scented soaps/bubble baths can shift her vaginal marshal and natural vaginal pH which can cause overgrowth of yeast or gardnerella which is the bacteria that causes BV. Discussed with patient to use only mild, unscented soaps such as Dove unscented and Dial and water to wash her vagina.       Other specified conditions associated with female genital organs and menstrual cycle   -     C. trachomatis/N. gonorrhoeae by AMP DNA        Orders Placed This Encounter   Procedures    Vaginosis Screen by DNA Probe    C. trachomatis/N. gonorrhoeae by AMP DNA       Follow up if symptoms worsen or fail to improve.     Counseling time: 15 minutes    Ashley Greenberg

## 2019-10-14 NOTE — PATIENT INSTRUCTIONS
For Teens: Understanding Vaginitis  Vaginitis is a name for a group of vaginal infections. These include trichomoniasis, bacterial vaginosis (BV), and yeast infections. The only true STD (sexually transmitted disease) is trichomoniasis. But having any type of vaginitis may increase your risk of catching other STDs.  What to look for  Unusual discharge is the most common sign of vaginitis. The discharge varies by the type of infection. But not all discharge means that you have an infection. A small amount of discharge with no other symptoms, like a bad smell or itching, can be normal:  · Trichomoniasis may cause frothy greenish or yellow discharge, which can have an odor. The vagina can itch or burn. There can be swelling or redness at the opening of the vagina. There can be pain during sex or urination.   · BV may cause grayish-white, watery, or milky discharge. The discharge can have a strong fishy odor.  · Yeast infections may cause discharge that looks like cottage cheese. There can also be intense itching or burning in the vagina. There can be swelling or redness at the opening of the vagina. There can be pain during sex or urination.  Treatment  Medications can cure all types of vaginitis. For trichomoniasis, your partner also needs to be treated. Otherwise, they can pass it back to you. To limit yeast infections, wash with mild soap and water. Dont douche. Wearing cotton underwear can also help limit yeast infections. Change out of clothes that are wet, like exercise clothes or bathing suits, as soon as possible.   If you dont get treated  · Discharge, burning, and itching can go on.  · Having BV or trichomoniasis can make it easier for you to catch other STDs.  · BV can put you at risk of PID.     Tell your partner if you have trichomoniasis. Men usually dont have symptoms. But, without treatment, they can pass it back to you or to others.   Date Last Reviewed: 5/9/2015  © 4203-3549 [employer] [address] [place] [address]. All rights reserved. This information is not intended as a substitute for professional medical care. Always follow your healthcare professional's instructions.

## 2019-10-15 LAB
BACTERIAL VAGINOSIS DNA: NEGATIVE
C TRACH DNA SPEC QL NAA+PROBE: NOT DETECTED
CANDIDA GLABRATA DNA: NEGATIVE
CANDIDA KRUSEI DNA: NEGATIVE
CANDIDA RRNA VAG QL PROBE: NEGATIVE
N GONORRHOEA DNA SPEC QL NAA+PROBE: NOT DETECTED
T VAGINALIS RRNA GENITAL QL PROBE: NEGATIVE

## 2019-10-21 ENCOUNTER — PATIENT OUTREACH (OUTPATIENT)
Dept: ADMINISTRATIVE | Facility: OTHER | Age: 34
End: 2019-10-21

## 2019-10-24 ENCOUNTER — OFFICE VISIT (OUTPATIENT)
Dept: GASTROENTEROLOGY | Facility: CLINIC | Age: 34
End: 2019-10-24
Payer: MEDICARE

## 2019-10-24 VITALS
DIASTOLIC BLOOD PRESSURE: 88 MMHG | HEIGHT: 57 IN | BODY MASS INDEX: 50.89 KG/M2 | HEART RATE: 53 BPM | SYSTOLIC BLOOD PRESSURE: 141 MMHG | WEIGHT: 235.88 LBS

## 2019-10-24 DIAGNOSIS — R14.0 ABDOMINAL BLOATING: ICD-10-CM

## 2019-10-24 DIAGNOSIS — A04.8 H. PYLORI INFECTION: Primary | ICD-10-CM

## 2019-10-24 DIAGNOSIS — R10.12 LEFT UPPER QUADRANT PAIN: ICD-10-CM

## 2019-10-24 DIAGNOSIS — R68.81 EARLY SATIETY: ICD-10-CM

## 2019-10-24 PROCEDURE — 99499 RISK ADDL DX/OHS AUDIT: ICD-10-PCS | Mod: S$GLB,,, | Performed by: NURSE PRACTITIONER

## 2019-10-24 PROCEDURE — 99214 OFFICE O/P EST MOD 30 MIN: CPT | Mod: S$GLB,,, | Performed by: NURSE PRACTITIONER

## 2019-10-24 PROCEDURE — 99999 PR PBB SHADOW E&M-EST. PATIENT-LVL III: CPT | Mod: PBBFAC,,, | Performed by: NURSE PRACTITIONER

## 2019-10-24 PROCEDURE — 99999 PR PBB SHADOW E&M-EST. PATIENT-LVL III: ICD-10-PCS | Mod: PBBFAC,,, | Performed by: NURSE PRACTITIONER

## 2019-10-24 PROCEDURE — 3008F BODY MASS INDEX DOCD: CPT | Mod: CPTII,S$GLB,, | Performed by: NURSE PRACTITIONER

## 2019-10-24 PROCEDURE — 99214 PR OFFICE/OUTPT VISIT, EST, LEVL IV, 30-39 MIN: ICD-10-PCS | Mod: S$GLB,,, | Performed by: NURSE PRACTITIONER

## 2019-10-24 PROCEDURE — 3008F PR BODY MASS INDEX (BMI) DOCUMENTED: ICD-10-PCS | Mod: CPTII,S$GLB,, | Performed by: NURSE PRACTITIONER

## 2019-10-24 PROCEDURE — 99499 UNLISTED E&M SERVICE: CPT | Mod: S$GLB,,, | Performed by: NURSE PRACTITIONER

## 2019-10-24 RX ORDER — METRONIDAZOLE 500 MG/1
500 TABLET ORAL EVERY 8 HOURS
Qty: 42 TABLET | Refills: 0 | Status: SHIPPED | OUTPATIENT
Start: 2019-10-24 | End: 2019-11-07

## 2019-10-24 RX ORDER — TETRACYCLINE HYDROCHLORIDE 500 MG/1
500 CAPSULE ORAL EVERY 8 HOURS
Qty: 42 CAPSULE | Refills: 0 | Status: SHIPPED | OUTPATIENT
Start: 2019-10-24 | End: 2019-11-07

## 2019-10-24 NOTE — LETTER
October 24, 2019      Shasta Mahajan PA-C  [address]  [address], [facility] [address]           [facility]  [address]  [facility] [address]  Phone: [phone]  Fax: [phone]          Patient: Prisca Zhu   MR Number: [id number]   YOB: 1985   Date of Visit: 10/24/2019       Dear Shasta Mahajan:    Thank you for referring Prisca Zhu to me for evaluation. Attached you will find relevant portions of my assessment and plan of care.    If you have questions, please do not hesitate to call me. I look forward to following Prisca Zhu along with you.    Sincerely,    Zoe Elder, YOVANY    Enclosure  CC:  No Recipients    If you would like to receive this communication electronically, please contact [email or web address] or [phone] to request more information on [employer] access.    For providers and/or their staff who would like to refer a patient to [facility], please contact us through our one-stop-shop provider referral line, [facility], at [phone].    If you feel you have received this communication in error or would no longer like to receive these types of communications, please e-mail [email or web address]

## 2019-10-24 NOTE — PATIENT INSTRUCTIONS
You will need to buy peptobismol tablets over the counter. You will need to take them 4 times per day with your antibiotic regimen for the 14 day duration.     You will continue to take your Omeprazole twice a day during this 14 day duration as well    Follow up in 8-12 weeks to re-check to make sure the bacteria is gone.    Stop all reflux medications, including the omeprazole,2 weeks prior to your visit.

## 2019-10-24 NOTE — PROGRESS NOTES
[facility] Consultation Note    Reason for Consult:  The primary encounter diagnosis was H. pylori infection. Diagnoses of Left upper quadrant pain, Early satiety, and Abdominal bloating were also pertinent to this visit.    PCP:   Mathew Carpenter   [address] [facility] [address] [facility], [facility], [facility] *    Referring MD:  Shasta Mahajan Pa-c  [address], [place] [address]    HPI:  This is a 34 y.o. female here for evaluation of +H. Pylori. She was dx with H. Pylori during a bariatric surgery work up in February 2019. At that time she was treated with Clarithromycin, Flagyl, and Omeprazole. She then had an EGD in April 2019 and was again diagnosed with H. Pylori on biopsy. When asked by Bariatrics if she was treated for this second diagnosis she told them that she was only treated in February. Due to a misunderstanding she was never treated again. Since then she has had numerous +H. Pylori stool antigens. She presents today with complaints of LUQ abdominal pain, early satiety, bloating, and reflux. She denies blood in her stool, melena, or painful bowel movements. She has continued taking 20mg Omeprazole BID since Feb. She takes 800mg Ibuprofen 1-2 times per day.     ROS:  Constitutional: No fevers, chills, No weight loss  ENT: No allergies  CV: No chest pain  Pulm: No cough, No shortness of breath  Ophtho: No vision changes  GI: see HPI  Derm: No rash  Heme: No lymphadenopathy, No bruising  MSK: Osteogenesis imperfecta  : No dysuria, No hematuria  Endo: No hot or cold intolerance  Neuro: No syncope, No seizure  Psych: No anxiety, No depression    Medical History:  has a past medical history of Anemia, BMI 50.0-59.9, adult, Encounter for IUD removal (5/23/2019), GERD (gastroesophageal reflux disease) (4/2/2019), History of blood transfusion (2001), Malpositioned intrauterine device (3/21/2019), Obesity, OI (osteogenesis  imperfecta), Osteopetrosis, and Tendonitis.    Surgical History:  has a past surgical history that includes Fracture surgery;  section; Femur fracture surgery (Bilateral); [place] tooth extraction; Esophagogastroduodenoscopy (N/A, 2019); and Removal of intrauterine device (IUD) (N/A, 6/3/2019).    Family History: family history includes Anxiety disorder in her mother; Asthma in her mother; Hyperlipidemia in her mother; Hypertension in her mother; Liver cancer in her maternal grandfather; No Known Problems in her father..     Social History:  reports that she has never smoked. She has never used smokeless tobacco. She reports that she does not drink alcohol or use drugs.    Review of patient's allergies indicates:   Allergen Reactions    Pcn [penicillins] Shortness Of Breath       Current Outpatient Medications on File Prior to Visit   Medication Sig Dispense Refill    cyanocobalamin (VITAMIN B-12) 100 MCG tablet Take 100 mcg by mouth once daily.      drospirenone-ethinyl estradiol (ROSALBA) 3-0.02 mg per tablet Take 1 tablet by mouth once daily. 84 tablet 3    ergocalciferol (ERGOCALCIFEROL) 50,000 unit Cap Take 1 capsule (50,000 Units total) by mouth every 7 days. for 12 doses 12 capsule 0    fish oil-omega-3 fatty acids 300-1,000 mg capsule Take by mouth once daily.      ibuprofen (ADVIL,MOTRIN) 800 MG tablet Take 1 tablet (800 mg total) by mouth every 8 (eight) hours as needed for Pain. 40 tablet 0    omeprazole (PRILOSEC) 20 MG capsule Take 1 capsule (20 mg total) by mouth once daily. 90 capsule 0    CALCIUM ORAL Take by mouth.       Current Facility-Administered Medications on File Prior to Visit   Medication Dose Route Frequency Provider Last Rate Last Dose    medroxyPROGESTERone (DEPO-PROVERA) injection 150 mg  150 mg Intramuscular Q90 Days Ashley Greenberg MD   150 mg at 19 1044         Objective Findings:    Vital Signs:  Blood Pressure (Abnormal) 141/88 (BP Location: Right  "arm)   Pulse (Abnormal) 53   Height 4' 9" (1.448 m)   Weight 107 kg (235 lb 14.3 oz)   Body Mass Index 51.05 kg/m²   Body mass index is 51.05 kg/m².    Physical Exam:  General Appearance: Obese. Well appearing in no acute distress  Head:   Normocephalic, without obvious abnormality  Eyes:    No scleral icterus  ENT: Neck supple  Lungs: CTA bilaterally in anterior and posterior fields, no wheezes, no crackles.  Heart:  Regular rate and rhythm, S1, S2 normal, no murmurs heard  Abdomen: Soft, non tender, non distended with positive bowel sounds in all four quadrants. No hepatosplenomegaly, ascites, or mass  Extremities: No edema  Skin: No rash  Neurologic: AAO x 3      Labs:  Lab Results   Component Value Date    WBC 8.87 2019    HGB 14.0 2019    HCT 42.9 2019     2019    CHOL 176 2019    TRIG 71 2019    HDL 43 2019    ALT 24 2019    AST 26 2019     2019    K 4.4 2019     2019    CREATININE 0.9 2019    BUN 9 2019    CO2 26 2019    TSH 0.762 2019    HGBA1C 5.2 05/10/2019       Imagin19 CT abdomen and pelvis  -No acute findings.  -Diverticulosis coli.    Endoscopy:    19 EGD  The Z-line was regular and was found 35 cm from the incisors.       The examined esophagus was normal.       The entire examined stomach was normal. Biopsies were taken with a        cold forceps for Helicobacter pylori testing.     Assessment:  1. H. pylori infection    2. Left upper quadrant pain    3. Early satiety    4. Abdominal bloating       Ms. Zhu is a 34yr female who presents with recurrent H. Pylori after being treated with one course of clarithromycin, flagly, and omeprazole. She continues to have abdominal pain, early satiety, and abdominal bloating. Due to multiple +H. Pylori stool antigens after treatment, will not test today. Will treat with Bismuth quadruple therapy.     Recommendations:  1. Two " antibiotics were prescribed. Flagyl and Tetracycline. Take these 4 times per day.  2. Get OTC peptobismol tablets. Takes these 4 times per day  3. Continue taking omeprazole twice per day  4. Stop taking omeprazole 2 weeks prior to follow up appointment  5. Try acetaminophen instead of Ibuprofen to aid in gastric healing    Follow up in about 8 weeks (around 12/19/2019).      Order summary:  Orders Placed This Encounter    metroNIDAZOLE (FLAGYL) 500 MG tablet    tetracycline (ACHROMYCIN,SUMYCIN) 500 MG capsule         Thank you so much for allowing me to participate in the care of JUANITO June

## 2019-11-18 ENCOUNTER — CLINICAL SUPPORT (OUTPATIENT)
Dept: INTERNAL MEDICINE | Facility: CLINIC | Age: 34
End: 2019-11-18
Payer: MEDICARE

## 2019-11-18 PROCEDURE — G0008 ADMIN INFLUENZA VIRUS VAC: HCPCS | Mod: 59,S$GLB,, | Performed by: INTERNAL MEDICINE

## 2019-11-18 PROCEDURE — 90715 TDAP VACCINE 7 YRS/> IM: CPT | Mod: S$GLB,,, | Performed by: INTERNAL MEDICINE

## 2019-11-18 PROCEDURE — 90686 FLU VACCINE (QUAD) GREATER THAN OR EQUAL TO 3YO PRESERVATIVE FREE IM: ICD-10-PCS | Mod: S$GLB,,, | Performed by: INTERNAL MEDICINE

## 2019-11-18 PROCEDURE — 90471 TDAP VACCINE GREATER THAN OR EQUAL TO 7YO IM: ICD-10-PCS | Mod: S$GLB,,, | Performed by: INTERNAL MEDICINE

## 2019-11-18 PROCEDURE — 90686 IIV4 VACC NO PRSV 0.5 ML IM: CPT | Mod: S$GLB,,, | Performed by: INTERNAL MEDICINE

## 2019-11-18 PROCEDURE — G0008 FLU VACCINE (QUAD) GREATER THAN OR EQUAL TO 3YO PRESERVATIVE FREE IM: ICD-10-PCS | Mod: 59,S$GLB,, | Performed by: INTERNAL MEDICINE

## 2019-11-18 PROCEDURE — 90715 TDAP VACCINE GREATER THAN OR EQUAL TO 7YO IM: ICD-10-PCS | Mod: S$GLB,,, | Performed by: INTERNAL MEDICINE

## 2019-11-18 PROCEDURE — 90471 IMMUNIZATION ADMIN: CPT | Mod: S$GLB,,, | Performed by: INTERNAL MEDICINE

## 2019-11-20 ENCOUNTER — CLINICAL SUPPORT (OUTPATIENT)
Dept: OBSTETRICS AND GYNECOLOGY | Facility: CLINIC | Age: 34
End: 2019-11-20
Payer: MEDICARE

## 2019-11-20 VITALS — WEIGHT: 236.69 LBS | BODY MASS INDEX: 51.21 KG/M2

## 2019-11-20 DIAGNOSIS — Z30.42 ENCOUNTER FOR MANAGEMENT AND INJECTION OF DEPO-PROVERA: Primary | ICD-10-CM

## 2019-11-20 PROCEDURE — 99999 PR PBB SHADOW E&M-EST. PATIENT-LVL II: ICD-10-PCS | Mod: PBBFAC,,,

## 2019-11-20 PROCEDURE — 99999 PR PBB SHADOW E&M-EST. PATIENT-LVL II: CPT | Mod: PBBFAC,,,

## 2019-11-20 PROCEDURE — 96372 THER/PROPH/DIAG INJ SC/IM: CPT | Mod: S$GLB,,, | Performed by: OBSTETRICS & GYNECOLOGY

## 2019-11-20 PROCEDURE — 96372 PR INJECTION,THERAP/PROPH/DIAG2ST, IM OR SUBCUT: ICD-10-PCS | Mod: S$GLB,,, | Performed by: OBSTETRICS & GYNECOLOGY

## 2019-11-20 RX ADMIN — MEDROXYPROGESTERONE ACETATE 150 MG: 150 INJECTION, SUSPENSION INTRAMUSCULAR at 11:11

## 2019-11-20 NOTE — PROGRESS NOTES
REVIEWED WITH PT DEPO PROVERA,  HAND OUT GIVEN TO PT ABOUT DEPO PROVERA, REVIEWED MEDICATION DOCUMENTATION TO CONFIRM CORRECT MEDICATION, INJECTION GIVEN VIA   R GLUTEAL W/O INCIDENT.

## 2020-01-07 ENCOUNTER — OFFICE VISIT (OUTPATIENT)
Dept: GASTROENTEROLOGY | Facility: CLINIC | Age: 35
End: 2020-01-07
Payer: MEDICARE

## 2020-01-07 VITALS
WEIGHT: 241.63 LBS | DIASTOLIC BLOOD PRESSURE: 70 MMHG | BODY MASS INDEX: 52.13 KG/M2 | HEIGHT: 57 IN | SYSTOLIC BLOOD PRESSURE: 118 MMHG

## 2020-01-07 DIAGNOSIS — A04.8 H. PYLORI INFECTION: Primary | ICD-10-CM

## 2020-01-07 PROCEDURE — 99499 UNLISTED E&M SERVICE: CPT | Mod: S$PBB,,, | Performed by: NURSE PRACTITIONER

## 2020-01-07 PROCEDURE — 3008F BODY MASS INDEX DOCD: CPT | Mod: CPTII,S$GLB,, | Performed by: NURSE PRACTITIONER

## 2020-01-07 PROCEDURE — 3008F PR BODY MASS INDEX (BMI) DOCUMENTED: ICD-10-PCS | Mod: CPTII,S$GLB,, | Performed by: NURSE PRACTITIONER

## 2020-01-07 PROCEDURE — 99213 PR OFFICE/OUTPT VISIT, EST, LEVL III, 20-29 MIN: ICD-10-PCS | Mod: S$GLB,,, | Performed by: NURSE PRACTITIONER

## 2020-01-07 PROCEDURE — 99213 OFFICE O/P EST LOW 20 MIN: CPT | Mod: S$GLB,,, | Performed by: NURSE PRACTITIONER

## 2020-01-07 PROCEDURE — 99999 PR PBB SHADOW E&M-EST. PATIENT-LVL III: CPT | Mod: PBBFAC,,, | Performed by: NURSE PRACTITIONER

## 2020-01-07 PROCEDURE — 99999 PR PBB SHADOW E&M-EST. PATIENT-LVL III: ICD-10-PCS | Mod: PBBFAC,,, | Performed by: NURSE PRACTITIONER

## 2020-01-07 PROCEDURE — 99499 RISK ADDL DX/OHS AUDIT: ICD-10-PCS | Mod: S$PBB,,, | Performed by: NURSE PRACTITIONER

## 2020-01-07 NOTE — PROGRESS NOTES
[facility] Consultation Note    Reason for Consult:  The encounter diagnosis was H. pylori infection.    PCP:   Mathew Carpenter       Referring MD:  No referring provider defined for this encounter.    HPI:  This is a 34 y.o. female here for evaluation of +H. Pylori. She was dx with H. Pylori during a bariatric surgery work up in February 2019. At that time she was treated with Clarithromycin, Flagyl, and Omeprazole. She then had an EGD in April 2019 and was again diagnosed with H. Pylori on biopsy. When asked by Bariatrics if she was treated for this second diagnosis she told them that she was only treated in February. Due to a misunderstanding she was never treated again. Since then she has had numerous +H. Pylori stool antigens. She presents today with complaints of LUQ abdominal pain, early satiety, bloating, and reflux. She denies blood in her stool, melena, or painful bowel movements. She has continued taking 20mg Omeprazole BID since Feb. She takes 800mg Ibuprofen 1-2 times per day.     Interval History 1/7/2020  She presents today for f/u after H. Pylori tx. She states she completed all antibiotics in October and has had complete resolution of abdominal pain, bloating, and early satiety. She does however, have reflux symptoms of throat burning after eating certain foods like pizza. Because of this occasional reflux, she has taken omeprazole prn. Discussed with her the need to remain off of all acid reducing medication before returning stool to check for H. Pylori eradication.     ROS:  Constitutional: No fevers, chills, No weight loss  ENT: No allergies  CV: No chest pain  Pulm: No cough, No shortness of breath  Ophtho: No vision changes  GI: see HPI  Derm: No rash  Heme: No lymphadenopathy, No bruising  MSK: Osteogenesis imperfecta  : No dysuria, No hematuria  Endo: No hot or cold intolerance  Neuro: No syncope, No seizure  Psych: No anxiety, No depression    Medical History:  has  a past medical history of Anemia, BMI 50.0-59.9, adult, Encounter for IUD removal (2019), GERD (gastroesophageal reflux disease) (2019), History of blood transfusion (), Malpositioned intrauterine device (3/21/2019), Obesity, OI (osteogenesis imperfecta), Osteopetrosis, and Tendonitis.    Surgical History:  has a past surgical history that includes Fracture surgery;  section; Femur fracture surgery (Bilateral); [place] tooth extraction; Esophagogastroduodenoscopy (N/A, 2019); and Removal of intrauterine device (IUD) (N/A, 6/3/2019).    Family History: family history includes Anxiety disorder in her mother; Asthma in her mother; Hyperlipidemia in her mother; Hypertension in her mother; Liver cancer in her maternal grandfather; No Known Problems in her father..     Social History:  reports that she has never smoked. She has never used smokeless tobacco. She reports that she does not drink alcohol or use drugs.    Review of patient's allergies indicates:   Allergen Reactions    Pcn [penicillins] Shortness Of Breath       Current Outpatient Medications on File Prior to Visit   Medication Sig Dispense Refill    CALCIUM ORAL Take by mouth.      cyanocobalamin (VITAMIN B-12) 100 MCG tablet Take 100 mcg by mouth once daily.      drospirenone-ethinyl estradiol (ROSALBA) 3-0.02 mg per tablet Take 1 tablet by mouth once daily. 84 tablet 3    fish oil-omega-3 fatty acids 300-1,000 mg capsule Take by mouth once daily.      ibuprofen (ADVIL,MOTRIN) 800 MG tablet Take 1 tablet (800 mg total) by mouth every 8 (eight) hours as needed for Pain. 40 tablet 0    omeprazole (PRILOSEC) 20 MG capsule Take 1 capsule (20 mg total) by mouth once daily. 90 capsule 0     Current Facility-Administered Medications on File Prior to Visit   Medication Dose Route Frequency Provider Last Rate Last Dose    medroxyPROGESTERone (DEPO-PROVERA) injection 150 mg  150 mg Intramuscular Q90 Days Ashley Greenberg MD   150 mg  "at 19 1103         Objective Findings:    Vital Signs:  Blood Pressure 118/70 (BP Location: Left arm)   Height 4' 9" (1.448 m)   Weight 109.6 kg (241 lb 10 oz)   Body Mass Index 52.29 kg/m²   Body mass index is 52.29 kg/m².    Physical Exam:  General Appearance: Obese. Well appearing in no acute distress  Head:   Normocephalic, without obvious abnormality  Eyes:    No scleral icterus  ENT: Neck supple  Extremities: No edema  Skin: No rash  Neurologic: AAO x 3      Labs:  Lab Results   Component Value Date    WBC 8.87 2019    HGB 14.0 2019    HCT 42.9 2019     2019    CHOL 176 2019    TRIG 71 2019    HDL 43 2019    ALT 24 2019    AST 26 2019     2019    K 4.4 2019     2019    CREATININE 0.9 2019    BUN 9 2019    CO2 26 2019    TSH 0.762 2019    HGBA1C 5.2 05/10/2019       Imagin19 CT abdomen and pelvis  -No acute findings.  -Diverticulosis coli.    Endoscopy:    19 EGD  The Z-line was regular and was found 35 cm from the incisors.       The examined esophagus was normal.       The entire examined stomach was normal. Biopsies were taken with a        cold forceps for Helicobacter pylori testing.     Assessment:  1. H. pylori infection       Ms. Zhu is a 34yr female who presents for f/u after treatment for recurrent H . Pylori infection in preparation for Bariatric surgery. She completed all antibiotics in October but has taken PPI prn for reflux with certain foods. Instructed pt to not take any acid reducing medications prior to turning in stool sample and the risks of untreated H. Pylori infection. She verbalized understanding.    Recommendations:  1. Stop all acid reducing medications including omprazole 2 weeks prior to turning in stool sample.  2. Try acetaminophen instead of Ibuprofen to aid in gastric healing  3. Will treat if H. Pylori +    Follow up if symptoms worsen " or fail to improve.      Order summary:  Orders Placed This Encounter    H. pylori antigen, stool         Thank you for allowing me to participate in the care of JUANITO June

## 2020-01-13 ENCOUNTER — LAB VISIT (OUTPATIENT)
Dept: LAB | Facility: HOSPITAL | Age: 35
End: 2020-01-13
Attending: NURSE PRACTITIONER
Payer: MEDICARE

## 2020-01-13 DIAGNOSIS — A04.8 H. PYLORI INFECTION: ICD-10-CM

## 2020-01-13 PROCEDURE — 87338 HPYLORI STOOL AG IA: CPT

## 2020-01-15 ENCOUNTER — PATIENT MESSAGE (OUTPATIENT)
Dept: GASTROENTEROLOGY | Facility: CLINIC | Age: 35
End: 2020-01-15

## 2020-01-18 LAB — H PYLORI AG STL QL IA: NOT DETECTED

## 2020-01-20 ENCOUNTER — TELEPHONE (OUTPATIENT)
Dept: GASTROENTEROLOGY | Facility: CLINIC | Age: 35
End: 2020-01-20

## 2020-01-20 DIAGNOSIS — E55.9 VITAMIN D DEFICIENCY: ICD-10-CM

## 2020-01-20 RX ORDER — ERGOCALCIFEROL 1.25 MG/1
CAPSULE ORAL
Qty: 4 CAPSULE | Refills: 2 | Status: SHIPPED | OUTPATIENT
Start: 2020-01-20 | End: 2023-08-22 | Stop reason: SDUPTHER

## 2020-01-20 NOTE — TELEPHONE ENCOUNTER
A contacted pt to give test results per Zoe. Pt did not answer, MA left message for pt to give the clinic a call.       ----- Message from Zoe Elder NP sent at 1/20/2020  9:40 AM CST -----  Chuck jamison

## 2020-01-21 ENCOUNTER — TELEPHONE (OUTPATIENT)
Dept: GASTROENTEROLOGY | Facility: CLINIC | Age: 35
End: 2020-01-21

## 2020-01-21 ENCOUNTER — PATIENT MESSAGE (OUTPATIENT)
Dept: BARIATRICS | Facility: CLINIC | Age: 35
End: 2020-01-21

## 2020-01-21 NOTE — TELEPHONE ENCOUNTER
MA returned pt call to give results per Zoe. Pt did not answer, MA provided pt with the number to give the clinic a call back .      ----- Message from Kiara Tolentino sent at 1/21/2020 11:46 AM CST -----  Contact: pt   Antoinette-- pt is returning your call about test results. Call back [phone]

## 2020-01-21 NOTE — TELEPHONE ENCOUNTER
MA returned pt call to give test results per Zoe. Pt verbalized understanding that her Pylori results were negative . Pt asked what was ext and MA told pt all Zoe recommendations have been done. But she needs to f/u if symptoms fails to improve or because worse . Pt also would dai a portal message on what medication should she take for gastric healing. MA will send pt a portal message.                   ----- Message from Parisa Gaspar sent at 1/21/2020 12:19 PM CST -----  Contact: pt#[phone]  Calling in regards to missed all from Antoinette regarding results. Please call

## 2020-01-22 ENCOUNTER — PATIENT MESSAGE (OUTPATIENT)
Dept: BARIATRICS | Facility: CLINIC | Age: 35
End: 2020-01-22

## 2020-01-22 DIAGNOSIS — E66.01 MORBID OBESITY: Primary | ICD-10-CM

## 2020-01-22 DIAGNOSIS — Z01.818 PREOP TESTING: ICD-10-CM

## 2020-01-23 ENCOUNTER — HOSPITAL ENCOUNTER (OUTPATIENT)
Dept: CARDIOLOGY | Facility: CLINIC | Age: 35
Discharge: HOME | End: 2020-01-23
Payer: MEDICARE

## 2020-01-23 ENCOUNTER — CLINICAL SUPPORT (OUTPATIENT)
Dept: BARIATRICS | Facility: CLINIC | Age: 35
End: 2020-01-23
Payer: MEDICARE

## 2020-01-23 VITALS — BODY MASS INDEX: 52.86 KG/M2 | WEIGHT: 244.25 LBS

## 2020-01-23 DIAGNOSIS — Z01.818 PREOP TESTING: ICD-10-CM

## 2020-01-23 DIAGNOSIS — E66.01 MORBID OBESITY WITH BMI OF 50.0-59.9, ADULT: ICD-10-CM

## 2020-01-23 DIAGNOSIS — E66.01 MORBID OBESITY: ICD-10-CM

## 2020-01-23 DIAGNOSIS — Z71.3 DIETARY COUNSELING: ICD-10-CM

## 2020-01-23 PROCEDURE — 97803 MED NUTRITION INDIV SUBSEQ: CPT | Mod: S$GLB,,, | Performed by: DIETITIAN, REGISTERED

## 2020-01-23 PROCEDURE — 97803 PR MED NUTR THER, SUBSQ, INDIV, EA 15 MIN: ICD-10-PCS | Mod: S$GLB,,, | Performed by: DIETITIAN, REGISTERED

## 2020-01-23 PROCEDURE — 93005 EKG 12-LEAD: ICD-10-PCS | Mod: S$GLB,,, | Performed by: SURGERY

## 2020-01-23 PROCEDURE — 93010 ELECTROCARDIOGRAM REPORT: CPT | Mod: S$GLB,,, | Performed by: INTERNAL MEDICINE

## 2020-01-23 PROCEDURE — 93005 ELECTROCARDIOGRAM TRACING: CPT | Mod: S$GLB,,, | Performed by: SURGERY

## 2020-01-23 PROCEDURE — 99499 UNLISTED E&M SERVICE: CPT | Mod: S$GLB,,, | Performed by: DIETITIAN, REGISTERED

## 2020-01-23 PROCEDURE — 93010 EKG 12-LEAD: ICD-10-PCS | Mod: S$GLB,,, | Performed by: INTERNAL MEDICINE

## 2020-01-23 PROCEDURE — 99499 NO LOS: ICD-10-PCS | Mod: S$GLB,,, | Performed by: DIETITIAN, REGISTERED

## 2020-01-23 NOTE — PROGRESS NOTES
NUTRITION NOTE    Referring Physician: Guillermo Villanueva M.D.  Reason for MNT Referral: Follow up Assessment pending Gastric Sleeve    Patient presents with 10 lbs weight gain over the past 8 months since last visit with RD. She initially made dietary and lifestyle changes in preparation for surgery, but has been mostly off of the diet plan for the past few months. She c/o milky prot shakes causing constipation. States she's not normally a milk drinker. She has a good exercise routine.    CLINICAL DATA:  34 y.o. female.    Current Weight: 244 lbs  Body mass index is 52.86 kg/m².    CURRENT DIET:  Reduced-calorie diet.  Diet Recall: Food records are not present.    12pm: [employer] fried chicken, mac and cheese, large, Sprite  5pm: 10 Ritz crackers with hummus, 1 cup grapes, 2 slices of swiss cheese  10pm: ham and swiss sandwich on 2 slices wheat, cup of fruit punch  10:30pm: strawberry Nutrigrain bar    Diet Includes:   Meal Pattern: Irregular.  Protein Supplements: None   Snacking: Excess. Snacks include healthy choices, junk foods and sweets.  Vegetables: Likes a variety. Eats almost daily.  Fruits: Likes a variety. Eats daily.  Beverages: water, soda and sugary beverages  Dining Out:  Daily. Weekly. Mostly fast food.  Cooking at home: Weekly. Mostly baked, grilled, smothered and fried meat, starchy CHO and vegetables.    CURRENT EXERCISE:  Adequate. 4 times/week. 1 hour before work. Elliptical at home or walking outside    Vitamins / Minerals / Herbs:   MV, B12, Ca, fish oil, Vit D Rx    Food Allergies:   None known    Social:  Works regular daytime shifts.  Alcohol: None.  Smoking: None.    ASSESSMENT:  Patient demonstrates some willingness to change lifestyle habits as evidenced by good exercise.    Doing fair with working on greatest challenges (dining out frequency, sweets, starchy CHO, portion control, snacking at night, irregular meal patterns, emotional eating and high-fat diet).    Barriers to Education:  lack  of motivation  Stage of Change:  contemplation    NUTRITION DIAGNOSIS:  Obesity related to Excessive carbohydrate intake as evidence by BMI.  Status: Same    PLAN:    Diet: Adjust diet plan.  Continue to review Bariatric Nutrition Guidebook at home and call with any questions.  Work on Bariatric Nutrition Checklist.  Work on expanding variety of vegetables.  Work on gradually cutting back on starchy CHO in the diet.  Begin trying various protein supplements to determine preference.  1200-calorie diet.  5-6 meals per day.  Start including protein supplements in the diet plan daily.  Reduce frequency of dining out.  More grocery shopping and meal preparation at home.   Eliminate sugary drinks.    Exercise: Maintain.    Behavior Modification: Begin to document food & activity logs daily.    Return to clinic in one month.  Needs additional visit(s) with RD to work on dietary and lifestyle changes in preparation for bariatric surgery.    Communicated nutrition plan with bariatric team.    SESSION TIME:  30 minutes

## 2020-02-03 ENCOUNTER — TELEPHONE (OUTPATIENT)
Dept: BARIATRICS | Facility: CLINIC | Age: 35
End: 2020-02-03

## 2020-02-03 NOTE — TELEPHONE ENCOUNTER
Patient has another appointment with the dietician to stay on tract for her weight loss surgery and will see how she is doing after that visit to schedule surgery.

## 2020-02-18 ENCOUNTER — CLINICAL SUPPORT (OUTPATIENT)
Dept: BARIATRICS | Facility: CLINIC | Age: 35
End: 2020-02-18
Payer: MEDICARE

## 2020-02-18 VITALS — BODY MASS INDEX: 53.18 KG/M2 | WEIGHT: 246.5 LBS | HEIGHT: 57 IN

## 2020-02-18 DIAGNOSIS — Z71.3 DIETARY COUNSELING: ICD-10-CM

## 2020-02-18 DIAGNOSIS — E66.01 MORBID OBESITY: ICD-10-CM

## 2020-02-18 PROCEDURE — 99999 PR PBB SHADOW E&M-EST. PATIENT-LVL I: CPT | Mod: PBBFAC,,, | Performed by: DIETITIAN, REGISTERED

## 2020-02-18 PROCEDURE — 97803 PR MED NUTR THER, SUBSQ, INDIV, EA 15 MIN: ICD-10-PCS | Mod: S$GLB,,, | Performed by: DIETITIAN, REGISTERED

## 2020-02-18 PROCEDURE — 99999 PR PBB SHADOW E&M-EST. PATIENT-LVL I: ICD-10-PCS | Mod: PBBFAC,,, | Performed by: DIETITIAN, REGISTERED

## 2020-02-18 PROCEDURE — 97803 MED NUTRITION INDIV SUBSEQ: CPT | Mod: S$GLB,,, | Performed by: DIETITIAN, REGISTERED

## 2020-02-18 NOTE — PROGRESS NOTES
NUTRITION NOTE     Referring Physician: Guillermo Villanueva M.D.  Reason for MNT Referral: Follow up Assessment pending Gastric Sleeve     Patient presents with  2 lb weight gain this past month, 12 lbs weight gain over the past 8 months since last visit with RD. She initially made dietary and lifestyle changes in preparation for surgery and had fell off the plan prior . Reports she has  Been back on track with diet since last month.  Has cut out all soda and sugary drinks She  continues to have a good exercise routine. She  says clothes are looser and she feels lighter. Believes she may be gaining weight from birth control pills.      CLINICAL DATA:  34 y.o. female.     Current Weight: 246 lbs  Last Weight: 244 lbs   Current Weight:  246 lbs  Body mass index is 53.34     CURRENT DIET:  Reduced-calorie diet.  Diet Recall: Food records are not present.     Breakfast: 2 eggs, avocado and veggies, premier protein shake  Lunch: Baked fish or chicken,  Veggies  Dinner: protein shake  Snack: fruit     Diet Includes:   Meal Pattern:   Protein Supplements: None   Snacking: Excess. Snacks include healthy choices, junk foods and sweets.  Vegetables: Likes a variety. Eats almost daily.  Fruits: Likes a variety. Eats daily.  Beverages: water, sugar free bevs  Dining Out:  Has not dinned out since last visit  Cooking at home: Daily. Mostly baked, grilled, smothered and fried meat, starchy CHO and vegetables.     CURRENT EXERCISE:  Adequate. 4 times/week. 1 hour before work. Elliptical at home or walking outside     Vitamins / Minerals / Herbs:   MV, B12, Ca, fish oil, Vit D Rx     Food Allergies:   None known     Social:  Works regular daytime shifts.  Alcohol: None.  Smoking: None.     ASSESSMENT:  Patient demonstrates all  willingness to change lifestyle habits as evidenced by good exercise and back on appropriate diet despite weight gain.      Doing  good with working on greatest challenges (dining out frequency, sweets,  starchy CHO, portion control, snacking at night, irregular meal patterns, emotional eating and high-fat diet).     Barriers to Education:   None  Stage of Change:  action     NUTRITION DIAGNOSIS:  Morbid obesity related to Excessive carbohydrate intake as evidence by BMI.  Status: Improved diet      PLAN:     Patient appears to be a good candidate for bariatric sx as she is back on track.    Diet: Adjust diet plan.  Continue to review Bariatric Nutrition Guidebook at home and call with any questions.  Work on Bariatric Nutrition Checklist.  5-6 meals per day  Breakfast -  Shake or eggs ( break up current breakfast into 2 smaller mini meals).         Exercise: Maintain.     Behavior Modification: Begin to document food & activity logs daily.       Communicated nutrition plan with bariatric team.

## 2020-02-20 ENCOUNTER — CLINICAL SUPPORT (OUTPATIENT)
Dept: OBSTETRICS AND GYNECOLOGY | Facility: CLINIC | Age: 35
End: 2020-02-20
Payer: MEDICARE

## 2020-02-20 VITALS
BODY MASS INDEX: 53.43 KG/M2 | SYSTOLIC BLOOD PRESSURE: 136 MMHG | DIASTOLIC BLOOD PRESSURE: 84 MMHG | WEIGHT: 246.94 LBS

## 2020-02-20 DIAGNOSIS — Z30.42 ENCOUNTER FOR MANAGEMENT AND INJECTION OF DEPO-PROVERA: Primary | ICD-10-CM

## 2020-02-20 PROCEDURE — 96372 PR INJECTION,THERAP/PROPH/DIAG2ST, IM OR SUBCUT: ICD-10-PCS | Mod: S$GLB,,, | Performed by: OBSTETRICS & GYNECOLOGY

## 2020-02-20 PROCEDURE — 99999 PR PBB SHADOW E&M-EST. PATIENT-LVL III: ICD-10-PCS | Mod: PBBFAC,,,

## 2020-02-20 PROCEDURE — 99999 PR PBB SHADOW E&M-EST. PATIENT-LVL III: CPT | Mod: PBBFAC,,,

## 2020-02-20 PROCEDURE — 96372 THER/PROPH/DIAG INJ SC/IM: CPT | Mod: S$GLB,,, | Performed by: OBSTETRICS & GYNECOLOGY

## 2020-02-20 RX ADMIN — MEDROXYPROGESTERONE ACETATE 150 MG: 150 INJECTION, SUSPENSION INTRAMUSCULAR at 02:02

## 2020-02-20 NOTE — PROGRESS NOTES
PT'S ORIGINAL APPT WAS @  0219   , PT ARRIVED @  140    , PT WAS ROOMED @  1411        REVIEWED WITH PT DEPO PROVERA,  HAND OUT GIVEN TO PT ABOUT DEPO PROVERA, REVIEWED MEDICATION DOCUMENTATION TO CONFIRM CORRECT MEDICATION, INJECTION GIVEN VIA   L GLUTEAL W/O INCIDENT

## 2020-02-26 ENCOUNTER — PATIENT MESSAGE (OUTPATIENT)
Dept: BARIATRICS | Facility: CLINIC | Age: 35
End: 2020-02-26

## 2020-02-28 ENCOUNTER — TELEPHONE (OUTPATIENT)
Dept: BARIATRICS | Facility: CLINIC | Age: 35
End: 2020-02-28

## 2020-02-28 DIAGNOSIS — Z71.3 NUTRITIONAL COUNSELING: ICD-10-CM

## 2020-02-28 DIAGNOSIS — Z71.3 DIETARY COUNSELING: ICD-10-CM

## 2020-02-28 DIAGNOSIS — Z01.818 PREOP TESTING: ICD-10-CM

## 2020-02-28 DIAGNOSIS — R79.9 ABNORMAL FINDING OF BLOOD CHEMISTRY, UNSPECIFIED: ICD-10-CM

## 2020-02-28 DIAGNOSIS — E66.01 MORBID OBESITY: Primary | ICD-10-CM

## 2020-02-28 DIAGNOSIS — E64.0 SEQUELAE OF PROTEIN-CALORIE MALNUTRITION: ICD-10-CM

## 2020-02-28 NOTE — TELEPHONE ENCOUNTER
Spoke with patient and confirmed the surgical procedure of sleeve with Dr Villanueva.  Scheduled preop appts/ surgery date/ post op appts. All dates and times agreed upon. Pt aware that they must have PCP clearance within 60 days of surgery- it should be dated, signed and in chart for preop appointment. All medications have been reviewed regarding the necessity to be crushed or broken into pieces smaller that the tip of a pencil eraser for the required period of time following surgical procedures.  Pt aware that protein liquid diet start date is 3/20/202.  Screened patient for history of UTI per protocol.   Discussed with patient to avoid antibiotics and elective procedures involving sedation/anesthesia within 30 days of surgery.  Patient instructed to call the bariatric clinic post op for any s/s of UTI.  Patient's mailing address confirmed with patient.  Pt aware that all appts can be seen in my [facility] patient portal at this time and appt reminders mailed to patient's mailing address today by RN.  Office phone and fax number given to patient for any future questions/concerns.  Also informed patient that she will be enrolled in the Patient Reported Outcomes program to track her progress and successes.  Confirmed email address.

## 2020-03-12 ENCOUNTER — PATIENT MESSAGE (OUTPATIENT)
Dept: BARIATRICS | Facility: CLINIC | Age: 35
End: 2020-03-12

## 2020-03-12 NOTE — TELEPHONE ENCOUNTER
Phoned patient in response to patient portal message.  Discussed the screening and safeguarding processes that [facility] has implemented to protect our patient's during this pandemic.  Her fears were addressed and she will continue on with the surgery on April 3, 2020.

## 2020-03-16 ENCOUNTER — TELEPHONE (OUTPATIENT)
Dept: BARIATRICS | Facility: CLINIC | Age: 35
End: 2020-03-16

## 2020-03-16 NOTE — TELEPHONE ENCOUNTER
Phoned patient and discussed the need to postpone her surgery until we are given the okay to reschedule it.  Pre op appointment canceled for now.  Patient V/U

## 2020-04-05 DIAGNOSIS — K21.9 GASTROESOPHAGEAL REFLUX DISEASE, ESOPHAGITIS PRESENCE NOT SPECIFIED: ICD-10-CM

## 2020-04-06 RX ORDER — OMEPRAZOLE 20 MG/1
CAPSULE, DELAYED RELEASE ORAL
Qty: 90 CAPSULE | Refills: 0 | Status: SHIPPED | OUTPATIENT
Start: 2020-04-06 | End: 2020-07-05

## 2020-04-07 ENCOUNTER — TELEPHONE (OUTPATIENT)
Dept: BARIATRICS | Facility: CLINIC | Age: 35
End: 2020-04-07

## 2020-04-07 NOTE — TELEPHONE ENCOUNTER
Spoke with patient and confirmed the surgical procedure of sleeve with Dr Villanueva.  Rescheduled  preop appts/ surgery date/ post op appts. All dates and times agreed upon.  All medications have been reviewed regarding the necessity to be crushed or broken into pieces smaller that the tip of a pencil eraser for the required period of time following surgical procedures.  Pt aware that protein liquid diet start date is 4/22/2020.  Screened patient for history of UTI per protocol.   Discussed with patient to avoid antibiotics and elective procedures involving sedation/anesthesia within 30 days of surgery.  Patient instructed to call the bariatric clinic post op for any s/s of UTI.  Patient's mailing address confirmed with patient.  Pt aware that all appts can be seen in my [facility] patient portal at this time and appt reminders mailed to patient's mailing address today by RN.  Office phone and fax number given to patient for any future questions/concerns.  Also informed patient that they will be enrolled in the Patient Reported Outcomes program to track her progress and successes.  Confirmed email address.

## 2020-04-13 ENCOUNTER — PATIENT MESSAGE (OUTPATIENT)
Dept: BARIATRICS | Facility: CLINIC | Age: 35
End: 2020-04-13

## 2020-04-21 ENCOUNTER — TELEPHONE (OUTPATIENT)
Dept: BARIATRICS | Facility: CLINIC | Age: 35
End: 2020-04-21

## 2020-04-21 NOTE — TELEPHONE ENCOUNTER
Phoned patient and discussed the need to postpone her surgery until later in May but we are not sure when we will be able to reschedule until we are given notice from hospital authorities.  Informed her that we would contact her as soon as we hear the we can begin rescheduling.  She verbalized understanding.

## 2020-04-23 ENCOUNTER — PATIENT MESSAGE (OUTPATIENT)
Dept: SURGERY | Facility: CLINIC | Age: 35
End: 2020-04-23

## 2020-05-07 ENCOUNTER — PATIENT MESSAGE (OUTPATIENT)
Dept: SURGERY | Facility: CLINIC | Age: 35
End: 2020-05-07

## 2020-05-20 ENCOUNTER — TELEPHONE (OUTPATIENT)
Dept: BARIATRICS | Facility: CLINIC | Age: 35
End: 2020-05-20

## 2020-05-20 ENCOUNTER — PATIENT MESSAGE (OUTPATIENT)
Dept: SURGERY | Facility: CLINIC | Age: 35
End: 2020-05-20

## 2020-05-20 NOTE — TELEPHONE ENCOUNTER
Phoned patient and discussed her new insurance coverage.  It has still not been updated in the EMR  Despite her stating that she phoned the phone room and gave them the information.  Gave her the insurance verification number [phone] to call and give them the information.  Then we will run her benefits and schedule her surgery.

## 2020-05-28 ENCOUNTER — TELEPHONE (OUTPATIENT)
Dept: BARIATRICS | Facility: CLINIC | Age: 35
End: 2020-05-28

## 2020-05-28 DIAGNOSIS — Z71.3 NUTRITIONAL COUNSELING: ICD-10-CM

## 2020-05-28 DIAGNOSIS — E55.9 VITAMIN D INSUFFICIENCY: ICD-10-CM

## 2020-05-28 DIAGNOSIS — Z79.899 OTHER LONG TERM (CURRENT) DRUG THERAPY: ICD-10-CM

## 2020-05-28 DIAGNOSIS — E66.01 MORBID OBESITY WITH BMI OF 50.0-59.9, ADULT: ICD-10-CM

## 2020-05-28 DIAGNOSIS — E64.0 SEQUELAE OF PROTEIN-CALORIE MALNUTRITION: ICD-10-CM

## 2020-05-28 DIAGNOSIS — Z01.818 PRE-OP TESTING: Primary | ICD-10-CM

## 2020-05-28 DIAGNOSIS — E66.01 MORBID OBESITY: ICD-10-CM

## 2020-05-28 DIAGNOSIS — K21.9 GASTROESOPHAGEAL REFLUX DISEASE, ESOPHAGITIS PRESENCE NOT SPECIFIED: ICD-10-CM

## 2020-05-28 NOTE — TELEPHONE ENCOUNTER
Spoke with patient and confirmed the surgical procedure of LRNY with Dr Villanueva.  Scheduled preop appts/ surgery date/ post op appts. All dates and times agreed upon. All medications have been reviewed regarding the necessity to be crushed or broken into pieces smaller that the tip of a pencil eraser for the required period of time following surgical procedures.  Pt aware that protein liquid diet start date is 6/26/2020.  Screened patient for history of UTI per protocol.   Discussed with patient to avoid antibiotics and elective procedures involving sedation/anesthesia within 30 days of surgery.  Patient instructed to call the bariatric clinic post op for any s/s of UTI.  Patient's mailing address confirmed with patient.  Pt aware that all appts can be seen in my [facility] patient portal at this time and appt reminders mailed to patient's mailing address today by RN.  Office phone and fax number given to patient for any future questions/concerns.  Also informed patient that they will be enrolled in the Patient Reported Outcomes program to track her progress and successes.  Confirmed email address.

## 2020-05-29 ENCOUNTER — TELEPHONE (OUTPATIENT)
Dept: BARIATRICS | Facility: CLINIC | Age: 35
End: 2020-05-29

## 2020-05-29 NOTE — TELEPHONE ENCOUNTER
----- Message from Nhung Bell sent at 5/29/2020  9:39 AM CDT -----  Regarding: RE: revision bariatric benefits sleeve to LRNY  Benefits updated.   ----- Message -----  From: Madiha Orona RN  Sent: 5/29/2020   8:46 AM CDT  To: Madiha Orona RN, Nhung Bell  Subject: revision bariatric benefits sleeve to LRNY       Good Morning Ms Richards,  This patient has been in workup for a revision and the last time her benefits were checked was 5/2019.  Can you please recheck them for a revision.  She wants to be scheduled for surgery.  Thanks,  Narcisa

## 2020-06-01 ENCOUNTER — TELEPHONE (OUTPATIENT)
Dept: BARIATRICS | Facility: CLINIC | Age: 35
End: 2020-06-01

## 2020-06-01 NOTE — TELEPHONE ENCOUNTER
----- Message from Nhung Bell sent at 5/26/2020  9:08 AM CDT -----  Hi there,     Patient has active [employer] Medicare HMO, coverage is verified and updated in Epic. Unsure of the confusion as the patient has [employer] coverage which remains active as of today.     Thanks     ----- Message -----  From: Madiha Orona RN  Sent: 5/26/2020   8:39 AM CDT  To: Nhung Bell    Good Morning Ms Richards,   Can you follow this patient portal feed and check on this patient's Insurance with [employer] for bariatric benefits.  She was scheduled for surgery and it was postponed due to covid and then her insurance changed.  She wants to be rescheduled but I am not sure she has coverage for it now.  Can you please help with this.  We have asked he rot update the information but for some reason it still says she has [employer] Managed Medicare.  ThanksNarcisa

## 2020-06-10 ENCOUNTER — TELEPHONE (OUTPATIENT)
Dept: SURGERY | Facility: CLINIC | Age: 35
End: 2020-06-10

## 2020-06-10 ENCOUNTER — TELEPHONE (OUTPATIENT)
Dept: BARIATRICS | Facility: CLINIC | Age: 35
End: 2020-06-10

## 2020-06-10 NOTE — TELEPHONE ENCOUNTER
Spoke with pt, explained ins info is now updated to [employer], benefits were verified and noted by ,  and authorization notified to obtain auth for surgery. Patient aware and verbalized understanding.

## 2020-06-10 NOTE — TELEPHONE ENCOUNTER
----- Message from Nataliya Gant sent at 6/10/2020  4:03 PM CDT -----  Contact: pt called [phone]  Returning missed call from Winnie hensley on today

## 2020-06-10 NOTE — TELEPHONE ENCOUNTER
----- Message from Kiara Tolentino sent at 6/10/2020  2:23 PM CDT -----  Madiha Orona, pt called updated insurance information.  Pt have [employer].  I updated the system.  Call back [phone]

## 2020-06-23 ENCOUNTER — CLINICAL SUPPORT (OUTPATIENT)
Dept: BARIATRICS | Facility: CLINIC | Age: 35
End: 2020-06-23
Payer: MEDICARE

## 2020-06-23 ENCOUNTER — LAB VISIT (OUTPATIENT)
Dept: LAB | Facility: HOSPITAL | Age: 35
End: 2020-06-23
Payer: MEDICARE

## 2020-06-23 ENCOUNTER — OFFICE VISIT (OUTPATIENT)
Dept: BARIATRICS | Facility: CLINIC | Age: 35
End: 2020-06-23
Payer: MEDICARE

## 2020-06-23 VITALS
HEIGHT: 57 IN | DIASTOLIC BLOOD PRESSURE: 61 MMHG | SYSTOLIC BLOOD PRESSURE: 123 MMHG | HEART RATE: 83 BPM | BODY MASS INDEX: 53.99 KG/M2 | WEIGHT: 250.25 LBS

## 2020-06-23 DIAGNOSIS — Z71.3 NUTRITIONAL COUNSELING: ICD-10-CM

## 2020-06-23 DIAGNOSIS — E66.01 MORBID OBESITY: ICD-10-CM

## 2020-06-23 DIAGNOSIS — Z09 POSTOP CHECK: Primary | ICD-10-CM

## 2020-06-23 DIAGNOSIS — Z01.818 PREOP TESTING: ICD-10-CM

## 2020-06-23 DIAGNOSIS — Z71.3 DIETARY COUNSELING: ICD-10-CM

## 2020-06-23 LAB
ALBUMIN SERPL BCP-MCNC: 3.8 G/DL (ref 3.5–5.2)
ALP SERPL-CCNC: 83 U/L (ref 55–135)
ALT SERPL W/O P-5'-P-CCNC: 23 U/L (ref 10–44)
ANION GAP SERPL CALC-SCNC: 10 MMOL/L (ref 8–16)
AST SERPL-CCNC: 24 U/L (ref 10–40)
BASOPHILS # BLD AUTO: 0.03 K/UL (ref 0–0.2)
BASOPHILS NFR BLD: 0.3 % (ref 0–1.9)
BILIRUB SERPL-MCNC: 0.3 MG/DL (ref 0.1–1)
BUN SERPL-MCNC: 25 MG/DL (ref 6–20)
CALCIUM SERPL-MCNC: 9.8 MG/DL (ref 8.7–10.5)
CHLORIDE SERPL-SCNC: 104 MMOL/L (ref 95–110)
CO2 SERPL-SCNC: 21 MMOL/L (ref 23–29)
CREAT SERPL-MCNC: 0.8 MG/DL (ref 0.5–1.4)
DIFFERENTIAL METHOD: ABNORMAL
EOSINOPHIL # BLD AUTO: 0.2 K/UL (ref 0–0.5)
EOSINOPHIL NFR BLD: 1.8 % (ref 0–8)
ERYTHROCYTE [DISTWIDTH] IN BLOOD BY AUTOMATED COUNT: 14.2 % (ref 11.5–14.5)
EST. GFR  (AFRICAN AMERICAN): >60 ML/MIN/1.73 M^2
EST. GFR  (NON AFRICAN AMERICAN): >60 ML/MIN/1.73 M^2
GLUCOSE SERPL-MCNC: 75 MG/DL (ref 70–110)
HCT VFR BLD AUTO: 42.9 % (ref 37–48.5)
HGB BLD-MCNC: 13.4 G/DL (ref 12–16)
IMM GRANULOCYTES # BLD AUTO: 0.02 K/UL (ref 0–0.04)
IMM GRANULOCYTES NFR BLD AUTO: 0.2 % (ref 0–0.5)
LYMPHOCYTES # BLD AUTO: 2.6 K/UL (ref 1–4.8)
LYMPHOCYTES NFR BLD: 29.9 % (ref 18–48)
MCH RBC QN AUTO: 28.4 PG (ref 27–31)
MCHC RBC AUTO-ENTMCNC: 31.2 G/DL (ref 32–36)
MCV RBC AUTO: 91 FL (ref 82–98)
MONOCYTES # BLD AUTO: 0.7 K/UL (ref 0.3–1)
MONOCYTES NFR BLD: 7.7 % (ref 4–15)
NEUTROPHILS # BLD AUTO: 5.2 K/UL (ref 1.8–7.7)
NEUTROPHILS NFR BLD: 60.1 % (ref 38–73)
NRBC BLD-RTO: 0 /100 WBC
PLATELET # BLD AUTO: 307 K/UL (ref 150–350)
PMV BLD AUTO: 10.8 FL (ref 9.2–12.9)
POTASSIUM SERPL-SCNC: 4.1 MMOL/L (ref 3.5–5.1)
PROT SERPL-MCNC: 8.4 G/DL (ref 6–8.4)
RBC # BLD AUTO: 4.72 M/UL (ref 4–5.4)
SODIUM SERPL-SCNC: 135 MMOL/L (ref 136–145)
WBC # BLD AUTO: 8.72 K/UL (ref 3.9–12.7)

## 2020-06-23 PROCEDURE — 3008F PR BODY MASS INDEX (BMI) DOCUMENTED: ICD-10-PCS | Mod: HCNC,CPTII,S$GLB, | Performed by: SURGERY

## 2020-06-23 PROCEDURE — 85025 COMPLETE CBC W/AUTO DIFF WBC: CPT | Mod: HCNC

## 2020-06-23 PROCEDURE — 80053 COMPREHEN METABOLIC PANEL: CPT | Mod: HCNC

## 2020-06-23 PROCEDURE — 99999 PR PBB SHADOW E&M-EST. PATIENT-LVL III: CPT | Mod: PBBFAC,HCNC,, | Performed by: SURGERY

## 2020-06-23 PROCEDURE — 99204 OFFICE O/P NEW MOD 45 MIN: CPT | Mod: HCNC,S$GLB,, | Performed by: SURGERY

## 2020-06-23 PROCEDURE — 99999 PR PBB SHADOW E&M-EST. PATIENT-LVL III: ICD-10-PCS | Mod: PBBFAC,HCNC,, | Performed by: SURGERY

## 2020-06-23 PROCEDURE — 99499 NO LOS: ICD-10-PCS | Mod: HCNC,S$GLB,, | Performed by: DIETITIAN, REGISTERED

## 2020-06-23 PROCEDURE — 99204 PR OFFICE/OUTPT VISIT, NEW, LEVL IV, 45-59 MIN: ICD-10-PCS | Mod: HCNC,S$GLB,, | Performed by: SURGERY

## 2020-06-23 PROCEDURE — 99499 UNLISTED E&M SERVICE: CPT | Mod: HCNC,S$GLB,, | Performed by: DIETITIAN, REGISTERED

## 2020-06-23 PROCEDURE — 36415 COLL VENOUS BLD VENIPUNCTURE: CPT | Mod: HCNC

## 2020-06-23 PROCEDURE — 3008F BODY MASS INDEX DOCD: CPT | Mod: HCNC,CPTII,S$GLB, | Performed by: SURGERY

## 2020-06-23 RX ORDER — URSODIOL 500 MG/1
TABLET, FILM COATED ORAL
Qty: 90 TABLET | Refills: 1 | Status: SHIPPED | OUTPATIENT
Start: 2020-06-23 | End: 2023-08-22

## 2020-06-23 RX ORDER — HEPARIN SODIUM 5000 [USP'U]/ML
5000 INJECTION, SOLUTION INTRAVENOUS; SUBCUTANEOUS ONCE
Status: CANCELLED | OUTPATIENT
Start: 2020-06-23 | End: 2020-06-23

## 2020-06-23 RX ORDER — HYDROCODONE BITARTRATE AND ACETAMINOPHEN 7.5; 325 MG/15ML; MG/15ML
15 SOLUTION ORAL 4 TIMES DAILY PRN
Qty: 200 ML | Refills: 0 | Status: SHIPPED | OUTPATIENT
Start: 2020-06-23 | End: 2021-01-05

## 2020-06-23 RX ORDER — METOCLOPRAMIDE HYDROCHLORIDE 5 MG/ML
10 INJECTION INTRAMUSCULAR; INTRAVENOUS ONCE
Status: CANCELLED | OUTPATIENT
Start: 2020-06-23 | End: 2020-06-23

## 2020-06-23 RX ORDER — SODIUM CITRATE AND CITRIC ACID MONOHYDRATE 334; 500 MG/5ML; MG/5ML
15 SOLUTION ORAL ONCE
Status: CANCELLED | OUTPATIENT
Start: 2020-06-23 | End: 2020-06-23

## 2020-06-23 RX ORDER — POLYETHYLENE GLYCOL 3350 17 G/17G
17 POWDER, FOR SOLUTION ORAL DAILY
Qty: 510 G | Refills: 3 | Status: SHIPPED | OUTPATIENT
Start: 2020-06-23 | End: 2021-01-05

## 2020-06-23 RX ORDER — ONDANSETRON 4 MG/1
4 TABLET, FILM COATED ORAL EVERY 6 HOURS PRN
Qty: 30 TABLET | Refills: 0 | Status: SHIPPED | OUTPATIENT
Start: 2020-06-23 | End: 2021-01-05

## 2020-06-23 RX ORDER — FAMOTIDINE 10 MG/ML
20 INJECTION INTRAVENOUS ONCE
Status: CANCELLED | OUTPATIENT
Start: 2020-06-23 | End: 2020-06-23

## 2020-06-23 NOTE — H&P (VIEW-ONLY)
History & Physical    SUBJECTIVE:     History of Present Illness:  Prisca Zhu is a 34 y.o. female who presents for pre-operative exam for weight loss surgery.  she has completed her pre-operative evaluation.  she has failed medical treatment for obesity.  1. Morbid Obesity with Body mass index is 50.52 kg/m². and difficulty with weight loss.  2. Co-morbidities: OI  3. PHQ9 13       Chief Complaint   Patient presents with    Pre-op Exam       Review of patient's allergies indicates:   Allergen Reactions    Pcn [penicillins] Shortness Of Breath       Current Outpatient Medications   Medication Sig    CALCIUM ORAL Take by mouth.    cyanocobalamin (VITAMIN B-12) 100 MCG tablet Take 100 mcg by mouth once daily.    drospirenone-ethinyl estradiol (ROSALBA) 3-0.02 mg per tablet Take 1 tablet by mouth once daily.    ergocalciferol (ERGOCALCIFEROL) 50,000 unit Cap TAKE 1 CAPSULE BY MOUTH EVERY 7 DAYS    fish oil-omega-3 fatty acids 300-1,000 mg capsule Take by mouth once daily.    ibuprofen (ADVIL,MOTRIN) 800 MG tablet Take 1 tablet (800 mg total) by mouth every 8 (eight) hours as needed for Pain.    omeprazole (PRILOSEC) 20 MG capsule TAKE 1 CAPSULE(20 MG) BY MOUTH EVERY DAY     Current Facility-Administered Medications   Medication    medroxyPROGESTERone (DEPO-PROVERA) injection 150 mg       Past Medical History:   Diagnosis Date    Anemia     BMI 50.0-59.9, adult     Encounter for IUD removal 2019    GERD (gastroesophageal reflux disease) 2019    History of blood transfusion 2001    Malpositioned intrauterine device 3/21/2019    2019 OB/GYN - Dr Guzman - Discussed with patient that we will proceed with imaging to locate the IUD after which we can then proceed to book case in OR to retrieve displaced IUD    Obesity     OI (osteogenesis imperfecta)     Osteopetrosis     Tendonitis        Past Surgical History:   Procedure Laterality Date     SECTION      ,      "ESOPHAGOGASTRODUODENOSCOPY N/A 4/2/2019    Procedure: ESOPHAGOGASTRODUODENOSCOPY (EGD);  Surgeon: Segun Dominguez MD;  Location: [facility] ([address] [place] [address]);  Service: Endoscopy;  Laterality: N/A;  BMI 51    FEMUR FRACTURE SURGERY Bilateral     hardware/rods in both legs    FRACTURE SURGERY      femur    REMOVAL OF INTRAUTERINE DEVICE (IUD) N/A 6/3/2019    Procedure: REMOVAL, INTRAUTERINE DEVICE;  Surgeon: Ashley Greenberg MD;  Location: [facility];  Service: OB/GYN;  Laterality: N/A;  RN PRE OP 5-23-19----BMI-51.29    WISDOM TOOTH EXTRACTION         Review of Systems   Constitutional: Negative for fever.   HENT: Negative for congestion and sinus pain.    Respiratory: Negative for cough and shortness of breath.    Cardiovascular: Negative for chest pain and palpitations.   Gastrointestinal: Positive for heartburn. Negative for abdominal pain, constipation, diarrhea, nausea and vomiting.        Heartburn food dependant.   Genitourinary: Negative for dysuria and urgency.   Endo/Heme/Allergies: Does not bruise/bleed easily.          OBJECTIVE:     Vitals:    06/23/20 1352   BP: 123/61   Pulse: 83   Weight: 113.5 kg (250 lb 3.6 oz)   Height: 4' 9" (1.448 m)       Physical Exam   Constitutional: She is oriented to person, place, and time and well-developed, well-nourished, and in no distress.   Abdominal: Soft. There is no abdominal tenderness.   Neurological: She is alert and oriented to person, place, and time.   Skin: Skin is warm and dry.   Psychiatric: Mood, memory, affect and judgment normal.   Vitals reviewed.       Laboratory  CBC: Reviewed  CMP: Reviewed    Diagnostic Results:  ECG: Reviewed  X-Ray: Reviewed  CT: Reviewed     Dietitian: Patient has participated in pre-operative nutritional program with understanding of necessary lifelong dietary changes required with surgery.    Psych: No overt contraindications to bariatric surgery, patient has completed psychological evaluation and is able to give informed " consent.    Clearance: PCP clearance    ASSESSMENT/PLAN:     Morbid obesity with failure of medical conservative therapy.    Co Morbid Conditions:   Patient Active Problem List   Diagnosis    OI (osteogenesis imperfecta)    Osteopetrosis    Tendonitis    Obesity    BMI 50.0-59.9, adult    Pelvic pain    GERD (gastroesophageal reflux disease)       Patient wishes to undergo laparoscopic Symone-en-Y 100 cm symone limb.      The patient was informed that risks include, but are not limited to: death, leak, obstruction, bleeding, and sepsis. Any of these could require further surgery. Other risks include DVT, PE, pneumonia, wound dehiscence, hernia, wound infection, the need for dilatations and the inability to lose appropriate weight and keep it off.     We discussed that our goal is to ameliorate her medical problems and not to obtain a specific body mass index. she understands the risks and benefits and wishes to proceed with the procedure.  she has signed a consent form.

## 2020-06-23 NOTE — PROGRESS NOTES
History & Physical    SUBJECTIVE:     History of Present Illness:  Prisca Zhu is a 34 y.o. female who presents for pre-operative exam for weight loss surgery.  she has completed her pre-operative evaluation.  she has failed medical treatment for obesity.  1. Morbid Obesity with Body mass index is 50.52 kg/m². and difficulty with weight loss.  2. Co-morbidities: OI  3. PHQ9 13       Chief Complaint   Patient presents with    Pre-op Exam       Review of patient's allergies indicates:   Allergen Reactions    Pcn [penicillins] Shortness Of Breath       Current Outpatient Medications   Medication Sig    CALCIUM ORAL Take by mouth.    cyanocobalamin (VITAMIN B-12) 100 MCG tablet Take 100 mcg by mouth once daily.    drospirenone-ethinyl estradiol (ROSALBA) 3-0.02 mg per tablet Take 1 tablet by mouth once daily.    ergocalciferol (ERGOCALCIFEROL) 50,000 unit Cap TAKE 1 CAPSULE BY MOUTH EVERY 7 DAYS    fish oil-omega-3 fatty acids 300-1,000 mg capsule Take by mouth once daily.    ibuprofen (ADVIL,MOTRIN) 800 MG tablet Take 1 tablet (800 mg total) by mouth every 8 (eight) hours as needed for Pain.    omeprazole (PRILOSEC) 20 MG capsule TAKE 1 CAPSULE(20 MG) BY MOUTH EVERY DAY     Current Facility-Administered Medications   Medication    medroxyPROGESTERone (DEPO-PROVERA) injection 150 mg       Past Medical History:   Diagnosis Date    Anemia     BMI 50.0-59.9, adult     Encounter for IUD removal 2019    GERD (gastroesophageal reflux disease) 2019    History of blood transfusion 2001    Malpositioned intrauterine device 3/21/2019    2019 OB/GYN - Dr Guzman - Discussed with patient that we will proceed with imaging to locate the IUD after which we can then proceed to book case in OR to retrieve displaced IUD    Obesity     OI (osteogenesis imperfecta)     Osteopetrosis     Tendonitis        Past Surgical History:   Procedure Laterality Date     SECTION      ,      "ESOPHAGOGASTRODUODENOSCOPY N/A 4/2/2019    Procedure: ESOPHAGOGASTRODUODENOSCOPY (EGD);  Surgeon: Segun Dominguez MD;  Location: [facility] ([address] [place] [address]);  Service: Endoscopy;  Laterality: N/A;  BMI 51    FEMUR FRACTURE SURGERY Bilateral     hardware/rods in both legs    FRACTURE SURGERY      femur    REMOVAL OF INTRAUTERINE DEVICE (IUD) N/A 6/3/2019    Procedure: REMOVAL, INTRAUTERINE DEVICE;  Surgeon: Ashley Greenberg MD;  Location: [facility] OR;  Service: OB/GYN;  Laterality: N/A;  RN PRE OP 5-23-19----BMI-51.29    WISDOM TOOTH EXTRACTION         Review of Systems   Constitutional: Negative for fever.   HENT: Negative for congestion and sinus pain.    Respiratory: Negative for cough and shortness of breath.    Cardiovascular: Negative for chest pain and palpitations.   Gastrointestinal: Positive for heartburn. Negative for abdominal pain, constipation, diarrhea, nausea and vomiting.        Heartburn food dependant.   Genitourinary: Negative for dysuria and urgency.   Endo/Heme/Allergies: Does not bruise/bleed easily.          OBJECTIVE:     Vitals:    06/23/20 1352   BP: 123/61   Pulse: 83   Weight: 113.5 kg (250 lb 3.6 oz)   Height: 4' 9" (1.448 m)       Physical Exam   Constitutional: She is oriented to person, place, and time and well-developed, well-nourished, and in no distress.   Abdominal: Soft. There is no abdominal tenderness.   Neurological: She is alert and oriented to person, place, and time.   Skin: Skin is warm and dry.   Psychiatric: Mood, memory, affect and judgment normal.   Vitals reviewed.       Laboratory  CBC: Reviewed  CMP: Reviewed    Diagnostic Results:  ECG: Reviewed  X-Ray: Reviewed  CT: Reviewed     Dietitian: Patient has participated in pre-operative nutritional program with understanding of necessary lifelong dietary changes required with surgery.    Psych: No overt contraindications to bariatric surgery, patient has completed psychological evaluation and is able to give informed " consent.    Clearance: PCP clearance    ASSESSMENT/PLAN:     Morbid obesity with failure of medical conservative therapy.    Co Morbid Conditions:   Patient Active Problem List   Diagnosis    OI (osteogenesis imperfecta)    Osteopetrosis    Tendonitis    Obesity    BMI 50.0-59.9, adult    Pelvic pain    GERD (gastroesophageal reflux disease)       Patient wishes to undergo laparoscopic Symone-en-Y 100 cm symone limb.      The patient was informed that risks include, but are not limited to: death, leak, obstruction, bleeding, and sepsis. Any of these could require further surgery. Other risks include DVT, PE, pneumonia, wound dehiscence, hernia, wound infection, the need for dilatations and the inability to lose appropriate weight and keep it off.     We discussed that our goal is to ameliorate her medical problems and not to obtain a specific body mass index. she understands the risks and benefits and wishes to proceed with the procedure.  she has signed a consent form.

## 2020-06-23 NOTE — PROGRESS NOTES
Pt will use trimino has 7 gms of protein 28 calories per bottle, premier and powder protein shakes.  If using protein powder, reminded pt of mixing with water for days 1 and 2, by day 3 may try using skim, 1% milk or unsweetened almond/soy milk.  By Day 4, may use RTD protein shakes as tolerated  Pt has the following vitamins and minerals to start taking once discharged from hospital  Multivitamin with 18 mg of iron has purchased  B-complex with 50 mg thiamin has purchased  Calcium citrate needs to purchase  Vitamin B-12 500 mcg sublingually  Reviewed dosage and timing of vitamin/mineral guidelines.  Reviewed nutritional guidelines for protein and fluid requirements for week 1 and week 2 post-surgery.  Handout provided to log protein and fluid daily.  1 ounce medicine cups provided for patient to measure fluid intake after surgery.  Reviewed common nutritional concerns and prevention tips after bariatric surgery  Pt verbalized understanding of information provided with appropriate questions and comments.

## 2020-06-23 NOTE — LETTER
June 23, 2020      [facility] - Bariatric Surgery  [address]  [facility] [address]  Phone: [phone]  Fax: [phone]       Patient: Prisca Zhu   YOB: 1985  Date of Visit: 06/23/2020    To Whom It May Concern:    Geraldine Zhu  was at [facility] on 06/23/2020. She will be out of work from Friday, July 10, 2020, to Friday, July 24, 2020.   She may return to work on Monday, July 27, 2020, pending she has no complications.  She will have restrictions of no pushing, pulling or lifting anything greater than 10 pounds until after August 21, 2020.  If you have any questions or concerns, or if I can be of further assistance, please do not hesitate to contact me.    Sincerely,      Guillermo Villanueva MD

## 2020-06-26 ENCOUNTER — TELEPHONE (OUTPATIENT)
Dept: BARIATRICS | Facility: CLINIC | Age: 35
End: 2020-06-26

## 2020-06-26 NOTE — TELEPHONE ENCOUNTER
Called patient to discuss liquid diet and vitamins.    Pt using trimino, premier and powder protein shakes    Discussion:  -  gms of protein per day  - 600-800 calories per day   - Less than 4gm sugar per shake  - SF, Decaf, non-carbonated Fluids  - No Fruits, juices, yogurt or pudding on liquid diet  - No vitamins, fish oils or herbal supplements for 1 week prior to surgery    Remind pt per nursing and medical team to inform our department if taking antibiotics within the 30 days post bariatric surgery or it any other surgeries/procedures are scheduled within 30 days after bariatric surgery.    Reminded pt of pre-op and surgery dates.    Pt to call back with any questions.  ,  ----- Message from Madiha Orona RN sent at 5/28/2020  4:21 PM CDT -----  Regarding: LD  2 week liquid diet starts 6/26/2020  LRNY surgery scheduled 7/10/2020  preop appt scheduled 6/23/2020

## 2020-07-08 ENCOUNTER — LAB VISIT (OUTPATIENT)
Dept: SURGERY | Facility: CLINIC | Age: 35
DRG: 620 | End: 2020-07-08
Payer: MEDICARE

## 2020-07-08 DIAGNOSIS — E55.9 VITAMIN D INSUFFICIENCY: ICD-10-CM

## 2020-07-08 DIAGNOSIS — E66.01 MORBID OBESITY WITH BMI OF 50.0-59.9, ADULT: ICD-10-CM

## 2020-07-08 DIAGNOSIS — K21.9 GASTROESOPHAGEAL REFLUX DISEASE, ESOPHAGITIS PRESENCE NOT SPECIFIED: ICD-10-CM

## 2020-07-08 DIAGNOSIS — E64.0 SEQUELAE OF PROTEIN-CALORIE MALNUTRITION: ICD-10-CM

## 2020-07-08 DIAGNOSIS — Z71.3 NUTRITIONAL COUNSELING: ICD-10-CM

## 2020-07-08 DIAGNOSIS — Z01.818 PRE-OP TESTING: ICD-10-CM

## 2020-07-08 DIAGNOSIS — E66.01 MORBID OBESITY: ICD-10-CM

## 2020-07-08 DIAGNOSIS — Z79.899 OTHER LONG TERM (CURRENT) DRUG THERAPY: ICD-10-CM

## 2020-07-08 LAB — SARS-COV-2 RNA RESP QL NAA+PROBE: NOT DETECTED

## 2020-07-08 PROCEDURE — U0003 INFECTIOUS AGENT DETECTION BY NUCLEIC ACID (DNA OR RNA); SEVERE ACUTE RESPIRATORY SYNDROME CORONAVIRUS 2 (SARS-COV-2) (CORONAVIRUS DISEASE [COVID-19]), AMPLIFIED PROBE TECHNIQUE, MAKING USE OF HIGH THROUGHPUT TECHNOLOGIES AS DESCRIBED BY CMS-2020-01-R: HCPCS | Mod: HCNC

## 2020-07-09 ENCOUNTER — ANESTHESIA EVENT (OUTPATIENT)
Dept: SURGERY | Facility: HOSPITAL | Age: 35
DRG: 620 | End: 2020-07-09
Payer: MEDICARE

## 2020-07-09 ENCOUNTER — TELEPHONE (OUTPATIENT)
Dept: BARIATRICS | Facility: CLINIC | Age: 35
End: 2020-07-09

## 2020-07-09 NOTE — ANESTHESIA PREPROCEDURE EVALUATION
[facility]  Anesthesia Pre-Operative Evaluation         Patient Name: Prisca Zhu  YOB: 1985  MRN: [id number]    SUBJECTIVE:     Pre-operative evaluation for Procedure(s) (LRB):  GASTROENTEROSTOMY, LAPAROSCOPIC, with intraop EGD (N/A)     2020    Prisca Zhu is a 34 y.o. female w/ a significant PMHx of OI, GERD, morbid obesity.    Patient now presents for the above procedure(s).      LDA: None documented.       Prev airway: None documented.    Drips: None documented.      Patient Active Problem List   Diagnosis    OI (osteogenesis imperfecta)    Osteopetrosis    Tendonitis    Obesity    BMI 50.0-59.9, adult    Pelvic pain    GERD (gastroesophageal reflux disease)       Review of patient's allergies indicates:   Allergen Reactions    Pcn [penicillins] Shortness Of Breath       Current Inpatient Medications:   medroxyPROGESTERone  150 mg Intramuscular Q90 Days       Current Facility-Administered Medications on File Prior to Encounter   Medication Dose Route Frequency Provider Last Rate Last Dose    medroxyPROGESTERone (DEPO-PROVERA) injection 150 mg  150 mg Intramuscular Q90 Days Ashley Greenberg MD   150 mg at 20 1417     Current Outpatient Medications on File Prior to Encounter   Medication Sig Dispense Refill    CALCIUM ORAL Take by mouth every morning.       cyanocobalamin (VITAMIN B-12) 100 MCG tablet Take 100 mcg by mouth every morning.       drospirenone-ethinyl estradiol (ROSALBA) 3-0.02 mg per tablet Take 1 tablet by mouth once daily. 84 tablet 3    fish oil-omega-3 fatty acids 300-1,000 mg capsule Take by mouth every morning.       ergocalciferol (ERGOCALCIFEROL) 50,000 unit Cap TAKE 1 CAPSULE BY MOUTH EVERY 7 DAYS 4 capsule 2    ibuprofen (ADVIL,MOTRIN) 800 MG tablet Take 1 tablet (800 mg total) by mouth every 8 (eight) hours as needed for Pain. 40 tablet 0       Past Surgical History:   Procedure Laterality Date     SECTION       2009, 2016    ESOPHAGOGASTRODUODENOSCOPY N/A 4/2/2019    Procedure: ESOPHAGOGASTRODUODENOSCOPY (EGD);  Surgeon: Segun Dominguez MD;  Location: [address]);  Service: Endoscopy;  Laterality: N/A;  BMI 51    FEMUR FRACTURE SURGERY Bilateral     hardware/rods in both legs    FRACTURE SURGERY      femur    REMOVAL OF INTRAUTERINE DEVICE (IUD) N/A 6/3/2019    Procedure: REMOVAL, INTRAUTERINE DEVICE;  Surgeon: Ashley Greenberg MD;  Location: [facility] OR;  Service: OB/GYN;  Laterality: N/A;  RN PRE OP 5-23-19----BMI-51.29    WISDOM TOOTH EXTRACTION         Social History     Socioeconomic History    Marital status: Single     Spouse name: Not on file    Number of children: Not on file    Years of education: Not on file    Highest education level: Not on file   Occupational History    Not on file   Social Needs    Financial resource strain: Not on file    Food insecurity     Worry: Not on file     Inability: Not on file    Transportation needs     Medical: Not on file     Non-medical: Not on file   Tobacco Use    Smoking status: Never Smoker    Smokeless tobacco: Never Used   Substance and Sexual Activity    Alcohol use: No    Drug use: Never    Sexual activity: Yes     Partners: Male     Birth control/protection: None, I.U.D.   Lifestyle    Physical activity     Days per week: Not on file     Minutes per session: Not on file    Stress: Not on file   Relationships    Social connections     Talks on phone: Not on file     Gets together: Not on file     Attends Yarsanism service: Not on file     Active member of club or organization: Not on file     Attends meetings of clubs or organizations: Not on file     Relationship status: Not on file   Other Topics Concern    Not on file   Social History Narrative    Disabled 2/2 chronic left shoulder pain       OBJECTIVE:     Vital Signs Range (Last 24H):         Significant Labs:  Lab Results   Component Value Date    WBC 8.72 06/23/2020     HGB 13.4 06/23/2020    HCT 42.9 06/23/2020     06/23/2020    CHOL 176 02/04/2019    TRIG 71 02/04/2019    HDL 43 02/04/2019    ALT 23 06/23/2020    AST 24 06/23/2020     (L) 06/23/2020    K 4.1 06/23/2020     06/23/2020    CREATININE 0.8 06/23/2020    BUN 25 (H) 06/23/2020    CO2 21 (L) 06/23/2020    TSH 0.762 02/04/2019    HGBA1C 5.2 05/10/2019       Diagnostic Studies: No relevant studies.    EKG:   Results for orders placed or performed during the hospital encounter of 01/23/20   SCHEDULED EKG 12-LEAD (to Muse)    Collection Time: 01/23/20  9:40 AM    Narrative    Test Reason : E66.01,Z68.43,Z01.818,    Vent. Rate : 080 BPM     Atrial Rate : 080 BPM     P-R Int : 134 ms          QRS Dur : 076 ms      QT Int : 362 ms       P-R-T Axes : 047 016 014 degrees     QTc Int : 417 ms    Normal sinus rhythm with sinus arrhythmia  Normal ECG  When compared with ECG of 04-FEB-2019 10:08,  Vent. rate has increased BY  26 BPM    Confirmed by LAVONNE ROSALES MD ([id number]) on 1/23/2020 11:16:26 AM    Referred By: ANTONIO MOSES           Confirmed By:LAVONNE ROSALES MD       2D ECHO:  TTE:  No results found for this or any previous visit.    DAVID:  No results found for this or any previous visit.    ASSESSMENT/PLAN:         Anesthesia Evaluation    I have reviewed the Patient Summary Reports.    I have reviewed the Nursing Notes.    I have reviewed the Medications.     Review of Systems  Anesthesia Hx:  No problems with previous Anesthesia    Hepatic/GI:   GERD Morbid obesity   Musculoskeletal:   OI       Physical Exam  General:  Well nourished, Morbid Obesity    Airway/Jaw/Neck:  Airway Findings: Mouth Opening: Normal Tongue: Normal  Mallampati: II  Improves to I with phonation.  TM Distance: Normal, at least 6 cm  Jaw/Neck Findings:  Neck ROM: Normal ROM     Eyes/Ears/Nose:  EYES/EARS/NOSE FINDINGS: Normal   Dental:  Dental Findings:    Chest/Lungs:  Chest/Lungs Findings: Normal Respiratory Rate      Heart/Vascular:  Heart Findings: Normal       Mental Status:  Mental Status Findings:  Cooperative, Alert and Oriented         Anesthesia Plan  Type of Anesthesia, risks & benefits discussed:  Anesthesia Type:  general  Patient's Preference:   Intra-op Monitoring Plan: standard ASA monitors  Intra-op Monitoring Plan Comments:   Post Op Pain Control Plan: multimodal analgesia, IV/PO Opioids PRN and per primary service following discharge from PACU  Post Op Pain Control Plan Comments:   Induction:   IV  Beta Blocker:  Patient is not currently on a Beta-Blocker (No further documentation required).       Informed Consent: Patient understands risks and agrees with Anesthesia plan.  Questions answered. Anesthesia consent signed with patient.  ASA Score: 2     Day of Surgery Review of History & Physical:    H&P update referred to the surgeon.         Ready For Surgery From Anesthesia Perspective.

## 2020-07-09 NOTE — PRE-PROCEDURE INSTRUCTIONS
PREOP INSTRUCTIONS:No solid food ,milk or milk products for 8 hours prior to procedure.Clear liquids are allowed up to 2 hours before procedure.Clear liquids are:water,apple juice,gatorade & powerade.Patient instructed to follow her surgeon's instructions if they differ from these.Shower instructions as well as directions to the Surgery Center were given.Patient encouraged to wear loose fitting,comfortable clothing.Medication instructions for pm prior to and am of procedure reviewed.Instructed patient to avoid taking vitamins,supplements,aspirin and ibuprofen the morning of surgery. Patient stated an understanding.Patient instructed to take temperature the night before surgery as well as the morning of surgery and to notify [facility] at [phone] if it is 100.4 or above.Patient also informed of the current visitor policy and advised patient that one visitor may accompany them into the hospital and wait (socially distanced) until a member of the medical team provides an update at the conclusion of the procedure.When they enter the hospital both patient and visitor will have their temperature checked.All visitors are asked to arrive with a mask and to keep their mask on throughout the visit.Each inpatient is allowed 1 visitor per day between the hours of 10am and 6pm.This visitor must remain in the patient's room and not gather in common areas such as waiting rooms or cafeterias.The visitor must be 18 years or older and arrive with a mask and keep the mask on throughout the visit.    Covid test completed 7/8/2020-Negative    Patient denies any side effects or issues with anesthesia or sedation.    Patient does not know arrival time.Explained that this information comes from the surgeon's office and if they haven't heard from them by 2 or 3 pm to call the office.Patient stated an understanding.

## 2020-07-09 NOTE — TELEPHONE ENCOUNTER
Notified patient of arrival time to the [facility] 2nd floor Surgery Center at 1000 with expected surgery start time 1200 on 7/10/2020.   Instructed patient regarding pre-op instructions including no protein shakes or sugar free clear liquids after midnight but can have a rare sip of water for comfort, showering and preop medications to hold/take per anesthesia/preop.  Instructed patient on the s/s of dehydration and for patient to call at the first sign of dehydration.  Informed patient that someone from bariatrics will call them 1 week post op to review diet/fluid intake and to ensure adequate hydration.    Reminded patient not to take antibiotics for 30 days following surgery or schedule elective procedures involving anesthesia/sedation for 30 days following surgery unless checking with the bariatric clinic first.  Pt verbalized understanding. Pt given office phone number for any additional questions/concerns.

## 2020-07-10 ENCOUNTER — HOSPITAL ENCOUNTER (INPATIENT)
Facility: HOSPITAL | Age: 35
LOS: 1 days | Discharge: HOME | DRG: 620 | End: 2020-07-11
Attending: SURGERY | Admitting: SURGERY
Payer: MEDICARE

## 2020-07-10 ENCOUNTER — ANESTHESIA (OUTPATIENT)
Dept: SURGERY | Facility: HOSPITAL | Age: 35
DRG: 620 | End: 2020-07-10
Payer: MEDICARE

## 2020-07-10 LAB
B-HCG UR QL: NEGATIVE
CTP QC/QA: YES

## 2020-07-10 PROCEDURE — 11000001 HC ACUTE MED/SURG PRIVATE ROOM: Mod: HCNC

## 2020-07-10 PROCEDURE — S0028 INJECTION, FAMOTIDINE, 20 MG: HCPCS | Mod: HCNC | Performed by: SURGERY

## 2020-07-10 PROCEDURE — 25000003 PHARM REV CODE 250: Mod: HCNC | Performed by: STUDENT IN AN ORGANIZED HEALTH CARE EDUCATION/TRAINING PROGRAM

## 2020-07-10 PROCEDURE — 43775 PR LAP, GAST RESTRICT PROC, LONGITUDINAL GASTRECTOMY: ICD-10-PCS | Mod: HCNC,,, | Performed by: SURGERY

## 2020-07-10 PROCEDURE — D9220A PRA ANESTHESIA: ICD-10-PCS | Mod: ,,, | Performed by: ANESTHESIOLOGY

## 2020-07-10 PROCEDURE — 63600175 PHARM REV CODE 636 W HCPCS: Mod: HCNC | Performed by: STUDENT IN AN ORGANIZED HEALTH CARE EDUCATION/TRAINING PROGRAM

## 2020-07-10 PROCEDURE — 43775 LAP SLEEVE GASTRECTOMY: CPT | Mod: HCNC,,, | Performed by: SURGERY

## 2020-07-10 PROCEDURE — 71000016 HC POSTOP RECOV ADDL HR: Mod: HCNC | Performed by: SURGERY

## 2020-07-10 PROCEDURE — 63600175 PHARM REV CODE 636 W HCPCS: Mod: HCNC | Performed by: SURGERY

## 2020-07-10 PROCEDURE — 37000009 HC ANESTHESIA EA ADD 15 MINS: Mod: HCNC | Performed by: SURGERY

## 2020-07-10 PROCEDURE — D9220A PRA ANESTHESIA: Mod: ,,, | Performed by: ANESTHESIOLOGY

## 2020-07-10 PROCEDURE — 37000008 HC ANESTHESIA 1ST 15 MINUTES: Mod: HCNC | Performed by: SURGERY

## 2020-07-10 PROCEDURE — 71000015 HC POSTOP RECOV 1ST HR: Mod: HCNC | Performed by: SURGERY

## 2020-07-10 PROCEDURE — 71000039 HC RECOVERY, EACH ADD'L HOUR: Mod: HCNC | Performed by: SURGERY

## 2020-07-10 PROCEDURE — 71000033 HC RECOVERY, INTIAL HOUR: Mod: HCNC | Performed by: SURGERY

## 2020-07-10 PROCEDURE — 25000003 PHARM REV CODE 250: Mod: HCNC | Performed by: SURGERY

## 2020-07-10 PROCEDURE — 63600175 PHARM REV CODE 636 W HCPCS: Mod: HCNC

## 2020-07-10 PROCEDURE — C9113 INJ PANTOPRAZOLE SODIUM, VIA: HCPCS | Mod: HCNC | Performed by: STUDENT IN AN ORGANIZED HEALTH CARE EDUCATION/TRAINING PROGRAM

## 2020-07-10 PROCEDURE — 36000710: Mod: HCNC | Performed by: SURGERY

## 2020-07-10 PROCEDURE — 27201423 OPTIME MED/SURG SUP & DEVICES STERILE SUPPLY: Mod: HCNC | Performed by: SURGERY

## 2020-07-10 PROCEDURE — 94761 N-INVAS EAR/PLS OXIMETRY MLT: CPT | Mod: HCNC

## 2020-07-10 PROCEDURE — 36000711: Mod: HCNC | Performed by: SURGERY

## 2020-07-10 RX ORDER — ONDANSETRON 2 MG/ML
4 INJECTION INTRAMUSCULAR; INTRAVENOUS ONCE
Status: COMPLETED | OUTPATIENT
Start: 2020-07-10 | End: 2020-07-10

## 2020-07-10 RX ORDER — ACETAMINOPHEN 10 MG/ML
1000 INJECTION, SOLUTION INTRAVENOUS ONCE
Status: ACTIVE | OUTPATIENT
Start: 2020-07-10 | End: 2020-07-11

## 2020-07-10 RX ORDER — SODIUM CHLORIDE 9 MG/ML
INJECTION, SOLUTION INTRAVENOUS CONTINUOUS
Status: DISCONTINUED | OUTPATIENT
Start: 2020-07-10 | End: 2020-07-11 | Stop reason: HOSPADM

## 2020-07-10 RX ORDER — HYDROMORPHONE HYDROCHLORIDE 1 MG/ML
0.2 INJECTION, SOLUTION INTRAMUSCULAR; INTRAVENOUS; SUBCUTANEOUS EVERY 5 MIN PRN
Status: DISCONTINUED | OUTPATIENT
Start: 2020-07-10 | End: 2020-07-10 | Stop reason: HOSPADM

## 2020-07-10 RX ORDER — ROCURONIUM BROMIDE 10 MG/ML
INJECTION, SOLUTION INTRAVENOUS
Status: DISCONTINUED | OUTPATIENT
Start: 2020-07-10 | End: 2020-07-10

## 2020-07-10 RX ORDER — ONDANSETRON 2 MG/ML
4 INJECTION INTRAMUSCULAR; INTRAVENOUS EVERY 6 HOURS PRN
Status: DISCONTINUED | OUTPATIENT
Start: 2020-07-10 | End: 2020-07-11

## 2020-07-10 RX ORDER — PROPOFOL 10 MG/ML
VIAL (ML) INTRAVENOUS
Status: DISCONTINUED | OUTPATIENT
Start: 2020-07-10 | End: 2020-07-10

## 2020-07-10 RX ORDER — MIDAZOLAM HYDROCHLORIDE 1 MG/ML
INJECTION, SOLUTION INTRAMUSCULAR; INTRAVENOUS
Status: DISCONTINUED | OUTPATIENT
Start: 2020-07-10 | End: 2020-07-10

## 2020-07-10 RX ORDER — HYDROMORPHONE HCL IN 0.9% NACL 6 MG/30 ML
PATIENT CONTROLLED ANALGESIA SYRINGE INTRAVENOUS CONTINUOUS
Status: DISCONTINUED | OUTPATIENT
Start: 2020-07-10 | End: 2020-07-11

## 2020-07-10 RX ORDER — ACETAMINOPHEN 500 MG
1000 TABLET ORAL
Status: COMPLETED | OUTPATIENT
Start: 2020-07-10 | End: 2020-07-10

## 2020-07-10 RX ORDER — NALOXONE HCL 0.4 MG/ML
0.02 VIAL (ML) INJECTION
Status: DISCONTINUED | OUTPATIENT
Start: 2020-07-10 | End: 2020-07-11

## 2020-07-10 RX ORDER — FENTANYL CITRATE 50 UG/ML
INJECTION, SOLUTION INTRAMUSCULAR; INTRAVENOUS
Status: DISCONTINUED | OUTPATIENT
Start: 2020-07-10 | End: 2020-07-10

## 2020-07-10 RX ORDER — ONDANSETRON 2 MG/ML
INJECTION INTRAMUSCULAR; INTRAVENOUS
Status: DISCONTINUED | OUTPATIENT
Start: 2020-07-10 | End: 2020-07-10

## 2020-07-10 RX ORDER — BUPIVACAINE HYDROCHLORIDE 2.5 MG/ML
INJECTION, SOLUTION EPIDURAL; INFILTRATION; INTRACAUDAL
Status: DISCONTINUED | OUTPATIENT
Start: 2020-07-10 | End: 2020-07-10 | Stop reason: HOSPADM

## 2020-07-10 RX ORDER — ENOXAPARIN SODIUM 100 MG/ML
40 INJECTION SUBCUTANEOUS
Status: DISCONTINUED | OUTPATIENT
Start: 2020-07-10 | End: 2020-07-11 | Stop reason: HOSPADM

## 2020-07-10 RX ORDER — PANTOPRAZOLE SODIUM 40 MG/10ML
40 INJECTION, POWDER, LYOPHILIZED, FOR SOLUTION INTRAVENOUS 2 TIMES DAILY
Status: DISCONTINUED | OUTPATIENT
Start: 2020-07-10 | End: 2020-07-11 | Stop reason: HOSPADM

## 2020-07-10 RX ORDER — ONDANSETRON 2 MG/ML
INJECTION INTRAMUSCULAR; INTRAVENOUS
Status: COMPLETED
Start: 2020-07-10 | End: 2020-07-10

## 2020-07-10 RX ORDER — SUCCINYLCHOLINE CHLORIDE 20 MG/ML
INJECTION INTRAMUSCULAR; INTRAVENOUS
Status: DISCONTINUED | OUTPATIENT
Start: 2020-07-10 | End: 2020-07-10

## 2020-07-10 RX ORDER — METOCLOPRAMIDE HYDROCHLORIDE 5 MG/ML
10 INJECTION INTRAMUSCULAR; INTRAVENOUS ONCE
Status: COMPLETED | OUTPATIENT
Start: 2020-07-10 | End: 2020-07-10

## 2020-07-10 RX ORDER — FENTANYL CITRATE 50 UG/ML
25 INJECTION, SOLUTION INTRAMUSCULAR; INTRAVENOUS EVERY 5 MIN PRN
Status: DISCONTINUED | OUTPATIENT
Start: 2020-07-10 | End: 2020-07-10 | Stop reason: HOSPADM

## 2020-07-10 RX ORDER — SODIUM CITRATE AND CITRIC ACID MONOHYDRATE 334; 500 MG/5ML; MG/5ML
15 SOLUTION ORAL ONCE
Status: COMPLETED | OUTPATIENT
Start: 2020-07-10 | End: 2020-07-10

## 2020-07-10 RX ORDER — CLINDAMYCIN PHOSPHATE 900 MG/50ML
900 INJECTION, SOLUTION INTRAVENOUS
Status: COMPLETED | OUTPATIENT
Start: 2020-07-10 | End: 2020-07-10

## 2020-07-10 RX ORDER — SODIUM CHLORIDE 0.9 % (FLUSH) 0.9 %
10 SYRINGE (ML) INJECTION
Status: DISCONTINUED | OUTPATIENT
Start: 2020-07-10 | End: 2020-07-10 | Stop reason: HOSPADM

## 2020-07-10 RX ORDER — SODIUM CHLORIDE 9 MG/ML
INJECTION, SOLUTION INTRAVENOUS CONTINUOUS PRN
Status: DISCONTINUED | OUTPATIENT
Start: 2020-07-10 | End: 2020-07-10

## 2020-07-10 RX ORDER — LIDOCAINE HYDROCHLORIDE 20 MG/ML
INJECTION INTRAVENOUS
Status: DISCONTINUED | OUTPATIENT
Start: 2020-07-10 | End: 2020-07-10

## 2020-07-10 RX ORDER — FAMOTIDINE 10 MG/ML
20 INJECTION INTRAVENOUS ONCE
Status: COMPLETED | OUTPATIENT
Start: 2020-07-10 | End: 2020-07-10

## 2020-07-10 RX ORDER — HALOPERIDOL 5 MG/ML
1 INJECTION INTRAMUSCULAR
Status: DISCONTINUED | OUTPATIENT
Start: 2020-07-10 | End: 2020-07-10 | Stop reason: HOSPADM

## 2020-07-10 RX ORDER — SCOLOPAMINE TRANSDERMAL SYSTEM 1 MG/1
1 PATCH, EXTENDED RELEASE TRANSDERMAL
Status: DISCONTINUED | OUTPATIENT
Start: 2020-07-10 | End: 2020-07-11 | Stop reason: HOSPADM

## 2020-07-10 RX ORDER — PHENYLEPHRINE HYDROCHLORIDE 10 MG/ML
INJECTION INTRAVENOUS
Status: DISCONTINUED | OUTPATIENT
Start: 2020-07-10 | End: 2020-07-10

## 2020-07-10 RX ORDER — DEXAMETHASONE SODIUM PHOSPHATE 4 MG/ML
INJECTION, SOLUTION INTRA-ARTICULAR; INTRALESIONAL; INTRAMUSCULAR; INTRAVENOUS; SOFT TISSUE
Status: DISCONTINUED | OUTPATIENT
Start: 2020-07-10 | End: 2020-07-10

## 2020-07-10 RX ORDER — KETAMINE HCL IN 0.9 % NACL 50 MG/5 ML
SYRINGE (ML) INTRAVENOUS
Status: DISCONTINUED | OUTPATIENT
Start: 2020-07-10 | End: 2020-07-10

## 2020-07-10 RX ORDER — MIDAZOLAM HYDROCHLORIDE 1 MG/ML
0.5 INJECTION INTRAMUSCULAR; INTRAVENOUS
Status: DISCONTINUED | OUTPATIENT
Start: 2020-07-10 | End: 2020-07-10 | Stop reason: HOSPADM

## 2020-07-10 RX ORDER — HEPARIN SODIUM 5000 [USP'U]/ML
5000 INJECTION, SOLUTION INTRAVENOUS; SUBCUTANEOUS ONCE
Status: COMPLETED | OUTPATIENT
Start: 2020-07-10 | End: 2020-07-10

## 2020-07-10 RX ADMIN — SCOPALAMINE 1 PATCH: 1 PATCH, EXTENDED RELEASE TRANSDERMAL at 11:07

## 2020-07-10 RX ADMIN — ROCURONIUM BROMIDE 30 MG: 10 INJECTION, SOLUTION INTRAVENOUS at 12:07

## 2020-07-10 RX ADMIN — CLINDAMYCIN PHOSPHATE 900 MG: 900 INJECTION INTRAVENOUS at 12:07

## 2020-07-10 RX ADMIN — ONDANSETRON 4 MG: 2 INJECTION, SOLUTION INTRAMUSCULAR; INTRAVENOUS at 01:07

## 2020-07-10 RX ADMIN — PHENYLEPHRINE HYDROCHLORIDE 200 MCG: 10 INJECTION INTRAVENOUS at 12:07

## 2020-07-10 RX ADMIN — ROCURONIUM BROMIDE 20 MG: 10 INJECTION, SOLUTION INTRAVENOUS at 12:07

## 2020-07-10 RX ADMIN — SODIUM CITRATE AND CITRIC ACID MONOHYDRATE 15 ML: 500; 334 SOLUTION ORAL at 10:07

## 2020-07-10 RX ADMIN — ONDANSETRON 4 MG: 2 INJECTION INTRAMUSCULAR; INTRAVENOUS at 02:07

## 2020-07-10 RX ADMIN — HALOPERIDOL LACTATE 1 MG: 5 INJECTION, SOLUTION INTRAMUSCULAR at 01:07

## 2020-07-10 RX ADMIN — PHENYLEPHRINE HYDROCHLORIDE 100 MCG: 10 INJECTION INTRAVENOUS at 12:07

## 2020-07-10 RX ADMIN — HYDROMORPHONE HYDROCHLORIDE 0.2 MG: 1 INJECTION, SOLUTION INTRAMUSCULAR; INTRAVENOUS; SUBCUTANEOUS at 01:07

## 2020-07-10 RX ADMIN — ACETAMINOPHEN 1000 MG: 500 TABLET ORAL at 11:07

## 2020-07-10 RX ADMIN — Medication 30 MG: at 11:07

## 2020-07-10 RX ADMIN — PANTOPRAZOLE SODIUM 40 MG: 40 INJECTION, POWDER, FOR SOLUTION INTRAVENOUS at 08:07

## 2020-07-10 RX ADMIN — PROPOFOL 140 MG: 10 INJECTION, EMULSION INTRAVENOUS at 11:07

## 2020-07-10 RX ADMIN — SUCCINYLCHOLINE CHLORIDE 120 MG: 20 INJECTION, SOLUTION INTRAMUSCULAR; INTRAVENOUS at 11:07

## 2020-07-10 RX ADMIN — METOCLOPRAMIDE 10 MG: 5 INJECTION, SOLUTION INTRAMUSCULAR; INTRAVENOUS at 10:07

## 2020-07-10 RX ADMIN — SUGAMMADEX 200 MG: 100 INJECTION, SOLUTION INTRAVENOUS at 01:07

## 2020-07-10 RX ADMIN — Medication 10 MG: at 12:07

## 2020-07-10 RX ADMIN — SODIUM CHLORIDE: 0.9 INJECTION, SOLUTION INTRAVENOUS at 11:07

## 2020-07-10 RX ADMIN — LIDOCAINE HYDROCHLORIDE 100 MG: 20 INJECTION, SOLUTION INTRAVENOUS at 11:07

## 2020-07-10 RX ADMIN — MIDAZOLAM HYDROCHLORIDE 2 MG: 1 INJECTION, SOLUTION INTRAMUSCULAR; INTRAVENOUS at 11:07

## 2020-07-10 RX ADMIN — DEXAMETHASONE SODIUM PHOSPHATE 4 MG: 4 INJECTION, SOLUTION INTRAMUSCULAR; INTRAVENOUS at 12:07

## 2020-07-10 RX ADMIN — SODIUM CHLORIDE: 0.9 INJECTION, SOLUTION INTRAVENOUS at 01:07

## 2020-07-10 RX ADMIN — FAMOTIDINE 20 MG: 10 INJECTION INTRAVENOUS at 10:07

## 2020-07-10 RX ADMIN — HYDROMORPHONE HYDROCHLORIDE 0.2 MG: 1 INJECTION, SOLUTION INTRAMUSCULAR; INTRAVENOUS; SUBCUTANEOUS at 02:07

## 2020-07-10 RX ADMIN — Medication: at 02:07

## 2020-07-10 RX ADMIN — FENTANYL CITRATE 100 MCG: 50 INJECTION, SOLUTION INTRAMUSCULAR; INTRAVENOUS at 11:07

## 2020-07-10 RX ADMIN — HEPARIN SODIUM 5000 UNITS: 5000 INJECTION INTRAVENOUS; SUBCUTANEOUS at 10:07

## 2020-07-10 RX ADMIN — ENOXAPARIN SODIUM 40 MG: 40 INJECTION SUBCUTANEOUS at 04:07

## 2020-07-10 NOTE — OP NOTE
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]  Vitamin D insufficiency [E55.9]  Gastroesophageal reflux disease, esophagitis presence not specified [K21.9]  Other long term (current) drug therapy [Z79.899]  Morbid obesity with BMI of 50.0-59.9, adult [E66.01, Z68.43]    POST OP DIAGNOSIS: Morbid obesity [E66.01]  Nutritional counseling [Z71.3]  Sequelae of protein-calorie malnutrition [E64.0]  Vitamin D insufficiency [E55.9]  Gastroesophageal reflux disease, esophagitis presence not specified [K21.9]  Other long term (current) drug therapy [Z79.899]  Morbid obesity with BMI of 50.0-59.9, adult [E66.01, Z68.43]    PROCEDURE: Procedure(s) (LRB):  GASTROENTEROSTOMY, LAPAROSCOPIC, with intraop EGD (N/A)    Surgeon(s) and Role:     * Guillermo Villanueva MD - Primary     * Guillermo Bautista MD - Resident - AssistingANESTHESIA: General.     INDICATIONS: A 34 y.o. with 1. Morbid Obesity with Body mass index is 50.52 kg/m². and difficulty with weight loss.  2. Co-morbidities: OI  3. PHQ9 13     PROCEDURE IN DETAIL: The patient was placed under general anesthesia. The   abdomen was prepped and draped in the usual manner. Access to peritoneum was   gained through the umbilicus using Optiview trocar under direct vision.   Pneumoperitoneum to 15 mmHg with CO2 gas was obtained. Four 5 mm trocars were   placed subcostally at midclavicular and anterior axial lines   bilaterally. A 10 mm trocar was placed one handbreadth caudad to the right   midclavicular trocar. The small bowel was divided 50 cm from ligament of   Treitz, 100 cm Symone limb was measured out. The limbs were attached with each   other with an Endo-POLLO. The remaining enterotomy was closed with Endo-POLLO.   Heel stitch was placed and a Brolin stitch was placed to close the mesenteric   Defect.  A liver retractor was   placed. The lesser sac was incised  with a Harmonic scalpel. This was taken to   a point lesser curve approximately 5 cm from the lower esophageal sphincter.   The stomach was divided from this point to the angle of His using Endo-POLLO. The   Symone limb was attached proximal stomach pouch with a running Endostitch and   enterotomy made in each limb. An 11 mm gastrojejunostomy made with Endo-POLLO.   The endoscope was placed across the enterostomy. The remaining enterotomy was   closed with a running Endostitch using endoscope as a sizing stent.  A second   layer of running Endostitch was used to complete the anastomosis. A bowel clamp   was placed and air insufflated through   the endoscope to check for air leaks. No air leaks were seen. The anastomosis   and stomach appeared of appropriate size and configuration. Air was aspirated from   the endoscope and the endoscope was withdrawn. The fluid was removed from the   upper abdomen. The abdomen was inspected for hemostasis. The liver retractor was removed. Trocars removed under   direct vision. Prior to removing the last trocar, the pneumoperitoneum was   allowed to escape. The fascia of the primary trochar site was closed with 0 vicryl. The skin was infiltrated with Marcaine solution, closed with   4-0 plain catgut, and reinforced with Dermabond.   The patient tolerated procedure well and was brought to Recovery Room in stable   condition. Sponge and needle counts were correct at the end of the case.    Blood loss was min, complications were none, consent was obtained and pathology was none

## 2020-07-10 NOTE — BRIEF OP NOTE
[facility]  Brief Operative Note    SUMMARY     Surgery Date: 7/10/2020     Surgeon(s) and Role:     * Guillermo Villanueva MD - Primary     * Guillermo Bautista MD - Resident - Assisting        Pre-op Diagnosis:  Morbid obesity [E66.01]  Nutritional counseling [Z71.3]  Sequelae of protein-calorie malnutrition [E64.0]  Vitamin D insufficiency [E55.9]  Gastroesophageal reflux disease, esophagitis presence not specified [K21.9]  Other long term (current) drug therapy [Z79.899]  Morbid obesity with BMI of 50.0-59.9, adult [E66.01, Z68.43]    Post-op Diagnosis:  Post-Op Diagnosis Codes:     * Morbid obesity [E66.01]     * Nutritional counseling [Z71.3]     * Sequelae of protein-calorie malnutrition [E64.0]     * Vitamin D insufficiency [E55.9]     * Gastroesophageal reflux disease, esophagitis presence not specified [K21.9]     * Other long term (current) drug therapy [Z79.899]     * Morbid obesity with BMI of 50.0-59.9, adult [E66.01, Z68.43]    Procedure(s) (LRB):  GASTROENTEROSTOMY, LAPAROSCOPIC, with intraop EGD (N/A)    Anesthesia: General    Description of Procedure: Laparoscopic Symone en Y Gastric Bypass    Description of the findings of the procedure: GJ anastomosis negative leak test, JJ anastomosis patent, 100 cm Symone limb    Estimated Blood Loss: 10 cc         Specimens:     None

## 2020-07-10 NOTE — TRANSFER OF CARE
"Anesthesia Transfer of Care Note    Patient: Prisca Zhu    Procedure(s) Performed: Procedure(s) (LRB):  GASTROENTEROSTOMY, LAPAROSCOPIC, with intraop EGD (N/A)    Patient location: PACU    Anesthesia Type: general    Transport from OR: Transported from OR on 6-10 L/min O2 by face mask with adequate spontaneous ventilation    Post pain: adequate analgesia    Post assessment: no apparent anesthetic complications    Post vital signs: stable    Level of consciousness: awake    Nausea/Vomiting: no nausea/vomiting    Complications: none    Transfer of care protocol was followed      Last vitals:   Visit Vitals  /78 (BP Location: Right arm, Patient Position: Lying)   Pulse 91   Temp 36.5 °C (97.7 °F) (Temporal)   Resp 20   Ht 4' 9" (1.448 m)   Wt 111.1 kg (244 lb 14.9 oz)   LMP 06/10/2020 (Exact Date)   SpO2 99%   Breastfeeding No   BMI 53.00 kg/m²     "

## 2020-07-10 NOTE — NURSING
Pt arrived on unit, ambulated to restroom and voided, c/o nausea subsided, pain controlled w/ PCA.

## 2020-07-10 NOTE — ANESTHESIA PROCEDURE NOTES
Intubation  Performed by: Paul Neri MD  Authorized by: Ney Cuevas MD     Intubation:     Induction:  Intravenous    Intubated:  Postinduction    Mask Ventilation:  Easy mask    Attempts:  1    Attempted By:  Resident anesthesiologist    Method of Intubation:  Direct    Blade:  Sarabia 2    Laryngeal View Grade: Grade I - full view of chords      Difficult Airway Encountered?: No      Complications:  None    Airway Device:  Oral endotracheal tube    Airway Device Size:  7.0    Style/Cuff Inflation:  Cuffed (inflated to minimal occlusive pressure)    Inflation Amount (mL):  6    Tube secured:  21    Secured at:  The teeth    Placement Verified By:  Capnometry    Complicating Factors:  None and obesity    Findings Post-Intubation:  BS equal bilateral and atraumatic/condition of teeth unchanged

## 2020-07-10 NOTE — INTERVAL H&P NOTE
The patient has been examined and the H&P has been reviewed:    I concur with the findings and no changes have occurred since H&P was written.    No significant changes since office visit.  CTA  RRR    Anesthesia/Surgery risks, benefits and alternative options discussed and understood by patient/family.          Active Hospital Problems    Diagnosis  POA    BMI 50.0-59.9, adult [Z68.43]  Not Applicable      Resolved Hospital Problems   No resolved problems to display.

## 2020-07-10 NOTE — NURSING TRANSFER
Nursing Transfer Note      7/10/2020     Transfer To: 524    Transfer via bed    Transfer with linnea, pca    Transported by transport    Medicines sent: no    Chart send with patient: Yes    Notified: texted family

## 2020-07-10 NOTE — ANESTHESIA POSTPROCEDURE EVALUATION
Anesthesia Post Evaluation    Patient: Prisca Zhu    Procedure(s) Performed: Procedure(s) (LRB):  GASTROENTEROSTOMY, LAPAROSCOPIC, with intraop EGD (N/A)    Final Anesthesia Type: general    Patient location during evaluation: PACU  Patient participation: Yes- Able to Participate  Level of consciousness: awake and alert and oriented  Post-procedure vital signs: reviewed and stable  Pain management: adequate  Airway patency: patent    PONV status at discharge: No PONV  Anesthetic complications: no      Cardiovascular status: blood pressure returned to baseline  Respiratory status: unassisted, room air and spontaneous ventilation  Hydration status: euvolemic  Follow-up not needed.          Vitals Value Taken Time   /71 07/10/20 1446   Temp 36.6 °C (97.8 °F) 07/10/20 1430   Pulse 80 07/10/20 1450   Resp 22 07/10/20 1450   SpO2 97 % 07/10/20 1450   Vitals shown include unvalidated device data.      No case tracking events are documented in the log.      Pain/Blake Score: Pain Rating Prior to Med Admin: 7 (7/10/2020  2:03 PM)  Pain Rating Post Med Admin: 9 (7/10/2020  1:45 PM)  Blake Score: 8 (7/10/2020  2:30 PM)

## 2020-07-11 VITALS
TEMPERATURE: 97 F | DIASTOLIC BLOOD PRESSURE: 80 MMHG | SYSTOLIC BLOOD PRESSURE: 130 MMHG | OXYGEN SATURATION: 100 % | HEART RATE: 60 BPM | RESPIRATION RATE: 16 BRPM | HEIGHT: 57 IN | WEIGHT: 244.94 LBS | BODY MASS INDEX: 52.84 KG/M2

## 2020-07-11 LAB
ANION GAP SERPL CALC-SCNC: 8 MMOL/L (ref 8–16)
BASOPHILS # BLD AUTO: 0.01 K/UL (ref 0–0.2)
BASOPHILS NFR BLD: 0.1 % (ref 0–1.9)
BUN SERPL-MCNC: 8 MG/DL (ref 6–20)
CALCIUM SERPL-MCNC: 8.4 MG/DL (ref 8.7–10.5)
CHLORIDE SERPL-SCNC: 107 MMOL/L (ref 95–110)
CO2 SERPL-SCNC: 21 MMOL/L (ref 23–29)
CREAT SERPL-MCNC: 0.8 MG/DL (ref 0.5–1.4)
DIFFERENTIAL METHOD: ABNORMAL
EOSINOPHIL # BLD AUTO: 0 K/UL (ref 0–0.5)
EOSINOPHIL NFR BLD: 0 % (ref 0–8)
ERYTHROCYTE [DISTWIDTH] IN BLOOD BY AUTOMATED COUNT: 14.2 % (ref 11.5–14.5)
EST. GFR  (AFRICAN AMERICAN): >60 ML/MIN/1.73 M^2
EST. GFR  (NON AFRICAN AMERICAN): >60 ML/MIN/1.73 M^2
GLUCOSE SERPL-MCNC: 86 MG/DL (ref 70–110)
HCT VFR BLD AUTO: 38.8 % (ref 37–48.5)
HGB BLD-MCNC: 12.4 G/DL (ref 12–16)
IMM GRANULOCYTES # BLD AUTO: 0.03 K/UL (ref 0–0.04)
IMM GRANULOCYTES NFR BLD AUTO: 0.3 % (ref 0–0.5)
LYMPHOCYTES # BLD AUTO: 1.4 K/UL (ref 1–4.8)
LYMPHOCYTES NFR BLD: 12 % (ref 18–48)
MAGNESIUM SERPL-MCNC: 1.9 MG/DL (ref 1.6–2.6)
MCH RBC QN AUTO: 28.4 PG (ref 27–31)
MCHC RBC AUTO-ENTMCNC: 32 G/DL (ref 32–36)
MCV RBC AUTO: 89 FL (ref 82–98)
MONOCYTES # BLD AUTO: 0.9 K/UL (ref 0.3–1)
MONOCYTES NFR BLD: 7.3 % (ref 4–15)
NEUTROPHILS # BLD AUTO: 9.3 K/UL (ref 1.8–7.7)
NEUTROPHILS NFR BLD: 80.3 % (ref 38–73)
NRBC BLD-RTO: 0 /100 WBC
PHOSPHATE SERPL-MCNC: 2.8 MG/DL (ref 2.7–4.5)
PLATELET # BLD AUTO: 254 K/UL (ref 150–350)
PMV BLD AUTO: 11 FL (ref 9.2–12.9)
POTASSIUM SERPL-SCNC: 4 MMOL/L (ref 3.5–5.1)
RBC # BLD AUTO: 4.37 M/UL (ref 4–5.4)
SODIUM SERPL-SCNC: 136 MMOL/L (ref 136–145)
WBC # BLD AUTO: 11.62 K/UL (ref 3.9–12.7)

## 2020-07-11 PROCEDURE — 94770 HC EXHALED C02 TEST: CPT | Mod: HCNC

## 2020-07-11 PROCEDURE — 83735 ASSAY OF MAGNESIUM: CPT | Mod: HCNC

## 2020-07-11 PROCEDURE — C9113 INJ PANTOPRAZOLE SODIUM, VIA: HCPCS | Mod: HCNC | Performed by: STUDENT IN AN ORGANIZED HEALTH CARE EDUCATION/TRAINING PROGRAM

## 2020-07-11 PROCEDURE — 84100 ASSAY OF PHOSPHORUS: CPT | Mod: HCNC

## 2020-07-11 PROCEDURE — 80048 BASIC METABOLIC PNL TOTAL CA: CPT | Mod: HCNC

## 2020-07-11 PROCEDURE — 99900035 HC TECH TIME PER 15 MIN (STAT): Mod: HCNC

## 2020-07-11 PROCEDURE — 63600175 PHARM REV CODE 636 W HCPCS: Mod: HCNC | Performed by: STUDENT IN AN ORGANIZED HEALTH CARE EDUCATION/TRAINING PROGRAM

## 2020-07-11 PROCEDURE — 85025 COMPLETE CBC W/AUTO DIFF WBC: CPT | Mod: HCNC

## 2020-07-11 PROCEDURE — 36415 COLL VENOUS BLD VENIPUNCTURE: CPT | Mod: HCNC

## 2020-07-11 RX ORDER — ONDANSETRON 2 MG/ML
8 INJECTION INTRAMUSCULAR; INTRAVENOUS EVERY 6 HOURS
Status: DISCONTINUED | OUTPATIENT
Start: 2020-07-11 | End: 2020-07-11 | Stop reason: HOSPADM

## 2020-07-11 RX ORDER — HYDROCODONE BITARTRATE AND ACETAMINOPHEN 7.5; 325 MG/15ML; MG/15ML
15 SOLUTION ORAL EVERY 4 HOURS PRN
Status: DISCONTINUED | OUTPATIENT
Start: 2020-07-11 | End: 2020-07-11 | Stop reason: HOSPADM

## 2020-07-11 RX ADMIN — PANTOPRAZOLE SODIUM 40 MG: 40 INJECTION, POWDER, FOR SOLUTION INTRAVENOUS at 08:07

## 2020-07-11 RX ADMIN — ENOXAPARIN SODIUM 40 MG: 40 INJECTION SUBCUTANEOUS at 04:07

## 2020-07-11 RX ADMIN — ONDANSETRON 4 MG: 2 INJECTION INTRAMUSCULAR; INTRAVENOUS at 04:07

## 2020-07-11 NOTE — PROGRESS NOTES
Discharge Home: pt discharged home alert and oriented x4, skin warm to touch, denies any nausea and vomiting, tolerated diet well.Pt verbalized understanding of discharge teaching.

## 2020-07-11 NOTE — RESPIRATORY THERAPY
Rapid Response Respiratory Therapy ETCO2 Check     Time of visit: 075    Code Status: Prior   : 1985  Bed: 524/524 A:   MRN: [id number]    SITUATION     Evaluated patient for: ETCO2 Compliance     BACKGROUND     Patient has a past medical history of Anemia, BMI 50.0-59.9, adult, Encounter for IUD removal, GERD (gastroesophageal reflux disease), History of blood transfusion, Malpositioned intrauterine device, Obesity, OI (osteogenesis imperfecta), Osteopetrosis, and Tendonitis.    ASSESSMENT     Is the ETCO2 monitor on? (Yes/No)  yes   Is the patient wearing a cannula? (Yes/No) yes  Are ETCO2 orders placed? (Yes/No) yes  Is the patient on a PCA pump? (Yes/No) yes  ETCO2 monitored: ETCO2 (mmHg): 33 mmHg  O2 Device/Concentration:   Pulse:  Respiratory rate: Temperature: Temp: 97.2 °F (36.2 °C) BP: BP: (!) 145/89 SpO2:   Level of Consciousness: Level of Consciousness (AVPU): alert  All Lung Field Breath Sounds:    Ambu at bedside:      INTERVENTIONS/RECOMMENDATIONS         PHYSICIAN ESCALATION     Physician Escalation (Yes/No):      Care discussed with:   Discussed plan of care primary RT,      FOLLOW-UP     Please call back the Rapid Response RT, Tootie Slater, RRT at x [phone] for any questions or concerns.

## 2020-07-11 NOTE — PLAN OF CARE
Pt AAOx4 and VSS. Pt is progressing with plan of care. Free of skin breakdown as the pt positioned/repositioned well independently. Clean, dry, and intact dressing noted on abdomen. SCDs in place at all times. Incentive spirometer at bedside and pt instructed on its use. Pain controlled well with Hydromorphone. Frequent rounds made to assess pain and safety and no complaints at this time noted. Side rails up x 2. Bed locked. Call light within reach. No falls noted. Will continue to monitor.

## 2020-07-11 NOTE — PLAN OF CARE
Pharmacy: [facility] [place] [address]  [phone]    Payor: [employer] ([id number])    PCP: Mathew Carpenter MD    Emergency Contact: Richard Alvarez; [phone]    SW met with pt at bedside to complete assessment. Alert. Oriented. Denied hx of dialysis tx and coumadin clinic visits. Stated no use of DME prior to admit. Richard functions as CG if/when needed. Stated will have ride upon d/c.    No further needs at time of assessment.       07/11/20 1149   Discharge Assessment   Assessment Type Discharge Planning Assessment   Confirmed/corrected address and phone number on facesheet? Yes   Assessment information obtained from? Patient   Communicated expected length of stay with patient/caregiver yes  (not yet determined)   Prior to hospitilization cognitive status: Alert/Oriented   Prior to hospitalization functional status: Independent   Current cognitive status: Alert/Oriented   Current Functional Status: Independent   Facility Arrived From: home   Lives With alone   Able to Return to Prior Arrangements yes   Is patient able to care for self after discharge? Unable to determine at this time (comments)   Who are your caregiver(s) and their phone number(s)? Richard Carrillo; [place]; [phone]   Patient's perception of discharge disposition home or selfcare   Readmission Within the Last 30 Days unable to assess   Patient currently being followed by outpatient case management? Unable to determine (comments)   Patient currently receives any other outside agency services? No   Equipment Currently Used at Home none   Do you have any problems affording any of your prescribed medications? No   Is the patient taking medications as prescribed? yes   Does the patient have transportation home? Yes   Transportation Anticipated family or friend will provide   Dialysis Name and Scheduled days n/a   Does the patient receive services at the Coumadin Clinic? No   Discharge Plan A Home with family   Discharge  Plan B Home   DME Needed Upon Discharge  none   Patient/Family in Agreement with Plan yes       Marla Hernandez LMSW  Case Management Social Worker   [facility]

## 2020-07-11 NOTE — DISCHARGE SUMMARY
[facility]  DISCHARGE SUMMARY  General Surgery      Admit Date:  7/10/2020    Discharge Date and Time:  7/11/2020  12:00 PM    Attending Physician:  Guillermo Villanueva MD     Discharge Provider:  Oseas Ji MD     Reason for Admission:  BMI 50.0-59.9, adult     Procedures Performed:  Procedure(s) (LRB):  GASTROENTEROSTOMY, LAPAROSCOPIC, with intraop EGD (N/A)    Hospital Course:  Please see the preoperative H&P and other available documentation for full details related to history prior to this admission.  Briefly, Prisca Zhu is a 34 y.o. female who was admitted following scheduled elective surgery for BMI 50.0-59.9, adult    Following a complete preoperative discussion of the risks and benefits of surgery with signed informed consent, the patient was taken to the operating room on 7/10/2020 and underwent the above stated procedures.  The patient tolerated surgery well and there were no complications.  Please see the operative report for full intraoperative findings and details.  Postoperatively, the patient did well and was transferred from the PACU to the floor in stable condition where they had a stable and uncomplicated hospital course.  Labs and vital signs remained stable and appropriate throughout course.  Diet was advanced as tolerated and the patient's pain was controlled on oral pain medications without problem.  Currently, the patient is doing well at 1 Day Post-Op and is stable and appropriate for discharge home at this time.      Consults:  None.    Significant Diagnostic Studies:   Recent Labs   Lab 07/11/20  0501   WBC 11.62   HGB 12.4   HCT 38.8        Recent Labs   Lab 07/11/20  0501      K 4.0      CO2 21*   BUN 8   CREATININE 0.8   GLU 86   CALCIUM 8.4*   MG 1.9   PHOS 2.8   No results for input(s): INR, PTT, LABHEPA, LACTATE, TROPONINI, CPK, CPKMB, MB, BNP in the last 72 hours.No results for input(s): PH, PCO2, PO2, HCO3 in the last 72  hours.      Final Diagnoses:   Principal Problem:  BMI 50.0-59.9, adult   Secondary Diagnoses:    Active Hospital Problems    Diagnosis  POA    *BMI 50.0-59.9, adult [Z68.43]  Not Applicable      Resolved Hospital Problems   No resolved problems to display.       Discharged Condition:  Good    Disposition:  Home or Self Care    Follow Up/Patient Instructions:     Medications:  Reconciled Home Medications:    Current Discharge Medication List      CONTINUE these medications which have NOT CHANGED    Details   CALCIUM ORAL Take by mouth every morning.       cyanocobalamin (VITAMIN B-12) 100 MCG tablet Take 100 mcg by mouth every morning.       drospirenone-ethinyl estradiol (ROSALBA) 3-0.02 mg per tablet Take 1 tablet by mouth once daily.  Qty: 84 tablet, Refills: 3    Associated Diagnoses: Encounter for surveillance of contraceptive pills      ergocalciferol (ERGOCALCIFEROL) 50,000 unit Cap TAKE 1 CAPSULE BY MOUTH EVERY 7 DAYS  Qty: 4 capsule, Refills: 2    Associated Diagnoses: Vitamin D deficiency      fish oil-omega-3 fatty acids 300-1,000 mg capsule Take by mouth every morning.       hydrocodone-acetaminophen (HYCET) solution 7.5-325 mg/15mL Take 15 mLs by mouth 4 (four) times daily as needed for Pain.  Qty: 200 mL, Refills: 0    Comments: Quantity prescribed more than 7 day supply? No      ibuprofen (ADVIL,MOTRIN) 800 MG tablet Take 1 tablet (800 mg total) by mouth every 8 (eight) hours as needed for Pain.  Qty: 40 tablet, Refills: 0    Associated Diagnoses: Encounter for IUD removal      omeprazole (PRILOSEC) 20 MG capsule TAKE 1 CAPSULE(20 MG) BY MOUTH EVERY DAY  Qty: 90 capsule, Refills: 0    Associated Diagnoses: Gastroesophageal reflux disease, esophagitis presence not specified      ondansetron (ZOFRAN) 4 MG tablet Take 1 tablet (4 mg total) by mouth every 6 (six) hours as needed for Nausea.  Qty: 30 tablet, Refills: 0      polyethylene glycol (GLYCOLAX) 17 gram/dose powder Dissolve one capful (17 g) in  liquid and take by mouth once daily.  Qty: 510 g, Refills: 3      ursodioL (COSME FORTE) 500 MG tablet Crush one tablet daily for gall bladder. Please compound into liquid and supply for 90 days if insurance approves  Qty: 90 tablet, Refills: 1           Discharge Procedure Orders   Diet clear liquid     Lifting restrictions     Notify your health care provider if you experience any of the following:  temperature >100.4     Notify your health care provider if you experience any of the following:  persistent nausea and vomiting or diarrhea     Notify your health care provider if you experience any of the following:  severe uncontrolled pain     Notify your health care provider if you experience any of the following:  redness, tenderness, or signs of infection (pain, swelling, redness, odor or green/yellow discharge around incision site)     Notify your health care provider if you experience any of the following:  difficulty breathing or increased cough     Activity as tolerated     Follow-up Information     [facility] - Bariatric Surgery In 2 weeks.    Specialty: Bariatrics  Contact information:  [address] [facility]  [facility] [address] [phone]  Additional information:  [facility], 2nd Floor                 Oseas Ji MD

## 2020-07-14 ENCOUNTER — TELEPHONE (OUTPATIENT)
Dept: BARIATRICS | Facility: CLINIC | Age: 35
End: 2020-07-14

## 2020-07-14 NOTE — TELEPHONE ENCOUNTER
Phoned number and pressed 4 for [facility]. Spoke to Davin who took my direct number for [facility] to call me back regarding Procedure authorization date.

## 2020-07-14 NOTE — TELEPHONE ENCOUNTER
----- Message from Guillermo Villanueva MD sent at 7/14/2020  2:36 PM CDT -----  Regarding: RE: Procedure authorization dates  Contact: N[facility]u mishel/[facility] [phone]  Davina, please help with this.  ----- Message -----  From: Parisa Gaspar  Sent: 7/14/2020  12:16 PM CDT  To: Guillermo Villanueva MD  Subject: Procedure authorization dates                    Calling to verify dates of procedure so that authorization can be adjusted. Please call

## 2020-07-17 ENCOUNTER — TELEPHONE (OUTPATIENT)
Dept: BARIATRICS | Facility: CLINIC | Age: 35
End: 2020-07-17

## 2020-07-17 NOTE — TELEPHONE ENCOUNTER
----- Message from Maribel Noel RD sent at 7/17/2020 11:09 AM CDT -----  Regarding: Reports SOB at night  FYI  Pt reports she has difficulty breathing at night.  Reports it resolved when she started sleeping on her back.  Did not know if you wanted to check in with her concerning this?  Thanks  Maribel

## 2020-07-17 NOTE — TELEPHONE ENCOUNTER
Called to check in 1 week post op from bariatric surgery.    Water protocol began at 7 am and completed while in hospital/ medicine cups given to you by nursing to take home (y/n):n    Dehydration assessment:  Urine output/color:light  Chest pain:SOB at night resolved when sleep on back  Persistent increased heart rate:n  Fatigue:n  N/V: in beginning on day released but stopped   Dizzy/weak: n  BM:regular   Protein and fluid intake assessment: (food diary)  Fluid intake: chicken broth Gatorade zero, sf jello, water, bio and protein water  Protein supplements: premier protein shake, bio and protein 2.0  Protein intake yesterday: 2   Vitamins  -What vitamins are you taking?adherent  -Are you tolerating well?yes  Medications  Omeprazole:powder taking  Hycet:twice  How are you tolerating pain at this time? (rate on a scale from 1 to 10; >7 notify PA/MD)0  Are you having any problems? (f/u with PCP, cardiologist, endocrinologist)  How is your support system at home?good  Exercise reminder (light exercise at this time, no lifting above 10 lbs)reminded     Questions for nurse/MA/PA: no     Assessment:  Doing well.     Discussion:  Continue to work on fluid and protein intake.     Confirmed date and time for 2 week po labs and clinic visit    ----- Message from Maribel Noel RD sent at 2020  7:47 AM CDT -----  Regardin week post op phone call

## 2020-07-17 NOTE — TELEPHONE ENCOUNTER
Phoned patient to triage her symptoms. She feels like most of it is related to feeling bloated.  She usually sleeps on her side or stomach, but on her back she has no issues.  She is taking her gas ex as needed.  She is sleeping on her sofa right now with pillows for elevation.  Encouraged sleeping elevated on pillows.  She also did not get the email to the survey.  Told her to check her emails the week on June 22 to 26, that it might have come that week and also told her that it was not going [email or web address], that it might be MBS[employer] or PRO.  She will let us know.  Also encouraged her to do some coughing and deep breathing and splintting her stomach with pillows.

## 2020-07-20 ENCOUNTER — TELEPHONE (OUTPATIENT)
Dept: BARIATRICS | Facility: CLINIC | Age: 35
End: 2020-07-20

## 2020-07-24 ENCOUNTER — OFFICE VISIT (OUTPATIENT)
Dept: BARIATRICS | Facility: CLINIC | Age: 35
End: 2020-07-24
Payer: MEDICARE

## 2020-07-24 ENCOUNTER — CLINICAL SUPPORT (OUTPATIENT)
Dept: BARIATRICS | Facility: CLINIC | Age: 35
End: 2020-07-24
Payer: MEDICARE

## 2020-07-24 ENCOUNTER — LAB VISIT (OUTPATIENT)
Dept: LAB | Facility: HOSPITAL | Age: 35
End: 2020-07-24
Attending: SURGERY
Payer: MEDICARE

## 2020-07-24 VITALS — BODY MASS INDEX: 50.85 KG/M2 | HEIGHT: 57 IN | WEIGHT: 235.69 LBS

## 2020-07-24 DIAGNOSIS — Z01.818 PREOP TESTING: ICD-10-CM

## 2020-07-24 DIAGNOSIS — E64.0 SEQUELAE OF PROTEIN-CALORIE MALNUTRITION: ICD-10-CM

## 2020-07-24 DIAGNOSIS — Z71.3 NUTRITIONAL COUNSELING: ICD-10-CM

## 2020-07-24 DIAGNOSIS — Z71.3 DIETARY COUNSELING: ICD-10-CM

## 2020-07-24 DIAGNOSIS — Z98.890 POST-OPERATIVE STATE: Primary | ICD-10-CM

## 2020-07-24 DIAGNOSIS — R63.4 WEIGHT LOSS: ICD-10-CM

## 2020-07-24 DIAGNOSIS — E66.01 MORBID OBESITY: ICD-10-CM

## 2020-07-24 DIAGNOSIS — Z98.84 S/P GASTRIC BYPASS: ICD-10-CM

## 2020-07-24 LAB
ALBUMIN SERPL BCP-MCNC: 3.9 G/DL (ref 3.5–5.2)
ALP SERPL-CCNC: 82 U/L (ref 55–135)
ALT SERPL W/O P-5'-P-CCNC: 15 U/L (ref 10–44)
ANION GAP SERPL CALC-SCNC: 6 MMOL/L (ref 8–16)
AST SERPL-CCNC: 17 U/L (ref 10–40)
BASOPHILS # BLD AUTO: 0.02 K/UL (ref 0–0.2)
BASOPHILS NFR BLD: 0.3 % (ref 0–1.9)
BILIRUB SERPL-MCNC: 0.5 MG/DL (ref 0.1–1)
BUN SERPL-MCNC: 10 MG/DL (ref 6–20)
CALCIUM SERPL-MCNC: 9.4 MG/DL (ref 8.7–10.5)
CHLORIDE SERPL-SCNC: 105 MMOL/L (ref 95–110)
CO2 SERPL-SCNC: 24 MMOL/L (ref 23–29)
CREAT SERPL-MCNC: 0.9 MG/DL (ref 0.5–1.4)
DIFFERENTIAL METHOD: ABNORMAL
EOSINOPHIL # BLD AUTO: 0.1 K/UL (ref 0–0.5)
EOSINOPHIL NFR BLD: 1.5 % (ref 0–8)
ERYTHROCYTE [DISTWIDTH] IN BLOOD BY AUTOMATED COUNT: 14.5 % (ref 11.5–14.5)
EST. GFR  (AFRICAN AMERICAN): >60 ML/MIN/1.73 M^2
EST. GFR  (NON AFRICAN AMERICAN): >60 ML/MIN/1.73 M^2
GLUCOSE SERPL-MCNC: 79 MG/DL (ref 70–110)
HCT VFR BLD AUTO: 45.9 % (ref 37–48.5)
HGB BLD-MCNC: 14.1 G/DL (ref 12–16)
IMM GRANULOCYTES # BLD AUTO: 0.02 K/UL (ref 0–0.04)
IMM GRANULOCYTES NFR BLD AUTO: 0.3 % (ref 0–0.5)
LYMPHOCYTES # BLD AUTO: 2 K/UL (ref 1–4.8)
LYMPHOCYTES NFR BLD: 27 % (ref 18–48)
MCH RBC QN AUTO: 27.8 PG (ref 27–31)
MCHC RBC AUTO-ENTMCNC: 30.7 G/DL (ref 32–36)
MCV RBC AUTO: 91 FL (ref 82–98)
MONOCYTES # BLD AUTO: 0.5 K/UL (ref 0.3–1)
MONOCYTES NFR BLD: 6.4 % (ref 4–15)
NEUTROPHILS # BLD AUTO: 4.7 K/UL (ref 1.8–7.7)
NEUTROPHILS NFR BLD: 64.5 % (ref 38–73)
NRBC BLD-RTO: 0 /100 WBC
PLATELET # BLD AUTO: 301 K/UL (ref 150–350)
PMV BLD AUTO: 11.3 FL (ref 9.2–12.9)
POTASSIUM SERPL-SCNC: 4.1 MMOL/L (ref 3.5–5.1)
PROT SERPL-MCNC: 8.3 G/DL (ref 6–8.4)
RBC # BLD AUTO: 5.07 M/UL (ref 4–5.4)
SODIUM SERPL-SCNC: 135 MMOL/L (ref 136–145)
VIT B12 SERPL-MCNC: 1814 PG/ML (ref 210–950)
WBC # BLD AUTO: 7.3 K/UL (ref 3.9–12.7)

## 2020-07-24 PROCEDURE — 99999 PR PBB SHADOW E&M-EST. PATIENT-LVL I: CPT | Mod: PBBFAC,HCNC,, | Performed by: DIETITIAN, REGISTERED

## 2020-07-24 PROCEDURE — 84425 ASSAY OF VITAMIN B-1: CPT | Mod: HCNC

## 2020-07-24 PROCEDURE — 99499 NO LOS: ICD-10-PCS | Mod: HCNC,S$GLB,, | Performed by: DIETITIAN, REGISTERED

## 2020-07-24 PROCEDURE — 36415 COLL VENOUS BLD VENIPUNCTURE: CPT | Mod: HCNC

## 2020-07-24 PROCEDURE — 99499 UNLISTED E&M SERVICE: CPT | Mod: HCNC,S$GLB,, | Performed by: DIETITIAN, REGISTERED

## 2020-07-24 PROCEDURE — 99024 POSTOP FOLLOW-UP VISIT: CPT | Mod: 95,,, | Performed by: PHYSICIAN ASSISTANT

## 2020-07-24 PROCEDURE — 99024 PR POST-OP FOLLOW-UP VISIT: ICD-10-PCS | Mod: 95,,, | Performed by: PHYSICIAN ASSISTANT

## 2020-07-24 PROCEDURE — 99999 PR PBB SHADOW E&M-EST. PATIENT-LVL I: ICD-10-PCS | Mod: PBBFAC,HCNC,, | Performed by: DIETITIAN, REGISTERED

## 2020-07-24 PROCEDURE — 82607 VITAMIN B-12: CPT | Mod: HCNC

## 2020-07-24 PROCEDURE — 85025 COMPLETE CBC W/AUTO DIFF WBC: CPT | Mod: HCNC

## 2020-07-24 PROCEDURE — 80053 COMPREHEN METABOLIC PANEL: CPT | Mod: HCNC

## 2020-07-24 NOTE — PROGRESS NOTES
NUTRITION NOTE    Referring Physician: Guillermo Villanueva M.D.  Reason for MNT Referral: Follow-up 2 Weeks s/p Gastric Bypass    PAST MEDICAL HISTORY:  Denies nausea, vomiting, constipation and diarrhea.  Reports doing well.sometimes nausea if  Drinks cold shakes - also had dumping syndrome symptoms ( cold sweats/ nausea) when tried pudding    Past Medical History:   Diagnosis Date    Anemia     BMI 50.0-59.9, adult     Encounter for IUD removal 5/23/2019    GERD (gastroesophageal reflux disease) 4/2/2019    History of blood transfusion 2001    Malpositioned intrauterine device 3/21/2019    April 2019 OB/GYN - Dr Guzman - Discussed with patient that we will proceed with imaging to locate the IUD after which we can then proceed to book case in OR to retrieve displaced IUD    Obesity     OI (osteogenesis imperfecta)     Osteopetrosis     Tendonitis        CLINICAL DATA:  34 y.o. female.    There were no vitals filed for this visit.    Current Weight: 235 lbs  BMI: 51  Total Weight Loss: 15 lbs  Excess Weight Loss: 11%    LABS:  Drawn today    CURRENT DIET:  Bariatric Liquid Diet    Diet Recall: 88- 118 grams of protein/day; ~ 50 oz of fluids/day    Diet Includes:  Chicken broth, water  Protein Supplements: 2- 3 premier/ day, protein water with 7 grms protein( 4 per day)   Tried hummus, mashed beans last night. Also admits to  eating regular pudding ( 2 scoops)     EXERCISE:  Adequate light exercise.    Restrictions to Exercise: None.    VITAMINS/MINERALS:  Taking  Vitamins - crushing    ASSESSMENT:  Doing fairly well overall.  Adequate protein intake.  Inadequate fluid intake.    BARIATRIC DIET DISCUSSION:  Instructed and provided written materials on bariatric pureed and soft diet plan.  Reinforced post-op nutrition guidelines.    PLAN/RECOMMONDATIONS:  Advance to bariatric pureed diet.  Advance to soft as to. On 8/7. Patient advised not to skip diet progression  or eat anything with sugar. Risks vs.  Benefits reviewed with pt. Reports understanding  Maintain protein intake.  Increase fluid intake to 64 oz as raoul.   Begin light exercise.  Continue appropriate vitamins & minerals.  Adjust vitamins & minerals by: Can swallow whole pills on 8/7    Return to clinic in 6 weeks.    SESSION TIME: 25 minutes

## 2020-07-26 NOTE — PROGRESS NOTES
BARIATRIC POST-OPERATIVE VISIT:  The patient location is: [place]  The chief complaint leading to consultation is: 2 week visit    Visit type: audiovisual    Face to Face time with patient:   28 minutes of total time spent on the encounter, which includes face to face time and non-face to face time preparing to see the patient (eg, review of tests), Obtaining and/or reviewing separately obtained history, Documenting clinical information in the electronic or other health record, Independently interpreting results (not separately reported) and communicating results to the patient/family/caregiver, or Care coordination (not separately reported).         Each patient to whom he or she provides medical services by telemedicine is:  (1) informed of the relationship between the physician and patient and the respective role of any other health care provider with respect to management of the patient; and (2) notified that he or she may decline to receive medical services by telemedicine and may withdraw from such care at any time.    Notes:     HPI:  Prisca Zuh is a 34 y.o. year old female presents for 2 week post op visit following gastric sleeve.  she is doing well and tolerating the diet without difficulty.  she has no complaints.    Pt doing ok states that she had some trouble right at the beginning but none since. Supposed to return to work on Monday ( ) but states that she will not do any heavy lifting. Had one episode of vomiting but believed she attempted to eat too early. None since. Also a little bit of weakness but is resolving.       Denies: nausea, vomiting, abdominal pain, changes in bowel movement pattern, fever, chills, dysphagia, chest pain, and shortness of breath.    Review of Systems   Constitutional: Negative for fatigue and fever.   HENT: Negative for trouble swallowing.    Respiratory: Negative for cough and shortness of breath.    Cardiovascular: Negative for chest pain, palpitations  and leg swelling.   Gastrointestinal: Negative for abdominal pain, constipation, diarrhea, nausea and vomiting.   Genitourinary: Negative for decreased urine volume and dysuria.   Musculoskeletal: Negative for arthralgias, back pain, gait problem, joint swelling, myalgias and neck pain.   Skin: Negative for rash.   Neurological: Negative for dizziness, light-headedness and headaches.   Psychiatric/Behavioral: Negative for dysphoric mood and self-injury. The patient is not nervous/anxious.        EXERCISE & VITAMINS:  See Bariatric Assessment  Adherent to vitamin regimen  MEDICATIONS/ALLERGIES:  Have been reviewed.    DIET: Liquid  Bariatric Diet.  1-1.5 protein shakes daily, ~ 30- 45 grams protein.  28 fl oz SF clear beverage.  See Dietician note from today for a more detailed assessment.      Physical Exam  Constitutional:       Appearance: She is obese.   HENT:      Head: Normocephalic and atraumatic.   Eyes:      Extraocular Movements: Extraocular movements intact.      Conjunctiva/sclera: Conjunctivae normal.   Neck:      Musculoskeletal: Normal range of motion and neck supple.   Pulmonary:      Effort: Pulmonary effort is normal. No respiratory distress.   Musculoskeletal: Normal range of motion.         General: No swelling.   Neurological:      General: No focal deficit present.      Mental Status: She is alert. Mental status is at baseline.   Psychiatric:         Mood and Affect: Mood normal.         Thought Content: Thought content normal.         Judgment: Judgment normal.         ASSESSMENT:  - Morbid obesity s/p sleeve gastrectomy   - Co-morbidities: GERD  - Good Weight loss, 15#'s and 11% EWL  -  No Exercise routine  - Ok  Diet  - Good Vitamin regimen    PLAN:  - Ursodiol 300 mg twice daily for 6 months  - Anti-Acid medication, PPI daily for 3 months  - No lifting more than 10 lbs for 6 weeks  - Miralax daily for constipation  - Emphasized the importance of regular exercise and adherence to bariatric  diet to achieve maximum weight loss.  - Encouraged patient to start/continue regular exercise.  - Follow-up with dietician to advance diet.  - Continue daily vitamins and medications.  - RTC  as needed  - Call the office for any issues.  - Check labs today.

## 2020-07-29 LAB — VIT B1 BLD-MCNC: 84 UG/L (ref 38–122)

## 2020-08-14 DIAGNOSIS — Z11.59 NEED FOR HEPATITIS C SCREENING TEST: ICD-10-CM

## 2020-08-27 ENCOUNTER — CLINICAL SUPPORT (OUTPATIENT)
Dept: OBSTETRICS AND GYNECOLOGY | Facility: CLINIC | Age: 35
End: 2020-08-27
Payer: MEDICARE

## 2020-08-27 ENCOUNTER — OFFICE VISIT (OUTPATIENT)
Dept: OBSTETRICS AND GYNECOLOGY | Facility: CLINIC | Age: 35
End: 2020-08-27
Payer: MEDICARE

## 2020-08-27 VITALS — WEIGHT: 231.25 LBS | BODY MASS INDEX: 50.04 KG/M2

## 2020-08-27 VITALS
DIASTOLIC BLOOD PRESSURE: 84 MMHG | SYSTOLIC BLOOD PRESSURE: 118 MMHG | HEIGHT: 57 IN | WEIGHT: 231.13 LBS | BODY MASS INDEX: 49.86 KG/M2

## 2020-08-27 DIAGNOSIS — N89.8 VAGINAL DISCHARGE: Primary | ICD-10-CM

## 2020-08-27 DIAGNOSIS — Z30.42 ENCOUNTER FOR MANAGEMENT AND INJECTION OF DEPO-PROVERA: Primary | ICD-10-CM

## 2020-08-27 DIAGNOSIS — N94.89 OTHER SPECIFIED CONDITIONS ASSOCIATED WITH FEMALE GENITAL ORGANS AND MENSTRUAL CYCLE: ICD-10-CM

## 2020-08-27 DIAGNOSIS — Z30.42 ENCOUNTER FOR DEPO-PROVERA CONTRACEPTION: ICD-10-CM

## 2020-08-27 PROCEDURE — 99999 PR PBB SHADOW E&M-EST. PATIENT-LVL III: ICD-10-PCS | Mod: PBBFAC,HCNC,,

## 2020-08-27 PROCEDURE — 81025 PR  URINE PREGNANCY TEST: ICD-10-PCS | Mod: HCNC,S$GLB,, | Performed by: OBSTETRICS & GYNECOLOGY

## 2020-08-27 PROCEDURE — 96372 PR INJECTION,THERAP/PROPH/DIAG2ST, IM OR SUBCUT: ICD-10-PCS | Mod: HCNC,S$GLB,, | Performed by: OBSTETRICS & GYNECOLOGY

## 2020-08-27 PROCEDURE — 3008F BODY MASS INDEX DOCD: CPT | Mod: HCNC,CPTII,S$GLB, | Performed by: OBSTETRICS & GYNECOLOGY

## 2020-08-27 PROCEDURE — 87480 CANDIDA DNA DIR PROBE: CPT | Mod: HCNC

## 2020-08-27 PROCEDURE — 96372 THER/PROPH/DIAG INJ SC/IM: CPT | Mod: HCNC,S$GLB,, | Performed by: OBSTETRICS & GYNECOLOGY

## 2020-08-27 PROCEDURE — 99999 PR PBB SHADOW E&M-EST. PATIENT-LVL III: CPT | Mod: PBBFAC,HCNC,, | Performed by: OBSTETRICS & GYNECOLOGY

## 2020-08-27 PROCEDURE — 3008F PR BODY MASS INDEX (BMI) DOCUMENTED: ICD-10-PCS | Mod: HCNC,CPTII,S$GLB, | Performed by: OBSTETRICS & GYNECOLOGY

## 2020-08-27 PROCEDURE — 99999 PR PBB SHADOW E&M-EST. PATIENT-LVL III: ICD-10-PCS | Mod: PBBFAC,HCNC,, | Performed by: OBSTETRICS & GYNECOLOGY

## 2020-08-27 PROCEDURE — 99214 OFFICE O/P EST MOD 30 MIN: CPT | Mod: HCNC,S$GLB,, | Performed by: OBSTETRICS & GYNECOLOGY

## 2020-08-27 PROCEDURE — 81025 URINE PREGNANCY TEST: CPT | Mod: HCNC,S$GLB,, | Performed by: OBSTETRICS & GYNECOLOGY

## 2020-08-27 PROCEDURE — 87491 CHLMYD TRACH DNA AMP PROBE: CPT | Mod: HCNC

## 2020-08-27 PROCEDURE — 99214 PR OFFICE/OUTPT VISIT, EST, LEVL IV, 30-39 MIN: ICD-10-PCS | Mod: HCNC,S$GLB,, | Performed by: OBSTETRICS & GYNECOLOGY

## 2020-08-27 PROCEDURE — 87510 GARDNER VAG DNA DIR PROBE: CPT | Mod: HCNC

## 2020-08-27 PROCEDURE — 99999 PR PBB SHADOW E&M-EST. PATIENT-LVL III: CPT | Mod: PBBFAC,HCNC,,

## 2020-08-27 RX ORDER — MEDROXYPROGESTERONE ACETATE 150 MG/ML
150 INJECTION, SUSPENSION INTRAMUSCULAR
Status: DISCONTINUED | OUTPATIENT
Start: 2020-08-27 | End: 2022-06-09

## 2020-08-27 RX ORDER — METRONIDAZOLE 500 MG/1
500 TABLET ORAL EVERY 12 HOURS
Qty: 14 TABLET | Refills: 0 | Status: SHIPPED | OUTPATIENT
Start: 2020-08-27 | End: 2020-09-03

## 2020-08-27 RX ADMIN — MEDROXYPROGESTERONE ACETATE 150 MG: 150 INJECTION, SUSPENSION INTRAMUSCULAR at 03:08

## 2020-08-27 NOTE — PROGRESS NOTES
History & Physical  Gynecology      SUBJECTIVE:     Chief Complaint: Vaginal Discharge       History of Present Illness:  Vaginal Discharge and Irritation  Ms. Zhu is a 34 y/o female who presents for vaginal discharge check. Sexual history reviewed with the patient. STD exposure: denies knowledge of risky exposure.  Previous history of STD:  none. Current symptoms include vaginal discharge: white, thick and malodorous.  Contraception: Depo provera but has not had an injection since March. She reports that she does not get periods regularly and has not had one in a while. She reports that she has not been sexually active recently.      Review of patient's allergies indicates:   Allergen Reactions    Pcn [penicillins] Shortness Of Breath       Past Medical History:   Diagnosis Date    Anemia     BMI 50.0-59.9, adult     Encounter for IUD removal 2019    GERD (gastroesophageal reflux disease) 2019    History of blood transfusion 2001    Malpositioned intrauterine device 3/21/2019    2019 OB/GYN - Dr Guzman - Discussed with patient that we will proceed with imaging to locate the IUD after which we can then proceed to book case in OR to retrieve displaced IUD    Obesity     OI (osteogenesis imperfecta)     Osteopetrosis     Tendonitis      Past Surgical History:   Procedure Laterality Date     SECTION      ,     ESOPHAGOGASTRODUODENOSCOPY N/A 2019    Procedure: ESOPHAGOGASTRODUODENOSCOPY (EGD);  Surgeon: Segun Dominguez MD;  Location: [address]);  Service: Endoscopy;  Laterality: N/A;  BMI 51    FEMUR FRACTURE SURGERY Bilateral     hardware/rods in both legs    FRACTURE SURGERY      femur    LAPAROSCOPIC GASTROENTEROSTOMY N/A 7/10/2020    Procedure: GASTROENTEROSTOMY, LAPAROSCOPIC, with intraop EGD;  Surgeon: Guillermo Villanueva MD;  Location: [address];  Service: General;  Laterality: N/A;    REMOVAL OF INTRAUTERINE DEVICE (IUD) N/A 6/3/2019     Procedure: REMOVAL, INTRAUTERINE DEVICE;  Surgeon: Ashley Greenberg MD;  Location: [facility];  Service: OB/GYN;  Laterality: N/A;  RN PRE OP 19----BMI-51.29    WISDOM TOOTH EXTRACTION       OB History        4    Para   2    Term   2            AB        Living   2       SAB        TAB        Ectopic        Multiple        Live Births                   Family History   Problem Relation Age of Onset    Hypertension Mother     Hyperlipidemia Mother     Asthma Mother     Anxiety disorder Mother     No Known Problems Father     Liver cancer Maternal Grandfather     Celiac disease Neg Hx     Colon cancer Neg Hx     Colon polyps Neg Hx     Crohn's disease Neg Hx     Cystic fibrosis Neg Hx     Esophageal cancer Neg Hx     Irritable bowel syndrome Neg Hx     Rectal cancer Neg Hx     Stomach cancer Neg Hx      Social History     Tobacco Use    Smoking status: Never Smoker    Smokeless tobacco: Never Used   Substance Use Topics    Alcohol use: No    Drug use: Never       Current Outpatient Medications   Medication Sig    CALCIUM ORAL Take by mouth every morning.     cyanocobalamin (VITAMIN B-12) 100 MCG tablet Take 100 mcg by mouth every morning.     drospirenone-ethinyl estradiol (ROSALBA) 3-0.02 mg per tablet Take 1 tablet by mouth once daily.    ergocalciferol (ERGOCALCIFEROL) 50,000 unit Cap TAKE 1 CAPSULE BY MOUTH EVERY 7 DAYS    fish oil-omega-3 fatty acids 300-1,000 mg capsule Take by mouth every morning.     hydrocodone-acetaminophen (HYCET) solution 7.5-325 mg/15mL Take 15 mLs by mouth 4 (four) times daily as needed for Pain.    ibuprofen (ADVIL,MOTRIN) 800 MG tablet Take 1 tablet (800 mg total) by mouth every 8 (eight) hours as needed for Pain.    metroNIDAZOLE (FLAGYL) 500 MG tablet Take 1 tablet (500 mg total) by mouth every 12 (twelve) hours. for 7 days    omeprazole (PRILOSEC) 20 MG capsule TAKE 1 CAPSULE(20 MG) BY MOUTH EVERY DAY    ondansetron (ZOFRAN) 4 MG  tablet Take 1 tablet (4 mg total) by mouth every 6 (six) hours as needed for Nausea.    polyethylene glycol (GLYCOLAX) 17 gram/dose powder Dissolve one capful (17 g) in liquid and take by mouth once daily.    ursodioL (COSME FORTE) 500 MG tablet Crush one tablet daily for gall bladder. Please compound into liquid and supply for 90 days if insurance approves     Current Facility-Administered Medications   Medication    medroxyPROGESTERone (DEPO-PROVERA) injection 150 mg         Review of Systems:  Review of Systems   Constitutional: Negative for chills and fever.   Eyes: Negative for visual disturbance.   Respiratory: Negative for cough and wheezing.    Cardiovascular: Negative for chest pain and palpitations.   Gastrointestinal: Negative for abdominal pain, nausea and vomiting.   Genitourinary: Positive for vaginal discharge and vaginal odor. Negative for dysuria, frequency, hematuria, pelvic pain, vaginal bleeding and vaginal pain.   Neurological: Negative for headaches.   Psychiatric/Behavioral: Negative for depression.        OBJECTIVE:     Physical Exam:  Physical Exam  Vitals signs reviewed. Exam conducted with a chaperone present.   Constitutional:       Appearance: She is well-developed.   Cardiovascular:      Rate and Rhythm: Normal rate.      Heart sounds: Normal heart sounds.   Pulmonary:      Effort: Pulmonary effort is normal. No respiratory distress.   Chest:      Breasts: Breasts are symmetrical.     Abdominal:      General: There is no distension.      Palpations: Abdomen is soft.      Tenderness: There is no abdominal tenderness.   Genitourinary:     Exam position: Supine.      Vagina: Vaginal discharge present. No bleeding.      Cervix: No cervical motion tenderness.      Adnexa:         Right: No fullness.          Left: No fullness.        Rectum: Normal.   Skin:     General: Skin is warm and dry.   Neurological:      Mental Status: She is oriented to person, place, and time.            ASSESSMENT:       ICD-10-CM ICD-9-CM    1. Vaginal discharge  N89.8 623.5 Vaginosis Screen by DNA Probe      C. trachomatis/N. gonorrhoeae by AMP DNA      metroNIDAZOLE (FLAGYL) 500 MG tablet   2. Other specified conditions associated with female genital organs and menstrual cycle   N94.89 629.89 C. trachomatis/N. gonorrhoeae by AMP DNA   3. Encounter for Depo-Provera contraception  Z30.42 V25.49 medroxyPROGESTERone (DEPO-PROVERA) injection 150 mg      POCT urine pregnancy     Plan:      Prisca was seen today for vaginal discharge.    Diagnoses and all orders for this visit:    Prisca was seen today for vaginal discharge.    Diagnoses and all orders for this visit:      Encounter for Depo-Provera contraception  -     medroxyPROGESTERone (DEPO-PROVERA) injection 150 mg  -     POCT urine pregnancy  - Depo Provera injection today. Discusses possible irregular bleeding for first 3-6 months with injections. Pain, swelling and redness at injection site. Also discussed the possibility of irregular periods with injections or the possibility of amenorrhea. Patient understands that she must return to injection center every 3 months (90 days) for repeat injection.    Vaginal discharge  -     Vaginosis Screen by DNA Probe  -     C. trachomatis/N. gonorrhoeae by AMP DNA  -     metroNIDAZOLE (FLAGYL) 500 MG tablet; Take 1 tablet (500 mg total) by mouth every 12 (twelve) hours. for 7 days  - Discussed with patient how douching/body wash/scented soaps/bubble baths can shift her vaginal marshal and natural vaginal pH which can cause overgrowth of yeast or gardnerella which is the bacteria that causes BV. Discussed with patient to use only mild, unscented soaps such as Dove unscented and Dial and water to wash her vagina.      Orders Placed This Encounter   Procedures    Vaginosis Screen by DNA Probe    C. trachomatis/N. gonorrhoeae by AMP DNA    POCT urine pregnancy       Follow up if symptoms worsen or fail to  improve.    Counseling time: 30 minutes    Ashley Greenberg

## 2020-08-27 NOTE — PATIENT INSTRUCTIONS
Preventing Vaginal Infection  These steps can help you stay comfortable during treatment of a vaginal infection. They also help prevent vaginal infections in the future.  Keeping a healthy balance  Factors that change the normal balance in the vagina can lead to a vaginal infection. To help keep the balance normal, try these tips:  · Change out of wet bathing suits and damp exercise clothes as soon as possible. Yeast thrive in a warm, moist environment.  · Avoid wearing tight pants. Choose cotton underwear and pantyhose that have a cotton crotch. Cotton keeps you cooler and drier than synthetics.  · Don't douche unless directed by your health care provider. Douching can destroy friendly bacteria and change the vagina's normal balance.  · Wipe from front to back after using the toilet. This prevents bacteria from spreading from the anus to the vulva.  · Wash the vulva with mild, unscented soap or with plain water.  · Wash your diaphragm, spermicide applicators, and sex toys with mild soap and water after use. Dry them thoroughly before putting them away.  · Change tampons often (every 2 hours to 4 hours). Leaving a tampon in for too long may disrupt the balance of vaginal bacteria.  · Avoid vaginal sprays, scented toilet paper and soaps, and deodorant tampons or pads, which can cause vaginal irritation  Staying healthy overall  Good overall health can help you resist infection. To be healthier:  · Help protect yourself from STDs by using latex condoms for intercourse. Ask your health care provider for more information about safer sex.  · Eat a variety of healthy foods.  · Exercise regularly.  · Get enough rest and sleep.  · Maintain a healthy weight. If you need to lose weight, ask your health care provider for advice on how to start.  Date Last Reviewed: 5/18/2015  © 0655-5577 [employer]. [address] [place] [address], [place] [address]. All rights reserved. This information is not intended as a substitute  for professional medical care. Always follow your healthcare professional's instructions.

## 2020-08-27 NOTE — PROGRESS NOTES
REVIEWED WITH PT DEPO PROVERA,  HAND OUT GIVEN TO PT ABOUT DEPO PROVERA, REVIEWED MEDICATION DOCUMENTATION TO CONFIRM CORRECT MEDICATION, INJECTION GIVEN VIA   R GLUTEAL W/O INCIDENT

## 2020-08-29 LAB
CANDIDA RRNA VAG QL PROBE: NOT DETECTED
G VAGINALIS RRNA GENITAL QL PROBE: NOT DETECTED
T VAGINALIS RRNA GENITAL QL PROBE: NOT DETECTED

## 2020-09-04 ENCOUNTER — OFFICE VISIT (OUTPATIENT)
Dept: BARIATRICS | Facility: CLINIC | Age: 35
End: 2020-09-04
Payer: MEDICARE

## 2020-09-04 ENCOUNTER — CLINICAL SUPPORT (OUTPATIENT)
Dept: BARIATRICS | Facility: CLINIC | Age: 35
End: 2020-09-04
Payer: MEDICARE

## 2020-09-04 DIAGNOSIS — R63.4 WEIGHT LOSS: ICD-10-CM

## 2020-09-04 DIAGNOSIS — Z98.84 S/P GASTRIC BYPASS: ICD-10-CM

## 2020-09-04 DIAGNOSIS — Q78.0 OI (OSTEOGENESIS IMPERFECTA): ICD-10-CM

## 2020-09-04 DIAGNOSIS — Z98.890 POST-OPERATIVE STATE: Primary | ICD-10-CM

## 2020-09-04 DIAGNOSIS — K21.9 GASTROESOPHAGEAL REFLUX DISEASE, ESOPHAGITIS PRESENCE NOT SPECIFIED: ICD-10-CM

## 2020-09-04 PROCEDURE — 99499 NO LOS: ICD-10-PCS | Mod: HCNC,95,, | Performed by: DIETITIAN, REGISTERED

## 2020-09-04 PROCEDURE — 99024 POSTOP FOLLOW-UP VISIT: CPT | Mod: HCNC,95,, | Performed by: PHYSICIAN ASSISTANT

## 2020-09-04 PROCEDURE — 99499 UNLISTED E&M SERVICE: CPT | Mod: HCNC,95,, | Performed by: DIETITIAN, REGISTERED

## 2020-09-04 PROCEDURE — 99024 PR POST-OP FOLLOW-UP VISIT: ICD-10-PCS | Mod: HCNC,95,, | Performed by: PHYSICIAN ASSISTANT

## 2020-09-04 NOTE — PATIENT INSTRUCTIONS
Meal Ideas for Regular Bariatric Diet  *Recipes and products available at [email or web address].com      Breakfast: (15-20g protein)    - Egg white omelet: 2 egg whites or ½ cup Egg Beaters. (Optional proteins: cheese, shrimp, black beans, chicken, sliced turkey) (Optional veggies: tomatoes, salsa, spinach, mushrooms, onions, green peppers, or small slice avocado)     - Egg and sausage: 1 egg or ¼ cup Egg Beaters (any variety), with 1 hanna or 2 links of Turkey sausage or Veggie breakfast sausage (Strava or SmartNews)    - Crust-less breakfast quiche: To make a glass pie dish, mix 4oz part skim Ricotta, 1 cup skim milk, and 2 eggs as your base. Add protein: shredded cheese, sliced lean ham or turkey, turkey ivy/sausage. Add veggies: tomato, onion, green onion, mushroom, green pepper, spinach, etc.    - Yogurt parfait: Mix 1 - 6oz container Dannon Light N Fit vanilla yogurt, with ¼ cup crushed unsalted nuts    - Cottage cheese and fruit: ½ cup part-skim cottage cheese or ricotta cheese topped with fresh fruit or sugar free preserves     - Loulou Barrientos's Vanilla Egg custard* (add 2 Tbsp instant coffee granules to make Cappuccino Custard*)    - Hi-Protein café latte (skim milk, decaf coffee, 1 scoop protein powder). Optional to add Sugar free syrup or extract flavoring.    - Breakfast Lox: spread fat free cream cheese on slices of smoked salmon. Serve over scrambled or egg over easy (sauteed with nonstick cookspray) OR on a cucumber slice    - Eggwhich: Scramble or cook 1 large egg over easy using nonstick cookspray. Place between 2 slices of Colombian ivy and low fat cheese.     Lunch: (20-30g protein)    - ½ cup Black bean soup (Homemade or Progresso), with ¼ cup shredded low-fat cheese. Top with chopped tomato or fresh salsa.     - Lean deli turkey breast and low-fat sliced cheese, mustard or light sanchez to moisten, rolled up together, or wrapped in a Fabián lettuce leaf    - Chicken salad made from dinner  leftovers, moisten with low-fat salad dressing or light sanchez. Also try leftover salmon, shrimp, tuna or boiled eggs. Serve ½ cup over dark green salad    - Fat-free canned refried beans, topped with ¼ cup shredded low-fat cheese. Top with chopped tomato or fresh salsa.     - Greek salad: Top mixed greens with 1-2oz grilled chicken, tomatoes, red onions, 2-3 kalamata olives, and sprinkle lightly with feta cheese. Spritz with Balsamic vinegar to taste.     - Crust-less lunch quiche: To make a glass pie dish, mix 4oz part skim Ricotta, 1 cup skim milk, and 2 eggs as your base. Add protein: shredded cheese, sliced lean ham or turkey, shrimp, chicken. Add veggies: tomato, onion, green onion, mushroom, green pepper, spinach, artichoke, broccoli, etc.    - Pizza bake: spread a  lenny dustin mushroom with tomato sauce, low-fat shredded mozzarella and turkey pepperoni or [place] ivy. Add any veggies. Roast for 10-15 minutes, until cheese melted.     - Cucumber crab bites: Spread ¼ cup crab dip (lump crabmeat + light cream cheese and green onions) over sliced cucumber.     - Chicken with light spinach and artichoke dip*: Puree in : 6oz cooked and drained spinach, 2 cloves garlic, 1 can cannelloni beans, ½ cup chopped green onions, 1 can drained artichoke hearts (not marinated in oil), lemon juice and basil. Mix in 2oz chopped up chicken.    Supper: (20-30g protein)    - Serve grilled fish over dark green salad tossed with low-fat dressing, served with grilled asparagus hopper     - Rotisserie chicken salad: served with sliced strawberries, walnuts, fat-free feta cheese crumbles and 1 tbsp Olivias Own Light Raspberry [place] Vinaigrette    - Shrimp cocktail: Dip cold boiled shrimp in homemade low-sugar cocktail sauce (1/2 cup Nikko One Carb ketchup, 2 tbsp horseradish, 1/4 tsp hot sauce, 1 tsp Worcestershire sauce, 1 tbsp freshly-squeezed lemon juice). Serve with dark green salad, walnuts, and crumbled blue  cheese drizzled with olive oil and Balsamic vinegar    - Tuna Melt: Spread tuna salad onto 2 thick slices of tomato. Top with low-fat cheese and broil until cheese is melted. May also be made with chicken salad of shrimp salad. [place] with different types of cheeses.    - Chicken or beef fajitas (no tortilla, rice, beans, chips). Top meat and veggies w/ fresh salsa, fat free sour cream.     - Homemade low-fat Chili using extra lean ground beef or ground turkey. Top with shredded cheese and salsa as desired. May add dollop fat-free sour cream if desired    - Chicken parmesan: Top chicken breast w/ low sugar marinara sauce, mozzarella cheese and bake until chicken reaches 165*.  Serve w/ spaghetti SQUASH or [place] cut green beans    - Dinner Omelet with shrimp or chicken and onion, green peppers and chives.    - No noodle lasagna: Use sliced zucchini or eggplant in place of noodles.  Layer with part skim ricotta cheese and low sugar meat sauce (use very lean ground beef or ground turkey).    - Mexican chicken bake: Bake chunks of chicken breast or thigh with taco seasoning, Pace brand enchilada sauce, green onions and low-fat cheese. Serve with ¼ cup black beans or fat free refried beans topped with chopped tomatoes or salsa.    - Charlene frozen meatballs, simmered in Classico Marinara sauce. Different flavors of salsa or spaghetti sauce create different dishes! Sprinkle with parmesan cheese. Serve with grilled or steamed veggies, or a dark green salad.    - Simmer boneless skinless chicken thigh chunks in Classico Marinara sauce or roasted salsa until tender with chopped onion, bell pepper, garlic, mushrooms, spinach, etc.     - Hamburger or veggie burger, without the bun, dressed the way you like. Served with grilled or steamed veggies.    - Eggplant parmesan: Bake slices of eggplant at 350 degrees for 15 minutes. Layer tomato sauce, sliced eggplant and low-fat mozzarella cheese in a baking dish and cover with  foil. Bake 30-40 more minutes or until bubbly. Uncover and bake at 400 degrees for about 15 more minutes, or until top is slightly crisp.    - Fish tacos: grilled/baked white fish, wrapped in Fabián lettuce leaf, topped with salsa, shredded low-fat cheese, and light coleslaw.    - Chicken gracie: Sprinkle chicken w/ 1 tsp of hidden [place] ranch dip mix. Then grill chicken and top with black beans, salsa and 1 tsp fat free sour cream.     - Cauliflower pizza crust: Use cauliflower as crust (see recipe on pinterest, no flour!). Top w/ low fat cheese, turkey pepperoni and veggies and bake again    - chicken or turkey crust pizza: use ground chicken or turkey instead of cauliflower, spread in [place] and bake at 350 for about 20-30 minutes(may want to add garlic, black pepper, oregano and other herbs to ground meat mixture).  Remove and top w/ low fat cheese, turkey pepperoni and veggies and bake again for another 10 minutes or until cheese is browned.     Snacks: (100-200 calories; >5g protein)    - 1 low-fat cheese stick with 8 cherry tomatoes or 1 serving fresh fruit  - 4 thin slices fat-free turkey breast and 1 slice low-fat cheese  - 4 thin slices fat-free honey ham with wedge of melon  - 6-8 edamame pods (equivalent to about 1/4 cup edamame without pods).   - 1/4 cup unsalted nuts with ½ cup fruit  - 6-oz container Dannon Light n Fit vanilla yogurt, topped with 1oz unsalted nuts         - apple, celery or baby carrots spread with 2 Tbsp PB2  - apple slices with 1 oz slice low-fat cheese  - Apple slices dipped in 2 Tbsp of PB2  - celery, cucumber, bell pepper or baby carrots dipped in ¼ cup hummus bean spread or light spinach and artichoke dip (*recipe in lunch section)  - celery, cucumber, baby carrots dipped in high protein greek yogurt (Mix 16 oz plain greek yogurt + 1 packet of hidden valley ranch dip mix)  - Cesar Links Beef Steak - 14g protein! (similar to beef jerky)  - 2 wedges Laughing Cow - Light Herb  & Garlic Cheese with sliced cucumber or green bell pepper  - 1/2 cup low-fat cottage cheese with ¼ cup fruit or ¼ cup salsa  - RTD Protein drinks: Atkins, Low Carb Slim Fast, EAS light, Muscle Milk Light, etc.  - Homemade Protein drinks: GNC Soy95, Isopure, Nectar, UNJURY, Whey Gourmet, etc. Mix 1 scoop powder with 8oz skim/1% milk or light soymilk.  - Protein bars: Atkins, EAS, Pure Protein, Think Thin, Detour, etc. Must have 0-4 grams sugar - Read the label.    Takeout Options: No more than twice/week  Deli - Salads (no pasta or rice), meats, cheeses. Roasted chicken. Lox (salmon)    Mexican - Platters which don't include tortillas, chips, or rice. Go easy on the beans. Example: Fajitas without the tortillas. Ask the  not to bring chips to the table if they are too tempting.    Greek - Meat or fish and vegetable, but no bread or rice. Including hummus, baba ganoush, etc, is OK. Most sit-down Greek restaurants can provide you with cucumber slices for dipping instead of josh bread.    Fast Food (Avoid as much as possible) - Salads (no croutons and limit salad dressing to 2 tbsp), grilled chicken sandwich without the bun and ask for no sanchez. Mallories low fat chili or Taco Bell pintos and cheese.    BBQ - The meats are fine if you ask for sauces on the side, but most of the traditional side dishes are loaded with carbs. Santos slaw, baked beans and BBQ sauce are typically made with sugar.    Chinese - Nothing deep-fried, no rice or noodles. Many Chinese sauces have starch and sugar in them, so you'll have to use your judgement. If you find that these sauces trigger cravings, or cause Dumping, you can ask for the sauce to be made without sugar or just use soy sauce.    How to taper off of Omeprazole:  - Take 1 Tablet every other day for 2 weeks.  If you do not experience any heartburn, indigestion, nausea symptoms, the following week, take 1 tablet every 3 days.  Again if you remain without the above symptoms, you  may completely discontinue the medication.  If symptoms return at any point, please restart the medication.

## 2020-09-04 NOTE — PROGRESS NOTES
Established Patient - Audio Only Telehealth Visit     The patient location is: home ([place])  The chief complaint leading to consultation is: s/p bariatric surgery  Visit type: Virtual visit with audio only (telephone)  Total time spent with patient: 10 min       The reason for the audio only service rather than synchronous audio and video virtual visit was related to technical difficulties or patient preference/necessity.     Each patient to whom I provide medical services by telemedicine is:  (1) informed of the relationship between the physician and patient and the respective role of any other health care provider with respect to management of the patient; and (2) notified that they may decline to receive medical services by telemedicine and may withdraw from such care at any time. Patient verbally consented to receive this service via voice-only telephone call.       NUTRITION NOTE    Referring Physician: Guillermo Villanueva M.D.  Reason for MNT Referral: Follow-up 2 months s/p Gastric Bypass    PAST MEDICAL HISTORY:    Denies nausea, vomiting, constipation and diarrhea.  Reports doing well.    Past Medical History:   Diagnosis Date    Anemia     BMI 50.0-59.9, adult     Encounter for IUD removal 5/23/2019    GERD (gastroesophageal reflux disease) 4/2/2019    History of blood transfusion 2001    Malpositioned intrauterine device 3/21/2019    April 2019 OB/GYN - Dr Guzman - Discussed with patient that we will proceed with imaging to locate the IUD after which we can then proceed to book case in OR to retrieve displaced IUD    Obesity     OI (osteogenesis imperfecta)     Osteopetrosis     Tendonitis        CLINICAL DATA:  35 y.o. female.    CURRENT DIET:  Bariatric Diet.  Diet Recall: 80 grams of protein/day; 48 oz of fluids/day    Meal Pattern: 3 meal(s) (chicken, salad, hummus, tuna salad) + 3 protein supplement(s)  Adequate protein supplement intake. Premier shakes.  Adequate dairy intake.  Adequate  vegetable intake. No raw vegetables and lettuce yet.  Adequate fruit intake.    EXERCISE:  Adequate light exercise. Walking at work daily. Walking the park 1.5 miles on weekends.    Restrictions to Exercise: None.    VITAMINS / MINERALS:  Multivitamins: FC  Calcium Citrate + Vitamin D    ASSESSMENT:  Doing well overall.  Adequate calorie intake.  Adequate protein intake.  Adequate fluid intake.  Following diet appropriately.  Exercising.  Inadequate vitamins & minerals.    BARIATRIC DIET DISCUSSION:  Instructed and provided written materials on bariatric diet plan.  Reinforced post-op nutrition guidelines.    PLAN / RECOMMENDATIONS:  May begin to incorporate raw vegetables, lettuce, unsalted nuts, and protein bars as tolerated.  Continue excellent diet plan.  Maintain protein intake.  Maintain fluid intake.  Continue exercise.  Continue appropriate vitamins & minerals.  Adjust vitamins & minerals by resuming B-complex and B12 sub.    Return to clinic in 4 months.    SESSION TIME: 15 minutes                     This service was not originating from a related E/M service provided within the previous 7 days nor will  to an E/M service or procedure within the next 24 hours or my soonest available appointment.  Prevailing standard of care was able to be met in this audio-only visit.

## 2020-09-04 NOTE — PROGRESS NOTES
BARIATRIC POST-OPERATIVE VISIT:  The patient location is: [place]  The chief complaint leading to consultation is: 8 week visit    Visit type: audiovisual    Face to Face time with patient:   20 minutes of total time spent on the encounter, which includes face to face time and non-face to face time preparing to see the patient (eg, review of tests), Obtaining and/or reviewing separately obtained history, Documenting clinical information in the electronic or other health record, Independently interpreting results (not separately reported) and communicating results to the patient/family/caregiver, or Care coordination (not separately reported).         Each patient to whom he or she provides medical services by telemedicine is:  (1) informed of the relationship between the physician and patient and the respective role of any other health care provider with respect to management of the patient; and (2) notified that he or she may decline to receive medical services by telemedicine and may withdraw from such care at any time.    Notes:     HPI:  Prisca Zhu is a 35 y.o. year old female presents for 8 week post op visit following gastric sleeve.  she is doing well and tolerating the diet without difficulty.        Pts appt changed to virtual after no show due to virtual learning with children.     Pt states that she is doing fine, had to switch her vitamins ( [place]ne) because they were making her nauseated and even causing some episodes of emesis. States that since the switch it has been better. Not taking b12/b1 complex/ Prilosec. Explained to pt that she must take all the vitamins as directed in order to get the proper nutrients ( states understanding) also instructed to take Prilosec ( stopped because she did not have any heartburn) discussed tapering off starting at 12 weeks ( instruction given in AVS).    Had some episodes of dark urine due to not drinking enough, has since increased her liquid intake.      Last recorded weight from gyn appt on 8/27 231 lbs ( used to calculate EWL%)    Denies: nausea, vomiting, abdominal pain, changes in bowel movement pattern, fever, chills, dysphagia, chest pain, and shortness of breath.    Review of Systems   Constitutional: Negative for fatigue and fever.   HENT: Negative for trouble swallowing.    Respiratory: Negative for cough and shortness of breath.    Cardiovascular: Negative for chest pain, palpitations and leg swelling.   Gastrointestinal: Negative for abdominal pain, constipation, diarrhea, nausea and vomiting.   Genitourinary: Negative for decreased urine volume and dysuria.   Musculoskeletal: Negative for arthralgias, back pain, gait problem, joint swelling, myalgias and neck pain.   Skin: Negative for rash.   Neurological: Negative for dizziness, light-headedness and headaches.   Psychiatric/Behavioral: Negative for dysphoric mood and self-injury. The patient is not nervous/anxious.        EXERCISE & VITAMINS:  See Bariatric Assessment  Adherent to vitamin regimen ( not taking b12 or b1 calcium 2x daily unsure of dose  MEDICATIONS/ALLERGIES:  Have been reviewed.    DIET:      ~ 60-70 g protein 32 fl oz hydration  Am: shake  Lunch: boiled egg   hummus  Veggies ( cauliflower and broccoli)  Shredded cheese  Oysters  Salad  Tuna fish chicken    See Dietician note from today for a more detailed assessment.      Physical Exam  Constitutional:       Appearance: She is obese.   HENT:      Head: Normocephalic and atraumatic.   Eyes:      Extraocular Movements: Extraocular movements intact.      Conjunctiva/sclera: Conjunctivae normal.   Neck:      Musculoskeletal: Normal range of motion and neck supple.   Pulmonary:      Effort: Pulmonary effort is normal. No respiratory distress.   Musculoskeletal: Normal range of motion.         General: No swelling.   Neurological:      General: No focal deficit present.      Mental Status: She is alert. Mental status is at baseline.    Psychiatric:         Mood and Affect: Mood normal.         Thought Content: Thought content normal.         Judgment: Judgment normal.         ASSESSMENT:  - Morbid obesity s/p sleeve gastrectomy   - Co-morbidities: GERD  - Good Weight loss, 19#'s and 15% EWL  -  No Exercise routine  - Ok Diet  - Poor Vitamin regimen    PLAN:  - START TAKING VITAMINS DISCUSSED IMPORTANCE  - INCREASE HYDRATION   - TAKE PRILOSEC  - LABS TO BE SCHEDULED ( ordered wants to go on Saturday at [facility] no time specified.)  - Ursodiol 300 mg twice daily for 6 months  - Anti-Acid medication, PPI daily for 3 months  - No lifting more than 10 lbs for 6 weeks  - Miralax daily for constipation  - Emphasized the importance of regular exercise and adherence to bariatric diet to achieve maximum weight loss.  - Encouraged patient to start/continue regular exercise.  - Follow-up with dietician to advance diet.  - Continue daily vitamins and medications.  - RTC  as needed  - Call the office for any issues.  - Check labs today.

## 2020-09-27 LAB
C TRACH DNA SPEC QL NAA+PROBE: NOT DETECTED
N GONORRHOEA DNA SPEC QL NAA+PROBE: NOT DETECTED

## 2020-10-05 ENCOUNTER — PATIENT MESSAGE (OUTPATIENT)
Dept: ADMINISTRATIVE | Facility: HOSPITAL | Age: 35
End: 2020-10-05

## 2020-10-16 ENCOUNTER — HOSPITAL ENCOUNTER (OUTPATIENT)
Dept: RADIOLOGY | Facility: HOSPITAL | Age: 35
Discharge: HOME | End: 2020-10-16
Attending: INTERNAL MEDICINE
Payer: MEDICARE

## 2020-10-16 ENCOUNTER — OFFICE VISIT (OUTPATIENT)
Dept: FAMILY MEDICINE | Facility: CLINIC | Age: 35
End: 2020-10-16
Payer: MEDICARE

## 2020-10-16 VITALS
SYSTOLIC BLOOD PRESSURE: 98 MMHG | WEIGHT: 213.75 LBS | OXYGEN SATURATION: 98 % | HEART RATE: 60 BPM | DIASTOLIC BLOOD PRESSURE: 62 MMHG | BODY MASS INDEX: 46.12 KG/M2 | TEMPERATURE: 98 F | HEIGHT: 57 IN

## 2020-10-16 DIAGNOSIS — Z23 FLU VACCINE NEED: ICD-10-CM

## 2020-10-16 DIAGNOSIS — M25.552 CHRONIC HIP PAIN, BILATERAL: ICD-10-CM

## 2020-10-16 DIAGNOSIS — Z23 NEED FOR INFLUENZA VACCINATION: ICD-10-CM

## 2020-10-16 DIAGNOSIS — M54.41 CHRONIC BILATERAL LOW BACK PAIN WITH RIGHT-SIDED SCIATICA: Primary | ICD-10-CM

## 2020-10-16 DIAGNOSIS — G89.29 CHRONIC HIP PAIN, BILATERAL: ICD-10-CM

## 2020-10-16 DIAGNOSIS — M25.551 CHRONIC HIP PAIN, BILATERAL: ICD-10-CM

## 2020-10-16 DIAGNOSIS — E66.01 CLASS 3 SEVERE OBESITY DUE TO EXCESS CALORIES WITHOUT SERIOUS COMORBIDITY WITH BODY MASS INDEX (BMI) OF 40.0 TO 44.9 IN ADULT: ICD-10-CM

## 2020-10-16 DIAGNOSIS — Q78.0 OI (OSTEOGENESIS IMPERFECTA): ICD-10-CM

## 2020-10-16 DIAGNOSIS — G89.29 CHRONIC BILATERAL LOW BACK PAIN WITH RIGHT-SIDED SCIATICA: Primary | ICD-10-CM

## 2020-10-16 DIAGNOSIS — Z11.4 ENCOUNTER FOR SCREENING FOR HUMAN IMMUNODEFICIENCY VIRUS (HIV): ICD-10-CM

## 2020-10-16 DIAGNOSIS — Z11.59 NEED FOR HEPATITIS C SCREENING TEST: ICD-10-CM

## 2020-10-16 PROCEDURE — 99214 PR OFFICE/OUTPT VISIT, EST, LEVL IV, 30-39 MIN: ICD-10-PCS | Mod: HCNC,25,S$GLB, | Performed by: INTERNAL MEDICINE

## 2020-10-16 PROCEDURE — 90686 IIV4 VACC NO PRSV 0.5 ML IM: CPT | Mod: HCNC,S$GLB,, | Performed by: INTERNAL MEDICINE

## 2020-10-16 PROCEDURE — 3008F BODY MASS INDEX DOCD: CPT | Mod: HCNC,CPTII,S$GLB, | Performed by: INTERNAL MEDICINE

## 2020-10-16 PROCEDURE — 72170 X-RAY EXAM OF PELVIS: CPT | Mod: TC,HCNC,FY,PO

## 2020-10-16 PROCEDURE — 90686 FLU VACCINE (QUAD) GREATER THAN OR EQUAL TO 3YO PRESERVATIVE FREE IM: ICD-10-PCS | Mod: HCNC,S$GLB,, | Performed by: INTERNAL MEDICINE

## 2020-10-16 PROCEDURE — G0008 ADMIN INFLUENZA VIRUS VAC: HCPCS | Mod: HCNC,S$GLB,, | Performed by: INTERNAL MEDICINE

## 2020-10-16 PROCEDURE — 99214 OFFICE O/P EST MOD 30 MIN: CPT | Mod: HCNC,25,S$GLB, | Performed by: INTERNAL MEDICINE

## 2020-10-16 PROCEDURE — 72170 X-RAY EXAM OF PELVIS: CPT | Mod: 26,HCNC,, | Performed by: RADIOLOGY

## 2020-10-16 PROCEDURE — 99999 PR PBB SHADOW E&M-EST. PATIENT-LVL III: ICD-10-PCS | Mod: PBBFAC,HCNC,, | Performed by: INTERNAL MEDICINE

## 2020-10-16 PROCEDURE — 99999 PR PBB SHADOW E&M-EST. PATIENT-LVL III: CPT | Mod: PBBFAC,HCNC,, | Performed by: INTERNAL MEDICINE

## 2020-10-16 PROCEDURE — 3008F PR BODY MASS INDEX (BMI) DOCUMENTED: ICD-10-PCS | Mod: HCNC,CPTII,S$GLB, | Performed by: INTERNAL MEDICINE

## 2020-10-16 PROCEDURE — 72170 XR PELVIS ROUTINE AP: ICD-10-PCS | Mod: 26,HCNC,, | Performed by: RADIOLOGY

## 2020-10-16 PROCEDURE — G0008 FLU VACCINE (QUAD) GREATER THAN OR EQUAL TO 3YO PRESERVATIVE FREE IM: ICD-10-PCS | Mod: HCNC,S$GLB,, | Performed by: INTERNAL MEDICINE

## 2020-10-16 RX ORDER — GABAPENTIN 300 MG/1
300 CAPSULE ORAL 2 TIMES DAILY
Qty: 60 CAPSULE | Refills: 0 | Status: SHIPPED | OUTPATIENT
Start: 2020-10-16 | End: 2020-11-02

## 2020-10-16 RX ORDER — TIZANIDINE 2 MG/1
2 TABLET ORAL EVERY 8 HOURS PRN
Qty: 30 TABLET | Refills: 0 | Status: SHIPPED | OUTPATIENT
Start: 2020-10-16 | End: 2020-11-02

## 2020-10-16 NOTE — PROGRESS NOTES
Subjective:     Chief Complaint  Chief Complaint   Patient presents with    Leg Pain     patient states she has gastric bypass and she would like to get pain medication but wants to make sure its agreeable with her bypass surgery. Patient also states that she has some warmth to touch and tingling sensation in her ruightleg        HPI  Prisca Zhu is a 35 y.o. female with medical diagnoses as listed in the medical history and problem list that presents for above complaint(s).    Leg pain  Slipped on the sidewalk 3 days ago  Feels significant muscular pain - sharp with radiation to back  No numbness  Notes warmth of right lower extremity    S/p bariatric surgery/sleeve gastrectomy this year  Continuing weight loss -down to 213 lbs    Patient Care Team:  Mathew Carpenter MD as PCP - General (Internal Medicine)  Mildred Farias LPN as Licensed Practical Nurse  Mildred Farias LPN as Care Coordinator      PAST MEDICAL HISTORY:  Past Medical History:   Diagnosis Date    Anemia     BMI 50.0-59.9, adult     Encounter for IUD removal 2019    GERD (gastroesophageal reflux disease) 2019    History of blood transfusion 2001    Malpositioned intrauterine device 3/21/2019    2019 OB/GYN - Dr Guzman - Discussed with patient that we will proceed with imaging to locate the IUD after which we can then proceed to book case in OR to retrieve displaced IUD    Obesity     OI (osteogenesis imperfecta)     Osteopetrosis     Tendonitis        PAST SURGICAL HISTORY:  Past Surgical History:   Procedure Laterality Date     SECTION      ,     ESOPHAGOGASTRODUODENOSCOPY N/A 2019    Procedure: ESOPHAGOGASTRODUODENOSCOPY (EGD);  Surgeon: Segun Dominguez MD;  Location: [address]);  Service: Endoscopy;  Laterality: N/A;  BMI 51    FEMUR FRACTURE SURGERY Bilateral     hardware/rods in both legs    FRACTURE SURGERY      femur    LAPAROSCOPIC GASTROENTEROSTOMY N/A 7/10/2020     Procedure: GASTROENTEROSTOMY, LAPAROSCOPIC, with intraop EGD;  Surgeon: Guillermo Villanueva MD;  Location: [facility] OR [address] [place] [address];  Service: General;  Laterality: N/A;    REMOVAL OF INTRAUTERINE DEVICE (IUD) N/A 6/3/2019    Procedure: REMOVAL, INTRAUTERINE DEVICE;  Surgeon: Ashley Greenberg MD;  Location: [facility] OR;  Service: OB/GYN;  Laterality: N/A;  RN PRE OP 0-37-62----BMI-51.29    WISDOM TOOTH EXTRACTION         SOCIAL HISTORY:  Social History     Socioeconomic History    Marital status: Single     Spouse name: Not on file    Number of children: Not on file    Years of education: Not on file    Highest education level: Not on file   Occupational History    Not on file   Social Needs    Financial resource strain: Not on file    Food insecurity     Worry: Not on file     Inability: Not on file    Transportation needs     Medical: Not on file     Non-medical: Not on file   Tobacco Use    Smoking status: Never Smoker    Smokeless tobacco: Never Used   Substance and Sexual Activity    Alcohol use: No    Drug use: Never    Sexual activity: Yes     Partners: Male     Birth control/protection: None, I.U.D.   Lifestyle    Physical activity     Days per week: Not on file     Minutes per session: Not on file    Stress: Not on file   Relationships    Social connections     Talks on phone: Not on file     Gets together: Not on file     Attends Mu-ism service: Not on file     Active member of club or organization: Not on file     Attends meetings of clubs or organizations: Not on file     Relationship status: Not on file   Other Topics Concern    Not on file   Social History Narrative    Disabled 2/2 chronic left shoulder pain       FAMILY HISTORY:  Family History   Problem Relation Age of Onset    Hypertension Mother     Hyperlipidemia Mother     Asthma Mother     Anxiety disorder Mother     No Known Problems Father     Liver cancer Maternal Grandfather     Celiac disease Neg Hx     Colon  cancer Neg Hx     Colon polyps Neg Hx     Crohn's disease Neg Hx     Cystic fibrosis Neg Hx     Esophageal cancer Neg Hx     Irritable bowel syndrome Neg Hx     Rectal cancer Neg Hx     Stomach cancer Neg Hx        ALLERGIES AND MEDICATIONS: updated and reviewed.  Review of patient's allergies indicates:   Allergen Reactions    Pcn [penicillins] Shortness Of Breath     Current Outpatient Medications   Medication Sig Dispense Refill    CALCIUM ORAL Take by mouth every morning.       cyanocobalamin (VITAMIN B-12) 100 MCG tablet Take 100 mcg by mouth every morning.       ergocalciferol (ERGOCALCIFEROL) 50,000 unit Cap TAKE 1 CAPSULE BY MOUTH EVERY 7 DAYS 4 capsule 2    fish oil-omega-3 fatty acids 300-1,000 mg capsule Take by mouth every morning.       drospirenone-ethinyl estradiol (ROSALBA) 3-0.02 mg per tablet Take 1 tablet by mouth once daily. 84 tablet 3    gabapentin (NEURONTIN) 300 MG capsule Take 1 capsule (300 mg total) by mouth 2 (two) times daily. 60 capsule 0    hydrocodone-acetaminophen (HYCET) solution 7.5-325 mg/15mL Take 15 mLs by mouth 4 (four) times daily as needed for Pain. (Patient not taking: Reported on 10/16/2020) 200 mL 0    ibuprofen (ADVIL,MOTRIN) 800 MG tablet Take 1 tablet (800 mg total) by mouth every 8 (eight) hours as needed for Pain. (Patient not taking: Reported on 10/16/2020) 40 tablet 0    omeprazole (PRILOSEC) 20 MG capsule TAKE 1 CAPSULE(20 MG) BY MOUTH EVERY DAY (Patient not taking: Reported on 10/16/2020) 90 capsule 0    ondansetron (ZOFRAN) 4 MG tablet Take 1 tablet (4 mg total) by mouth every 6 (six) hours as needed for Nausea. (Patient not taking: Reported on 10/16/2020) 30 tablet 0    polyethylene glycol (GLYCOLAX) 17 gram/dose powder Dissolve one capful (17 g) in liquid and take by mouth once daily. (Patient not taking: Reported on 10/16/2020) 510 g 3    tiZANidine (ZANAFLEX) 2 MG tablet Take 1 tablet (2 mg total) by mouth every 8 (eight) hours as needed  "(pain/muscle pain spasm). 30 tablet 0    ursodioL (COSME FORTE) 500 MG tablet Crush one tablet daily for gall bladder. Please compound into liquid and supply for 90 days if insurance approves (Patient not taking: Reported on 10/16/2020) 90 tablet 1     Current Facility-Administered Medications   Medication Dose Route Frequency Provider Last Rate Last Dose    medroxyPROGESTERone (DEPO-PROVERA) injection 150 mg  150 mg Intramuscular Q90 Days Ashley Greenberg MD   150 mg at 08/27/20 1522         ROS:  Review of Systems   Constitutional: Negative for appetite change, chills, fever and unexpected weight change.   Respiratory: Negative for cough and shortness of breath.    Cardiovascular: Negative for chest pain, palpitations and leg swelling.   Gastrointestinal: Negative for abdominal pain, constipation, diarrhea, nausea and vomiting.   Musculoskeletal: Positive for arthralgias, back pain and gait problem.   Skin: Negative.    Neurological: Negative for dizziness, light-headedness and headaches.   Psychiatric/Behavioral: Negative for dysphoric mood. The patient is not nervous/anxious.        Objective:       Physical Exam  Vitals:    10/16/20 1032   BP: 98/62   BP Location: Left arm   Patient Position: Sitting   BP Method: Large (Manual)   Pulse: 60   Temp: 97.7 °F (36.5 °C)   TempSrc: Oral   SpO2: 98%   Weight: 97 kg (213 lb 11.8 oz)   Height: 4' 9" (1.448 m)    Body mass index is 46.25 kg/m².  Weight: 97 kg (213 lb 11.8 oz)   Height: 4' 9" (144.8 cm)   Physical Exam  Vitals signs reviewed.   Constitutional:       General: She is not in acute distress.     Appearance: She is well-developed.   HENT:      Head: Normocephalic and atraumatic.   Eyes:      Conjunctiva/sclera: Conjunctivae normal.   Neck:      Musculoskeletal: Normal range of motion.   Cardiovascular:      Rate and Rhythm: Normal rate.   Pulmonary:      Effort: Pulmonary effort is normal.   Musculoskeletal:         General: Tenderness (multiple sites " - right hip/trochanteric region, paraspinous lumbrosacral region, right posterior calf) present. No deformity.      Comments: Pain with external rotation of right him   Skin:     General: Skin is warm and dry.   Neurological:      Mental Status: She is alert.      Cranial Nerves: No cranial nerve deficit.   Psychiatric:         Behavior: Behavior normal.             Assessment:     1. Chronic bilateral low back pain with right-sided sciatica    2. Chronic hip pain, bilateral    3. OI (osteogenesis imperfecta)    4. Class 3 severe obesity due to excess calories without serious comorbidity with body mass index (BMI) of 40.0 to 44.9 in adult    5. Encounter for screening for human immunodeficiency virus (HIV)    6. Need for hepatitis C screening test    7. Flu vaccine need    8. Need for influenza vaccination      Plan:     Prisca was seen today for leg pain.    Diagnoses and all orders for this visit:    Chronic bilateral low back pain with right-sided sciatica  Chronic hip pain, bilateral  OI (osteogenesis imperfecta)  Patient with acute on chronic pain in the setting of recent fall 3 days prior   Trial medications as noted  Given history of OI need to rule out occult fractures  -     gabapentin (NEURONTIN) 300 MG capsule; Take 1 capsule (300 mg total) by mouth 2 (two) times daily.  -     tiZANidine (ZANAFLEX) 2 MG tablet; Take 1 tablet (2 mg total) by mouth every 8 (eight) hours as needed (pain/muscle pain spasm).  -     X-Ray Pelvis Routine AP; Future    Class 3 severe obesity due to excess calories without serious comorbidity with body mass index (BMI) of 40.0 to 44.9 in adult  Body mass index is 46.25 kg/m².  S/p sleeve gastrectomy - discussed continued weight loss interventions    Encounter for screening for human immunodeficiency virus (HIV)  Amenable to HIV screening - discussed, ordered   -     HIV 1/2 Ag/Ab (4th Gen); Future    Need for hepatitis C screening test  Ordered per USPSTF guidelines  -      Hepatitis C Antibody; Future    Flu vaccine need  Need for influenza vaccination  Counseled regarding seasonal influenza vaccination - administered  -     Influenza - Quadrivalent *Preferred* (6 months+) (PF)          Health Maintenance       Date Due Completion Date    Hepatitis C Screening 1983 ---    HIV Screening 08/04/1998 ---    Influenza Vaccine (1) 08/01/2020 11/18/2019    Cervical Cancer Screening 03/20/2022 3/20/2019    TETANUS VACCINE 11/18/2029 11/18/2019            Health Maintenance reviewed and addressed as per orders    Follow up in about 3 months (around 1/16/2021) for [facility] Pain.    The patient expressed understanding and no barriers to adherence were identified.     1. The patient indicates understanding of these issues and agrees with the plan. Brief care plan is updated and reviewed with the patient as applicable.     2. The patient is given an After Visit Summary that lists all medications with directions, allergies, orders placed during this encounter and follow-up instructions.     3. I have reviewed the patient's medical information including past medical, family, and social history sections including the medications and allergies.     4. We discussed the patient's current medications. I reconciled the patient's medication list and prepared and supplied needed refills.       Mathew Carpenter MD  Internal Medicine-Pediatrics

## 2020-10-23 ENCOUNTER — PATIENT MESSAGE (OUTPATIENT)
Dept: FAMILY MEDICINE | Facility: CLINIC | Age: 35
End: 2020-10-23

## 2020-10-23 DIAGNOSIS — G89.29 CHRONIC BILATERAL LOW BACK PAIN WITH RIGHT-SIDED SCIATICA: Primary | ICD-10-CM

## 2020-10-23 DIAGNOSIS — M54.41 CHRONIC BILATERAL LOW BACK PAIN WITH RIGHT-SIDED SCIATICA: Primary | ICD-10-CM

## 2020-11-01 ENCOUNTER — PATIENT MESSAGE (OUTPATIENT)
Dept: FAMILY MEDICINE | Facility: CLINIC | Age: 35
End: 2020-11-01

## 2020-11-01 ENCOUNTER — HOSPITAL ENCOUNTER (EMERGENCY)
Facility: HOSPITAL | Age: 35
Discharge: HOME | End: 2020-11-01
Attending: EMERGENCY MEDICINE
Payer: MEDICARE

## 2020-11-01 VITALS
HEART RATE: 60 BPM | TEMPERATURE: 98 F | BODY MASS INDEX: 45.95 KG/M2 | WEIGHT: 213 LBS | SYSTOLIC BLOOD PRESSURE: 124 MMHG | HEIGHT: 57 IN | RESPIRATION RATE: 16 BRPM | OXYGEN SATURATION: 100 % | DIASTOLIC BLOOD PRESSURE: 71 MMHG

## 2020-11-01 DIAGNOSIS — R52 PAIN: ICD-10-CM

## 2020-11-01 DIAGNOSIS — M54.32 SCIATICA OF LEFT SIDE: ICD-10-CM

## 2020-11-01 DIAGNOSIS — G89.29 CHRONIC RIGHT HIP PAIN: Primary | ICD-10-CM

## 2020-11-01 DIAGNOSIS — M25.559 HIP PAIN: Primary | ICD-10-CM

## 2020-11-01 DIAGNOSIS — M25.551 CHRONIC RIGHT HIP PAIN: Primary | ICD-10-CM

## 2020-11-01 LAB
B-HCG UR QL: NEGATIVE
CTP QC/QA: YES

## 2020-11-01 PROCEDURE — 96372 THER/PROPH/DIAG INJ SC/IM: CPT | Mod: HCNC

## 2020-11-01 PROCEDURE — 63600175 PHARM REV CODE 636 W HCPCS: Mod: HCNC | Performed by: PHYSICIAN ASSISTANT

## 2020-11-01 PROCEDURE — 81025 URINE PREGNANCY TEST: CPT | Mod: HCNC | Performed by: EMERGENCY MEDICINE

## 2020-11-01 PROCEDURE — 99284 EMERGENCY DEPT VISIT MOD MDM: CPT | Mod: 25,HCNC

## 2020-11-01 PROCEDURE — 25000003 PHARM REV CODE 250: Mod: HCNC | Performed by: PHYSICIAN ASSISTANT

## 2020-11-01 RX ORDER — LIDOCAINE 50 MG/G
1 PATCH TOPICAL DAILY
Qty: 15 PATCH | Refills: 0 | Status: SHIPPED | OUTPATIENT
Start: 2020-11-01 | End: 2023-08-22

## 2020-11-01 RX ORDER — TRAMADOL HYDROCHLORIDE 50 MG/1
50 TABLET ORAL EVERY 6 HOURS PRN
Qty: 11 TABLET | Refills: 0 | Status: SHIPPED | OUTPATIENT
Start: 2020-11-01 | End: 2021-01-05

## 2020-11-01 RX ORDER — KETOROLAC TROMETHAMINE 30 MG/ML
30 INJECTION, SOLUTION INTRAMUSCULAR; INTRAVENOUS
Status: COMPLETED | OUTPATIENT
Start: 2020-11-01 | End: 2020-11-01

## 2020-11-01 RX ORDER — DEXAMETHASONE SODIUM PHOSPHATE 4 MG/ML
8 INJECTION, SOLUTION INTRA-ARTICULAR; INTRALESIONAL; INTRAMUSCULAR; INTRAVENOUS; SOFT TISSUE
Status: COMPLETED | OUTPATIENT
Start: 2020-11-01 | End: 2020-11-01

## 2020-11-01 RX ORDER — TRAMADOL HYDROCHLORIDE 50 MG/1
50 TABLET ORAL
Status: COMPLETED | OUTPATIENT
Start: 2020-11-01 | End: 2020-11-01

## 2020-11-01 RX ADMIN — DEXAMETHASONE SODIUM PHOSPHATE 8 MG: 4 INJECTION, SOLUTION INTRA-ARTICULAR; INTRALESIONAL; INTRAMUSCULAR; INTRAVENOUS; SOFT TISSUE at 12:11

## 2020-11-01 RX ADMIN — KETOROLAC TROMETHAMINE 30 MG: 30 INJECTION, SOLUTION INTRAMUSCULAR; INTRAVENOUS at 12:11

## 2020-11-01 RX ADMIN — TRAMADOL HYDROCHLORIDE 50 MG: 50 TABLET, FILM COATED ORAL at 12:11

## 2020-11-01 NOTE — ED TRIAGE NOTES
Patient c/o chronic right hip pain 7/10. Patient states she has a history of slip disc, arthritis, back problems. Patient reports taking prescribed pain medication and PCP discussing hip surgery.     Denies chest pain, sob, fever, n/v/d.    AAO x 4.

## 2020-11-01 NOTE — DISCHARGE INSTRUCTIONS
Please take new medication as directed and follow discharge instructions provided.  Please make sure to follow-up with your PCP in orthopedic to discuss today's emergency department visit and for further evaluation and management.  Please return emergency department immediately if her symptoms worsen or you develop any additional concerning symptoms.

## 2020-11-02 NOTE — ED PROVIDER NOTES
Encounter Date: 2020       History     Chief Complaint   Patient presents with    Hip Pain     right hip pain started x 2 months     Ms. lynn is a 35-year-old female patient that presents to the ED for urgent evaluation of atraumatic right hip pain.  Onset of symptoms patient states was 2 months ago.  Patient also complaining of chronic back pain.  States that the pain shoots down her legs and believe she has a pinched nerve.  Denies any urinary or bowel incontinence, urinary retention, saddle anesthesia.  Denies any fevers, chills, body aches, runny nose, sore throat, cough, chest pain, shortness of breath, abdominal pain, nausea, vomiting, diarrhea, urinary symptoms.        Review of patient's allergies indicates:   Allergen Reactions    Pcn [penicillins] Shortness Of Breath     Past Medical History:   Diagnosis Date    Anemia     BMI 50.0-59.9, adult     Encounter for IUD removal 2019    GERD (gastroesophageal reflux disease) 2019    History of blood transfusion 2001    Malpositioned intrauterine device 3/21/2019    2019 OB/GYN - Dr Guzman - Discussed with patient that we will proceed with imaging to locate the IUD after which we can then proceed to book case in OR to retrieve displaced IUD    Obesity     OI (osteogenesis imperfecta)     Osteopetrosis     Tendonitis      Past Surgical History:   Procedure Laterality Date     SECTION      2016    ESOPHAGOGASTRODUODENOSCOPY N/A 2019    Procedure: ESOPHAGOGASTRODUODENOSCOPY (EGD);  Surgeon: Segun Dominguez MD;  Location: [address]);  Service: Endoscopy;  Laterality: N/A;  BMI 51    FEMUR FRACTURE SURGERY Bilateral     hardware/rods in both legs    FRACTURE SURGERY      femur    LAPAROSCOPIC GASTROENTEROSTOMY N/A 7/10/2020    Procedure: GASTROENTEROSTOMY, LAPAROSCOPIC, with intraop EGD;  Surgeon: Guillermo Villanueva MD;  Location: [address];  Service: General;  Laterality: N/A;     REMOVAL OF INTRAUTERINE DEVICE (IUD) N/A 6/3/2019    Procedure: REMOVAL, INTRAUTERINE DEVICE;  Surgeon: Ashley Greenberg MD;  Location: [facility] OR;  Service: OB/GYN;  Laterality: N/A;  RN PRE OP 1-30-54----BMI-51.29    WISDOM TOOTH EXTRACTION       Family History   Problem Relation Age of Onset    Hypertension Mother     Hyperlipidemia Mother     Asthma Mother     Anxiety disorder Mother     No Known Problems Father     Liver cancer Maternal Grandfather     Celiac disease Neg Hx     Colon cancer Neg Hx     Colon polyps Neg Hx     Crohn's disease Neg Hx     Cystic fibrosis Neg Hx     Esophageal cancer Neg Hx     Irritable bowel syndrome Neg Hx     Rectal cancer Neg Hx     Stomach cancer Neg Hx      Social History     Tobacco Use    Smoking status: Never Smoker    Smokeless tobacco: Never Used   Substance Use Topics    Alcohol use: No    Drug use: Never     Review of Systems   Constitutional: Negative for fever.   HENT: Negative for sore throat.    Respiratory: Negative for shortness of breath.    Cardiovascular: Negative for chest pain.   Gastrointestinal: Negative for nausea.   Genitourinary: Negative for dysuria.   Musculoskeletal: Positive for arthralgias and back pain. Negative for neck pain and neck stiffness.   Skin: Negative for rash.   Neurological: Negative for weakness.   Hematological: Does not bruise/bleed easily.   Psychiatric/Behavioral: Negative for confusion.       Physical Exam     Initial Vitals [11/01/20 1107]   BP Pulse Resp Temp SpO2   117/78 65 18 99.2 °F (37.3 °C) 98 %      MAP       --         Physical Exam    Constitutional: She appears well-developed and well-nourished. No distress.   HENT:   Head: Normocephalic and atraumatic.   Eyes: Conjunctivae and EOM are normal. Pupils are equal, round, and reactive to light.   Neck: Normal range of motion. Neck supple.   Cardiovascular: Normal rate, regular rhythm and normal heart sounds.   Abdominal: Soft. Normal appearance  and bowel sounds are normal. There is no abdominal tenderness. There is no rigidity, no rebound and no guarding.   Musculoskeletal:      Right hip: She exhibits tenderness. She exhibits no swelling and no deformity.      Lumbar back: She exhibits pain. She exhibits no tenderness, no bony tenderness and no swelling.      Comments: No gross deformities, bruising, swelling, erythema, warmth noted.  No external rotation or shortening of right lower extremity.  2+ dorsalis pedis pulses present bilaterally.  Distal sensation intact.  5/5 strength to the bilateral lower extremities.   Neurological: She is alert and oriented to person, place, and time.   Skin: Skin is warm, dry and intact. No rash noted.   Psychiatric: She has a normal mood and affect. Her speech is normal and behavior is normal. Judgment and thought content normal. Cognition and memory are normal.         ED Course   Procedures  Labs Reviewed   POCT URINE PREGNANCY          Imaging Results          X-Ray Hip 2 View Right (Final result)  Result time 11/01/20 12:26:03    Final result by Luciano Gomes MD (11/01/20 12:26:03)                 Impression:      1. No acute displaced fracture or dislocation of the left hip.      Electronically signed by: Luciano Gomes MD  Date:    11/01/2020  Time:    12:26             Narrative:    EXAMINATION:  XR HIP 2 VIEW RIGHT    CLINICAL HISTORY:  Pain, unspecified    TECHNIQUE:  AP view of the pelvis and frog leg lateral view of the right hip were performed.    COMPARISON:  None    FINDINGS:  Two views left hip.    The bilateral sacroiliac joints are intact noting degenerative changes, left greater than right suggesting that of sacroiliitis.  The pubic symphysis is intact.  The bilateral femoroacetabular joints are intact.  Surgical changes are noted of the right femur and left femur.  No radiopaque foreign body.                                 Medical Decision Making:   Clinical Tests:   Lab Tests: Ordered and  Reviewed  Radiological Study: Ordered and Reviewed  ED Management:  Hemodynamically stable.  Nontoxic and in no acute distress.  Patient is overall well-appearing, pleasant, conversational.  X-ray read reports no acute displaced fracture or dislocation left hip.  Does report degenerative changes with surgical changes noted of the right femur and left femur.  I believe patient's symptoms most likely due to combination of osteoarthritis and sciatica.  Will treat pain with shot of IM Decadron, Toradol, tramadol.  Discharge home with PCP and orthopedic follow-up.  Patient verbalizes understanding and is agreeable with plan.  Strict ED return precautions given for any worsening or additional concerning symptoms.                             Clinical Impression:       ICD-10-CM ICD-9-CM   1. Hip pain  M25.559 719.45   2. Pain  R52 780.96   3. Sciatica of left side  M54.32 724.3                      Disposition:   Disposition: Discharged  Condition: Stable     ED Disposition Condition    Discharge Stable        ED Prescriptions     Medication Sig Dispense Start Date End Date Auth. Provider    lidocaine (LIDODERM) 5 % Place 1 patch onto the skin once daily. Remove & Discard patch within 12 hours or as directed by MD 15 patch 11/1/2020  Manfred Valentin PA-C    traMADoL (ULTRAM) 50 mg tablet Take 1 tablet (50 mg total) by mouth every 6 (six) hours as needed. 11 tablet 11/1/2020  Manfred Valentin PA-C        Follow-up Information     Follow up With Specialties Details Why Contact Info    Mathew Carpenter MD Internal Medicine, Pediatrics Schedule an appointment as soon as possible for a visit   [address] [facility]  [place] [address]  [phone]      [facility] Emergency Medicine Go to  If symptoms worsen [address]  [facility] [id number] [phone]                                       Manfred Valentin PA-C  11/01/20 1931

## 2020-11-03 NOTE — TELEPHONE ENCOUNTER
Referral to Orthopedic Surgery placed presently for chronic RLE pain in the setting of prior surgically implanted dionne

## 2020-11-05 ENCOUNTER — PATIENT MESSAGE (OUTPATIENT)
Dept: BARIATRICS | Facility: CLINIC | Age: 35
End: 2020-11-05

## 2020-11-05 ENCOUNTER — OFFICE VISIT (OUTPATIENT)
Dept: ORTHOPEDICS | Facility: CLINIC | Age: 35
End: 2020-11-05
Payer: MEDICARE

## 2020-11-05 ENCOUNTER — HOSPITAL ENCOUNTER (OUTPATIENT)
Facility: HOSPITAL | Age: 35
Discharge: HOME | End: 2020-11-06
Attending: EMERGENCY MEDICINE | Admitting: INTERNAL MEDICINE
Payer: MEDICARE

## 2020-11-05 ENCOUNTER — HOSPITAL ENCOUNTER (OUTPATIENT)
Dept: RADIOLOGY | Facility: HOSPITAL | Age: 35
Discharge: HOME | End: 2020-11-05
Attending: ORTHOPAEDIC SURGERY
Payer: MEDICARE

## 2020-11-05 VITALS — WEIGHT: 212.94 LBS | HEIGHT: 57 IN | BODY MASS INDEX: 45.94 KG/M2

## 2020-11-05 DIAGNOSIS — K92.2 GI BLEED: ICD-10-CM

## 2020-11-05 DIAGNOSIS — M25.551 CHRONIC RIGHT HIP PAIN: ICD-10-CM

## 2020-11-05 DIAGNOSIS — M54.9 DORSALGIA, UNSPECIFIED: ICD-10-CM

## 2020-11-05 DIAGNOSIS — R10.9 ABDOMINAL PAIN: ICD-10-CM

## 2020-11-05 DIAGNOSIS — M53.87 SCIATICA OF RIGHT SIDE ASSOCIATED WITH DISORDER OF LUMBOSACRAL SPINE: Primary | ICD-10-CM

## 2020-11-05 DIAGNOSIS — Q78.0 OI (OSTEOGENESIS IMPERFECTA): ICD-10-CM

## 2020-11-05 DIAGNOSIS — Q78.0 OSTEOGENESIS IMPERFECTA: Primary | ICD-10-CM

## 2020-11-05 DIAGNOSIS — K62.5 RECTAL BLEEDING: ICD-10-CM

## 2020-11-05 DIAGNOSIS — R19.8 ABDOMINAL BURNING SENSATION IN LEFT UPPER QUADRANT: Primary | ICD-10-CM

## 2020-11-05 DIAGNOSIS — G89.29 CHRONIC RIGHT HIP PAIN: ICD-10-CM

## 2020-11-05 LAB
ABO + RH BLD: NORMAL
ALBUMIN SERPL BCP-MCNC: 3.9 G/DL (ref 3.5–5.2)
ALP SERPL-CCNC: 68 U/L (ref 55–135)
ALT SERPL W/O P-5'-P-CCNC: 10 U/L (ref 10–44)
ANION GAP SERPL CALC-SCNC: 12 MMOL/L (ref 8–16)
ANISOCYTOSIS BLD QL SMEAR: SLIGHT
APTT BLDCRRT: 26.4 SEC (ref 21–32)
AST SERPL-CCNC: 20 U/L (ref 10–40)
BASOPHILS # BLD AUTO: 0.03 K/UL (ref 0–0.2)
BASOPHILS NFR BLD: 0.4 % (ref 0–1.9)
BILIRUB SERPL-MCNC: 0.6 MG/DL (ref 0.1–1)
BLD GP AB SCN CELLS X3 SERPL QL: NORMAL
BLOOD GROUP ANTIBODIES SERPL: NORMAL
BUN SERPL-MCNC: 8 MG/DL (ref 6–20)
BUN SERPL-MCNC: 8 MG/DL (ref 6–30)
BURR CELLS BLD QL SMEAR: ABNORMAL
CALCIUM SERPL-MCNC: 9.8 MG/DL (ref 8.7–10.5)
CHLORIDE SERPL-SCNC: 108 MMOL/L (ref 95–110)
CHLORIDE SERPL-SCNC: 109 MMOL/L (ref 95–110)
CO2 SERPL-SCNC: 20 MMOL/L (ref 23–29)
CREAT SERPL-MCNC: 1 MG/DL (ref 0.5–1.4)
CREAT SERPL-MCNC: 1.1 MG/DL (ref 0.5–1.4)
CTP QC/QA: YES
DIFFERENTIAL METHOD: ABNORMAL
EOSINOPHIL # BLD AUTO: 0.1 K/UL (ref 0–0.5)
EOSINOPHIL NFR BLD: 0.7 % (ref 0–8)
ERYTHROCYTE [DISTWIDTH] IN BLOOD BY AUTOMATED COUNT: 14.5 % (ref 11.5–14.5)
EST. GFR  (AFRICAN AMERICAN): >60 ML/MIN/1.73 M^2
EST. GFR  (NON AFRICAN AMERICAN): >60 ML/MIN/1.73 M^2
GLUCOSE SERPL-MCNC: 73 MG/DL (ref 70–110)
GLUCOSE SERPL-MCNC: 75 MG/DL (ref 70–110)
HCT VFR BLD AUTO: 41.6 % (ref 37–48.5)
HCT VFR BLD CALC: 44 %PCV (ref 36–54)
HGB BLD-MCNC: 13 G/DL (ref 12–16)
IMM GRANULOCYTES # BLD AUTO: 0.01 K/UL (ref 0–0.04)
IMM GRANULOCYTES NFR BLD AUTO: 0.1 % (ref 0–0.5)
INR PPP: 1 (ref 0.8–1.2)
LACTATE SERPL-SCNC: 1.2 MMOL/L (ref 0.5–2.2)
LYMPHOCYTES # BLD AUTO: 2.6 K/UL (ref 1–4.8)
LYMPHOCYTES NFR BLD: 31 % (ref 18–48)
MCH RBC QN AUTO: 28.3 PG (ref 27–31)
MCHC RBC AUTO-ENTMCNC: 31.3 G/DL (ref 32–36)
MCV RBC AUTO: 90 FL (ref 82–98)
MONOCYTES # BLD AUTO: 0.6 K/UL (ref 0.3–1)
MONOCYTES NFR BLD: 6.7 % (ref 4–15)
NEUTROPHILS # BLD AUTO: 5.1 K/UL (ref 1.8–7.7)
NEUTROPHILS NFR BLD: 61.1 % (ref 38–73)
NRBC BLD-RTO: 0 /100 WBC
OVALOCYTES BLD QL SMEAR: ABNORMAL
PLATELET # BLD AUTO: 212 K/UL (ref 150–350)
PLATELET BLD QL SMEAR: ABNORMAL
PMV BLD AUTO: 12.4 FL (ref 9.2–12.9)
POC IONIZED CALCIUM: 1.11 MMOL/L (ref 1.06–1.42)
POC TCO2 (MEASURED): 21 MMOL/L (ref 23–29)
POIKILOCYTOSIS BLD QL SMEAR: SLIGHT
POTASSIUM BLD-SCNC: 4.9 MMOL/L (ref 3.5–5.1)
POTASSIUM SERPL-SCNC: 4.1 MMOL/L (ref 3.5–5.1)
PROT SERPL-MCNC: 8.7 G/DL (ref 6–8.4)
PROTHROMBIN TIME: 11.1 SEC (ref 9–12.5)
RBC # BLD AUTO: 4.6 M/UL (ref 4–5.4)
SAMPLE: ABNORMAL
SARS-COV-2 RDRP RESP QL NAA+PROBE: NEGATIVE
SODIUM BLD-SCNC: 140 MMOL/L (ref 136–145)
SODIUM SERPL-SCNC: 140 MMOL/L (ref 136–145)
WBC # BLD AUTO: 8.35 K/UL (ref 3.9–12.7)

## 2020-11-05 PROCEDURE — 99999 PR PBB SHADOW E&M-EST. PATIENT-LVL V: CPT | Mod: PBBFAC,HCNC,, | Performed by: ORTHOPAEDIC SURGERY

## 2020-11-05 PROCEDURE — 96376 TX/PRO/DX INJ SAME DRUG ADON: CPT | Performed by: EMERGENCY MEDICINE

## 2020-11-05 PROCEDURE — 99285 EMERGENCY DEPT VISIT HI MDM: CPT | Mod: 25,HCNC

## 2020-11-05 PROCEDURE — C9113 INJ PANTOPRAZOLE SODIUM, VIA: HCPCS | Mod: HCNC | Performed by: EMERGENCY MEDICINE

## 2020-11-05 PROCEDURE — 63600175 PHARM REV CODE 636 W HCPCS: Mod: HCNC | Performed by: EMERGENCY MEDICINE

## 2020-11-05 PROCEDURE — 80047 BASIC METABLC PNL IONIZED CA: CPT | Mod: HCNC

## 2020-11-05 PROCEDURE — 83605 ASSAY OF LACTIC ACID: CPT | Mod: HCNC

## 2020-11-05 PROCEDURE — 99220 PR INITIAL OBSERVATION CARE,LEVL III: ICD-10-PCS | Mod: HCNC,,, | Performed by: INTERNAL MEDICINE

## 2020-11-05 PROCEDURE — 96361 HYDRATE IV INFUSION ADD-ON: CPT | Mod: HCNC

## 2020-11-05 PROCEDURE — C9113 INJ PANTOPRAZOLE SODIUM, VIA: HCPCS | Mod: HCNC | Performed by: INTERNAL MEDICINE

## 2020-11-05 PROCEDURE — 3008F BODY MASS INDEX DOCD: CPT | Mod: HCNC,CPTII,S$GLB, | Performed by: ORTHOPAEDIC SURGERY

## 2020-11-05 PROCEDURE — 3008F PR BODY MASS INDEX (BMI) DOCUMENTED: ICD-10-PCS | Mod: HCNC,CPTII,S$GLB, | Performed by: ORTHOPAEDIC SURGERY

## 2020-11-05 PROCEDURE — 99203 PR OFFICE/OUTPT VISIT, NEW, LEVL III, 30-44 MIN: ICD-10-PCS | Mod: HCNC,S$GLB,, | Performed by: ORTHOPAEDIC SURGERY

## 2020-11-05 PROCEDURE — 99220 PR INITIAL OBSERVATION CARE,LEVL III: CPT | Mod: HCNC,,, | Performed by: INTERNAL MEDICINE

## 2020-11-05 PROCEDURE — 85025 COMPLETE CBC W/AUTO DIFF WBC: CPT | Mod: HCNC

## 2020-11-05 PROCEDURE — 72100 XR LUMBAR SPINE AP AND LATERAL: ICD-10-PCS | Mod: 26,HCNC,, | Performed by: RADIOLOGY

## 2020-11-05 PROCEDURE — 96361 HYDRATE IV INFUSION ADD-ON: CPT | Performed by: EMERGENCY MEDICINE

## 2020-11-05 PROCEDURE — 82330 ASSAY OF CALCIUM: CPT | Mod: HCNC

## 2020-11-05 PROCEDURE — U0002 COVID-19 LAB TEST NON-CDC: HCPCS | Mod: HCNC | Performed by: INTERNAL MEDICINE

## 2020-11-05 PROCEDURE — 96374 THER/PROPH/DIAG INJ IV PUSH: CPT | Mod: HCNC

## 2020-11-05 PROCEDURE — 85610 PROTHROMBIN TIME: CPT | Mod: HCNC

## 2020-11-05 PROCEDURE — 86901 BLOOD TYPING SEROLOGIC RH(D): CPT | Mod: HCNC

## 2020-11-05 PROCEDURE — 72100 X-RAY EXAM L-S SPINE 2/3 VWS: CPT | Mod: 26,HCNC,, | Performed by: RADIOLOGY

## 2020-11-05 PROCEDURE — 63600175 PHARM REV CODE 636 W HCPCS: Mod: HCNC | Performed by: INTERNAL MEDICINE

## 2020-11-05 PROCEDURE — 86870 RBC ANTIBODY IDENTIFICATION: CPT | Mod: HCNC

## 2020-11-05 PROCEDURE — G0378 HOSPITAL OBSERVATION PER HR: HCPCS | Mod: HCNC,CS

## 2020-11-05 PROCEDURE — 99285 EMERGENCY DEPT VISIT HI MDM: CPT | Mod: ,,, | Performed by: EMERGENCY MEDICINE

## 2020-11-05 PROCEDURE — 80053 COMPREHEN METABOLIC PANEL: CPT | Mod: HCNC

## 2020-11-05 PROCEDURE — 99285 PR EMERGENCY DEPT VISIT,LEVEL V: ICD-10-PCS | Mod: ,,, | Performed by: EMERGENCY MEDICINE

## 2020-11-05 PROCEDURE — 99215 OFFICE O/P EST HI 40 MIN: CPT | Mod: HCNC,,, | Performed by: INTERNAL MEDICINE

## 2020-11-05 PROCEDURE — 25000003 PHARM REV CODE 250: Mod: HCNC | Performed by: INTERNAL MEDICINE

## 2020-11-05 PROCEDURE — 99203 OFFICE O/P NEW LOW 30 MIN: CPT | Mod: HCNC,S$GLB,, | Performed by: ORTHOPAEDIC SURGERY

## 2020-11-05 PROCEDURE — 85730 THROMBOPLASTIN TIME PARTIAL: CPT | Mod: HCNC

## 2020-11-05 PROCEDURE — 72100 X-RAY EXAM L-S SPINE 2/3 VWS: CPT | Mod: TC,HCNC

## 2020-11-05 PROCEDURE — 99999 PR PBB SHADOW E&M-EST. PATIENT-LVL V: ICD-10-PCS | Mod: PBBFAC,HCNC,, | Performed by: ORTHOPAEDIC SURGERY

## 2020-11-05 PROCEDURE — 25000003 PHARM REV CODE 250: Mod: HCNC | Performed by: EMERGENCY MEDICINE

## 2020-11-05 PROCEDURE — 99215 PR OFFICE/OUTPT VISIT, EST, LEVL V, 40-54 MIN: ICD-10-PCS | Mod: HCNC,,, | Performed by: INTERNAL MEDICINE

## 2020-11-05 RX ORDER — FAMOTIDINE 40 MG/5ML
20 POWDER, FOR SUSPENSION ORAL 2 TIMES DAILY
Qty: 150 ML | Refills: 3 | Status: SHIPPED | OUTPATIENT
Start: 2020-11-05 | End: 2021-12-22

## 2020-11-05 RX ORDER — TIZANIDINE 4 MG/1
4 TABLET ORAL EVERY 8 HOURS PRN
Status: DISCONTINUED | OUTPATIENT
Start: 2020-11-05 | End: 2020-11-06 | Stop reason: HOSPADM

## 2020-11-05 RX ORDER — TRAMADOL HYDROCHLORIDE 50 MG/1
50 TABLET ORAL EVERY 6 HOURS PRN
Status: DISCONTINUED | OUTPATIENT
Start: 2020-11-05 | End: 2020-11-06 | Stop reason: HOSPADM

## 2020-11-05 RX ORDER — ONDANSETRON 4 MG/1
4 TABLET, FILM COATED ORAL EVERY 6 HOURS PRN
Status: DISCONTINUED | OUTPATIENT
Start: 2020-11-05 | End: 2020-11-06 | Stop reason: HOSPADM

## 2020-11-05 RX ORDER — PANTOPRAZOLE SODIUM 40 MG/10ML
40 INJECTION, POWDER, LYOPHILIZED, FOR SOLUTION INTRAVENOUS 2 TIMES DAILY
Status: DISCONTINUED | OUTPATIENT
Start: 2020-11-05 | End: 2020-11-06 | Stop reason: HOSPADM

## 2020-11-05 RX ORDER — SODIUM CHLORIDE 9 MG/ML
1000 INJECTION, SOLUTION INTRAVENOUS
Status: COMPLETED | OUTPATIENT
Start: 2020-11-05 | End: 2020-11-05

## 2020-11-05 RX ORDER — GABAPENTIN 300 MG/1
300 CAPSULE ORAL 2 TIMES DAILY
Status: DISCONTINUED | OUTPATIENT
Start: 2020-11-05 | End: 2020-11-06 | Stop reason: HOSPADM

## 2020-11-05 RX ORDER — PANTOPRAZOLE SODIUM 40 MG/10ML
80 INJECTION, POWDER, LYOPHILIZED, FOR SOLUTION INTRAVENOUS
Status: COMPLETED | OUTPATIENT
Start: 2020-11-05 | End: 2020-11-05

## 2020-11-05 RX ORDER — OMEPRAZOLE 40 MG/1
40 CAPSULE, DELAYED RELEASE ORAL
Qty: 30 CAPSULE | Refills: 2 | Status: SHIPPED | OUTPATIENT
Start: 2020-11-05

## 2020-11-05 RX ADMIN — PANTOPRAZOLE SODIUM 80 MG: 40 INJECTION, POWDER, LYOPHILIZED, FOR SOLUTION INTRAVENOUS at 01:11

## 2020-11-05 RX ADMIN — SODIUM CHLORIDE 1000 ML: 0.9 INJECTION, SOLUTION INTRAVENOUS at 01:11

## 2020-11-05 RX ADMIN — PANTOPRAZOLE SODIUM 40 MG: 40 INJECTION, POWDER, LYOPHILIZED, FOR SOLUTION INTRAVENOUS at 09:11

## 2020-11-05 RX ADMIN — GABAPENTIN 300 MG: 300 CAPSULE ORAL at 09:11

## 2020-11-05 NOTE — CONSULTS
[facility]  Gastroenterology  Consult Note    Patient Name: Prisca Zhu  MRN: [id number]  Admission Date: 11/5/2020  Hospital Length of Stay: 0 days  Code Status: Prior   Attending Provider: Arthur Newberry MD   Consulting Provider: Todd Armas MD  Primary Care Physician: Mathew Carpenter MD  Principal Problem:<principal problem not specified>    Inpatient consult to Gastroenterology  Consult performed by: Todd Armas MD  Consult ordered by: Arthur Newberry MD        Subjective:     HPI:  35 F PMH GERD, recent Symone-en-Y gastric bypass, osteogenesis imperfecta with recent hip/femur fracture transferred from the orthopedic clinic with report of rectal bleeding.  She was seen in the ED where she was noted to be hemodynamically stable and not tachycardic labs notable for a normal hemoglobin, 13, normal creatinine, unremarkable CMP.  KUB unremarkable.  Given patient was recently started on steroids by Orthopedics as well as Toradol, GI was consulted for further evaluation.    Upon interview, patient notes she had a hard black formed stool that was difficult to pass, this was followed by her next bowel movement being hematochezia, small volume.  She denied any further melenic stools and has not had bowel movement since.  She did endorse receiving steroids and NSAIDs which concerned her given history of Symone-en-Y.  She has not subsequently taken any further inserted since hospitalization.     Past Medical History:   Diagnosis Date    Anemia     BMI 50.0-59.9, adult     Encounter for IUD removal 5/23/2019    GERD (gastroesophageal reflux disease) 4/2/2019    History of blood transfusion 2001    Malpositioned intrauterine device 3/21/2019    April 2019 OB/GYN - Dr Guzman - Discussed with patient that we will proceed with imaging to locate the IUD after which we can then proceed to book case in OR to retrieve displaced IUD    Obesity     OI (osteogenesis imperfecta)      Osteopetrosis     Tendonitis        Past Surgical History:   Procedure Laterality Date     SECTION      ,     ESOPHAGOGASTRODUODENOSCOPY N/A 2019    Procedure: ESOPHAGOGASTRODUODENOSCOPY (EGD);  Surgeon: Segun Dominguez MD;  [facility]  Service: Endoscopy;  Laterality: N/A;  BMI 51    FEMUR FRACTURE SURGERY Bilateral     hardware/rods in both legs    FRACTURE SURGERY      femur    LAPAROSCOPIC GASTROENTEROSTOMY N/A 7/10/2020    Procedure: GASTROENTEROSTOMY, LAPAROSCOPIC, with intraop EGD;  Surgeon: Guillermo Villanueva MD;  [address]  Service: General;  Laterality: N/A;    REMOVAL OF INTRAUTERINE DEVICE (IUD) N/A 6/3/2019    Procedure: REMOVAL, INTRAUTERINE DEVICE;  Surgeon: Ashley Greenberg MD;  Location: [facility];  Service: OB/GYN;  Laterality: N/A;  RN PRE OP 6-66-46----BMI-51.29    WISDOM TOOTH EXTRACTION         Review of patient's allergies indicates:   Allergen Reactions    Pcn [penicillins] Shortness Of Breath     Family History     Problem Relation (Age of Onset)    Anxiety disorder Mother    Asthma Mother    Hyperlipidemia Mother    Hypertension Mother    Liver cancer Maternal Grandfather    No Known Problems Father        Tobacco Use    Smoking status: Never Smoker    Smokeless tobacco: Never Used   Substance and Sexual Activity    Alcohol use: No    Drug use: Never    Sexual activity: Yes     Partners: Male     Birth control/protection: None, I.U.D.     Review of Systems   Constitutional: Negative for fatigue, fever and unexpected weight change.   HENT: Negative.    Eyes: Negative.    Respiratory: Negative.    Cardiovascular: Negative.    Gastrointestinal: Positive for anal bleeding and constipation. Negative for abdominal pain, diarrhea, nausea and vomiting.   Endocrine: Negative.    Genitourinary: Negative.    Musculoskeletal: Negative.    Skin: Negative.    Allergic/Immunologic: Negative.    Neurological: Negative.     Hematological: Negative.    Psychiatric/Behavioral: Negative.      Objective:     Vital Signs (Most Recent):  Temp: 98.3 °F (36.8 °C) (11/05/20 1043)  Pulse: (!) 42 (11/05/20 1511)  Resp: 20 (11/05/20 1511)  BP: (!) 155/75 (11/05/20 1511)  SpO2: 100 % (11/05/20 1511) Vital Signs (24h Range):  Temp:  [98.3 °F (36.8 °C)] 98.3 °F (36.8 °C)  Pulse:  [42-60] 42  Resp:  [15-20] 20  SpO2:  [96 %-100 %] 100 %  BP: (140-155)/() 155/75     Weight: 96.2 kg (212 lb) (11/05/20 1043)  Body mass index is 45.88 kg/m².    No intake or output data in the 24 hours ending 11/05/20 1715    Lines/Drains/Airways     Peripheral Intravenous Line                 Peripheral IV - Single Lumen 11/05/20 1228 20 G Right Antecubital less than 1 day                Physical Exam    Gen: NAD, lying comfortably  HENT: NCAT, oropharynx clear  Eyes: anicteric sclerae, EOMI grossly  Neck: supple, no visible masses/goiter  Cardiac: RRR, no M/R/G, S1/S2 present  Lungs: CTAB, no crackles, no wheezes  Abd: soft, NT/ND, normoactive BS, no rebound  Rectal:  Possible small anal fissure, non bleeding, no melena or hematochezia, tight anal sphincter  Ext: no LE edema, warm, well perfused  Skin: skin intact on exposed body parts, no visible rashes, lesions  Neuro: A&Ox4, neuro exam grossly intact, moves all extremities  Psych: appropriate mood, affect      Significant Labs:  CBC:   Recent Labs   Lab 11/05/20  1228 11/05/20  1237   WBC 8.35  --    HGB 13.0  --    HCT 41.6 44     --      CMP:   Recent Labs   Lab 11/05/20  1228   GLU 73   CALCIUM 9.8   ALBUMIN 3.9   PROT 8.7*      K 4.1   CO2 20*      BUN 8   CREATININE 1.1   ALKPHOS 68   ALT 10   AST 20   BILITOT 0.6     Coagulation:   Recent Labs   Lab 11/05/20  1228   INR 1.0   APTT 26.4       Significant Imaging:  Imaging results within the past 24 hours have been reviewed.    Assessment/Plan:     GI bleed  Patient presenting with what appears to be a lower GI bleed in the setting of a  hard stool.  Likely small tear/fissure.  No further bowel movements.  She is concerned about the one black formed stool she had and this may be upper GI bleeding.  It would not be unreasonable to trend her hemoglobins ensure she is stable and not having further melena.  Hemoglobin normal.    Recommendations:  Upper GI Bleed (non variceal)    Management:  -not unreasonable to trend hemoglobin overnight  -Place patient NPO at midnight  -Place 2 large bore IVs, volume resuscitation per primary  -Transfuse pRBC for Hb < 7 g/dL (Consider a higher Hb target if there is clinical evidence of intravascular volume depletion or comorbidities, such as CAD or if high suspicion of vigorous active ongoing bleeding or an uncorrected coagulopathy exists.).  -Correct coagulopathy (goal plt >50, INR <1.5) if present in patients without absolute contraindications.  -Avoid nonsteroidal agents, antiplatelet agents and anticoagulants if possible in patients without absolute contraindications  -Please call with any additional questions, concerns or changes in the patient's clinical status.            Thank you for your consult. I will follow-up with patient. Please contact us if you have any additional questions.    Todd Armas MD  Gastroenterology  [facility]

## 2020-11-05 NOTE — FIRST PROVIDER EVALUATION
Emergency Department TeleTriage Encounter Note      CHIEF COMPLAINT    Chief Complaint   Patient presents with    Rectal Bleeding     fell few days ago, seen in er, coming from ortho, hx gastric bypass       VITAL SIGNS   Initial Vitals [11/05/20 1043]   BP Pulse Resp Temp SpO2   (!) 147/70 60 18 98.3 °F (36.8 °C) 99 %      MAP       --            ALLERGIES    Review of patient's allergies indicates:   Allergen Reactions    Pcn [penicillins] Shortness Of Breath       PROVIDER TRIAGE NOTE  35-year-old female presents to the ER for evaluation of rectal bleeding.  Patient reports abdominal pain and noticing dark stool as well as bright red blood.  History of gastric bypass.      Initial orders will be placed and care will be transferred to an alternate provider when patient is roomed for a full evaluation. Any additional orders and the final disposition will be determined by that provider.      ORDERS  Labs Reviewed   CBC W/ AUTO DIFFERENTIAL   COMPREHENSIVE METABOLIC PANEL   APTT   PROTIME-INR   ISTAT CHEM8       ED Orders (720h ago, onward)    Start Ordered     Status Ordering Provider    11/05/20 1129 11/05/20 1128  Diet NPO  Diet effective now      Ordered JOHNYKUTTY, PANDA    11/05/20 1128 11/05/20 1127  ISTAT CHEM8  Once      Ordered JOHNYKUTTY, PANDA    11/05/20 1128 11/05/20 1127  CBC auto differential  STAT  Collect    Ordered JOHNYKUTTY, PANDA    11/05/20 1128 11/05/20 1127  Comprehensive metabolic panel  STAT  Collect    Ordered JOHNYKUTTY, PANDA    11/05/20 1128 11/05/20 1127  APTT  STAT  Collect    Ordered JOHNYKUTTY, PANDA    11/05/20 1128 11/05/20 1127  Protime-INR  STAT  Collect    Ordered JOHNYKUTTY, PANDA    11/05/20 1128 11/05/20 1127  Insert Saline lock IV  Once      Ordered JOHNYKUTTY, PANDA            Virtual Visit Note: The provider triage portion of this emergency department evaluation and documentation was performed via "[employer] a HIPAA-compliant telemedicine application, in concert  with a tele-presenter in the room. A face to face patient evaluation with one of my colleagues will occur once the patient is placed in an emergency department room.      DISCLAIMER: This note was prepared with [employer] voice recognition transcription software. Garbled syntax, mangled pronouns, and other bizarre constructions may be attributed to that software system.

## 2020-11-05 NOTE — ED PROVIDER NOTES
Encounter Date: 2020       History     Chief Complaint   Patient presents with    Rectal Bleeding     fell few days ago, seen in er, coming from ortho, hx gastric bypass     HPI   Prisca Zhu  Is a 35-year-old female with a history of an anemia, GERD, history of gastric bypass, obesity, osteogenesis imperfecta and recent hip/femur surgery presenting with rectal bleeding.  She was seen in orthopedic clinic recently and in the emergency department and found to have dark stools including bright red bleeding per rectum.  She has been on steroids despite having a gastric bypass.  She is concerned about gastric bleed and was sent in for evaluation from our clinic.  She reports having some abdominal pain which is diffuse, but no peritoneal findings.  She denies any vomiting but does endorse nausea, she endorses melena and bright red bleeding.  She denies any fevers, chills, back pain, chest pain, lightheadedness or dizziness.  Denies any falls or trauma.    Review of patient's allergies indicates:   Allergen Reactions    Pcn [penicillins] Shortness Of Breath     Past Medical History:   Diagnosis Date    Anemia     BMI 50.0-59.9, adult     Encounter for IUD removal 2019    GERD (gastroesophageal reflux disease) 2019    History of blood transfusion 2001    Malpositioned intrauterine device 3/21/2019    2019 OB/GYN - Dr Guzman - Discussed with patient that we will proceed with imaging to locate the IUD after which we can then proceed to book case in OR to retrieve displaced IUD    Obesity     OI (osteogenesis imperfecta)     Osteopetrosis     Tendonitis      Past Surgical History:   Procedure Laterality Date     SECTION      ,     ESOPHAGOGASTRODUODENOSCOPY N/A 2019    Procedure: ESOPHAGOGASTRODUODENOSCOPY (EGD);  Surgeon: Segun Dominguez MD;  Location: [facility] ([address] [place] [address]);  Service: Endoscopy;  Laterality: N/A;  BMI 51    FEMUR FRACTURE SURGERY Bilateral      hardware/rods in both legs    FRACTURE SURGERY      femur    LAPAROSCOPIC GASTROENTEROSTOMY N/A 7/10/2020    Procedure: GASTROENTEROSTOMY, LAPAROSCOPIC, with intraop EGD;  Surgeon: Guillermo Villanueva MD;  Location: [facility] OR [address] [place] [address];  Service: General;  Laterality: N/A;    REMOVAL OF INTRAUTERINE DEVICE (IUD) N/A 6/3/2019    Procedure: REMOVAL, INTRAUTERINE DEVICE;  Surgeon: Ashley Greenberg MD;  Location: [facility];  Service: OB/GYN;  Laterality: N/A;  RN PRE OP 0-97-66----BMI-51.29    WISDOM TOOTH EXTRACTION       Family History   Problem Relation Age of Onset    Hypertension Mother     Hyperlipidemia Mother     Asthma Mother     Anxiety disorder Mother     No Known Problems Father     Liver cancer Maternal Grandfather     Celiac disease Neg Hx     Colon cancer Neg Hx     Colon polyps Neg Hx     Crohn's disease Neg Hx     Cystic fibrosis Neg Hx     Esophageal cancer Neg Hx     Irritable bowel syndrome Neg Hx     Rectal cancer Neg Hx     Stomach cancer Neg Hx      Social History     Tobacco Use    Smoking status: Never Smoker    Smokeless tobacco: Never Used   Substance Use Topics    Alcohol use: No    Drug use: Never     Review of Systems   Constitutional: Positive for fatigue. Negative for chills and fever.   HENT: Negative for congestion, sinus pain and sore throat.    Eyes: Negative for photophobia and visual disturbance.   Respiratory: Negative for chest tightness and shortness of breath.    Cardiovascular: Negative for chest pain and palpitations.   Gastrointestinal: Positive for abdominal pain, blood in stool and nausea. Negative for abdominal distention, constipation, rectal pain and vomiting.   Endocrine: Negative for polyphagia and polyuria.   Genitourinary: Negative for dysuria, flank pain, frequency and urgency.   Musculoskeletal: Negative for arthralgias, back pain, myalgias, neck pain and neck stiffness.   Skin: Negative for color change and wound.   Neurological:  Negative for speech difficulty, light-headedness and headaches.   Psychiatric/Behavioral: Negative for behavioral problems and confusion. The patient is not nervous/anxious.        Physical Exam     Initial Vitals [11/05/20 1043]   BP Pulse Resp Temp SpO2   (!) 147/70 60 18 98.3 °F (36.8 °C) 99 %      MAP       --         Physical Exam    Nursing note and vitals reviewed.    Gen/Constitutional: Interactive. No acute distress  Head: Normocephalic, Atraumatic  Neck: supple, no masses or LAD, no JVD  Eyes: PERRLA, conjunctiva clear  Ears, Nose and Throat: No rhinorrhea or stridor.  Cardiac:  Bradycardic rate, Reg Rhythm, No murmur  Pulmonary: CTA Bilat, no wheezes, rhonchi, rales.  GI: Abdomen soft,  Moderate abdominal tenderness diffuse, non-distended; no rebound or guarding  Rectal exam:  No hemorrhoids, no anal fissure, positive guaiac, dark stool, no gross melena  : No CVA tenderness.  Musculoskeletal: Extremities warm, well perfused, no erythema, no edema  Skin: No rashes  Neuro: Alert and Oriented x 3; No focal motor or sensory deficits.    Psych: Normal affect        ED Course   Procedures  Labs Reviewed   CBC W/ AUTO DIFFERENTIAL - Abnormal; Notable for the following components:       Result Value    MCHC 31.3 (*)     All other components within normal limits   COMPREHENSIVE METABOLIC PANEL - Abnormal; Notable for the following components:    CO2 20 (*)     Total Protein 8.7 (*)     All other components within normal limits   ISTAT PROCEDURE - Abnormal; Notable for the following components:    POC TCO2 (MEASURED) 21 (*)     All other components within normal limits   APTT   PROTIME-INR   LACTIC ACID, PLASMA   SARS-COV-2 RDRP GENE    Narrative:     This test utilizes isothermal nucleic acid amplification   technology to detect the SARS-CoV-2 RdRp nucleic acid segment.   The analytical sensitivity (limit of detection) is 125 genome   equivalents/mL.   A POSITIVE result implies infection with the SARS-CoV-2  virus;   the patient is presumed to be contagious.     A NEGATIVE result means that SARS-CoV-2 nucleic acids are not   present above the limit of detection. A NEGATIVE result should be   treated as presumptive. It does not rule out the possibility of   COVID-19 and should not be the sole basis for treatment decisions.   If COVID-19 is strongly suspected based on clinical and exposure   history, re-testing using an alternate molecular assay should be   considered.   This test is only for use under the Food and Drug   Administration s Emergency Use Authorization (EUA).   Commercial kits are provided by [employer].   Performance characteristics of the EUA have been independently   verified by [facility] Department of   Pathology and Laboratory Medicine.   _________________________________________________________________   The authorized Fact Sheet for Healthcare Providers and the authorized Fact   Sheet for Patients of the ID NOW COVID-19 are available on the FDA   website:     https://www.fda.gov/media/597034/download  https://www.fda.gov/media/734573/download       TYPE & SCREEN   ANTIBODY IDENTIFICATION     EKG Readings: (Independently Interpreted)   Initial Reading: No STEMI. Previous EKG: Compared with most recent EKG Rhythm: Sinus Bradycardia. Heart Rate: 43. Ectopy: No Ectopy.     ECG Results          EKG 12-lead (Final result)  Result time 11/06/20 13:17:56    Final result by Interface, Lab In [facility] (11/06/20 13:17:56)                 Narrative:    Test Reason : K92.2,    Vent. Rate : 043 BPM     Atrial Rate : 043 BPM     P-R Int : 120 ms          QRS Dur : 090 ms      QT Int : 458 ms       P-R-T Axes : 041 024 026 degrees     QTc Int : 387 ms    Marked sinus bradycardia  Abnormal ECG  When compared with ECG of 05-NOV-2020 12:16,  No significant change was found  Confirmed by Jayden Cherry MD ([id number]) on 11/6/2020 1:17:42 PM    Referred By: [email or web address]   SELF           Confirmed By:Jayden  Dilip YOUNG                            Imaging Results          X-Ray Abdomen Flat And Erect (Final result)  Result time 11/05/20 13:37:39    Final result by Nikita Collins MD (11/05/20 13:37:39)                 Impression:      Nonspecific, nonobstructive bowel gas pattern.  No evidence of free air.      Electronically signed by: Nikita Collins  Date:    11/05/2020  Time:    13:37             Narrative:    EXAMINATION:  XR ABDOMEN FLAT AND ERECT    CLINICAL HISTORY:  Unspecified abdominal pain    TECHNIQUE:  Flat and erect AP views of the abdomen were performed.    COMPARISON:  CT abdomen pelvis performed 05/18/2019.  Same day radiographs of the lumbar spine.    FINDINGS:  Moderate devices overlie the lower chest and abdomen.  No dilated loops of small or large bowel are noted.  No free air is suggested on the upright view.  No fluid levels.  Suture material is noted in the left abdomen.  Degenerative findings in lumbar spine are better assessed on separately reported same day radiographs.                              X-Rays:   Independently Interpreted Readings:   Abdomen:   Flat and Erect of Abdomen - Nonspecific bowel gas.  No free air under diaphragm.  No air fluid levels or signs of obstruction.     Medical Decision Making:   History:   Old Medical Records: I decided to obtain old medical records.  Old Records Summarized: records from clinic visits.  Initial Assessment:   Prisca Zhu  Is a 35-year-old female with a history of an anemia, GERD, history of gastric bypass, obesity, osteogenesis imperfecta and recent hip/femur surgery presenting with rectal bleeding.  Differential Diagnosis:   Gastritis, peptic ulcer bleeding, upper GI bleed, lower GI bleed,  AVM, angiodysplasia, hemorrhoid  Independently Interpreted Test(s):   I have ordered and independently interpreted X-rays - see prior notes.  I have ordered and independently interpreted EKG Reading(s) - see prior notes  Clinical Tests:   Lab Tests:  Ordered and Reviewed  Radiological Study: Ordered and Reviewed  Medical Tests: Ordered and Reviewed    Emergent evaluation of patient with a history of gastric bypass, osteogenesis imperfecta and recent hip surgery presenting with dark stools and rectal bleeding.  She reports taking steroids despite having a gastric bypass and started noticing dark stools with bright red bleeding.  On arrival she is tachycardic and soft blood pressures but no hypotension or fevers.  Physical exam findings remarkable for slight abdominal tenderness but no focal peritoneal signs.  Rectal exam shows positive guaiac and dark stool but no gross melena.  ECG obtained with no signs of ischemia or STEMI on my read.  Placed on cardiac and telemetry monitoring.  Given her history of gastric bypass and steroid use including NSAID use will discussed case with GI and trend hemoglobins with concern for upper GI bleed versus lower GI bleed.  IV line was placed, labs were drawn, patient was typed and screened.  Patient was given IV Protonix 80 mg in the ED. Discussed case with GI, and also discussed case with hospital medicine will admit to observation.  Patient agreeable to observation plan.  Abdominal x-ray without signs of obstruction or perforation, doubt perforated viscus or acute intra-abdominal process.    Complexity: High - level 5                             Clinical Impression:     ICD-10-CM ICD-9-CM   1. Osteogenesis imperfecta  Q78.0 756.51   2. GI bleed  K92.2 578.9   3. Abdominal pain  R10.9 789.00   4. Rectal bleeding  K62.5 569.3                     Disposition:   Disposition: Placed in Observation  Condition: Stable     ED Disposition Condition    Observation                Chris Marley DO, FAAEM  Emergency Staff Physician   Dept of Emergency Medicine   [facility]  Spectralink: [phone]               Chris Marley DO  11/07/20 0607

## 2020-11-05 NOTE — HPI
35 F PMH GERD, recent Symone-en-Y gastric bypass, osteogenesis imperfecta with recent hip/femur fracture transferred from the orthopedic clinic with report of rectal bleeding.  She was seen in the ED where she was noted to be hemodynamically stable and not tachycardic labs notable for a normal hemoglobin, 13, normal creatinine, unremarkable CMP.  KUB unremarkable.  Given patient was recently started on steroids by Orthopedics as well as Toradol, GI was consulted for further evaluation.    Upon interview, patient notes she had a hard black formed stool that was difficult to pass, this was followed by her next bowel movement being hematochezia, small volume.  She denied any further melenic stools and has not had bowel movement since.  She did endorse receiving steroids and NSAIDs which concerned her given history of Symone-en-Y.  She has not subsequently taken any further inserted since hospitalization.

## 2020-11-05 NOTE — SUBJECTIVE & OBJECTIVE
Past Medical History:   Diagnosis Date    Anemia     BMI 50.0-59.9, adult     Encounter for IUD removal 2019    GERD (gastroesophageal reflux disease) 2019    History of blood transfusion 2001    Malpositioned intrauterine device 3/21/2019    2019 OB/GYN - Dr Guzman - Discussed with patient that we will proceed with imaging to locate the IUD after which we can then proceed to book case in OR to retrieve displaced IUD    Obesity     OI (osteogenesis imperfecta)     Osteopetrosis     Tendonitis        Past Surgical History:   Procedure Laterality Date     SECTION      ,     ESOPHAGOGASTRODUODENOSCOPY N/A 2019    Procedure: ESOPHAGOGASTRODUODENOSCOPY (EGD);  Surgeon: Segun Dominguez MD;  Location: [facility] ([address] [place] [address]);  Service: Endoscopy;  Laterality: N/A;  BMI 51    FEMUR FRACTURE SURGERY Bilateral     hardware/rods in both legs    FRACTURE SURGERY      femur    LAPAROSCOPIC GASTROENTEROSTOMY N/A 7/10/2020    Procedure: GASTROENTEROSTOMY, LAPAROSCOPIC, with intraop EGD;  Surgeon: Guillermo Villanueva MD;  Location: [address];  Service: General;  Laterality: N/A;    REMOVAL OF INTRAUTERINE DEVICE (IUD) N/A 6/3/2019    Procedure: REMOVAL, INTRAUTERINE DEVICE;  Surgeon: Ashley Greenberg MD;  Location: [facility];  Service: OB/GYN;  Laterality: N/A;  RN PRE OP 19----BMI-51.29    WISDOM TOOTH EXTRACTION         Review of patient's allergies indicates:   Allergen Reactions    Pcn [penicillins] Shortness Of Breath     Family History     Problem Relation (Age of Onset)    Anxiety disorder Mother    Asthma Mother    Hyperlipidemia Mother    Hypertension Mother    Liver cancer Maternal Grandfather    No Known Problems Father        Tobacco Use    Smoking status: Never Smoker    Smokeless tobacco: Never Used   Substance and Sexual Activity    Alcohol use: No    Drug use: Never    Sexual activity: Yes     Partners: Male     Birth control/protection:  None, I.U.D.     Review of Systems   Constitutional: Negative for fatigue, fever and unexpected weight change.   HENT: Negative.    Eyes: Negative.    Respiratory: Negative.    Cardiovascular: Negative.    Gastrointestinal: Positive for anal bleeding and constipation. Negative for abdominal pain, diarrhea, nausea and vomiting.   Endocrine: Negative.    Genitourinary: Negative.    Musculoskeletal: Negative.    Skin: Negative.    Allergic/Immunologic: Negative.    Neurological: Negative.    Hematological: Negative.    Psychiatric/Behavioral: Negative.      Objective:     Vital Signs (Most Recent):  Temp: 98.3 °F (36.8 °C) (11/05/20 1043)  Pulse: (!) 42 (11/05/20 1511)  Resp: 20 (11/05/20 1511)  BP: (!) 155/75 (11/05/20 1511)  SpO2: 100 % (11/05/20 1511) Vital Signs (24h Range):  Temp:  [98.3 °F (36.8 °C)] 98.3 °F (36.8 °C)  Pulse:  [42-60] 42  Resp:  [15-20] 20  SpO2:  [96 %-100 %] 100 %  BP: (140-155)/() 155/75     Weight: 96.2 kg (212 lb) (11/05/20 1043)  Body mass index is 45.88 kg/m².    No intake or output data in the 24 hours ending 11/05/20 1715    Lines/Drains/Airways     Peripheral Intravenous Line                 Peripheral IV - Single Lumen 11/05/20 1228 20 G Right Antecubital less than 1 day                Physical Exam    Gen: NAD, lying comfortably  HENT: NCAT, oropharynx clear  Eyes: anicteric sclerae, EOMI grossly  Neck: supple, no visible masses/goiter  Cardiac: RRR, no M/R/G, S1/S2 present  Lungs: CTAB, no crackles, no wheezes  Abd: soft, NT/ND, normoactive BS, no rebound  Rectal:  Possible small anal fissure, non bleeding, no melena or hematochezia, tight anal sphincter  Ext: no LE edema, warm, well perfused  Skin: skin intact on exposed body parts, no visible rashes, lesions  Neuro: A&Ox4, neuro exam grossly intact, moves all extremities  Psych: appropriate mood, affect      Significant Labs:  CBC:   Recent Labs   Lab 11/05/20  1228 11/05/20  1237   WBC 8.35  --    HGB 13.0  --    HCT 41.6 44      --      CMP:   Recent Labs   Lab 11/05/20  1228   GLU 73   CALCIUM 9.8   ALBUMIN 3.9   PROT 8.7*      K 4.1   CO2 20*      BUN 8   CREATININE 1.1   ALKPHOS 68   ALT 10   AST 20   BILITOT 0.6     Coagulation:   Recent Labs   Lab 11/05/20  1228   INR 1.0   APTT 26.4       Significant Imaging:  Imaging results within the past 24 hours have been reviewed.

## 2020-11-05 NOTE — ED TRIAGE NOTES
Prisca Zhu, a 35 y.o. female presents to the ED via personal transportion with CC  Blood in stool x1 day, initially black, now red. Also c/o nausea without vomiting and decreased appetite due to nausea. Also c/o chronic hip pain 10/10, states stopped taking medications due to blood in stool.     Patient identifiers verified verbally with patient and correct for Prisca Zhu.    LOC/ APPEARANCE: The patient is AAOx4. Pt is speaking appropriately, no slurred speech. Pt changed into hospital gown. Continuous cardiac monitor, cont pulse ox, and auto BP cuff applied to patient. Pt is clean and well groomed. No JVD visible. Pt updated on POC. Bed low and locked with side rails up x2, call bell in pt reach.  SKIN: Skin is warm dry and intact, and color is consistent with ethnicity. Capillary refill <3 seconds. No breakdown or brusing visible. Mucus membranes moist, acyanotic.  RESPIRATORY: Airway is open and patent. Respirations-spontaneous, unlabored, regular rate, equal bilaterally on inspiration and expiration. No accessory muscle use noted. Lungs clear to auscultation in all fields bilaterally anterior and posterior.   CARDIAC: Patient bradycardic.  No peripheral edema noted, and patient has no c/o chest pain. Peripheral pulses present equal and strong throughout.  ABDOMEN: C/o generalized abdominal pain onset yesterday, constant, rates pain 8/10. Abdomen soft and non-tender to palpation with no distention noted. Normoactive bowel sounds x4 quadrants. C/o blood in stool and nausea.   NEUROLOGIC: Eyes open spontaneously and facial expression symmetrical. Pt behavior appropriate to situation, and pt follows commands. Pt reports sensation present in all extremities when touched with a finger, denies any numbness or tingling. PERRLA  MUSCULOSKELETAL: Pt c/o right hip pain. Spontaneous movement noted to all extremities. Hand  equal and leg strength strong +5 bilaterally.   : No complaints of frequency,  burning, urgency or blood in the urine. No complaints of incontinence.

## 2020-11-05 NOTE — ASSESSMENT & PLAN NOTE
Patient presenting with what appears to be a lower GI bleed in the setting of a hard stool.  Likely small tear/fissure.  No further bowel movements.  She is concerned about the one black formed stool she had and this may be upper GI bleeding.  It would not be unreasonable to trend her hemoglobins ensure she is stable and not having further melena.  Hemoglobin normal.    Recommendations:  Upper GI Bleed (non variceal)    Management:  -not unreasonable to trend hemoglobin overnight  -Place patient NPO at midnight  -Place 2 large bore IVs, volume resuscitation per primary  -Transfuse pRBC for Hb < 7 g/dL (Consider a higher Hb target if there is clinical evidence of intravascular volume depletion or comorbidities, such as CAD or if high suspicion of vigorous active ongoing bleeding or an uncorrected coagulopathy exists.).  -Correct coagulopathy (goal plt >50, INR <1.5) if present in patients without absolute contraindications.  -Avoid nonsteroidal agents, antiplatelet agents and anticoagulants if possible in patients without absolute contraindications  -Please call with any additional questions, concerns or changes in the patient's clinical status.

## 2020-11-05 NOTE — ED NOTES
I-STAT Chem-8+ Results:   Value Reference Range   Sodium 140 136-145 mmol/L   Potassium  4.9 3.5-5.1 mmol/L   Chloride 109  mmol/L   Ionized Calcium 1.11 1.06-1.42 mmol/L   CO2 (measured) 21 23-29 mmol/L   Glucose 75  mg/dL   BUN 8 6-30 mg/dL   Creatinine 1.0 0.5-1.4 mg/dL   Hematocrit 44 36-54%

## 2020-11-05 NOTE — TELEPHONE ENCOUNTER
"Spoke to pt about concerns. Pt states that she went to the ED on Sunday for hip pain ( [facility]) states that she told one of the nurses about her surgery but then another cam e in and she was given three medications ( two steriods and one NSAID through IV " from chart review was given toradol"). Since then she has had extreme abdominal burning to where she cannot tolerate eating along with black stools and one episode of red stools. States that she believed she was constipated from abdominal pressure so she took a fleets which caused her to have the bowel movements which produced the two black stools and the one red stool.     Deneis chest pain shortness of breath bleeing outside of bowel movements heartburn nausea or vomiting. Complains of abdominal burning.     Sent pt to Ed to evaluate for black tarry stools. Soren have pt come in next week to evaluate abdominal pain ( schedule is booked for next two days will get earliest appt available)     Sent Prilosec and Pepcid to [facility] main campus instructing pt to take as prescribed. Reverted back to soft diet as tolerated.     Pt states understanding will head to ED after her ortho appt. Message sent to staff to schedule early next week.     Mariluz Mahajan PA-C  "

## 2020-11-06 VITALS
RESPIRATION RATE: 20 BRPM | HEIGHT: 57 IN | TEMPERATURE: 99 F | BODY MASS INDEX: 45.74 KG/M2 | HEART RATE: 110 BPM | WEIGHT: 212 LBS | SYSTOLIC BLOOD PRESSURE: 109 MMHG | OXYGEN SATURATION: 95 % | DIASTOLIC BLOOD PRESSURE: 63 MMHG

## 2020-11-06 PROBLEM — M53.87 SCIATICA OF RIGHT SIDE ASSOCIATED WITH DISORDER OF LUMBOSACRAL SPINE: Status: ACTIVE | Noted: 2020-11-06

## 2020-11-06 PROBLEM — M85.38 OSTEITIS CONDENSANS ILII: Status: ACTIVE | Noted: 2020-11-06

## 2020-11-06 LAB
BASOPHILS # BLD AUTO: 0.04 K/UL (ref 0–0.2)
BASOPHILS NFR BLD: 0.5 % (ref 0–1.9)
DIFFERENTIAL METHOD: ABNORMAL
EOSINOPHIL # BLD AUTO: 0.1 K/UL (ref 0–0.5)
EOSINOPHIL NFR BLD: 1.4 % (ref 0–8)
ERYTHROCYTE [DISTWIDTH] IN BLOOD BY AUTOMATED COUNT: 14.3 % (ref 11.5–14.5)
HCT VFR BLD AUTO: 38 % (ref 37–48.5)
HGB BLD-MCNC: 12.2 G/DL (ref 12–16)
IMM GRANULOCYTES # BLD AUTO: 0.01 K/UL (ref 0–0.04)
IMM GRANULOCYTES NFR BLD AUTO: 0.1 % (ref 0–0.5)
LYMPHOCYTES # BLD AUTO: 2.9 K/UL (ref 1–4.8)
LYMPHOCYTES NFR BLD: 37.1 % (ref 18–48)
MCH RBC QN AUTO: 28.9 PG (ref 27–31)
MCHC RBC AUTO-ENTMCNC: 32.1 G/DL (ref 32–36)
MCV RBC AUTO: 90 FL (ref 82–98)
MONOCYTES # BLD AUTO: 0.6 K/UL (ref 0.3–1)
MONOCYTES NFR BLD: 7.1 % (ref 4–15)
NEUTROPHILS # BLD AUTO: 4.2 K/UL (ref 1.8–7.7)
NEUTROPHILS NFR BLD: 53.8 % (ref 38–73)
NRBC BLD-RTO: 0 /100 WBC
PLATELET # BLD AUTO: 165 K/UL (ref 150–350)
PMV BLD AUTO: 13.3 FL (ref 9.2–12.9)
RBC # BLD AUTO: 4.22 M/UL (ref 4–5.4)
WBC # BLD AUTO: 7.85 K/UL (ref 3.9–12.7)

## 2020-11-06 PROCEDURE — 96376 TX/PRO/DX INJ SAME DRUG ADON: CPT | Performed by: EMERGENCY MEDICINE

## 2020-11-06 PROCEDURE — 93010 ELECTROCARDIOGRAM REPORT: CPT | Mod: HCNC,,, | Performed by: INTERNAL MEDICINE

## 2020-11-06 PROCEDURE — 25000003 PHARM REV CODE 250: Mod: HCNC | Performed by: INTERNAL MEDICINE

## 2020-11-06 PROCEDURE — 63600175 PHARM REV CODE 636 W HCPCS: Mod: HCNC | Performed by: INTERNAL MEDICINE

## 2020-11-06 PROCEDURE — 93010 EKG 12-LEAD: ICD-10-PCS | Mod: HCNC,,, | Performed by: INTERNAL MEDICINE

## 2020-11-06 PROCEDURE — 85025 COMPLETE CBC W/AUTO DIFF WBC: CPT | Mod: HCNC

## 2020-11-06 PROCEDURE — G0378 HOSPITAL OBSERVATION PER HR: HCPCS | Mod: HCNC

## 2020-11-06 PROCEDURE — C9113 INJ PANTOPRAZOLE SODIUM, VIA: HCPCS | Mod: HCNC | Performed by: INTERNAL MEDICINE

## 2020-11-06 PROCEDURE — 99217 PR OBSERVATION CARE DISCHARGE: CPT | Mod: HCNC,,, | Performed by: INTERNAL MEDICINE

## 2020-11-06 PROCEDURE — 36415 COLL VENOUS BLD VENIPUNCTURE: CPT | Mod: HCNC

## 2020-11-06 PROCEDURE — 99217 PR OBSERVATION CARE DISCHARGE: ICD-10-PCS | Mod: HCNC,,, | Performed by: INTERNAL MEDICINE

## 2020-11-06 PROCEDURE — 93005 ELECTROCARDIOGRAM TRACING: CPT | Mod: HCNC

## 2020-11-06 RX ADMIN — GABAPENTIN 300 MG: 300 CAPSULE ORAL at 08:11

## 2020-11-06 RX ADMIN — PANTOPRAZOLE SODIUM 40 MG: 40 INJECTION, POWDER, LYOPHILIZED, FOR SOLUTION INTRAVENOUS at 08:11

## 2020-11-06 NOTE — PLAN OF CARE
11/06/20 1256   Post-Acute Status   Post-Acute Authorization Other   Other Status No Post-Acute Service Needs     Cathy Mccray RN Case Manager   #[phone]

## 2020-11-06 NOTE — H&P
Hospital Medicine  History and Physical Exam    Team: [facility] HOSP MED D Arthur Newberry MD  Admit Date: 2020  KEM   Principal Problem:  <principal problem not specified>   Patient information was obtained from patient and ER records.   Primary care Physician: Mathew Carpenter MD  Code status: Full Code    HPI:  35 F PMH GERD, recent Symone-en-Y gastric bypass, osteogenesis imperfecta with recent hip/femur fracture transferred from the orthopedic clinic with report of rectal bleeding.  She was seen in the ED where she was noted to be hemodynamically stable and not tachycardic labs notable for a normal hemoglobin, 13, normal creatinine, unremarkable CMP.  KUB unremarkable.  Given patient was recently started on steroids by Orthopedics as well as Toradol, GI was consulted for further evaluation.    Past Medical History: Patient has a past medical history of Anemia, BMI 50.0-59.9, adult, Encounter for IUD removal (2019), GERD (gastroesophageal reflux disease) (2019), History of blood transfusion (), Malpositioned intrauterine device (3/21/2019), Obesity, OI (osteogenesis imperfecta), Osteopetrosis, and Tendonitis.    Past Surgical History: Patient has a past surgical history that includes Fracture surgery;  section; Femur fracture surgery (Bilateral); [place] tooth extraction; Esophagogastroduodenoscopy (N/A, 2019); Removal of intrauterine device (IUD) (N/A, 6/3/2019); and Laparoscopic gastroenterostomy (N/A, 7/10/2020).    Social History: Patient reports that she has never smoked. She has never used smokeless tobacco. She reports that she does not drink alcohol or use drugs.    Family History: family history includes Anxiety disorder in her mother; Asthma in her mother; Hyperlipidemia in her mother; Hypertension in her mother; Liver cancer in her maternal grandfather; No Known Problems in her father.    Medications:   Prior to Admission medications    Medication Sig Start Date End Date Taking?  Authorizing Provider   CALCIUM ORAL Take by mouth every morning.    Yes Historical Provider   cyanocobalamin (VITAMIN B-12) 100 MCG tablet Take 100 mcg by mouth every morning.    Yes Historical Provider   ergocalciferol (ERGOCALCIFEROL) 50,000 unit Cap TAKE 1 CAPSULE BY MOUTH EVERY 7 DAYS 1/20/20  Yes Mathew Carpenter MD   fish oil-omega-3 fatty acids 300-1,000 mg capsule Take by mouth every morning.    Yes Historical Provider   gabapentin (NEURONTIN) 300 MG capsule TAKE 1 CAPSULE(300 MG) BY MOUTH TWICE DAILY 11/2/20  Yes Mathew Carpenter MD   hydrocodone-acetaminophen (HYCET) solution 7.5-325 mg/15mL Take 15 mLs by mouth 4 (four) times daily as needed for Pain. 6/23/20  Yes Guillermo Villanueva MD   tiZANidine (ZANAFLEX) 2 MG tablet TAKE 1 TABLET(2 MG) BY MOUTH EVERY 8 HOURS AS NEEDED FOR PAIN OR MUSCLE PAIN OR SPASM 11/2/20  Yes Mathew Carpenter MD   traMADoL (ULTRAM) 50 mg tablet Take 1 tablet (50 mg total) by mouth every 6 (six) hours as needed. 11/1/20  Yes Manfred Valentin PA-C   drospirenone-ethinyl estradiol (ROSALBA) 3-0.02 mg per tablet Take 1 tablet by mouth once daily. 8/7/19 8/6/20  Mathew Carpenter MD   famotidine (PEPCID) 40 mg/5 mL (8 mg/mL) suspension Take 2.5 mLs (20 mg total) by mouth 2 (two) times daily. 11/5/20   Shasta Mahajan PA-C   ibuprofen (ADVIL,MOTRIN) 800 MG tablet Take 1 tablet (800 mg total) by mouth every 8 (eight) hours as needed for Pain. 6/3/19   Ashley Greenberg MD   lidocaine (LIDODERM) 5 % Place 1 patch onto the skin once daily. Remove & Discard patch within 12 hours or as directed by MD 11/1/20   Manfred Valentin PA-C   omeprazole (PRILOSEC) 40 MG capsule Take 1 capsule (40 mg total) by mouth 2 (two) times daily before meals. 11/5/20   Shasta Mahajan PA-C   ondansetron (ZOFRAN) 4 MG tablet Take 1 tablet (4 mg total) by mouth every 6 (six) hours as needed for Nausea. 6/23/20   Guillermo Villanueva MD   polyethylene glycol (GLYCOLAX) 17 gram/dose powder Dissolve one  capful (17 g) in liquid and take by mouth once daily. 6/23/20   Guillermo Villanueva MD   ursodioL (COSME FORTE) 500 MG tablet Crush one tablet daily for gall bladder. Please compound into liquid and supply for 90 days if insurance approves 6/23/20   Guillermo Villanueva MD   omeprazole (PRILOSEC) 20 MG capsule TAKE 1 CAPSULE(20 MG) BY MOUTH EVERY DAY 7/5/20 11/5/20  Mathwe Carpenter MD       Allergies: Patient is allergic to pcn [penicillins].    ROS    Constitutional: Negative for fatigue, fever and unexpected weight change.   HENT: Negative.    Eyes: Negative.    Respiratory: Negative.    Cardiovascular: Negative.    Gastrointestinal: Positive for anal bleeding and constipation. Negative for abdominal pain, diarrhea, nausea and vomiting.   Endocrine: Negative.    Genitourinary: Negative.    Musculoskeletal: Negative.    Skin: Negative.    Allergic/Immunologic: Negative.    Neurological: Negative.    Hematological: Negative.    Psychiatric/Behavioral: Negative.      PEx  Temp:  [98.3 °F (36.8 °C)]   Pulse:  [42-60]   Resp:  [15-20]   BP: (140-160)/()   SpO2:  [96 %-100 %]   Body mass index is 45.88 kg/m².     General: well developed, well nourished, neg for acute distress  Eyes: conjunctivae/corneas clear.   Head: normocephalic, atraumatic.   Neck: supple, symmetrical, trachea midline, neg for JVD, neg for carotid bruits  Lungs: clear to auscultation bilaterally, normal respiratory effort  Heart: regular rate and rhythm, neg for murmur  Extremities: neg for edema, neg for joint swelling, pulses: 2+ at ankles   Abdomen: Soft, non-tender; liver and spleen not palpable, bowel sounds active, neg for aortic bruits  Skin: Skin color, texture, turgor normal. Neg for rashes or lesions.   Neurologic: CN II-XII grossly intact, DTR: 2/4 bilaterally. Normal muscle strength and tone. Neg for focal numbness or weakness  Mental status: Alert, oriented x 4, affect appropriate, memory intact    Recent Labs   Lab  11/05/20  1228 11/05/20  1237   WBC 8.35  --    HGB 13.0  --    HCT 41.6 44     --      Recent Labs   Lab 11/05/20  1228      K 4.1      CO2 20*   BUN 8   CREATININE 1.1   GLU 73   CALCIUM 9.8     Recent Labs   Lab 11/05/20  1228   ALKPHOS 68   ALT 10   AST 20   ALBUMIN 3.9   PROT 8.7*   BILITOT 0.6   INR 1.0      No results for input(s): POCTGLUCOSE in the last 168 hours.  Recent Labs     11/05/20  1228   LACTATE 1.2        @LABRCNT(CPK:3,CPKMB:3,mb:3,TroponinI:3)  )@  Hemoglobin A1C   Date Value Ref Range Status   05/10/2019 5.2 4.0 - 5.6 % Final     Comment:     ADA Screening Guidelines:  5.7-6.4%  Consistent with prediabetes  >or=6.5%  Consistent with diabetes  High levels of fetal hemoglobin interfere with the HbA1C  assay. Heterozygous hemoglobin variants (HbS, HgC, etc)do  not significantly interfere with this assay.   However, presence of multiple variants may affect accuracy.     02/04/2019 5.3 4.0 - 5.6 % Final     Comment:     ADA Screening Guidelines:  5.7-6.4%  Consistent with prediabetes  >or=6.5%  Consistent with diabetes  High levels of fetal hemoglobin interfere with the HbA1C  assay. Heterozygous hemoglobin variants (HbS, HgC, etc)do  not significantly interfere with this assay.   However, presence of multiple variants may affect accuracy.             Active Hospital Problems    Diagnosis  POA    GI bleed [K92.2]  Yes      Resolved Hospital Problems   No resolved problems to display.       Overview: 35 F PMH GERD, recent Symone-en-Y gastric bypass, osteogenesis imperfecta with recent hip/femur fracture transferred from the orthopedic clinic with report of rectal bleeding.      Assessment and Plan:    LGIB  Probable anal tear after, BM  Protonix iV for now  Trend H and H  HCT stable now.  Probable dc in am  NPO - Clear Liquids      :    Goals of Care: Return to prior functional status  Discharge plan: To home    Time (minutes) spent in care of the patient (Greater than 1/2 spent in  direct face-to-face contact) 35 minutes     Arthur Newberry MD

## 2020-11-06 NOTE — PROGRESS NOTES
Hospital Medicine  Progress note    [facility]: [facility] D Arthur Newberry MD  Admit Date: 11/5/2020  KEM   Length of Stay:  LOS: 0 days   Code status: Full Code    Principal Problem:  <principal problem not specified>    HPI / Hospital Course     Interval hx:  11/06 Slight drop in H and h, will discuss need for endoscopy with GI.    ROS     Respiratory: neg for cough neg for shortness of breath  Cardiovascular: neg for chest pain neg for palpitations  Gastrointestinal: neg for nausea neg for vomiting, neg for abdominal pain neg for diarrhea neg for constipation   Behavioral/Psych: neg for depression neg for anxiety    PEx  Temp:  [97.1 °F (36.2 °C)-98.8 °F (37.1 °C)]   Pulse:  [42-60]   Resp:  [10-20]   BP: (101-160)/()   SpO2:  [96 %-100 %]     Intake/Output Summary (Last 24 hours) at 11/6/2020 0805  Last data filed at 11/5/2020 2120  Gross per 24 hour   Intake 1000 ml   Output --   Net 1000 ml       General Appearance: no acute distress   Heart: regular rate and rhythm  Respiratory: Normal respiratory effort, no crackles   Abdomen: Soft, non-tender; bowel sounds active  Skin: intact.Skin intact  Neurologic:  No focal numbness or weakness  Mental status: Alert, oriented x 4, affect appropriate     Recent Labs   Lab 11/05/20  1228 11/05/20  1237 11/06/20  0500   WBC 8.35  --  7.85   HGB 13.0  --  12.2   HCT 41.6 44 38.0     --  165     Recent Labs   Lab 11/05/20  1228      K 4.1      CO2 20*   BUN 8   CREATININE 1.1   GLU 73   CALCIUM 9.8     Recent Labs   Lab 11/05/20  1228   ALKPHOS 68   ALT 10   AST 20   ALBUMIN 3.9   PROT 8.7*   BILITOT 0.6   INR 1.0        No results for input(s): POCTGLUCOSE in the last 168 hours.    Scheduled Meds:   gabapentin  300 mg Oral BID    pantoprazole  40 mg Intravenous BID     Continuous Infusions:  As Needed:  ondansetron, tiZANidine, traMADoL        Active Hospital Problems    Diagnosis  POA    GI bleed [K92.2]  Yes      Resolved Hospital Problems   No  resolved problems to display.       Assessment and Plan  / Problems managed today    LGIB  Probable anal tear after, BM  Protonix iV for now  Trend H and H  HCT stable now.  Probable dc in am  NPO - Clear Liquids        Goals of Care:  Return to prior functional status     Discharge plan:    Time (minutes) spent in care of the patient (Greater than 1/2 spent in direct face-to-face contact)  35 minutes     Arthur Newberry MD

## 2020-11-06 NOTE — PLAN OF CARE
11/06/20 1514   Final Note   Assessment Type Final Discharge Note   Anticipated Discharge Disposition Home   What phone number can be called within the next 1-3 days to see how you are doing after discharge? [phone]   Discharge plans and expectations educations in teach back method with documentation complete? Yes   Right Care Referral Info   Post Acute Recommendation No Care   Post-Acute Status   Post-Acute Authorization Other   Other Status No Post-Acute Service Needs     Future Appointments   Date Time Provider Department Center   11/10/2020  4:00 PM Guillermo Villanueva MD [facility] BARIAT [facility]   11/11/2020  8:45 AM Abby Holliday PT [facility] OP [facility] B   11/18/2020  3:00 PM Brandon Lee MD [facility]   1/5/2021  9:30 AM Jamila Garcia PA-C [facility] SPINE [facility]   1/19/2021  9:20 AM Mathew Carpenter MD [facility] Octavio Mccray RN Case Manager   #[phone]

## 2020-11-06 NOTE — PROGRESS NOTES
Subjective:       Patient ID: Prisca Zhu is a 35 y.o. female.    Chief Complaint:   Pain of the Right Hip   She comes in complaining of pain in the right hip.  She has a history of relatively mild osteogenesis imperfecta, but she did have fractures of her femurs in her younger days, and has had internal fixation with rods in both femurs.  She is having pain around the right buttock area which goes all the way down to the ankle area.  She has a shocking pain when she breathes in deeply.  In September she tripped over a tree stump and stepped down hard on her right side.  These new symptoms started after that.  She occasionally notices a sensation of numbness and tingling in the right distal lower extremity.    Past Medical History:   Diagnosis Date    Anemia     BMI 50.0-59.9, adult     Encounter for IUD removal 2019    GERD (gastroesophageal reflux disease) 2019    History of blood transfusion 2001    Malpositioned intrauterine device 3/21/2019    2019 OB/GYN - Dr Guzman - Discussed with patient that we will proceed with imaging to locate the IUD after which we can then proceed to book case in OR to retrieve displaced IUD    Obesity     OI (osteogenesis imperfecta)     Osteopetrosis     Tendonitis      Past Surgical History:   Procedure Laterality Date     SECTION      ,     ESOPHAGOGASTRODUODENOSCOPY N/A 2019    Procedure: ESOPHAGOGASTRODUODENOSCOPY (EGD);  Surgeon: Segun Dominguez MD;  Location: [address]);  Service: Endoscopy;  Laterality: N/A;  BMI 51    FEMUR FRACTURE SURGERY Bilateral     hardware/rods in both legs    FRACTURE SURGERY      femur    LAPAROSCOPIC GASTROENTEROSTOMY N/A 7/10/2020    Procedure: GASTROENTEROSTOMY, LAPAROSCOPIC, with intraop EGD;  Surgeon: Guillermo Villanueva MD;  Location: [address];  Service: General;  Laterality: N/A;    REMOVAL OF INTRAUTERINE DEVICE (IUD) N/A 6/3/2019    Procedure: REMOVAL,  INTRAUTERINE DEVICE;  Surgeon: Ashley Greenberg MD;  Location: [facility] OR;  Service: OB/GYN;  Laterality: N/A;  RN PRE OP 2-79-16----BMI-51.29    WISDOM TOOTH EXTRACTION       Family History   Problem Relation Age of Onset    Hypertension Mother     Hyperlipidemia Mother     Asthma Mother     Anxiety disorder Mother     No Known Problems Father     Liver cancer Maternal Grandfather     Celiac disease Neg Hx     Colon cancer Neg Hx     Colon polyps Neg Hx     Crohn's disease Neg Hx     Cystic fibrosis Neg Hx     Esophageal cancer Neg Hx     Irritable bowel syndrome Neg Hx     Rectal cancer Neg Hx     Stomach cancer Neg Hx      Social History     Socioeconomic History    Marital status: Single     Spouse name: Not on file    Number of children: Not on file    Years of education: Not on file    Highest education level: Not on file   Occupational History    Not on file   Social Needs    Financial resource strain: Not on file    Food insecurity     Worry: Not on file     Inability: Not on file    Transportation needs     Medical: Not on file     Non-medical: Not on file   Tobacco Use    Smoking status: Never Smoker    Smokeless tobacco: Never Used   Substance and Sexual Activity    Alcohol use: No    Drug use: Never    Sexual activity: Yes     Partners: Male     Birth control/protection: None, I.U.D.   Lifestyle    Physical activity     Days per week: Not on file     Minutes per session: Not on file    Stress: Not on file   Relationships    Social connections     Talks on phone: Not on file     Gets together: Not on file     Attends [employer] service: Not on file     Active member of club or organization: Not on file     Attends meetings of clubs or organizations: Not on file     Relationship status: Not on file   Other Topics Concern    Not on file   Social History Narrative    Disabled 2/2 chronic left shoulder pain       No current facility-administered medications for this visit.  "     No current outpatient medications on file.     Facility-Administered Medications Ordered in Other Visits   Medication Dose Route Frequency Provider Last Rate Last Dose    gabapentin capsule 300 mg  300 mg Oral BID Arthur Newberry MD   300 mg at 11/06/20 0858    ondansetron tablet 4 mg  4 mg Oral Q6H PRN Arthur Newberry MD        pantoprazole injection 40 mg  40 mg Intravenous BID Arthur Newberry MD   40 mg at 11/06/20 0858    tiZANidine tablet 4 mg  4 mg Oral Q8H PRN Arthur Newberry MD        traMADoL tablet 50 mg  50 mg Oral Q6H PRN Arthur Newberry MD         Review of patient's allergies indicates:   Allergen Reactions    Pcn [penicillins] Shortness Of Breath       ROS:  A review of systems is updated and there are no reported vision changes, ear/nose/mouth/throat complaints,  chest pain, shortness of breath, abdominal pain, urological complaints, fevers or chills, psychiatric complaints. Musculoskeletal and neurologcial symptoms are as documented. All other systems are negative.    Objective:      Vitals:    11/05/20 0844   Weight: 96.6 kg (212 lb 15.4 oz)   Height: 4' 9" (1.448 m)     Physical Exam  Short stature at 4 ft 9 in, and blue tinged sclerae.  She has diffuse pain in the right hip and buttock area and the low back.  She has reproduction of a pulling sensation and pain below her knee with flip test.  There is no pain with passive movement of the right or left hip.  She walks with a Rollator walker.  Knees are benign without effusion or tenderness.  Normal range of motion.  No fixed sensory deficit.  Imaging Review:   Recent hip x-rays showed a locked intramedullary nail in the right femur, and a Rush dionne in the left without any complications or significant hip arthrosis.  X-rays of the lumbar spine done today show mild diffuse lumbar spondylosis, and evidence of osteitis condensans ilii.  Assessment:   Osteitis condensans ilii.  Lumbar spondylosis.  Plan:       I told her that I did not " think any of her pain was related to her previous femur fractures or other fixation.  I reassured her that she does not need to consider surgical removal of her hardware.  She had worried that this might be necessary.  It may be that her OCI is associated with her pain, although this is often and asymptomatic incidental finding.  I am going to refer her to back and spine, and also to the pain clinic for further evaluation.    The patient's pathophysiology was explained in detail with reference to x-rays, models, other visual aids as appropriate.  Treatment options were discussed in detail.  Questions were invited and answered to the patient's satisfaction.

## 2020-11-06 NOTE — PLAN OF CARE
CM met with patient at the bedside to discuss discharge planning assessment. Pt lives with family and has transportation at discharge. Pt verified PCP and Pharmacy. CM will continue to follow for discharge needs.         11/06/20 0924   Discharge Assessment   Assessment Type Discharge Planning Assessment   Confirmed/corrected address and phone number on facesheet? Yes   Assessment information obtained from? Patient   Expected Length of Stay (days) 2   Communicated expected length of stay with patient/caregiver yes   Prior to hospitilization cognitive status: Alert/Oriented   Prior to hospitalization functional status: Assistive Equipment   Current cognitive status: Alert/Oriented   Current Functional Status: Assistive Equipment   Lives With other relative(s)   Able to Return to Prior Arrangements yes   Is patient able to care for self after discharge? Yes   Patient's perception of discharge disposition home or selfcare   Readmission Within the Last 30 Days no previous admission in last 30 days   Patient currently being followed by outpatient case management? No   Patient currently receives any other outside agency services? No   Equipment Currently Used at Home walker, standard   Do you have any problems affording any of your prescribed medications? No   Is the patient taking medications as prescribed? yes   Does the patient have transportation home? Yes   Transportation Anticipated family or friend will provide   Does the patient receive services at the Coumadin Clinic? No   Discharge Plan A Home with family   Discharge Plan B Home with family   DME Needed Upon Discharge  none   Patient/Family in Agreement with Plan yes

## 2020-11-07 NOTE — PLAN OF CARE
Safety precautions maintained. Bed in low position wheels locked. Side rails up x 2. Call light within reach. Pt free of falls.

## 2020-11-07 NOTE — NURSING
Pt Dc per MD orders. Dc and prescription instructions given. Pt verbalizes understanding. PIV removed catheter tip intact. VSS. Pt transported by [facility] escorted along with SO.

## 2020-11-10 ENCOUNTER — DOCUMENTATION ONLY (OUTPATIENT)
Dept: BARIATRICS | Facility: CLINIC | Age: 35
End: 2020-11-10

## 2020-11-10 ENCOUNTER — OFFICE VISIT (OUTPATIENT)
Dept: BARIATRICS | Facility: CLINIC | Age: 35
End: 2020-11-10
Payer: MEDICARE

## 2020-11-10 VITALS
HEIGHT: 57 IN | BODY MASS INDEX: 43.85 KG/M2 | DIASTOLIC BLOOD PRESSURE: 63 MMHG | SYSTOLIC BLOOD PRESSURE: 146 MMHG | HEART RATE: 75 BPM | WEIGHT: 203.25 LBS

## 2020-11-10 DIAGNOSIS — Z98.84 BARIATRIC SURGERY STATUS: ICD-10-CM

## 2020-11-10 DIAGNOSIS — K25.4 GASTROINTESTINAL HEMORRHAGE ASSOCIATED WITH GASTRIC ULCER: Primary | ICD-10-CM

## 2020-11-10 PROCEDURE — 99213 PR OFFICE/OUTPT VISIT, EST, LEVL III, 20-29 MIN: ICD-10-PCS | Mod: HCNC,S$GLB,, | Performed by: SURGERY

## 2020-11-10 PROCEDURE — 3008F BODY MASS INDEX DOCD: CPT | Mod: HCNC,CPTII,S$GLB, | Performed by: SURGERY

## 2020-11-10 PROCEDURE — 99999 PR PBB SHADOW E&M-EST. PATIENT-LVL III: CPT | Mod: PBBFAC,HCNC,, | Performed by: SURGERY

## 2020-11-10 PROCEDURE — 3008F PR BODY MASS INDEX (BMI) DOCUMENTED: ICD-10-PCS | Mod: HCNC,CPTII,S$GLB, | Performed by: SURGERY

## 2020-11-10 PROCEDURE — 99999 PR PBB SHADOW E&M-EST. PATIENT-LVL III: ICD-10-PCS | Mod: PBBFAC,HCNC,, | Performed by: SURGERY

## 2020-11-10 PROCEDURE — 99213 OFFICE O/P EST LOW 20 MIN: CPT | Mod: HCNC,S$GLB,, | Performed by: SURGERY

## 2020-11-10 RX ORDER — OMEPRAZOLE 40 MG/1
40 CAPSULE, DELAYED RELEASE ORAL
Qty: 60 CAPSULE | Refills: 12 | Status: SHIPPED | OUTPATIENT
Start: 2020-11-10

## 2020-11-10 RX ORDER — SUCRALFATE 1 G/10ML
1 SUSPENSION ORAL
Qty: 1200 ML | Refills: 1 | Status: SHIPPED | OUTPATIENT
Start: 2020-11-10 | End: 2020-11-10

## 2020-11-10 NOTE — PROGRESS NOTES
Subjective:       Patient ID: Prisca Zhu is a 35 y.o. female.    Chief Complaint: Follow-up and Post-op Problem    HPI S/p bypass 7/10/20.  She was doing well until around Halloween when she was given steroids and nsaids for bone pain and she developed epigastric pain and ugib.  The pain has nearly resolved and there is no more blood or tarry stools. She was put on something mixed with water and pepcid.  Review of Systems   Constitutional: Negative for fever.   Respiratory: Negative for cough, chest tightness and shortness of breath.    Cardiovascular: Negative for chest pain and palpitations.   Gastrointestinal: Negative for constipation, diarrhea, nausea and vomiting.   Genitourinary: Negative for difficulty urinating and dysuria.   Neurological: Negative for light-headedness.       Objective:    BMI 50.52 to 42.99 (weight prior to surgery approx 250 and today 203)  Physical Exam  Vitals signs reviewed.   Constitutional:       Appearance: Normal appearance.   Neurological:      General: No focal deficit present.      Mental Status: She is alert and oriented to person, place, and time.   Psychiatric:         Behavior: Behavior normal.         Thought Content: Thought content normal.         Judgment: Judgment normal.         Assessment:       1. Gastrointestinal hemorrhage associated with gastric ulcer    2. Bariatric surgery status        Plan:      Start ppi bid, continue pepcid and start carafate qid.  Egd.  Follow up 3 months.

## 2020-11-10 NOTE — PROGRESS NOTES
Premier protein 5 days  Eggs, ok  Vitamins:  MV with iron Mc[place]a twice a day  B complex with thiamin  Calcium twice a day  B-12 every 7 days.

## 2020-11-11 NOTE — PROGRESS NOTES
The patient was scheduled for Physical Therapy Initial Evaluation at 8:45 AM on 11/11/20 but did not show for appointment.    Abby Holliday PT, ANABELLET

## 2020-11-11 NOTE — DISCHARGE SUMMARY
Discharge Summary  Hospital Medicine    Attending Provider on Discharge: Arthur Newberry MD  Hospital Medicine Team: [facility]  Date of Admission:  11/5/2020     Date of Discharge:  11/6/2020  Length of Stay:  LOS: 0 days   Code status: Full Code      Active Hospital Problems    Diagnosis  POA    *GI bleed [K92.2]  Yes      Resolved Hospital Problems   No resolved problems to display.        HPI35 F PMH GERD, recent Symone-en-Y gastric bypass, osteogenesis imperfecta with recent hip/femur fracture transferred from the orthopedic clinic with report of rectal bleeding.  She was seen in the ED where she was noted to be hemodynamically stable and not tachycardic labs notable for a normal hemoglobin, 13, normal creatinine, unremarkable CMP.  KUB unremarkable.  Given patient was recently started on steroids by Orthopedics as well as Toradol, GI was consulted for further evaluation.       Hospital Course    No endoscopy done.    LGIB  Probable anal tear after, BM  Protonix iV for now  Trend H and H  HCT stable now.  Probable dc in am  NPO - Clear Liquids       Recent Labs   Lab 11/05/20  1228 11/05/20  1237 11/06/20  0500   WBC 8.35  --  7.85   HGB 13.0  --  12.2   HCT 41.6 44 38.0     --  165     Recent Labs   Lab 11/05/20  1228      K 4.1      CO2 20*   BUN 8   CREATININE 1.1   GLU 73   CALCIUM 9.8     Recent Labs   Lab 11/05/20  1228   ALKPHOS 68   ALT 10   AST 20   ALBUMIN 3.9   PROT 8.7*   BILITOT 0.6   INR 1.0      No results for input(s): CPK, CPKMB, MB, TROPONINI in the last 72 hours.  No results for input(s): LACTATE in the last 72 hours.    No results for input(s): POCTGLUCOSE in the last 168 hours.   Hemoglobin A1C   Date Value Ref Range Status   05/10/2019 5.2 4.0 - 5.6 % Final     Comment:     ADA Screening Guidelines:  5.7-6.4%  Consistent with prediabetes  >or=6.5%  Consistent with diabetes  High levels of fetal hemoglobin interfere with the HbA1C  assay. Heterozygous hemoglobin  variants (HbS, HgC, etc)do  not significantly interfere with this assay.   However, presence of multiple variants may affect accuracy.     02/04/2019 5.3 4.0 - 5.6 % Final     Comment:     ADA Screening Guidelines:  5.7-6.4%  Consistent with prediabetes  >or=6.5%  Consistent with diabetes  High levels of fetal hemoglobin interfere with the HbA1C  assay. Heterozygous hemoglobin variants (HbS, HgC, etc)do  not significantly interfere with this assay.   However, presence of multiple variants may affect accuracy.         Procedures: none    Consultants: GI    Discharge Medication List as of 11/6/2020  5:58 PM      CONTINUE these medications which have NOT CHANGED    Details   CALCIUM ORAL Take by mouth every morning. , Historical Med      cyanocobalamin (VITAMIN B-12) 100 MCG tablet Take 100 mcg by mouth every morning. , Historical Med      ergocalciferol (ERGOCALCIFEROL) 50,000 unit Cap TAKE 1 CAPSULE BY MOUTH EVERY 7 DAYS, Normal      fish oil-omega-3 fatty acids 300-1,000 mg capsule Take by mouth every morning. , Historical Med      gabapentin (NEURONTIN) 300 MG capsule TAKE 1 CAPSULE(300 MG) BY MOUTH TWICE DAILY, Normal      hydrocodone-acetaminophen (HYCET) solution 7.5-325 mg/15mL Take 15 mLs by mouth 4 (four) times daily as needed for Pain., Starting Tue 6/23/2020, Normal      tiZANidine (ZANAFLEX) 2 MG tablet TAKE 1 TABLET(2 MG) BY MOUTH EVERY 8 HOURS AS NEEDED FOR PAIN OR MUSCLE PAIN OR SPASM, Normal      traMADoL (ULTRAM) 50 mg tablet Take 1 tablet (50 mg total) by mouth every 6 (six) hours as needed., Starting Sun 11/1/2020, Print      drospirenone-ethinyl estradiol (ROSALBA) 3-0.02 mg per tablet Take 1 tablet by mouth once daily., Starting Wed 8/7/2019, Until Thu 8/6/2020, Normal      famotidine (PEPCID) 40 mg/5 mL (8 mg/mL) suspension Take 2.5 mLs (20 mg total) by mouth 2 (two) times daily., Starting Thu 11/5/2020, Normal      ibuprofen (ADVIL,MOTRIN) 800 MG tablet Take 1 tablet (800 mg total) by mouth every 8  (eight) hours as needed for Pain., Starting Mon 6/3/2019, Normal      lidocaine (LIDODERM) 5 % Place 1 patch onto the skin once daily. Remove & Discard patch within 12 hours or as directed by MD, Starting Sun 11/1/2020, Normal      omeprazole (PRILOSEC) 40 MG capsule Take 1 capsule (40 mg total) by mouth 2 (two) times daily before meals., Starting Thu 11/5/2020, Normal      ondansetron (ZOFRAN) 4 MG tablet Take 1 tablet (4 mg total) by mouth every 6 (six) hours as needed for Nausea., Starting Tue 6/23/2020, Normal      polyethylene glycol (GLYCOLAX) 17 gram/dose powder Dissolve one capful (17 g) in liquid and take by mouth once daily., Starting Tue 6/23/2020, Normal      ursodioL (COSME FORTE) 500 MG tablet Crush one tablet daily for gall bladder. Please compound into liquid and supply for 90 days if insurance approves, Normal             Discharge Diet:regular diet     Activity: activity as tolerated    Discharge Condition: Good    Disposition: Home or Self Care    Tests pending at the time of discharge: none      Time spent  on the discharge of the patient including review of hospital course with the patient. reviewing discharge medications and arranging follow-up care 35 minutes    Discharge examination Patient was seen and examined on the date of discharge and determined to be suitable for discharge.    Discharge plan     Future Appointments   Date Time Provider Department Center   11/18/2020  3:00 PM Brandon Lee MD [facility]   1/5/2021  9:30 AM Jamila Garcia PA-C [facility] SPINE [address]   1/19/2021  9:20 AM Mathew Carpenter MD [facility] Octavio Newberry MD

## 2020-11-13 ENCOUNTER — PATIENT MESSAGE (OUTPATIENT)
Dept: ORTHOPEDICS | Facility: CLINIC | Age: 35
End: 2020-11-13

## 2020-11-18 ENCOUNTER — OFFICE VISIT (OUTPATIENT)
Dept: PAIN MEDICINE | Facility: CLINIC | Age: 35
End: 2020-11-18
Payer: MEDICARE

## 2020-11-18 VITALS
HEART RATE: 55 BPM | OXYGEN SATURATION: 100 % | DIASTOLIC BLOOD PRESSURE: 85 MMHG | RESPIRATION RATE: 18 BRPM | WEIGHT: 202.81 LBS | BODY MASS INDEX: 43.75 KG/M2 | TEMPERATURE: 97 F | SYSTOLIC BLOOD PRESSURE: 137 MMHG | HEIGHT: 57 IN

## 2020-11-18 DIAGNOSIS — M53.87 SCIATICA OF RIGHT SIDE ASSOCIATED WITH DISORDER OF LUMBOSACRAL SPINE: ICD-10-CM

## 2020-11-18 DIAGNOSIS — M54.16 LUMBAR RADICULOPATHY: Primary | ICD-10-CM

## 2020-11-18 DIAGNOSIS — M54.9 DORSALGIA, UNSPECIFIED: ICD-10-CM

## 2020-11-18 PROCEDURE — 99999 PR PBB SHADOW E&M-EST. PATIENT-LVL V: ICD-10-PCS | Mod: PBBFAC,HCNC,, | Performed by: ANESTHESIOLOGY

## 2020-11-18 PROCEDURE — 99999 PR PBB SHADOW E&M-EST. PATIENT-LVL V: CPT | Mod: PBBFAC,HCNC,, | Performed by: ANESTHESIOLOGY

## 2020-11-18 PROCEDURE — 99204 PR OFFICE/OUTPT VISIT, NEW, LEVL IV, 45-59 MIN: ICD-10-PCS | Mod: HCNC,S$GLB,, | Performed by: ANESTHESIOLOGY

## 2020-11-18 PROCEDURE — 99204 OFFICE O/P NEW MOD 45 MIN: CPT | Mod: HCNC,S$GLB,, | Performed by: ANESTHESIOLOGY

## 2020-11-18 NOTE — LETTER
November 18, 2020      Manfred Beard MD  [address]  [facility] [address]           [facility]  [address]  [place] [address]  Phone: [phone]  Fax: [phone]          Patient: Prisca Zhu   MR Number: [id number]   YOB: 1985   Date of Visit: 11/18/2020       Dear Dr. Manfred Beard:    Thank you for referring Prisca Zhu to me for evaluation. Attached you will find relevant portions of my assessment and plan of care.    If you have questions, please do not hesitate to call me. I look forward to following Prisca Zhu along with you.    Sincerely,    Brandon Lee MD    Enclosure  CC:  No Recipients    If you would like to receive this communication electronically, please contact [email or web address] or [phone] to request more information on [email or web address] Link access.    For providers and/or their staff who would like to refer a patient to [facility], please contact us through our one-stop-shop provider referral line, [facility], at [phone].    If you feel you have received this communication in error or would no longer like to receive these types of communications, please e-mail [email or web address]

## 2020-11-18 NOTE — PROGRESS NOTES
Chronic Pain - New Consult    Referring Physician: Manfred Beard MD    Chief Complaint:   Chief Complaint   Patient presents with    Leg Pain    Low-back Pain        SUBJECTIVE:    Prisca Munguia a 34 y/o female with history of GERD, s/p gastric bypass, osteogenesis imperfecta, s/p internal fixation of both femurs in childhood, obesity who presents to the clinic for the evaluation of back and leg pain.  Pain started in September 2020 after she went down a water slide.  She had been having low back pain for many years prior to this which was mild and tolerable.  After she went down a water slide in September, she developed pain in her right hip which started to radiate down the right lower extremity up to the toes.  Over the past couple of weeks she now has developed pain in the left lower extremity.  She describes the pain as aching, throbbing.  Aggravated by any sort of activity.  Sleep is disturbed due to the pain.  She has also been getting weaker in the right leg and is requiring a walker or a wheelchair more often for longer distances. She was seen by Orthopedics.  X-ray of her right hip showed no acute pathology.  She reports spending 2 hours per day reclining. The patient reports 4 hours of uninterrupted sleep per night.    She denies any urinary or bowel incontinence.     She was previously undergoing physical therapy for neck pain and a right rotator cuff injury but has not received any sessions since the COVID lockdown.     Has been seen at an [facility] for neck pain and was found to have neural foraminal narrowing at C4 through C7.  A cervical LOKESH was recommended but was deferred until she underwent gastric bypass surgery and weight loss.    He was seen in the ER for her low back pain and received 2 cortisone shots after which she developed a GI bleed.  She was advised after that to only take Tylenol for her pain and nothing else.    Physical Therapy/Home Exercise: no      Pain  Disability Index Review:  Last 3 PDI Scores 11/18/2020   Pain Disability Index (PDI) 70       Pain Medications:    - Others: Tylenol     report:  Reviewed and consistent with medication use as prescribed.    Pain Procedures: none    Imaging:   Narrative & Impression     EXAMINATION:  XR ABDOMEN FLAT AND ERECT     CLINICAL HISTORY:  Unspecified abdominal pain     TECHNIQUE:  Flat and erect AP views of the abdomen were performed.     COMPARISON:  CT abdomen pelvis performed 05/18/2019.  Same day radiographs of the lumbar spine.     FINDINGS:  Moderate devices overlie the lower chest and abdomen.  No dilated loops of small or large bowel are noted.  No free air is suggested on the upright view.  No fluid levels.  Suture material is noted in the left abdomen.  Degenerative findings in lumbar spine are better assessed on separately reported same day radiographs.     Impression:     Nonspecific, nonobstructive bowel gas pattern.  No evidence of free air.        Electronically signed by: Nikita Collins  Date:                                            11/05/2020  Time:                                           13:37     Narrative & Impression     EXAMINATION:  XR LUMBAR SPINE AP AND LATERAL     CLINICAL HISTORY:  Back pain or radiculopathy, > 6 wks;Dorsalgia, unspecified     TECHNIQUE:  AP, lateral and spot images were performed of the lumbar spine.     COMPARISON:  None     FINDINGS:  Posterior vertebral alignment is satisfactory.  Vertebral body heights are well maintained.  There is no evidence for acute fracture or bone destruction.  There are mild degenerative changes present.  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  There are surgical changes within the left upper abdomen.  Intramedullary rods are identified within the femurs bilaterally.     Impression:     No evidence for acute fracture, bone destruction, or subluxation.     Mild lumbar  spondylosis.     Surgical changes.        Electronically signed by: Jesús Conklin MD  Date:                                            11/05/2020  Time:                                           09:57     Narrative & Impression     EXAMINATION:  XR HIP 2 VIEW RIGHT     CLINICAL HISTORY:  Pain, unspecified     TECHNIQUE:  AP view of the pelvis and frog leg lateral view of the right hip were performed.     COMPARISON:  None     FINDINGS:  Two views left hip.     The bilateral sacroiliac joints are intact noting degenerative changes, left greater than right suggesting that of sacroiliitis.  The pubic symphysis is intact.  The bilateral femoroacetabular joints are intact.  Surgical changes are noted of the right femur and left femur.  No radiopaque foreign body.     Impression:     1. No acute displaced fracture or dislocation of the left hip.        Electronically signed by: Luciano Gomes MD  Date:                                            11/01/2020  Time:                                           12:26        Narrative & Impression     EXAMINATION:  XR PELVIS ROUTINE AP     CLINICAL HISTORY:  acute hip pain s/p fall history of surgery s/p instrumentation;  Pain in right hip     TECHNIQUE:  AP view of the pelvis was performed.     COMPARISON:  05/18/2019     FINDINGS:  No evidence for pelvic fracture.  Sclerosis along the iliac aspect of the bilateral SI joints, similar to prior study.  Intramedullary nails are seen in the bilateral femurs.  Hip cartilage spaces are maintained.     Impression:     As above.        Electronically signed by: Matthias Velasco MD  Date:                                            10/16/2020  Time:                                           12:24               Past Medical History:   Diagnosis Date    Anemia     BMI 50.0-59.9, adult     Encounter for IUD removal 5/23/2019    GERD (gastroesophageal reflux disease) 4/2/2019    History of blood transfusion 2001    Malpositioned  intrauterine device 3/21/2019    2019 OB/GYN - Dr Guzman - Discussed with patient that we will proceed with imaging to locate the IUD after which we can then proceed to book case in OR to retrieve displaced IUD    Obesity     OI (osteogenesis imperfecta)     Osteopetrosis     Tendonitis      Past Surgical History:   Procedure Laterality Date     SECTION      ,     ESOPHAGOGASTRODUODENOSCOPY N/A 2019    Procedure: ESOPHAGOGASTRODUODENOSCOPY (EGD);  Surgeon: Segun Dominguez MD;  [facility]  Service: Endoscopy;  Laterality: N/A;  BMI 51    FEMUR FRACTURE SURGERY Bilateral     hardware/rods in both legs    FRACTURE SURGERY      femur    LAPAROSCOPIC GASTROENTEROSTOMY N/A 7/10/2020    Procedure: GASTROENTEROSTOMY, LAPAROSCOPIC, with intraop EGD;  Surgeon: Guillermo Villanueva MD;  Location: [address];  Service: General;  Laterality: N/A;    REMOVAL OF INTRAUTERINE DEVICE (IUD) N/A 6/3/2019    Procedure: REMOVAL, INTRAUTERINE DEVICE;  Surgeon: Ashley Greenberg MD;  Location: [facility];  Service: OB/GYN;  Laterality: N/A;  RN PRE OP 3-58-60----BMI-51.29    WISDOM TOOTH EXTRACTION       Social History     Socioeconomic History    Marital status: Single     Spouse name: Not on file    Number of children: Not on file    Years of education: Not on file    Highest education level: Not on file   Occupational History    Not on file   Social Needs    Financial resource strain: Not on file    Food insecurity     Worry: Not on file     Inability: Not on file    Transportation needs     Medical: Not on file     Non-medical: Not on file   Tobacco Use    Smoking status: Never Smoker    Smokeless tobacco: Never Used   Substance and Sexual Activity    Alcohol use: No    Drug use: Never    Sexual activity: Yes     Partners: Male     Birth control/protection: None, I.U.D.   Lifestyle    Physical activity     Days per week: Not on file     Minutes per  session: Not on file    Stress: Not on file   Relationships    Social connections     Talks on phone: Not on file     Gets together: Not on file     Attends [employer] service: Not on file     Active member of club or organization: Not on file     Attends meetings of clubs or organizations: Not on file     Relationship status: Not on file   Other Topics Concern    Not on file   Social History Narrative    Disabled 2/2 chronic left shoulder pain     Family History   Problem Relation Age of Onset    Hypertension Mother     Hyperlipidemia Mother     Asthma Mother     Anxiety disorder Mother     No Known Problems Father     Liver cancer Maternal Grandfather     Celiac disease Neg Hx     Colon cancer Neg Hx     Colon polyps Neg Hx     Crohn's disease Neg Hx     Cystic fibrosis Neg Hx     Esophageal cancer Neg Hx     Irritable bowel syndrome Neg Hx     Rectal cancer Neg Hx     Stomach cancer Neg Hx        Review of patient's allergies indicates:   Allergen Reactions    Pcn [penicillins] Shortness Of Breath       Current Outpatient Medications   Medication Sig    CALCIUM ORAL Take by mouth every morning.     cyanocobalamin (VITAMIN B-12) 100 MCG tablet Take 100 mcg by mouth every morning.     ergocalciferol (ERGOCALCIFEROL) 50,000 unit Cap TAKE 1 CAPSULE BY MOUTH EVERY 7 DAYS    famotidine (PEPCID) 40 mg/5 mL (8 mg/mL) suspension Take 2.5 mLs (20 mg total) by mouth 2 (two) times daily.    fish oil-omega-3 fatty acids 300-1,000 mg capsule Take by mouth every morning.     gabapentin (NEURONTIN) 300 MG capsule TAKE 1 CAPSULE(300 MG) BY MOUTH TWICE DAILY    omeprazole (PRILOSEC) 40 MG capsule Take 1 capsule (40 mg total) by mouth 2 (two) times daily before meals.    omeprazole (PRILOSEC) 40 MG capsule Take 1 capsule (40 mg total) by mouth 2 (two) times daily before meals.    ursodioL (COSME FORTE) 500 MG tablet Crush one tablet daily for gall bladder. Please compound into liquid and supply for 90 days  if insurance approves    drospirenone-ethinyl estradiol (ROSALBA) 3-0.02 mg per tablet Take 1 tablet by mouth once daily.    hydrocodone-acetaminophen (HYCET) solution 7.5-325 mg/15mL Take 15 mLs by mouth 4 (four) times daily as needed for Pain. (Patient not taking: Reported on 11/10/2020)    ibuprofen (ADVIL,MOTRIN) 800 MG tablet Take 1 tablet (800 mg total) by mouth every 8 (eight) hours as needed for Pain. (Patient not taking: Reported on 11/10/2020)    lidocaine (LIDODERM) 5 % Place 1 patch onto the skin once daily. Remove & Discard patch within 12 hours or as directed by MD    ondansetron (ZOFRAN) 4 MG tablet Take 1 tablet (4 mg total) by mouth every 6 (six) hours as needed for Nausea. (Patient not taking: Reported on 11/10/2020)    polyethylene glycol (GLYCOLAX) 17 gram/dose powder Dissolve one capful (17 g) in liquid and take by mouth once daily. (Patient not taking: Reported on 11/10/2020)    sucralfate (CARAFATE) 100 mg/mL suspension TAKE 10 ML BY MOUTH FOUR TIMES DAILY WITH MEALS AND NIGHTLY (Patient not taking: Reported on 11/18/2020)    tiZANidine (ZANAFLEX) 2 MG tablet TAKE 1 TABLET(2 MG) BY MOUTH EVERY 8 HOURS AS NEEDED FOR PAIN OR MUSCLE PAIN OR SPASM (Patient not taking: Reported on 11/18/2020)    traMADoL (ULTRAM) 50 mg tablet Take 1 tablet (50 mg total) by mouth every 6 (six) hours as needed. (Patient not taking: Reported on 11/10/2020)     Current Facility-Administered Medications   Medication    medroxyPROGESTERone (DEPO-PROVERA) injection 150 mg       REVIEW OF SYSTEMS:    GENERAL:  No weight loss, malaise or fevers.  HEENT:  Negative for frequent or significant headaches.  NECK:  Negative for lumps, goiter, pain and significant neck swelling.  RESPIRATORY:  Negative for cough, wheezing or shortness of breath.  CARDIOVASCULAR:  Negative for chest pain, leg swelling or palpitations.  GI:  Negative for abdominal discomfort, blood in stools or black stools or change in bowel  "habits.  MUSCULOSKELETAL:  See HPI.  SKIN:  Negative for lesions, rash, and itching.  PSYCH:  Positive for sleep disturbance. Negative for mood disorder and recent psychosocial stressors.  HEMATOLOGY/LYMPHOLOGY:  Negative for prolonged bleeding, bruising easily or swollen nodes.  NEURO:   No history of headaches, syncope, paralysis, seizures or tremors. + Weakness  All other reviewed and negative other than HPI.    OBJECTIVE:    /85   Pulse (!) 55   Temp 97.2 °F (36.2 °C)   Resp 18   Ht 4' 9" (1.448 m)   Wt 92 kg (202 lb 13.2 oz)   SpO2 100%   BMI 43.89 kg/m²     PHYSICAL EXAMINATION:    General appearance: Well appearing, in no acute distress, alert and oriented x3.  Psych:  Mood and affect appropriate.  Skin: Skin color, texture, turgor normal, no rashes or lesions, in both upper and lower body.  Head/face:  Normocephalic, atraumatic. No palpable lymph nodes.  Neck: No pain to palpation over the cervical paraspinous muscles. Spurling Negative. No pain with neck flexion, extension, or lateral flexion.   Cor: RRR  Pulm: CTA  GI:  Soft and non-tender.  Back: Straight leg raising in the sitting and supine positions is negative to radicular pain. No pain to palpation over the spine or costovertebral angles. Normal range of motion without pain reproduction.  Extremities: Peripheral joint ROM is full and pain free without obvious instability or laxity in all four extremities. No deformities, edema, or skin discoloration. Good capillary refill.  Musculoskeletal: Shoulder, hip, sacroiliac and knee provocative maneuvers are negative.   RLE:  Hip flexion and extension 2/5  Knee flexion and extension - 2/5  Dorsiflexion and plantar flexion - 3/5  LLE and b/l UE - 5/5    No atrophy or tone abnormalities are noted.  Neuro: Bilateral upper and lower extremity coordination and muscle stretch reflexes are physiologic and symmetric.  Plantar response are downgoing. Decreased sensation to light touch and pinprick in the " RLE.  Gait: antalgic.    ASSESSMENT: 35 y.o. year old female with low back pain that radiates down both lower extremities, R>L with progressive weakness in the right lower extremity.  Suspect that she has a herniated disc.  X-ray of the lumbar spine showed lumbar spondylosis.    1. Lumbar radiculopathy  EMG W/ ULTRASOUND AND NERVE CONDUCTION TEST 2 Extremities    MRI Lumbar Spine Without Contrast    Ambulatory referral/consult to Physical/Occupational Therapy   2. Sciatica of right side associated with disorder of lumbosacral spine  Ambulatory referral/consult to Pain Clinic   3. Dorsalgia, unspecified           PLAN:     -- I have stressed the importance of physical activity and a home exercise plan to help with pain and improve health.  -- Referral to Physical therapy for Lumbar stabilization, core strengthening, and a home exercise program.  -- Order MRI of the Lumbar Spine STAT to help evaluate possible source of pain.  -- EMG/NCS of the lower extremities to evaluate for any evidence of lumbar radiculopathy.  -- Continue Gabapentin 300mg BID.  Explained to the patient that this medication does not cause GI ulcers.  -- RTC 2 weeks  -- Counseled patient regarding the importance of physical therapy.    The above plan and management options were discussed at length with patient. Patient is in agreement with the above and verbalized understanding. It will be communicated with the referring physician via electronic record, fax, or mail.    David Aguilar  11/18/2020     I have reviewed and concur with the resident's history, physical, assessment, and plan.  I have personally interviewed and examined the patient at bedside.  See below addendum for my evaluation and additional findings.  35-year-old female with lower back pain with worsening lower extremity weakness consistent with lumbar radiculopathy.  Patient denies any bowel/bladder incontinence.  We will order stat lumbar spine MRI further evaluate the  etiology of her pain and order EMG of lower extremities.  She may continue to current regimen for pain control.  Will follow up with her in 2 weeks.  We will also refer her to physical therapy.    Brandon Lee MD

## 2020-11-18 NOTE — LETTER
November 18, 2020      [facility] [address]  [facility] [address]  Phone: [phone]  Fax: [phone]       Patient: Prisca Zhu   YOB: 1985  Date of Visit: 11/18/2020    To Whom It May Concern:    Geraldine Zhu  was at [facility] on 11/18/2020. She may return to work/school on 11/20/20 with no restrictions. If you have any questions or concerns, or if I can be of further assistance, please do not hesitate to contact me.    Sincerely,          Frieda Gama MA

## 2020-11-19 ENCOUNTER — TELEPHONE (OUTPATIENT)
Dept: BARIATRICS | Facility: CLINIC | Age: 35
End: 2020-11-19

## 2020-11-19 ENCOUNTER — PATIENT MESSAGE (OUTPATIENT)
Dept: ADMINISTRATIVE | Facility: HOSPITAL | Age: 35
End: 2020-11-19

## 2020-11-19 ENCOUNTER — HOSPITAL ENCOUNTER (OUTPATIENT)
Dept: RADIOLOGY | Facility: HOSPITAL | Age: 35
Discharge: HOME | End: 2020-11-19
Attending: ANESTHESIOLOGY
Payer: MEDICARE

## 2020-11-19 DIAGNOSIS — M54.16 LUMBAR RADICULOPATHY: ICD-10-CM

## 2020-11-19 DIAGNOSIS — Z12.11 COLON CANCER SCREENING: ICD-10-CM

## 2020-11-19 PROCEDURE — 72148 MRI LUMBAR SPINE W/O DYE: CPT | Mod: TC,HCNC

## 2020-11-19 PROCEDURE — 72148 MRI LUMBAR SPINE W/O DYE: CPT | Mod: 26,HCNC,, | Performed by: RADIOLOGY

## 2020-11-19 PROCEDURE — 72148 MRI LUMBAR SPINE WITHOUT CONTRAST: ICD-10-PCS | Mod: 26,HCNC,, | Performed by: RADIOLOGY

## 2020-11-19 NOTE — TELEPHONE ENCOUNTER
Called and spoke to Patient.  Stated that she is taking the Famotidine that Dr. Villanueva has prescribed.  Stated that she does get some abdominal burning, but it has improved.  Explained that Dr. Villanueva is requesting to see her back in February '21 and that she will receive a reminder letter in the mail to schedule the appointment.  Asked her if she would be available to have an EGD on the [facility] or [facility].  She prefers the [facility], if possible.  Sent an In Basket message to the Endo Schedulers at [facility] to see if the EGD can be scheduled there.  She did not have any questions at present.

## 2020-11-20 ENCOUNTER — ANESTHESIA EVENT (OUTPATIENT)
Dept: ENDOSCOPY | Facility: HOSPITAL | Age: 35
End: 2020-11-20
Payer: MEDICARE

## 2020-11-20 ENCOUNTER — ANESTHESIA (OUTPATIENT)
Dept: ENDOSCOPY | Facility: HOSPITAL | Age: 35
End: 2020-11-20
Payer: MEDICARE

## 2020-11-20 ENCOUNTER — HOSPITAL ENCOUNTER (OUTPATIENT)
Facility: HOSPITAL | Age: 35
Discharge: HOME | End: 2020-11-20
Attending: INTERNAL MEDICINE | Admitting: INTERNAL MEDICINE
Payer: MEDICARE

## 2020-11-20 VITALS
DIASTOLIC BLOOD PRESSURE: 81 MMHG | RESPIRATION RATE: 20 BRPM | TEMPERATURE: 98 F | SYSTOLIC BLOOD PRESSURE: 146 MMHG | HEART RATE: 49 BPM | OXYGEN SATURATION: 99 %

## 2020-11-20 DIAGNOSIS — K92.2 GASTROINTESTINAL HEMORRHAGE: ICD-10-CM

## 2020-11-20 LAB
B-HCG UR QL: NEGATIVE
SARS-COV-2 RDRP RESP QL NAA+PROBE: NEGATIVE

## 2020-11-20 PROCEDURE — D9220A PRA ANESTHESIA: Mod: HCNC,ANES,, | Performed by: ANESTHESIOLOGY

## 2020-11-20 PROCEDURE — D9220A PRA ANESTHESIA: ICD-10-PCS | Mod: HCNC,ANES,, | Performed by: ANESTHESIOLOGY

## 2020-11-20 PROCEDURE — 43235 PR EGD, FLEX, DIAGNOSTIC: ICD-10-PCS | Mod: HCNC,,, | Performed by: INTERNAL MEDICINE

## 2020-11-20 PROCEDURE — 81025 URINE PREGNANCY TEST: CPT | Mod: HCNC

## 2020-11-20 PROCEDURE — 25000003 PHARM REV CODE 250: Mod: HCNC | Performed by: STUDENT IN AN ORGANIZED HEALTH CARE EDUCATION/TRAINING PROGRAM

## 2020-11-20 PROCEDURE — 37000009 HC ANESTHESIA EA ADD 15 MINS: Mod: HCNC | Performed by: INTERNAL MEDICINE

## 2020-11-20 PROCEDURE — 37000008 HC ANESTHESIA 1ST 15 MINUTES: Mod: HCNC | Performed by: INTERNAL MEDICINE

## 2020-11-20 PROCEDURE — U0002 COVID-19 LAB TEST NON-CDC: HCPCS | Mod: HCNC

## 2020-11-20 PROCEDURE — 43235 EGD DIAGNOSTIC BRUSH WASH: CPT | Mod: HCNC,,, | Performed by: INTERNAL MEDICINE

## 2020-11-20 PROCEDURE — D9220A PRA ANESTHESIA: ICD-10-PCS | Mod: HCNC,CRNA,, | Performed by: STUDENT IN AN ORGANIZED HEALTH CARE EDUCATION/TRAINING PROGRAM

## 2020-11-20 PROCEDURE — D9220A PRA ANESTHESIA: Mod: HCNC,CRNA,, | Performed by: STUDENT IN AN ORGANIZED HEALTH CARE EDUCATION/TRAINING PROGRAM

## 2020-11-20 PROCEDURE — 43235 EGD DIAGNOSTIC BRUSH WASH: CPT | Mod: HCNC | Performed by: INTERNAL MEDICINE

## 2020-11-20 PROCEDURE — 63600175 PHARM REV CODE 636 W HCPCS: Mod: HCNC | Performed by: STUDENT IN AN ORGANIZED HEALTH CARE EDUCATION/TRAINING PROGRAM

## 2020-11-20 RX ORDER — PROPOFOL 10 MG/ML
VIAL (ML) INTRAVENOUS
Status: DISCONTINUED | OUTPATIENT
Start: 2020-11-20 | End: 2020-11-20

## 2020-11-20 RX ORDER — PROPOFOL 10 MG/ML
INJECTION, EMULSION INTRAVENOUS
Status: DISCONTINUED
Start: 2020-11-20 | End: 2020-11-20 | Stop reason: HOSPADM

## 2020-11-20 RX ORDER — LIDOCAINE HYDROCHLORIDE 20 MG/ML
INJECTION INTRAVENOUS
Status: DISCONTINUED | OUTPATIENT
Start: 2020-11-20 | End: 2020-11-20

## 2020-11-20 RX ORDER — LIDOCAINE HYDROCHLORIDE 20 MG/ML
INJECTION, SOLUTION EPIDURAL; INFILTRATION; INTRACAUDAL; PERINEURAL
Status: DISCONTINUED
Start: 2020-11-20 | End: 2020-11-20 | Stop reason: HOSPADM

## 2020-11-20 RX ORDER — SODIUM CHLORIDE 9 MG/ML
INJECTION, SOLUTION INTRAVENOUS CONTINUOUS PRN
Status: DISCONTINUED | OUTPATIENT
Start: 2020-11-20 | End: 2020-11-20

## 2020-11-20 RX ADMIN — SODIUM CHLORIDE: 9 INJECTION, SOLUTION INTRAVENOUS at 11:11

## 2020-11-20 RX ADMIN — Medication 100 MG: at 11:11

## 2020-11-20 RX ADMIN — PROPOFOL 100 MG: 10 INJECTION, EMULSION INTRAVENOUS at 11:11

## 2020-11-20 NOTE — [employer] PATIENT INSTRUCTIONS
Discharge Summary/Instructions after an Endoscopic Procedure  Patient Name: Prisca Zhu  Patient MRN: [id number]  Patient YOB: 1985 Friday, November 20, 2020  Boom Farris MD  RESTRICTIONS:  During your procedure today, you received medications for sedation.  These   medications may affect your judgment, balance and coordination.  Therefore,   for 24 hours, you have the following restrictions:   - DO NOT drive a car, operate machinery, make legal/financial decisions,   sign important papers or drink alcohol.    ACTIVITY:  Today: no heavy lifting, straining or running due to procedural   sedation/anesthesia.  The following day: return to full activity including work.  DIET:  Eat and drink normally unless instructed otherwise.     TREATMENT FOR COMMON SIDE EFFECTS:  - Mild abdominal pain, nausea, belching, bloating or excessive gas:  rest,   eat lightly and use a heating pad.  - Sore Throat: treat with throat lozenges and/or gargle with warm salt   water.  - Because air was used during the procedure, expelling large amounts of air   from your rectum or belching is normal.  - If a bowel prep was taken, you may not have a bowel movement for 1-3 days.    This is normal.  SYMPTOMS TO WATCH FOR AND REPORT TO YOUR PHYSICIAN:  1. Abdominal pain or bloating, other than gas cramps.  2. Chest pain.  3. Back pain.  4. Signs of infection such as: chills or fever occurring within 24 hours   after the procedure.  5. Rectal bleeding, which would show as bright red, maroon, or black stools.   (A tablespoon of blood from the rectum is not serious, especially if   hemorrhoids are present.)  6. Vomiting.  7. Weakness or dizziness.  GO DIRECTLY TO THE NEAREST EMERGENCY ROOM IF YOU HAVE ANY OF THE FOLLOWING:      Difficulty breathing              Chills and/or fever over 101 F   Persistent vomiting and/or vomiting blood   Severe abdominal pain   Severe chest pain   Black, tarry stools   Bleeding- more than one  tablespoon   Any other symptom or condition that you feel may need urgent attention  Your doctor recommends these additional instructions:  If any biopsies were taken, your doctors clinic will contact you in 1 to 2   weeks with any results.  - Discharge patient to home.   - Resume previous diet.   - Continue present medications.   - Return to referring physician as previously scheduled.   - Avoid NSAIDs  For questions, problems or results please call your physician - Boom Farris MD at Work:  [phone].  [facility] can be reached at [phone]     IF A COMPLICATION OR EMERGENCY SITUATION ARISES AND YOU ARE UNABLE TO REACH   YOUR PHYSICIAN - GO DIRECTLY TO THE EMERGENCY ROOM.  Boom Farris MD  11/20/2020 11:24:49 AM  This report has been verified and signed electronically.  [employer]

## 2020-11-20 NOTE — ANESTHESIA PREPROCEDURE EVALUATION
2020  Prisca Zhu is a 35 y.o., female.  To undergo Procedure(s) (LRB):  EGD (ESOPHAGOGASTRODUODENOSCOPY) (N/A)     Denies CP/SOB/MI/CVA/URI symptoms.  Occasional GERD with certain foods.  METS > 4  NPO > 8    Past Medical History:  Past Medical History:   Diagnosis Date    Anemia     BMI 50.0-59.9, adult     Encounter for IUD removal 2019    GERD (gastroesophageal reflux disease) 2019    History of blood transfusion 2001    Malpositioned intrauterine device 3/21/2019    2019 OB/GYN - Dr Guzman - Discussed with patient that we will proceed with imaging to locate the IUD after which we can then proceed to book case in OR to retrieve displaced IUD    Obesity     OI (osteogenesis imperfecta)     Osteopetrosis     Tendonitis        Past Surgical History:  Past Surgical History:   Procedure Laterality Date     SECTION      ,     ESOPHAGOGASTRODUODENOSCOPY N/A 2019    Procedure: ESOPHAGOGASTRODUODENOSCOPY (EGD);  Surgeon: Segun Dominguez MD;  Location: [address]);  Service: Endoscopy;  Laterality: N/A;  BMI 51    FEMUR FRACTURE SURGERY Bilateral     hardware/rods in both legs    FRACTURE SURGERY      femur    LAPAROSCOPIC GASTROENTEROSTOMY N/A 7/10/2020    Procedure: GASTROENTEROSTOMY, LAPAROSCOPIC, with intraop EGD;  Surgeon: Guillermo Villanueva MD;  Location: [address];  Service: General;  Laterality: N/A;    REMOVAL OF INTRAUTERINE DEVICE (IUD) N/A 6/3/2019    Procedure: REMOVAL, INTRAUTERINE DEVICE;  Surgeon: Ashley Greenberg MD;  Location: [facility];  Service: OB/GYN;  Laterality: N/A;  RN PRE OP 19----BMI-51.29    WISDOM TOOTH EXTRACTION         Social History:  Social History     Socioeconomic History    Marital status: Single     Spouse name: Not on file    Number of children: Not on file    Years of  education: Not on file    Highest education level: Not on file   Occupational History    Not on file   Social Needs    Financial resource strain: Not on file    Food insecurity     Worry: Not on file     Inability: Not on file    Transportation needs     Medical: Not on file     Non-medical: Not on file   Tobacco Use    Smoking status: Never Smoker    Smokeless tobacco: Never Used   Substance and Sexual Activity    Alcohol use: No    Drug use: Never    Sexual activity: Yes     Partners: Male     Birth control/protection: None, I.U.D.   Lifestyle    Physical activity     Days per week: Not on file     Minutes per session: Not on file    Stress: Not on file   Relationships    Social connections     Talks on phone: Not on file     Gets together: Not on file     Attends [employer] service: Not on file     Active member of club or organization: Not on file     Attends meetings of clubs or organizations: Not on file     Relationship status: Not on file   Other Topics Concern    Not on file   Social History Narrative    Disabled 2/2 chronic left shoulder pain       Medications:  No current facility-administered medications on file prior to encounter.      Current Outpatient Medications on File Prior to Encounter   Medication Sig Dispense Refill    CALCIUM ORAL Take by mouth every morning.       cyanocobalamin (VITAMIN B-12) 100 MCG tablet Take 100 mcg by mouth every morning.       drospirenone-ethinyl estradiol (ROSALBA) 3-0.02 mg per tablet Take 1 tablet by mouth once daily. 84 tablet 3    ergocalciferol (ERGOCALCIFEROL) 50,000 unit Cap TAKE 1 CAPSULE BY MOUTH EVERY 7 DAYS 4 capsule 2    famotidine (PEPCID) 40 mg/5 mL (8 mg/mL) suspension Take 2.5 mLs (20 mg total) by mouth 2 (two) times daily. 150 mL 3    fish oil-omega-3 fatty acids 300-1,000 mg capsule Take by mouth every morning.       gabapentin (NEURONTIN) 300 MG capsule TAKE 1 CAPSULE(300 MG) BY MOUTH TWICE DAILY 60 capsule 0     hydrocodone-acetaminophen (HYCET) solution 7.5-325 mg/15mL Take 15 mLs by mouth 4 (four) times daily as needed for Pain. (Patient not taking: Reported on 11/10/2020) 200 mL 0    ibuprofen (ADVIL,MOTRIN) 800 MG tablet Take 1 tablet (800 mg total) by mouth every 8 (eight) hours as needed for Pain. (Patient not taking: Reported on 11/10/2020) 40 tablet 0    lidocaine (LIDODERM) 5 % Place 1 patch onto the skin once daily. Remove & Discard patch within 12 hours or as directed by MD 15 patch 0    omeprazole (PRILOSEC) 40 MG capsule Take 1 capsule (40 mg total) by mouth 2 (two) times daily before meals. 30 capsule 2    omeprazole (PRILOSEC) 40 MG capsule Take 1 capsule (40 mg total) by mouth 2 (two) times daily before meals. 60 capsule 12    ondansetron (ZOFRAN) 4 MG tablet Take 1 tablet (4 mg total) by mouth every 6 (six) hours as needed for Nausea. (Patient not taking: Reported on 11/10/2020) 30 tablet 0    polyethylene glycol (GLYCOLAX) 17 gram/dose powder Dissolve one capful (17 g) in liquid and take by mouth once daily. (Patient not taking: Reported on 11/10/2020) 510 g 3    sucralfate (CARAFATE) 100 mg/mL suspension TAKE 10 ML BY MOUTH FOUR TIMES DAILY WITH MEALS AND NIGHTLY (Patient not taking: Reported on 11/18/2020) 414 mL 11    tiZANidine (ZANAFLEX) 2 MG tablet TAKE 1 TABLET(2 MG) BY MOUTH EVERY 8 HOURS AS NEEDED FOR PAIN OR MUSCLE PAIN OR SPASM (Patient not taking: Reported on 11/18/2020) 30 tablet 0    traMADoL (ULTRAM) 50 mg tablet Take 1 tablet (50 mg total) by mouth every 6 (six) hours as needed. (Patient not taking: Reported on 11/10/2020) 11 tablet 0    ursodioL (COSME FORTE) 500 MG tablet Crush one tablet daily for gall bladder. 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 Range 11/6/2020 05:00   WBC Latest Ref Range: 3.90 - 12.70 K/uL 7.85   RBC Latest Ref Range: 4.00 - 5.40 M/uL 4.22   Hemoglobin Latest Ref Range: 12.0 - 16.0 g/dL 12.2   Hematocrit Latest Ref Range: 37.0 - 48.5 % 38.0   MCV Latest Ref Range: 82 - 98 fL 90   MCH Latest Ref Range: 27.0 - 31.0 pg 28.9   MCHC Latest Ref Range: 32.0 - 36.0 g/dL 32.1   RDW Latest Ref Range: 11.5 - 14.5 % 14.3   Platelets Latest Ref Range: 150 - 350 K/uL 165   MPV Latest Ref Range: 9.2 - 12.9 fL 13.3 (H)   Gran % Latest Ref Range: 38.0 - 73.0 % 53.8   Lymph % Latest Ref Range: 18.0 - 48.0 % 37.1   Mono % Latest Ref Range: 4.0 - 15.0 % 7.1   Eosinophil % Latest Ref Range: 0.0 - 8.0 % 1.4   Basophil % Latest Ref Range: 0.0 - 1.9 % 0.5   Immature Granulocytes Latest Ref Range: 0.0 - 0.5 % 0.1   Gran # (ANC) Latest Ref Range: 1.8 - 7.7 K/uL 4.2   Lymph # Latest Ref Range: 1.0 - 4.8 K/uL 2.9   Mono # Latest Ref Range: 0.3 - 1.0 K/uL 0.6   Eos # Latest Ref Range: 0.0 - 0.5 K/uL 0.1   Baso # Latest Ref Range: 0.00 - 0.20 K/uL 0.04   Immature Grans (Abs) Latest Ref Range: 0.00 - 0.04 K/uL 0.01   nRBC Latest Ref Range: 0 /100 WBC 0   Differential Method Unknown Automated   Results for TIMOTHY SEGOVIA (MRN [id number]) as of 11/20/2020 10:41   Ref. Range 11/20/2020 10:13   SARS-CoV-2 RNA, Amplification, Qual Latest Ref Range: Negative  Negative     EKG (11/6/20):  Marked sinus bradycardia   Abnormal ECG     24 Hour Vitals:  Temp:  [37.1 °C (98.7 °F)] 37.1 °C (98.7 °F)  Pulse:  [46] 46  Resp:  [18] 18  SpO2:  [100 %] 100  %  BP: (132)/(86) 132/86   See Nursing Charting For Additional Vitals    Anesthesia Evaluation    I have reviewed the Patient Summary Reports.    I have reviewed the Nursing Notes.       Review of Systems  Anesthesia Hx:  No problems with previous Anesthesia   Denies Personal Hx of Anesthesia complications.   Social:  Non-Smoker, No Alcohol Use    Cardiovascular:  Cardiovascular Normal Exercise tolerance: good  ECG has been reviewed.    Pulmonary:  Pulmonary Normal    Hepatic/GI:   GERD, well controlled    Musculoskeletal:   Osteogenesis Imperfecta   Neurological:  Neurology Normal    Endocrine:  Endocrine Normal        Physical Exam  General:  Morbid Obesity    Airway/Jaw/Neck:   MP2, TMD > 3FB, teeth intact     Chest/Lungs:  Chest/Lungs Clear    Heart/Vascular:  Heart Findings: Normal            Anesthesia Plan  Type of Anesthesia, risks & benefits discussed:  Anesthesia Type:  general, MAC  Patient's Preference:   Intra-op Monitoring Plan: standard ASA monitors  Intra-op Monitoring Plan Comments:   Post Op Pain Control Plan: multimodal analgesia  Post Op Pain Control Plan Comments:   Induction:   IV  Beta Blocker:  Patient is not currently on a Beta-Blocker (No further documentation required).       Informed Consent: Patient understands risks and agrees with Anesthesia plan.  Questions answered. Anesthesia consent signed with patient.  ASA Score: 3     Day of Surgery Review of History & Physical:  There are no significant changes.          Ready For Surgery From Anesthesia Perspective.

## 2020-11-20 NOTE — ANESTHESIA POSTPROCEDURE EVALUATION
Anesthesia Post Evaluation    Patient: Prisca Zhu    Procedure(s) Performed: Procedure(s) (LRB):  EGD (ESOPHAGOGASTRODUODENOSCOPY) (N/A)    Final Anesthesia Type: general    Patient location during evaluation: GI PACU  Patient participation: Yes- Able to Participate  Level of consciousness: awake and alert and oriented  Post-procedure vital signs: reviewed and stable  Pain management: adequate  Airway patency: patent    PONV status at discharge: No PONV  Anesthetic complications: no      Cardiovascular status: hemodynamically stable and blood pressure returned to baseline  Respiratory status: spontaneous ventilation, room air and unassisted  Hydration status: euvolemic  Follow-up not needed.          Vitals Value Taken Time   /81 11/20/20 1156   Temp 36.6 °C (97.9 °F) 11/20/20 1126   Pulse 49 11/20/20 1156   Resp 20 11/20/20 1156   SpO2 99 % 11/20/20 1156         Event Time   Out of Recovery 12:07:03         Pain/Blake Score: Blake Score: 10 (11/20/2020 11:56 AM)

## 2020-11-20 NOTE — TRANSFER OF CARE
Anesthesia Transfer of Care Note    Patient: Prisca Zhu    Procedure(s) Performed: Procedure(s) (LRB):  EGD (ESOPHAGOGASTRODUODENOSCOPY) (N/A)    Patient location: GI    Anesthesia Type: general    Transport from OR: Transported from OR on room air with adequate spontaneous ventilation    Post pain: adequate analgesia    Post assessment: no apparent anesthetic complications and tolerated procedure well    Post vital signs: stable    Level of consciousness: awake and alert    Nausea/Vomiting: no nausea/vomiting    Complications: none    Transfer of care protocol was followed      Last vitals:   Visit Vitals  BP (!) 143/90   Pulse 79   Temp 36.6 °C (97.9 °F) (Oral)   Resp 20   SpO2 95%   Breastfeeding No

## 2020-11-20 NOTE — PLAN OF CARE
Procedure completed. Pt discharged with significant other. In no acute distress. Verbalizes understanding of post procedure restrictions and instructions.

## 2020-11-20 NOTE — H&P
Short Stay Endoscopy History and Physical    PCP - Mathew Carpenter MD  Referring Physician - Guillermo Villanueva MD  [address] [place] [address]    Procedure - egd  ASA - per anesthesia  Mallampati - per anesthesia  History of Anesthesia problems - no  Family history Anesthesia problems -  no   Plan of anesthesia - General    HPI:  This is a 35 y.o. female here for evaluation of: melena    Reflux - no  Dysphagia - no  Abdominal pain - no  Diarrhea - no    ROS:  Constitutional: No fevers, chills, No weight loss  CV: No chest pain  Pulm: No cough, No shortness of breath  Ophtho: No vision changes  GI: see HPI  Derm: No rash    Medical History:  has a past medical history of Anemia, BMI 50.0-59.9, adult, Encounter for IUD removal (2019), GERD (gastroesophageal reflux disease) (2019), History of blood transfusion (), Malpositioned intrauterine device (3/21/2019), Obesity, OI (osteogenesis imperfecta), Osteopetrosis, and Tendonitis.    Surgical History:  has a past surgical history that includes Fracture surgery;  section; Femur fracture surgery (Bilateral); [place] tooth extraction; Esophagogastroduodenoscopy (N/A, 2019); Removal of intrauterine device (IUD) (N/A, 6/3/2019); and Laparoscopic gastroenterostomy (N/A, 7/10/2020).    Family History: family history includes Anxiety disorder in her mother; Asthma in her mother; Hyperlipidemia in her mother; Hypertension in her mother; Liver cancer in her maternal grandfather; No Known Problems in her father..    Social History:  reports that she has never smoked. She has never used smokeless tobacco. She reports that she does not drink alcohol or use drugs.    Review of patient's allergies indicates:   Allergen Reactions    Pcn [penicillins] Shortness Of Breath       Medications:   Facility-Administered Medications Prior to Admission   Medication Dose Route Frequency Provider Last Rate Last Dose    medroxyPROGESTERone  (DEPO-PROVERA) injection 150 mg  150 mg Intramuscular Q90 Days Ashley Greenberg MD   150 mg at 08/27/20 1522     Medications Prior to Admission   Medication Sig Dispense Refill Last Dose    CALCIUM ORAL Take by mouth every morning.        cyanocobalamin (VITAMIN B-12) 100 MCG tablet Take 100 mcg by mouth every morning.        drospirenone-ethinyl estradiol (ROSALBA) 3-0.02 mg per tablet Take 1 tablet by mouth once daily. 84 tablet 3     ergocalciferol (ERGOCALCIFEROL) 50,000 unit Cap TAKE 1 CAPSULE BY MOUTH EVERY 7 DAYS 4 capsule 2     famotidine (PEPCID) 40 mg/5 mL (8 mg/mL) suspension Take 2.5 mLs (20 mg total) by mouth 2 (two) times daily. 150 mL 3     fish oil-omega-3 fatty acids 300-1,000 mg capsule Take by mouth every morning.        gabapentin (NEURONTIN) 300 MG capsule TAKE 1 CAPSULE(300 MG) BY MOUTH TWICE DAILY 60 capsule 0     hydrocodone-acetaminophen (HYCET) solution 7.5-325 mg/15mL Take 15 mLs by mouth 4 (four) times daily as needed for Pain. (Patient not taking: Reported on 11/10/2020) 200 mL 0     ibuprofen (ADVIL,MOTRIN) 800 MG tablet Take 1 tablet (800 mg total) by mouth every 8 (eight) hours as needed for Pain. (Patient not taking: Reported on 11/10/2020) 40 tablet 0     lidocaine (LIDODERM) 5 % Place 1 patch onto the skin once daily. Remove & Discard patch within 12 hours or as directed by MD 15 patch 0     omeprazole (PRILOSEC) 40 MG capsule Take 1 capsule (40 mg total) by mouth 2 (two) times daily before meals. 30 capsule 2     omeprazole (PRILOSEC) 40 MG capsule Take 1 capsule (40 mg total) by mouth 2 (two) times daily before meals. 60 capsule 12     ondansetron (ZOFRAN) 4 MG tablet Take 1 tablet (4 mg total) by mouth every 6 (six) hours as needed for Nausea. (Patient not taking: Reported on 11/10/2020) 30 tablet 0     polyethylene glycol (GLYCOLAX) 17 gram/dose powder Dissolve one capful (17 g) in liquid and take by mouth once daily. (Patient not taking: Reported on  11/10/2020) 510 g 3     sucralfate (CARAFATE) 100 mg/mL suspension TAKE 10 ML BY MOUTH FOUR TIMES DAILY WITH MEALS AND NIGHTLY (Patient not taking: Reported on 11/18/2020) 414 mL 11     tiZANidine (ZANAFLEX) 2 MG tablet TAKE 1 TABLET(2 MG) BY MOUTH EVERY 8 HOURS AS NEEDED FOR PAIN OR MUSCLE PAIN OR SPASM (Patient not taking: Reported on 11/18/2020) 30 tablet 0     traMADoL (ULTRAM) 50 mg tablet Take 1 tablet (50 mg total) by mouth every 6 (six) hours as needed. (Patient not taking: Reported on 11/10/2020) 11 tablet 0     ursodioL (COSME FORTE) 500 MG tablet Crush one tablet daily for gall bladder. Please compound into liquid and supply for 90 days if insurance approves 90 tablet 1        Physical Exam:    Vital Signs:   Vitals:    11/20/20 1041   BP: 132/86   Pulse: (!) 46   Resp: 18   Temp: 98.7 °F (37.1 °C)       General Appearance: Well appearing in no acute distress    Labs:  Lab Results   Component Value Date    WBC 7.85 11/06/2020    HGB 12.2 11/06/2020    HCT 38.0 11/06/2020     11/06/2020    CHOL 176 02/04/2019    TRIG 71 02/04/2019    HDL 43 02/04/2019    ALT 10 11/05/2020    AST 20 11/05/2020     11/05/2020    K 4.1 11/05/2020     11/05/2020    CREATININE 1.1 11/05/2020    BUN 8 11/05/2020    CO2 20 (L) 11/05/2020    TSH 0.762 02/04/2019    INR 1.0 11/05/2020    HGBA1C 5.2 05/10/2019       I have explained the risks and benefits of this endoscopic procedure to the patient including but not limited to bleeding, inflammation, infection, perforation, and death.      Boom Farris MD

## 2020-11-20 NOTE — DISCHARGE INSTRUCTIONS
When You Have Gastrointestinal (GI) Bleeding    Blood in your vomit or stool can be a sign of gastrointestinal (GI) bleeding. GI bleeding can be scary. But the cause may not be serious. You should always see a doctor if GI bleeding occurs.  The GI tract  The GI tract is the path through which food travels in the body. Food passes from the mouth down the esophagus (the tube from the mouth to the stomach). Food begins to break down in the stomach. It then moves through the duodenum, the first part of the small intestine. Nutrients are absorbed as food travels through the small intestine. What is left passes into the colon (large intestine) as waste. The colon removes water from the waste. Waste continues from the colon to the rectum (where stool is stored). Waste then leaves the body through the anus.  Causes of GI bleeding  GI bleeding can be caused by many different problems. Some of the more common causes include:  · Swollen veins in the anus (hemorrhoids)  · Swollen veins in the esophagus (varices)  · Sore on the lining of the GI tract (ulcer)  · Cuts or scrapes in the mouth or throat  · Infection caused by germs such as bacteria or parasites  · Food allergies, such as milk allergy in young children  · Medicines  · Inflammation of the GI tract (gastritis or esophagitis)  · Colitis (Crohn's disease or ulcerative colitis)  · Cancer (tumors or polyps)  · Abnormal pouches in the colon (diverticula)  · Tears in the esophagus or anus  · Nosebleed  · Abnormal blood vessels in the GI tract (angiodysplasia)  Diagnosing the cause of blood in stool  If blood is coming out in your stool, you may have a lower GI tract problem or a very fast upper GI tract bleed. Bleeding from the GI tract can be bright red. Or it may look dark and tarry. Tests may also find blood in your stool that cant be seen with the eye (occult blood). To find out the cause, tests that may be ordered include:  · Blood tests. A blood sample is taken and  sent to a lab for exam.  · Hemoccult test. Checks a stool sample for blood.  · Stool culture. Checks a stool sample for bacteria or parasites.  · X-ray, ultrasound, or CT scan. Imaging tests that take pictures of the digestive tract.  · Colonoscopy or sigmoidoscopy. This test uses a flexible tube with a tiny camera. The tube is inserted through your anus into your rectum to see the inside of your colon. Your provider can also take a tiny tissue sample (biopsy) and treat a bleeding source  Diagnosing the cause of blood in vomit  If you are vomiting blood or something that looks like coffee grounds, you may have an upper GI tract problem. To find the cause, tests that may be done include:  · Upper Endoscopy. A flexible tube with a tiny camera is inserted through your mouth and throat to see inside your upper GI tract. This lets your provider take a tiny tissue sample (biopsy) and treat a bleeding source.  · Nasogastric lavage. This can tell if you have upper GI or lower GI bleeding.  · X-ray, ultrasound, or CT scan. Imaging tests that take pictures of your digestive tract.  · Upper GI series. X-rays of the upper part of your GI tract taken from inside your body.  · Enteroscopy. This sends a flexible tube or a small, swallowed capsule camera into your small intestine.  When to call your healthcare provider  Call your healthcare provider right away if you have any of the following:  · Bleeding from your mouth or anus that can't be stopped  · Fever of 100.4°F (38.0°) or higher  · Bleeding along with feeling lightheaded or dizzy  · Signs of fluid loss (dehydration). These include a dry, sticky mouth, decreased urine output; and very dark urine.  · Belly (abdominal) pain   Date Last Reviewed: 7/1/2016  © 9637-5749 [email or web address]. [address] [place] [address], [place] [address]. All rights reserved. This information is not intended as a substitute for professional medical care. Always follow your healthcare  professional's instructions.

## 2020-11-23 ENCOUNTER — PATIENT MESSAGE (OUTPATIENT)
Dept: PAIN MEDICINE | Facility: CLINIC | Age: 35
End: 2020-11-23

## 2020-11-25 ENCOUNTER — TELEPHONE (OUTPATIENT)
Dept: BARIATRICS | Facility: CLINIC | Age: 35
End: 2020-11-25

## 2020-11-25 DIAGNOSIS — K25.4 GASTROINTESTINAL HEMORRHAGE ASSOCIATED WITH GASTRIC ULCER: ICD-10-CM

## 2020-11-25 DIAGNOSIS — R19.8 ABDOMINAL BURNING SENSATION IN LEFT UPPER QUADRANT: ICD-10-CM

## 2020-11-25 DIAGNOSIS — K25.9 GASTRIC ULCER, UNSPECIFIED CHRONICITY, UNSPECIFIED WHETHER GASTRIC ULCER HEMORRHAGE OR PERFORATION PRESENT: Primary | ICD-10-CM

## 2020-11-25 DIAGNOSIS — R10.30 LOWER ABDOMINAL PAIN: ICD-10-CM

## 2020-11-25 RX ORDER — METRONIDAZOLE 500 MG/1
500 TABLET ORAL EVERY 8 HOURS
Qty: 42 TABLET | Refills: 0 | Status: SHIPPED | OUTPATIENT
Start: 2020-11-25 | End: 2020-12-09

## 2020-11-25 RX ORDER — CLARITHROMYCIN 500 MG/1
500 TABLET, FILM COATED ORAL EVERY 12 HOURS
Qty: 28 TABLET | Refills: 0 | Status: SHIPPED | OUTPATIENT
Start: 2020-11-25 | End: 2020-12-09

## 2020-11-25 RX ORDER — OMEPRAZOLE 20 MG/1
20 CAPSULE, DELAYED RELEASE ORAL 2 TIMES DAILY
Qty: 28 CAPSULE | Refills: 0 | Status: SHIPPED | OUTPATIENT
Start: 2020-11-25 | End: 2021-01-28 | Stop reason: SDUPTHER

## 2020-11-25 NOTE — TELEPHONE ENCOUNTER
----- Message from Guillermo Villanueva MD sent at 11/24/2020  2:40 PM CST -----  Regarding: FW: EGD done 11-20-20  I am not sure what we did for her but apparently she took some nsaids and has an ulcer.  Please see that she is on triple therapy for ulcers and gets and egd in about 2 months, if we didn't already set that up.  ----- Message -----  From: Diandra Alston RN  Sent: 11/24/2020   9:33 AM CST  To: Guillermo Villanueva MD  Subject: EGD done 11-20-20                                Dr. Villanueva,     The EGD was completed on 11-20-20.  Ms. Zhu was seen in the [facility] for abdominal pain after having NSAIDs.                  Thanks, Diandra

## 2020-11-25 NOTE — TELEPHONE ENCOUNTER
Phoned patient and discussed the triple therapy for her ulcers per EGD results and Dr Villanueva's recommendations. Informed patient to pick them up for treatment of the ulcers.  Also discussed the follow up EGD 2 months after she has completed the triple therapy for the ulcers. Dropped case for EGD and in comments included scheduling after February 10 and to contact the patient to schedule.  Will schedule to see Dr Villanueva after EGD completed

## 2020-12-09 ENCOUNTER — PROCEDURE VISIT (OUTPATIENT)
Dept: PHYSICAL MEDICINE AND REHAB | Facility: CLINIC | Age: 35
End: 2020-12-09
Payer: MEDICARE

## 2020-12-09 DIAGNOSIS — M54.16 LUMBAR RADICULOPATHY: ICD-10-CM

## 2020-12-09 PROCEDURE — 95909 PR NERVE CONDUCTION STUDY; 5-6 STUDIES: ICD-10-PCS | Mod: HCNC,S$GLB,, | Performed by: PHYSICAL MEDICINE & REHABILITATION

## 2020-12-09 PROCEDURE — 99499 RISK ADDL DX/OHS AUDIT: ICD-10-PCS | Mod: S$GLB,,, | Performed by: PHYSICAL MEDICINE & REHABILITATION

## 2020-12-09 PROCEDURE — 99499 UNLISTED E&M SERVICE: CPT | Mod: S$GLB,,, | Performed by: PHYSICAL MEDICINE & REHABILITATION

## 2020-12-09 PROCEDURE — 95886 MUSC TEST DONE W/N TEST COMP: CPT | Mod: HCNC,S$GLB,, | Performed by: PHYSICAL MEDICINE & REHABILITATION

## 2020-12-09 PROCEDURE — 95886 PR EMG COMPLETE, W/ NERVE CONDUCTION STUDIES, 5+ MUSCLES: ICD-10-PCS | Mod: HCNC,S$GLB,, | Performed by: PHYSICAL MEDICINE & REHABILITATION

## 2020-12-09 PROCEDURE — 95909 NRV CNDJ TST 5-6 STUDIES: CPT | Mod: HCNC,S$GLB,, | Performed by: PHYSICAL MEDICINE & REHABILITATION

## 2020-12-09 NOTE — PROCEDURES
Test Date:  2020    Patient: Prisca Zhu : 1985 Physician: Dinh Orta D.O.   ID#:  [id number]: Female Ref. Phys: Brandon Lee MD     HPI: Prisca Zhu is a 35 y.o.female who presents for NCS/EMG to evaluate lumbosacral radiculopathy    NCV & EMG Findings:   All examined nerves (as indicated in the following tables) were within normal limits.   Needle evaluation of the right Vastus Medius muscle showed increased insertional activity and moderately increased spontaneous activity.   The right Tibialis Posterior and the right Lumbo Paraspinals (Lower) muscles showed increased insertional activity and slightly increased spontaneous activity.   The right Biceps Femoris (Short Hd) muscle showed increased insertional activity.   All remaining muscles (as indicated in the following table) showed no evidence of electrical instability.    Impression:  1. Technically, limited study due to morbid obesity (difficult time getting to many muscles on needle EMG, and significant volume conduction issues on NCS), pain with activation of muscles on needle EMG limiting activation, and difficult time getting surface electrodes to adhere to her skin, even with skinprep.    2. There is evidence of a right lumbosacral radiculopathy with active denervation, likely L5/S1 (though also with active denervation in the vastus medialis, so L2-4 possible)    ___________________________  Dinh Orta D.O.        NCS+  Motor Nerve Results      Latency Amplitude F-Lat Segment Distance CV Comment   Site (ms) Norm (mV) Norm (ms)  (cm) (m/s) Norm    Left Fibular (EDB)   Ankle 3.5  < 6.5 2.9  > 2.6         Bel Fib Head 9.4 - 3.1 -  Bel Fib Head-Ankle 32 54  > 43    Right Fibular (EDB)   Ankle 3.5  < 6.5 8.0  > 2.6         Bel Fib Head 9.6 - 5.1 -  Bel Fib Head-Ankle 33 54  > 43    Left Tibial (AHB)   Ankle 5.8  < 6.1 9.8  > 5.3         Right Tibial (AHB)   Ankle 6.0  < 6.1 6.7  > 5.3           Sensory Nerve  Results      Latency (Peak) Amplitude (P-P) Segment Distance CV Comment   Site (ms) Norm (µV) Norm  (cm) (m/s) Norm    Right Sural   Calf-Lat Mall 2.5  < 4.5 7  > 4 Calf-Lat Mall 14 56  > 35    Right Superficial Fibular   14 cm-Ankle 3.2  < 4.2 6  > 5 14 cm-Ankle 14 44  > 32      EMG+     Side Muscle Nerve Root Ins Act Fibs Psw Amp Dur Poly Recrt Int Pat Comment   Right Vastus Med Femoral L2-L4 *Incr *2+ *2+ Nml Nml 0 Nml Nml    Right Tib Anterior Fibular,  Deep Fibula... L4-L5 Nml Nml Nml Nml Nml 0 Nml Nml    Right Tib Posterior Tibial L5-S1 *Incr *1+ *1+ Nml Nml 0 Nml Nml    Right Gastroc Tibial S1-S2 Nml Nml Nml Nml Nml 0 Nml Nml    Right Biceps Fem SH Sciatic L5-S1 *Incr Nml Nml Nml Nml 0 Nml Nml +myotonic dc   Right Lumbo Parasp (Lower) Rami L5-S1 *Incr *1+ *1+         Left Vastus Med Femoral L2-L4 Nml Nml Nml Nml Nml 0 Nml Nml    Left Tib Anterior Fibular,  Deep Fibula... L4-L5 Nml Nml Nml Nml Nml 0 Nml Nml    Left Tib Posterior Tibial L5-S1 Nml Nml Nml Nml Nml 0 Nml Nml    Left Gastroc Tibial S1-S2 Nml Nml Nml Nml Nml 0 Nml Nml dec activation due to pain   Left Biceps Fem SH Sciatic L5-S1 Nml Nml Nml Nml Nml 0 Nml Nml dec activation due to pain   Left Lumbo Parasp (Lower) Rami L5-S1 Nml Nml Nml                 Waveforms:    Motor              Sensory

## 2020-12-10 ENCOUNTER — OFFICE VISIT (OUTPATIENT)
Dept: PAIN MEDICINE | Facility: CLINIC | Age: 35
End: 2020-12-10
Payer: MEDICARE

## 2020-12-10 DIAGNOSIS — M53.87 SCIATICA OF RIGHT SIDE ASSOCIATED WITH DISORDER OF LUMBOSACRAL SPINE: ICD-10-CM

## 2020-12-10 DIAGNOSIS — M54.9 DORSALGIA, UNSPECIFIED: ICD-10-CM

## 2020-12-10 DIAGNOSIS — M54.16 LUMBAR RADICULOPATHY: Primary | ICD-10-CM

## 2020-12-10 PROCEDURE — 99213 OFFICE O/P EST LOW 20 MIN: CPT | Mod: HCNC,95,, | Performed by: ANESTHESIOLOGY

## 2020-12-10 PROCEDURE — 99213 PR OFFICE/OUTPT VISIT, EST, LEVL III, 20-29 MIN: ICD-10-PCS | Mod: HCNC,95,, | Performed by: ANESTHESIOLOGY

## 2020-12-10 NOTE — PROGRESS NOTES
Chronic Pain-Tele-Medicine-Established Note (Follow up visit)      The patient location is: home  The chief complaint leading to consultation is: low back pain  Visit type: Virtual visit with synchronous audio and video  Total time spent with patient: 15 minutes  Each patient to whom he or she provides medical services by telemedicine is:  (1) informed of the relationship between the physician and patient and the respective role of any other health care provider with respect to management of the patient; and (2) notified that he or she may decline to receive medical services by telemedicine and may withdraw from such care at any time.    Notes:     SUBJECTIVE:    Interval history 12/10/2020:  Prisca Zhu presents tele-medicine appointment for a follow-up appointment for low back pain. Since the last visit, Prisca Zhu states the pain has been persistant.  She continues to have low back pain which radiates down the right lower extremity down to toes. She has been taking gabapentin 300 mg 4 times a day accompanied by Tylenol 1 g 4 times a day.  She has not started physical therapy as she is waiting for results of imaging to be reviewed.  MRI of lumbar spine shows Mild circumferential annular disc bulge L5-S1 disc space, No significant central canal spinal stenosis.  There is facet arthropathy L3-4, L4-5.  EMG/NCS done yesterday shows evidence of a right lumbosacral radiculopathy with active denervation, likely L5/S1.  Imaging results were reviewed with the patient today.  She was also instructed to limit Tylenol to 3 g per day.      Initial Visit 11/18/2020  Prisca Zhuis a 36 y/o female with history of GERD, s/p gastric bypass, osteogenesis imperfecta, s/p internal fixation of both femurs in childhood, obesity who presents to the clinic for the evaluation of back and leg pain.  Pain started in September 2020 after she went down a water slide.  She had been having low back pain for many years prior  to this which was mild and tolerable.  After she went down a water slide in September, she developed pain in her right hip which started to radiate down the right lower extremity up to the toes.  Over the past couple of weeks she now has developed pain in the left lower extremity.  She describes the pain as aching, throbbing.  Aggravated by any sort of activity.  Sleep is disturbed due to the pain.  She has also been getting weaker in the right leg and is requiring a walker or a wheelchair more often for longer distances. She was seen by Orthopedics.  X-ray of her right hip showed no acute pathology.  She reports spending 2 hours per day reclining. The patient reports 4 hours of uninterrupted sleep per night.     She denies any urinary or bowel incontinence.      She was previously undergoing physical therapy for neck pain and a right rotator cuff injury but has not received any sessions since the COVID lockdown.      Has been seen at an [facility] for neck pain and was found to have neural foraminal narrowing at C4 through C7.  A cervical LOKESH was recommended but was deferred until she underwent gastric bypass surgery and weight loss.     He was seen in the ER for her low back pain and received 2 cortisone shots after which she developed a GI bleed.  She was advised after that to only take Tylenol for her pain and nothing else.     Physical Therapy/Home Exercise: no      Pain Medications:  Gabapentin 300 mg qid  Tylenol      report:  Reviewed and consistent with medication use as prescribed.     Pain Procedures: none     Imaging:   NCS/EMG 2020  Narrative    Dinh Orta DO     2020 11:51 AM     Test Date:  2020     Patient: Prisca Zhu : 1985 Physician: Dinh Orta D.O.   ID#:  SEX: Female Ref. Phys: Brandon Lee MD     HPI: Prisca Zhu is a 35 y.o.female who presents for   NCS/EMG to evaluate lumbosacral radiculopathy     NCV & EMG Findings:   ?  All examined nerves (as indicated in the following tables) were   within normal limits.   ? Needle evaluation of the right Vastus Medius muscle showed   increased insertional activity and moderately increased   spontaneous activity.   ? The right Tibialis Posterior and the right Lumbo Paraspinals   (Lower) muscles showed increased insertional activity and   slightly increased spontaneous activity.   ? The right Biceps Femoris (Short Hd) muscle showed increased   insertional activity.   ? All remaining muscles (as indicated in the following table)   showed no evidence of electrical instability.     Impression:   1. Technically, limited study due to morbid obesity (difficult   time getting to many muscles on needle EMG, and significant   volume conduction issues on NCS), pain with activation of muscles   on needle EMG limiting activation, and difficult time getting   surface electrodes to adhere to her skin, even with skinprep.     2. There is evidence of a right lumbosacral radiculopathy with   active denervation, likely L5/S1 (though also with active   denervation in the vastus medialis, so L2-4 possible)     ___________________________   Dinh Orta D.O.        Narrative & Impression     EXAMINATION:  MRI LUMBAR SPINE WITHOUT CONTRAST     CLINICAL HISTORY:  Back pain or radiculopathy, > 6 wks; Radiculopathy, lumbar region     TECHNIQUE:  Multiplanar, multisequence MR images were acquired from the thoracolumbar junction to the sacrum without the administration of contrast.     COMPARISON:  Lumbar spine radiograph 11/05/2020     FINDINGS:  There is again a normal anatomic alignment of the osseous segments of the lumbar spine.  No spondylolisthesis or spondylolysis.  There is no marrow edema throughout the lumbar vertebral bodies to suggest acute fractures.  Incidental note of hypointense T1 and T2 marrow abnormality associated with the left sacroiliac joint and minor similar changes along the right sacroiliac  joint.  This finding was seen also on the lumbar spine radiograph and represented sclerotic SI joint arthropathy.  There is also a hemangioma of the S2 segment of the sacrum.     Paraspinal soft tissues are unremarkable.  Vertebral body heights are within normal limits.  The tip of the conus is at T12-L1 disc space.     L5-S1: There is a mild circumferential annular disc bulge without significant compression of the thecal sac.  No significant spinal stenosis.  No significant foraminal stenosis.     L4-5: Normal intervertebral disc height.  No significant focal disc protrusions or spinal stenosis or foraminal stenosis.  There is mild facet arthropathy.     L3-4: There is no significant central canal spinal stenosis or disc protrusions.  Facet arthropathy noted bilaterally.  No foraminal disc protrusions noted.  Mild foraminal narrowing.     L2-3: No significant central canal spinal stenosis.  Mild left foraminal disc bulge associated with mild left foraminal stenosis.  There is also mild right foraminal stenosis.     L1-2: No significant central canal disc protrusions or spinal stenosis or significant foraminal stenosis.     T12-L1: No disc protrusions or spinal stenosis or foraminal stenosis.     Incidental note of a retroverted uterus also noted.     Impression:     1. No acute lumbar spine fractures.  2. Mild circumferential annular disc bulge L5-S1 disc space.  3. No significant central canal spinal stenosis.  There is facet arthropathy L3-4, L4-5.        Electronically signed by: Benjamin Regalado MD  Date:                                            11/19/2020  Time:                                           13:31         Narrative & Impression       EXAMINATION:  XR ABDOMEN FLAT AND ERECT     CLINICAL HISTORY:  Unspecified abdominal pain     TECHNIQUE:  Flat and erect AP views of the abdomen were performed.     COMPARISON:  CT abdomen pelvis performed 05/18/2019.  Same day radiographs of the lumbar  spine.     FINDINGS:  Moderate devices overlie the lower chest and abdomen.  No dilated loops of small or large bowel are noted.  No free air is suggested on the upright view.  No fluid levels.  Suture material is noted in the left abdomen.  Degenerative findings in lumbar spine are better assessed on separately reported same day radiographs.     Impression:     Nonspecific, nonobstructive bowel gas pattern.  No evidence of free air.        Electronically signed by: Nikita Collins  Date:                                            11/05/2020  Time:                                           13:37      Narrative & Impression       EXAMINATION:  XR LUMBAR SPINE AP AND LATERAL     CLINICAL HISTORY:  Back pain or radiculopathy, > 6 wks;Dorsalgia, unspecified     TECHNIQUE:  AP, lateral and spot images were performed of the lumbar spine.     COMPARISON:  None     FINDINGS:  Posterior vertebral alignment is satisfactory.  Vertebral body heights are well maintained.  There is no evidence for acute fracture or bone destruction.  There are mild degenerative changes present.  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  There are surgical changes within the left upper abdomen.  Intramedullary rods are identified within the femurs bilaterally.     Impression:     No evidence for acute fracture, bone destruction, or subluxation.     Mild lumbar spondylosis.     Surgical changes.        Electronically signed by: Jesús Conklin MD  Date:                                            11/05/2020  Time:                                           09:57      Narrative & Impression       EXAMINATION:  XR HIP 2 VIEW RIGHT     CLINICAL HISTORY:  Pain, unspecified     TECHNIQUE:  AP view of the pelvis and frog leg lateral view of the right hip were performed.     COMPARISON:  None     FINDINGS:  Two views left hip.     The bilateral sacroiliac joints are intact noting degenerative changes, left  greater than right suggesting that of sacroiliitis.  The pubic symphysis is intact.  The bilateral femoroacetabular joints are intact.  Surgical changes are noted of the right femur and left femur.  No radiopaque foreign body.     Impression:     1. No acute displaced fracture or dislocation of the left hip.        Electronically signed by: Luciano Gomes MD  Date:                                            2020  Time:                                           12:26          Narrative & Impression       EXAMINATION:  XR PELVIS ROUTINE AP     CLINICAL HISTORY:  acute hip pain s/p fall history of surgery s/p instrumentation;  Pain in right hip     TECHNIQUE:  AP view of the pelvis was performed.     COMPARISON:  2019     FINDINGS:  No evidence for pelvic fracture.  Sclerosis along the iliac aspect of the bilateral SI joints, similar to prior study.  Intramedullary nails are seen in the bilateral femurs.  Hip cartilage spaces are maintained.     Impression:     As above.        Electronically signed by: Matthias Velasco MD  Date:                                            10/16/2020  Time:                                           12:24       Past Medical History:   Diagnosis Date    Anemia     BMI 50.0-59.9, adult     Encounter for IUD removal 2019    GERD (gastroesophageal reflux disease) 2019    History of blood transfusion 2001    Malpositioned intrauterine device 3/21/2019    2019 OB/GYN - Dr Guzman - Discussed with patient that we will proceed with imaging to locate the IUD after which we can then proceed to book case in OR to retrieve displaced IUD    Obesity     OI (osteogenesis imperfecta)     Osteopetrosis     Tendonitis      Past Surgical History:   Procedure Laterality Date     SECTION      ,     ESOPHAGOGASTRODUODENOSCOPY N/A 2019    Procedure: ESOPHAGOGASTRODUODENOSCOPY (EGD);  Surgeon: Segun Dominguez MD;  Location: [facility] (2ND FLR);   Service: Endoscopy;  Laterality: N/A;  BMI 51    ESOPHAGOGASTRODUODENOSCOPY N/A 11/20/2020    Procedure: EGD (ESOPHAGOGASTRODUODENOSCOPY);  Surgeon: Boom Farris MD;  Location[facility];  Service: Endoscopy;  Laterality: N/A;  Dr. Jolly said we can do a rapid on her    FEMUR FRACTURE SURGERY Bilateral     hardware/rods in both legs    FRACTURE SURGERY      femur    LAPAROSCOPIC GASTROENTEROSTOMY N/A 7/10/2020    Procedure: GASTROENTEROSTOMY, LAPAROSCOPIC, with intraop EGD;  Surgeon: Guillermo Villanueva MD;  Location: [address];  Service: General;  Laterality: N/A;    REMOVAL OF INTRAUTERINE DEVICE (IUD) N/A 6/3/2019    Procedure: REMOVAL, INTRAUTERINE DEVICE;  Surgeon: Ashley Greenberg MD;  Location: [facility];  Service: OB/GYN;  Laterality: N/A;  RN PRE OP 0-42-10----BMI-51.29    WISDOM TOOTH EXTRACTION       Social History     Socioeconomic History    Marital status: Single     Spouse name: Not on file    Number of children: Not on file    Years of education: Not on file    Highest education level: Not on file   Occupational History    Not on file   Social Needs    Financial resource strain: Not on file    Food insecurity     Worry: Not on file     Inability: Not on file    Transportation needs     Medical: Not on file     Non-medical: Not on file   Tobacco Use    Smoking status: Never Smoker    Smokeless tobacco: Never Used   Substance and Sexual Activity    Alcohol use: No    Drug use: Never    Sexual activity: Yes     Partners: Male     Birth control/protection: None, I.U.D.   Lifestyle    Physical activity     Days per week: Not on file     Minutes per session: Not on file    Stress: Not on file   Relationships    Social connections     Talks on phone: Not on file     Gets together: Not on file     Attends Congregational service: Not on file     Active member of club or organization: Not on file     Attends meetings of clubs or organizations: Not on file     Relationship  status: Not on file   Other Topics Concern    Not on file   Social History Narrative    Disabled 2/2 chronic left shoulder pain     Family History   Problem Relation Age of Onset    Hypertension Mother     Hyperlipidemia Mother     Asthma Mother     Anxiety disorder Mother     No Known Problems Father     Liver cancer Maternal Grandfather     Celiac disease Neg Hx     Colon cancer Neg Hx     Colon polyps Neg Hx     Crohn's disease Neg Hx     Cystic fibrosis Neg Hx     Esophageal cancer Neg Hx     Irritable bowel syndrome Neg Hx     Rectal cancer Neg Hx     Stomach cancer Neg Hx        Review of patient's allergies indicates:   Allergen Reactions    Pcn [penicillins] Shortness Of Breath       Current Outpatient Medications   Medication Sig    CALCIUM ORAL Take by mouth every morning.     cyanocobalamin (VITAMIN B-12) 100 MCG tablet Take 100 mcg by mouth every morning.     drospirenone-ethinyl estradiol (ROSALBA) 3-0.02 mg per tablet Take 1 tablet by mouth once daily.    ergocalciferol (ERGOCALCIFEROL) 50,000 unit Cap TAKE 1 CAPSULE BY MOUTH EVERY 7 DAYS    famotidine (PEPCID) 40 mg/5 mL (8 mg/mL) suspension Take 2.5 mLs (20 mg total) by mouth 2 (two) times daily.    fish oil-omega-3 fatty acids 300-1,000 mg capsule Take by mouth every morning.     gabapentin (NEURONTIN) 300 MG capsule TAKE 1 CAPSULE(300 MG) BY MOUTH TWICE DAILY    hydrocodone-acetaminophen (HYCET) solution 7.5-325 mg/15mL Take 15 mLs by mouth 4 (four) times daily as needed for Pain. (Patient not taking: Reported on 11/10/2020)    ibuprofen (ADVIL,MOTRIN) 800 MG tablet Take 1 tablet (800 mg total) by mouth every 8 (eight) hours as needed for Pain. (Patient not taking: Reported on 11/10/2020)    lidocaine (LIDODERM) 5 % Place 1 patch onto the skin once daily. Remove & Discard patch within 12 hours or as directed by MD    omeprazole (PRILOSEC) 40 MG capsule Take 1 capsule (40 mg total) by mouth 2 (two) times daily before meals.     omeprazole (PRILOSEC) 40 MG capsule Take 1 capsule (40 mg total) by mouth 2 (two) times daily before meals.    ondansetron (ZOFRAN) 4 MG tablet Take 1 tablet (4 mg total) by mouth every 6 (six) hours as needed for Nausea. (Patient not taking: Reported on 11/10/2020)    polyethylene glycol (GLYCOLAX) 17 gram/dose powder Dissolve one capful (17 g) in liquid and take by mouth once daily. (Patient not taking: Reported on 11/10/2020)    sucralfate (CARAFATE) 100 mg/mL suspension TAKE 10 ML BY MOUTH FOUR TIMES DAILY WITH MEALS AND NIGHTLY (Patient not taking: Reported on 11/18/2020)    tiZANidine (ZANAFLEX) 2 MG tablet TAKE 1 TABLET(2 MG) BY MOUTH EVERY 8 HOURS AS NEEDED FOR PAIN OR MUSCLE PAIN OR SPASM (Patient not taking: Reported on 11/18/2020)    traMADoL (ULTRAM) 50 mg tablet Take 1 tablet (50 mg total) by mouth every 6 (six) hours as needed. (Patient not taking: Reported on 11/10/2020)    ursodioL (COSME FORTE) 500 MG tablet Crush one tablet daily for gall bladder. Please compound into liquid and supply for 90 days if insurance approves     Current Facility-Administered Medications   Medication    medroxyPROGESTERone (DEPO-PROVERA) injection 150 mg       REVIEW OF SYSTEMS:    GENERAL:  No weight loss, malaise or fevers.  HEENT:  Negative for frequent or significant headaches.  NECK:  Negative for lumps, goiter, pain and significant neck swelling.  RESPIRATORY:  Negative for cough, wheezing or shortness of breath.  CARDIOVASCULAR:  Negative for chest pain, leg swelling or palpitations.  GI:  Negative for abdominal discomfort, blood in stools or black stools or change in bowel habits.  MUSCULOSKELETAL:  See HPI.  SKIN:  Negative for lesions, rash, and itching.  PSYCH:  Positive for sleep disturbance. Negative for mood disorder and recent psychosocial stressors.  HEMATOLOGY/LYMPHOLOGY:  Negative for prolonged bleeding, bruising easily or swollen nodes.  NEURO:   No history of headaches, syncope, paralysis,  seizures or tremors. + Weakness  All other reviewed and negative other than HPI.    OBJECTIVE:    General appearance: Well appearing, in no acute distress, alert and oriented x3.  Psych:  Mood and affect appropriate.    PREVIOUS PHYSICAL EXAMINATION:     General appearance: Well appearing, in no acute distress, alert and oriented x3.  Psych:  Mood and affect appropriate.  Skin: Skin color, texture, turgor normal, no rashes or lesions, in both upper and lower body.  Head/face:  Normocephalic, atraumatic. No palpable lymph nodes.  Neck: No pain to palpation over the cervical paraspinous muscles. Spurling Negative. No pain with neck flexion, extension, or lateral flexion.   Cor: RRR  Pulm: CTA  GI:  Soft and non-tender.  Back: Straight leg raising in the sitting and supine positions is negative to radicular pain. No pain to palpation over the spine or costovertebral angles. Normal range of motion without pain reproduction.  Extremities: Peripheral joint ROM is full and pain free without obvious instability or laxity in all four extremities. No deformities, edema, or skin discoloration. Good capillary refill.  Musculoskeletal: Shoulder, hip, sacroiliac and knee provocative maneuvers are negative.   RLE:  Hip flexion and extension 2/5  Knee flexion and extension - 2/5  Dorsiflexion and plantar flexion - 3/5  LLE and b/l UE - 5/5     No atrophy or tone abnormalities are noted.  Neuro: Bilateral upper and lower extremity coordination and muscle stretch reflexes are physiologic and symmetric.  Plantar response are downgoing. Decreased sensation to light touch and pinprick in the RLE.  Gait: antalgic.    ASSESSMENT: 35 y.o. year old female with low back pain consistent with L5/S1 radiculopathy, supported by EMG/NCS and MRI lumbar spine findings.  We will perform right L5/S1, S1 TFESI.  Patient will then start aquatherapy if pain is well controlled with the injections.    1. Lumbar radiculopathy  Ambulatory referral/consult  to Physical/Occupational Therapy   2. Sciatica of right side associated with disorder of lumbosacral spine     3. Dorsalgia, unspecified           PLAN:     - I have stressed the importance of physical activity and a home exercise plan to help with pain and improve health.  - continue gabapentin 300 mg q.i.d..  - patient can continue Tylenol, but limited to 3 g per day  - Schedule for a Right Transforaminal epidural steroid injection at L5/S1, S1 to help with her pain.  - patient will start PT/aqua therapy.  - RTC 2 weeks after TFESI  - Counseled patient regarding the importance of activity modification, smoking cessation and physical therapy.    The above plan and management options were discussed at length with patient. Patient is in agreement with the above and verbalized understanding. It will be communicated with the referring physician via electronic record, fax, or mail.    Tiburcio Epperson  12/10/2020     I have personally reviewed the encounter with resident/fellow/NP and personally spoke with patient and addressed all questions and concerns. The encounter was initiated by patient who consented and verbalized understanding to the type of encounter not related to any office visit or other encounter in the past 7 days.  35-year-old female with chronic lower back pain radiation down to right lower extremity consistent with lumbar radiculopathy.  Lumbar spine MRI EMG results were reviewed today.  Will continue to current regimen including gabapentin and Tylenol.  We will refer the patient to aquatic therapy and schedule her for right L5/S1 and S1 transforaminal epidural steroid injection.  Will follow up with her 2 weeks after the procedure.    Brandon Lee MD   12/10/2020

## 2020-12-17 ENCOUNTER — PATIENT MESSAGE (OUTPATIENT)
Dept: ORTHOPEDICS | Facility: CLINIC | Age: 35
End: 2020-12-17

## 2020-12-17 ENCOUNTER — TELEPHONE (OUTPATIENT)
Dept: ORTHOPEDICS | Facility: CLINIC | Age: 35
End: 2020-12-17

## 2020-12-17 DIAGNOSIS — M51.36 DDD (DEGENERATIVE DISC DISEASE), LUMBAR: Primary | ICD-10-CM

## 2020-12-29 ENCOUNTER — PATIENT MESSAGE (OUTPATIENT)
Dept: PAIN MEDICINE | Facility: OTHER | Age: 35
End: 2020-12-29

## 2021-01-05 ENCOUNTER — HOSPITAL ENCOUNTER (OUTPATIENT)
Dept: RADIOLOGY | Facility: HOSPITAL | Age: 36
Discharge: HOME | End: 2021-01-05
Attending: PHYSICIAN ASSISTANT
Payer: MEDICARE

## 2021-01-05 ENCOUNTER — OFFICE VISIT (OUTPATIENT)
Dept: ORTHOPEDICS | Facility: CLINIC | Age: 36
End: 2021-01-05
Payer: MEDICARE

## 2021-01-05 VITALS — HEIGHT: 57 IN | BODY MASS INDEX: 42.42 KG/M2 | WEIGHT: 196.63 LBS

## 2021-01-05 DIAGNOSIS — M51.36 DDD (DEGENERATIVE DISC DISEASE), LUMBAR: ICD-10-CM

## 2021-01-05 DIAGNOSIS — M53.87 SCIATICA OF RIGHT SIDE ASSOCIATED WITH DISORDER OF LUMBOSACRAL SPINE: ICD-10-CM

## 2021-01-05 PROCEDURE — 99214 OFFICE O/P EST MOD 30 MIN: CPT | Mod: HCNC,S$GLB,, | Performed by: PHYSICIAN ASSISTANT

## 2021-01-05 PROCEDURE — 3008F BODY MASS INDEX DOCD: CPT | Mod: HCNC,CPTII,S$GLB, | Performed by: PHYSICIAN ASSISTANT

## 2021-01-05 PROCEDURE — 72120 XR LUMBAR SPINE FLEXION AND EXTENSION ONLY: ICD-10-PCS | Mod: 26,HCNC,, | Performed by: RADIOLOGY

## 2021-01-05 PROCEDURE — 99999 PR PBB SHADOW E&M-EST. PATIENT-LVL III: ICD-10-PCS | Mod: PBBFAC,HCNC,, | Performed by: PHYSICIAN ASSISTANT

## 2021-01-05 PROCEDURE — 99214 PR OFFICE/OUTPT VISIT, EST, LEVL IV, 30-39 MIN: ICD-10-PCS | Mod: HCNC,S$GLB,, | Performed by: PHYSICIAN ASSISTANT

## 2021-01-05 PROCEDURE — 1125F AMNT PAIN NOTED PAIN PRSNT: CPT | Mod: HCNC,S$GLB,, | Performed by: PHYSICIAN ASSISTANT

## 2021-01-05 PROCEDURE — 3008F PR BODY MASS INDEX (BMI) DOCUMENTED: ICD-10-PCS | Mod: HCNC,CPTII,S$GLB, | Performed by: PHYSICIAN ASSISTANT

## 2021-01-05 PROCEDURE — 1125F PR PAIN SEVERITY QUANTIFIED, PAIN PRESENT: ICD-10-PCS | Mod: HCNC,S$GLB,, | Performed by: PHYSICIAN ASSISTANT

## 2021-01-05 PROCEDURE — 72120 X-RAY BEND ONLY L-S SPINE: CPT | Mod: 26,HCNC,, | Performed by: RADIOLOGY

## 2021-01-05 PROCEDURE — 72120 X-RAY BEND ONLY L-S SPINE: CPT | Mod: TC,HCNC

## 2021-01-05 PROCEDURE — 99999 PR PBB SHADOW E&M-EST. PATIENT-LVL III: CPT | Mod: PBBFAC,HCNC,, | Performed by: PHYSICIAN ASSISTANT

## 2021-01-07 ENCOUNTER — HOSPITAL ENCOUNTER (OUTPATIENT)
Facility: OTHER | Age: 36
Discharge: HOME | End: 2021-01-07
Attending: ANESTHESIOLOGY | Admitting: ANESTHESIOLOGY
Payer: MEDICARE

## 2021-01-07 VITALS
BODY MASS INDEX: 42.28 KG/M2 | HEART RATE: 62 BPM | RESPIRATION RATE: 14 BRPM | WEIGHT: 196 LBS | SYSTOLIC BLOOD PRESSURE: 168 MMHG | TEMPERATURE: 98 F | OXYGEN SATURATION: 98 % | HEIGHT: 57 IN | DIASTOLIC BLOOD PRESSURE: 86 MMHG

## 2021-01-07 DIAGNOSIS — G89.29 CHRONIC PAIN: ICD-10-CM

## 2021-01-07 DIAGNOSIS — M54.16 LUMBAR RADICULOPATHY: Primary | ICD-10-CM

## 2021-01-07 PROCEDURE — 63600175 PHARM REV CODE 636 W HCPCS: Mod: HCNC | Performed by: ANESTHESIOLOGY

## 2021-01-07 PROCEDURE — 25500020 PHARM REV CODE 255: Mod: HCNC | Performed by: ANESTHESIOLOGY

## 2021-01-07 PROCEDURE — 64483 NJX AA&/STRD TFRM EPI L/S 1: CPT | Mod: HCNC,RT,, | Performed by: ANESTHESIOLOGY

## 2021-01-07 PROCEDURE — 64483 NJX AA&/STRD TFRM EPI L/S 1: CPT | Mod: HCNC,RT | Performed by: ANESTHESIOLOGY

## 2021-01-07 PROCEDURE — 25000003 PHARM REV CODE 250: Mod: HCNC | Performed by: ANESTHESIOLOGY

## 2021-01-07 PROCEDURE — 64483 PR EPIDURAL INJ, ANES/STEROID, TRANSFORAMINAL, LUMB/SACR, SNGL LEVL: ICD-10-PCS | Mod: HCNC,RT,, | Performed by: ANESTHESIOLOGY

## 2021-01-07 RX ORDER — LIDOCAINE HYDROCHLORIDE 10 MG/ML
INJECTION INFILTRATION; PERINEURAL
Status: DISCONTINUED | OUTPATIENT
Start: 2021-01-07 | End: 2021-01-07 | Stop reason: HOSPADM

## 2021-01-07 RX ORDER — DEXAMETHASONE SODIUM PHOSPHATE 10 MG/ML
INJECTION INTRAMUSCULAR; INTRAVENOUS
Status: DISCONTINUED | OUTPATIENT
Start: 2021-01-07 | End: 2021-01-07 | Stop reason: HOSPADM

## 2021-01-07 RX ORDER — FENTANYL CITRATE 50 UG/ML
INJECTION, SOLUTION INTRAMUSCULAR; INTRAVENOUS
Status: DISCONTINUED | OUTPATIENT
Start: 2021-01-07 | End: 2021-01-07 | Stop reason: HOSPADM

## 2021-01-07 RX ORDER — LIDOCAINE HYDROCHLORIDE 5 MG/ML
INJECTION, SOLUTION INFILTRATION; INTRAVENOUS
Status: DISCONTINUED | OUTPATIENT
Start: 2021-01-07 | End: 2021-01-07 | Stop reason: HOSPADM

## 2021-01-07 RX ORDER — MIDAZOLAM HYDROCHLORIDE 1 MG/ML
INJECTION INTRAMUSCULAR; INTRAVENOUS
Status: DISCONTINUED | OUTPATIENT
Start: 2021-01-07 | End: 2021-01-07 | Stop reason: HOSPADM

## 2021-01-07 RX ORDER — SODIUM CHLORIDE 9 MG/ML
INJECTION, SOLUTION INTRAVENOUS CONTINUOUS
Status: DISCONTINUED | OUTPATIENT
Start: 2021-01-07 | End: 2021-01-07 | Stop reason: HOSPADM

## 2021-01-07 RX ADMIN — SODIUM CHLORIDE 25 ML/HR: 0.9 INJECTION, SOLUTION INTRAVENOUS at 02:01

## 2021-01-25 ENCOUNTER — TELEPHONE (OUTPATIENT)
Dept: BARIATRICS | Facility: CLINIC | Age: 36
End: 2021-01-25

## 2021-01-25 DIAGNOSIS — K25.9 GASTRIC ULCER, UNSPECIFIED CHRONICITY, UNSPECIFIED WHETHER GASTRIC ULCER HEMORRHAGE OR PERFORATION PRESENT: ICD-10-CM

## 2021-01-25 DIAGNOSIS — R10.30 LOWER ABDOMINAL PAIN: Primary | ICD-10-CM

## 2021-01-25 DIAGNOSIS — Z98.84 BARIATRIC SURGERY STATUS: ICD-10-CM

## 2021-01-25 DIAGNOSIS — R19.8 ABDOMINAL BURNING SENSATION IN LEFT UPPER QUADRANT: ICD-10-CM

## 2021-01-28 DIAGNOSIS — G89.29 CHRONIC BILATERAL LOW BACK PAIN WITH RIGHT-SIDED SCIATICA: ICD-10-CM

## 2021-01-28 DIAGNOSIS — K25.9 GASTRIC ULCER, UNSPECIFIED CHRONICITY, UNSPECIFIED WHETHER GASTRIC ULCER HEMORRHAGE OR PERFORATION PRESENT: ICD-10-CM

## 2021-01-28 DIAGNOSIS — M54.41 CHRONIC BILATERAL LOW BACK PAIN WITH RIGHT-SIDED SCIATICA: ICD-10-CM

## 2021-01-28 RX ORDER — GABAPENTIN 300 MG/1
300 CAPSULE ORAL 2 TIMES DAILY
Qty: 60 CAPSULE | Refills: 0 | Status: SHIPPED | OUTPATIENT
Start: 2021-01-28 | End: 2021-02-25 | Stop reason: ALTCHOICE

## 2021-01-29 RX ORDER — OMEPRAZOLE 20 MG/1
20 CAPSULE, DELAYED RELEASE ORAL 2 TIMES DAILY
Qty: 28 CAPSULE | Refills: 0 | Status: SHIPPED | OUTPATIENT
Start: 2021-01-29 | End: 2021-02-12

## 2021-01-29 RX ORDER — METRONIDAZOLE 500 MG/1
500 TABLET ORAL EVERY 8 HOURS
Qty: 42 TABLET | Refills: 0 | OUTPATIENT
Start: 2021-01-29 | End: 2021-02-12

## 2021-02-01 ENCOUNTER — PATIENT MESSAGE (OUTPATIENT)
Dept: PAIN MEDICINE | Facility: CLINIC | Age: 36
End: 2021-02-01

## 2021-02-02 ENCOUNTER — OFFICE VISIT (OUTPATIENT)
Dept: PAIN MEDICINE | Facility: CLINIC | Age: 36
End: 2021-02-02
Payer: MEDICARE

## 2021-02-02 VITALS
HEIGHT: 57 IN | WEIGHT: 194.44 LBS | BODY MASS INDEX: 41.95 KG/M2 | HEART RATE: 48 BPM | RESPIRATION RATE: 18 BRPM | SYSTOLIC BLOOD PRESSURE: 138 MMHG | DIASTOLIC BLOOD PRESSURE: 88 MMHG | OXYGEN SATURATION: 100 %

## 2021-02-02 DIAGNOSIS — G89.4 CHRONIC PAIN DISORDER: ICD-10-CM

## 2021-02-02 DIAGNOSIS — M51.36 DDD (DEGENERATIVE DISC DISEASE), LUMBAR: Primary | ICD-10-CM

## 2021-02-02 DIAGNOSIS — M54.16 LUMBAR RADICULOPATHY: ICD-10-CM

## 2021-02-02 PROCEDURE — 99999 PR PBB SHADOW E&M-EST. PATIENT-LVL IV: CPT | Mod: PBBFAC,,, | Performed by: ANESTHESIOLOGY

## 2021-02-02 PROCEDURE — 3008F BODY MASS INDEX DOCD: CPT | Mod: CPTII,S$GLB,, | Performed by: ANESTHESIOLOGY

## 2021-02-02 PROCEDURE — 99214 OFFICE O/P EST MOD 30 MIN: CPT | Mod: S$GLB,,, | Performed by: ANESTHESIOLOGY

## 2021-02-02 PROCEDURE — 1125F PR PAIN SEVERITY QUANTIFIED, PAIN PRESENT: ICD-10-PCS | Mod: S$GLB,,, | Performed by: ANESTHESIOLOGY

## 2021-02-02 PROCEDURE — 99499 UNLISTED E&M SERVICE: CPT | Mod: S$GLB,,, | Performed by: ANESTHESIOLOGY

## 2021-02-02 PROCEDURE — 99999 PR PBB SHADOW E&M-EST. PATIENT-LVL IV: ICD-10-PCS | Mod: PBBFAC,,, | Performed by: ANESTHESIOLOGY

## 2021-02-02 PROCEDURE — 1125F AMNT PAIN NOTED PAIN PRSNT: CPT | Mod: S$GLB,,, | Performed by: ANESTHESIOLOGY

## 2021-02-02 PROCEDURE — 99214 PR OFFICE/OUTPT VISIT, EST, LEVL IV, 30-39 MIN: ICD-10-PCS | Mod: S$GLB,,, | Performed by: ANESTHESIOLOGY

## 2021-02-02 PROCEDURE — 99499 RISK ADDL DX/OHS AUDIT: ICD-10-PCS | Mod: S$GLB,,, | Performed by: ANESTHESIOLOGY

## 2021-02-02 PROCEDURE — 3008F PR BODY MASS INDEX (BMI) DOCUMENTED: ICD-10-PCS | Mod: CPTII,S$GLB,, | Performed by: ANESTHESIOLOGY

## 2021-02-02 RX ORDER — CYCLOBENZAPRINE HCL 5 MG
5 TABLET ORAL NIGHTLY
Qty: 30 TABLET | Refills: 2 | Status: SHIPPED | OUTPATIENT
Start: 2021-02-02 | End: 2021-03-04

## 2021-02-02 RX ORDER — GABAPENTIN 600 MG/1
600 TABLET ORAL 3 TIMES DAILY
Qty: 90 TABLET | Refills: 2 | Status: SHIPPED | OUTPATIENT
Start: 2021-02-02 | End: 2022-01-25 | Stop reason: SDUPTHER

## 2021-02-24 ENCOUNTER — PATIENT MESSAGE (OUTPATIENT)
Dept: ENDOSCOPY | Facility: HOSPITAL | Age: 36
End: 2021-02-24

## 2021-02-24 DIAGNOSIS — Z01.818 PRE-OP TESTING: ICD-10-CM

## 2021-02-24 DIAGNOSIS — G89.29 CHRONIC BILATERAL LOW BACK PAIN WITH RIGHT-SIDED SCIATICA: ICD-10-CM

## 2021-02-24 DIAGNOSIS — M54.41 CHRONIC BILATERAL LOW BACK PAIN WITH RIGHT-SIDED SCIATICA: ICD-10-CM

## 2021-02-25 RX ORDER — GABAPENTIN 300 MG/1
300 CAPSULE ORAL 2 TIMES DAILY
Qty: 60 CAPSULE | Refills: 0 | OUTPATIENT
Start: 2021-02-25

## 2021-03-01 ENCOUNTER — LAB VISIT (OUTPATIENT)
Dept: FAMILY MEDICINE | Facility: CLINIC | Age: 36
End: 2021-03-01
Payer: MEDICARE

## 2021-03-01 DIAGNOSIS — Z01.818 PRE-OP TESTING: ICD-10-CM

## 2021-03-01 LAB — SARS-COV-2 RNA RESP QL NAA+PROBE: NOT DETECTED

## 2021-03-01 PROCEDURE — U0003 INFECTIOUS AGENT DETECTION BY NUCLEIC ACID (DNA OR RNA); SEVERE ACUTE RESPIRATORY SYNDROME CORONAVIRUS 2 (SARS-COV-2) (CORONAVIRUS DISEASE [COVID-19]), AMPLIFIED PROBE TECHNIQUE, MAKING USE OF HIGH THROUGHPUT TECHNOLOGIES AS DESCRIBED BY CMS-2020-01-R: HCPCS

## 2021-03-01 PROCEDURE — U0005 INFEC AGEN DETEC AMPLI PROBE: HCPCS

## 2021-03-02 ENCOUNTER — PATIENT MESSAGE (OUTPATIENT)
Dept: PAIN MEDICINE | Facility: CLINIC | Age: 36
End: 2021-03-02

## 2021-03-03 ENCOUNTER — ANESTHESIA EVENT (OUTPATIENT)
Dept: ENDOSCOPY | Facility: HOSPITAL | Age: 36
End: 2021-03-03
Payer: MEDICARE

## 2021-03-04 ENCOUNTER — HOSPITAL ENCOUNTER (OUTPATIENT)
Facility: HOSPITAL | Age: 36
Discharge: HOME | End: 2021-03-04
Attending: INTERNAL MEDICINE | Admitting: INTERNAL MEDICINE
Payer: MEDICARE

## 2021-03-04 ENCOUNTER — ANESTHESIA (OUTPATIENT)
Dept: ENDOSCOPY | Facility: HOSPITAL | Age: 36
End: 2021-03-04
Payer: MEDICARE

## 2021-03-04 ENCOUNTER — TELEPHONE (OUTPATIENT)
Dept: GASTROENTEROLOGY | Facility: CLINIC | Age: 36
End: 2021-03-04

## 2021-03-04 VITALS
OXYGEN SATURATION: 100 % | WEIGHT: 194 LBS | RESPIRATION RATE: 18 BRPM | DIASTOLIC BLOOD PRESSURE: 60 MMHG | TEMPERATURE: 98 F | HEIGHT: 57 IN | HEART RATE: 57 BPM | BODY MASS INDEX: 41.85 KG/M2 | SYSTOLIC BLOOD PRESSURE: 109 MMHG

## 2021-03-04 DIAGNOSIS — K25.9 GASTRIC ULCER DUE TO HELICOBACTER PYLORI, UNSPECIFIED CHRONICITY: ICD-10-CM

## 2021-03-04 DIAGNOSIS — B96.81 GASTRIC ULCER DUE TO HELICOBACTER PYLORI, UNSPECIFIED CHRONICITY: ICD-10-CM

## 2021-03-04 DIAGNOSIS — K21.9 GASTROESOPHAGEAL REFLUX DISEASE WITHOUT ESOPHAGITIS: Primary | ICD-10-CM

## 2021-03-04 LAB — B-HCG UR QL: NEGATIVE

## 2021-03-04 PROCEDURE — 27201012 HC FORCEPS, HOT/COLD, DISP: Performed by: INTERNAL MEDICINE

## 2021-03-04 PROCEDURE — D9220A PRA ANESTHESIA: Mod: CRNA,,, | Performed by: STUDENT IN AN ORGANIZED HEALTH CARE EDUCATION/TRAINING PROGRAM

## 2021-03-04 PROCEDURE — 37000008 HC ANESTHESIA 1ST 15 MINUTES: Performed by: INTERNAL MEDICINE

## 2021-03-04 PROCEDURE — 81025 URINE PREGNANCY TEST: CPT | Performed by: INTERNAL MEDICINE

## 2021-03-04 PROCEDURE — 88305 TISSUE EXAM BY PATHOLOGIST: ICD-10-PCS | Mod: 26,,, | Performed by: PATHOLOGY

## 2021-03-04 PROCEDURE — 43239 PR EGD, FLEX, W/BIOPSY, SGL/MULTI: ICD-10-PCS | Mod: ,,, | Performed by: INTERNAL MEDICINE

## 2021-03-04 PROCEDURE — 88342 CHG IMMUNOCYTOCHEMISTRY: ICD-10-PCS | Mod: 26,,, | Performed by: PATHOLOGY

## 2021-03-04 PROCEDURE — 88342 IMHCHEM/IMCYTCHM 1ST ANTB: CPT | Performed by: PATHOLOGY

## 2021-03-04 PROCEDURE — 37000009 HC ANESTHESIA EA ADD 15 MINS: Performed by: INTERNAL MEDICINE

## 2021-03-04 PROCEDURE — 88342 IMHCHEM/IMCYTCHM 1ST ANTB: CPT | Mod: 26,,, | Performed by: PATHOLOGY

## 2021-03-04 PROCEDURE — D9220A PRA ANESTHESIA: Mod: ANES,,, | Performed by: ANESTHESIOLOGY

## 2021-03-04 PROCEDURE — 88305 TISSUE EXAM BY PATHOLOGIST: CPT | Performed by: PATHOLOGY

## 2021-03-04 PROCEDURE — 63600175 PHARM REV CODE 636 W HCPCS: Performed by: STUDENT IN AN ORGANIZED HEALTH CARE EDUCATION/TRAINING PROGRAM

## 2021-03-04 PROCEDURE — 25000003 PHARM REV CODE 250: Performed by: STUDENT IN AN ORGANIZED HEALTH CARE EDUCATION/TRAINING PROGRAM

## 2021-03-04 PROCEDURE — 25000003 PHARM REV CODE 250: Performed by: ANESTHESIOLOGY

## 2021-03-04 PROCEDURE — D9220A PRA ANESTHESIA: ICD-10-PCS | Mod: CRNA,,, | Performed by: STUDENT IN AN ORGANIZED HEALTH CARE EDUCATION/TRAINING PROGRAM

## 2021-03-04 PROCEDURE — 88305 TISSUE EXAM BY PATHOLOGIST: CPT | Mod: 26,,, | Performed by: PATHOLOGY

## 2021-03-04 PROCEDURE — 43239 EGD BIOPSY SINGLE/MULTIPLE: CPT | Performed by: INTERNAL MEDICINE

## 2021-03-04 PROCEDURE — 43239 EGD BIOPSY SINGLE/MULTIPLE: CPT | Mod: ,,, | Performed by: INTERNAL MEDICINE

## 2021-03-04 PROCEDURE — D9220A PRA ANESTHESIA: ICD-10-PCS | Mod: ANES,,, | Performed by: ANESTHESIOLOGY

## 2021-03-04 RX ORDER — SODIUM CHLORIDE 9 MG/ML
INJECTION, SOLUTION INTRAVENOUS ONCE
Status: DISCONTINUED | OUTPATIENT
Start: 2021-03-04 | End: 2021-03-04 | Stop reason: HOSPADM

## 2021-03-04 RX ORDER — LIDOCAINE HYDROCHLORIDE 20 MG/ML
INJECTION, SOLUTION EPIDURAL; INFILTRATION; INTRACAUDAL; PERINEURAL
Status: DISCONTINUED
Start: 2021-03-04 | End: 2021-03-04 | Stop reason: HOSPADM

## 2021-03-04 RX ORDER — PROPOFOL 10 MG/ML
VIAL (ML) INTRAVENOUS
Status: DISCONTINUED | OUTPATIENT
Start: 2021-03-04 | End: 2021-03-04

## 2021-03-04 RX ORDER — SODIUM CHLORIDE 9 MG/ML
INJECTION, SOLUTION INTRAVENOUS CONTINUOUS
Status: DISCONTINUED | OUTPATIENT
Start: 2021-03-04 | End: 2021-03-04 | Stop reason: HOSPADM

## 2021-03-04 RX ORDER — LIDOCAINE HYDROCHLORIDE 20 MG/ML
INJECTION INTRAVENOUS
Status: DISCONTINUED | OUTPATIENT
Start: 2021-03-04 | End: 2021-03-04

## 2021-03-04 RX ORDER — PROPOFOL 10 MG/ML
INJECTION, EMULSION INTRAVENOUS
Status: DISCONTINUED
Start: 2021-03-04 | End: 2021-03-04 | Stop reason: HOSPADM

## 2021-03-04 RX ORDER — LIDOCAINE HYDROCHLORIDE 10 MG/ML
1 INJECTION, SOLUTION EPIDURAL; INFILTRATION; INTRACAUDAL; PERINEURAL ONCE
Status: DISCONTINUED | OUTPATIENT
Start: 2021-03-04 | End: 2021-03-04 | Stop reason: HOSPADM

## 2021-03-04 RX ADMIN — PROPOFOL 120 MG: 10 INJECTION, EMULSION INTRAVENOUS at 11:03

## 2021-03-04 RX ADMIN — GLYCOPYRROLATE 0.1 MCG: 0.2 INJECTION, SOLUTION INTRAMUSCULAR; INTRAVITREAL at 11:03

## 2021-03-04 RX ADMIN — PROPOFOL 50 MG: 10 INJECTION, EMULSION INTRAVENOUS at 11:03

## 2021-03-04 RX ADMIN — PROPOFOL 50 MG: 10 INJECTION, EMULSION INTRAVENOUS at 12:03

## 2021-03-04 RX ADMIN — Medication 100 MG: at 11:03

## 2021-03-04 RX ADMIN — SODIUM CHLORIDE: 0.9 INJECTION, SOLUTION INTRAVENOUS at 11:03

## 2021-03-10 LAB
FINAL PATHOLOGIC DIAGNOSIS: NORMAL
GROSS: NORMAL
Lab: NORMAL

## 2021-03-19 ENCOUNTER — TELEPHONE (OUTPATIENT)
Dept: BARIATRICS | Facility: CLINIC | Age: 36
End: 2021-03-19

## 2021-03-19 DIAGNOSIS — R10.13 EPIGASTRIC PAIN: Primary | ICD-10-CM

## 2021-03-22 ENCOUNTER — HOSPITAL ENCOUNTER (OUTPATIENT)
Dept: RADIOLOGY | Facility: HOSPITAL | Age: 36
Discharge: HOME | End: 2021-03-22
Attending: SURGERY
Payer: MEDICARE

## 2021-03-22 DIAGNOSIS — R10.13 EPIGASTRIC PAIN: ICD-10-CM

## 2021-03-22 PROCEDURE — 25500020 PHARM REV CODE 255: Performed by: SURGERY

## 2021-03-22 PROCEDURE — 74177 CT ABDOMEN PELVIS WITH CONTRAST: ICD-10-PCS | Mod: 26,,, | Performed by: RADIOLOGY

## 2021-03-22 PROCEDURE — 74177 CT ABD & PELVIS W/CONTRAST: CPT | Mod: 26,,, | Performed by: RADIOLOGY

## 2021-03-22 PROCEDURE — 74177 CT ABD & PELVIS W/CONTRAST: CPT | Mod: TC

## 2021-03-22 RX ADMIN — IOHEXOL 100 ML: 350 INJECTION, SOLUTION INTRAVENOUS at 12:03

## 2021-03-22 RX ADMIN — IOHEXOL 15 ML: 300 INJECTION, SOLUTION INTRAVENOUS at 12:03

## 2021-03-23 ENCOUNTER — PATIENT MESSAGE (OUTPATIENT)
Dept: PAIN MEDICINE | Facility: CLINIC | Age: 36
End: 2021-03-23

## 2021-04-01 ENCOUNTER — TELEPHONE (OUTPATIENT)
Dept: PAIN MEDICINE | Facility: CLINIC | Age: 36
End: 2021-04-01

## 2021-04-05 ENCOUNTER — OFFICE VISIT (OUTPATIENT)
Dept: PAIN MEDICINE | Facility: CLINIC | Age: 36
End: 2021-04-05
Payer: MEDICARE

## 2021-04-05 VITALS
HEART RATE: 49 BPM | BODY MASS INDEX: 39.91 KG/M2 | SYSTOLIC BLOOD PRESSURE: 109 MMHG | DIASTOLIC BLOOD PRESSURE: 69 MMHG | WEIGHT: 185 LBS | HEIGHT: 57 IN | RESPIRATION RATE: 18 BRPM

## 2021-04-05 DIAGNOSIS — M25.551 HIP PAIN, BILATERAL: Primary | ICD-10-CM

## 2021-04-05 DIAGNOSIS — G89.4 CHRONIC PAIN DISORDER: ICD-10-CM

## 2021-04-05 DIAGNOSIS — M47.817 LUMBOSACRAL SPONDYLOSIS WITHOUT MYELOPATHY: ICD-10-CM

## 2021-04-05 DIAGNOSIS — M54.16 LUMBAR RADICULOPATHY: ICD-10-CM

## 2021-04-05 DIAGNOSIS — M51.36 DDD (DEGENERATIVE DISC DISEASE), LUMBAR: ICD-10-CM

## 2021-04-05 DIAGNOSIS — M25.552 HIP PAIN, BILATERAL: Primary | ICD-10-CM

## 2021-04-05 PROCEDURE — 99999 PR PBB SHADOW E&M-EST. PATIENT-LVL IV: CPT | Mod: PBBFAC,,, | Performed by: NURSE PRACTITIONER

## 2021-04-05 PROCEDURE — 99999 PR PBB SHADOW E&M-EST. PATIENT-LVL IV: ICD-10-PCS | Mod: PBBFAC,,, | Performed by: NURSE PRACTITIONER

## 2021-04-05 PROCEDURE — 99213 PR OFFICE/OUTPT VISIT, EST, LEVL III, 20-29 MIN: ICD-10-PCS | Mod: S$GLB,,, | Performed by: NURSE PRACTITIONER

## 2021-04-05 PROCEDURE — 1125F PR PAIN SEVERITY QUANTIFIED, PAIN PRESENT: ICD-10-PCS | Mod: S$GLB,,, | Performed by: NURSE PRACTITIONER

## 2021-04-05 PROCEDURE — 1125F AMNT PAIN NOTED PAIN PRSNT: CPT | Mod: S$GLB,,, | Performed by: NURSE PRACTITIONER

## 2021-04-05 PROCEDURE — 3008F PR BODY MASS INDEX (BMI) DOCUMENTED: ICD-10-PCS | Mod: CPTII,S$GLB,, | Performed by: NURSE PRACTITIONER

## 2021-04-05 PROCEDURE — 3008F BODY MASS INDEX DOCD: CPT | Mod: CPTII,S$GLB,, | Performed by: NURSE PRACTITIONER

## 2021-04-05 PROCEDURE — 99213 OFFICE O/P EST LOW 20 MIN: CPT | Mod: S$GLB,,, | Performed by: NURSE PRACTITIONER

## 2021-04-05 PROCEDURE — 99499 UNLISTED E&M SERVICE: CPT | Mod: S$GLB,,, | Performed by: NURSE PRACTITIONER

## 2021-04-05 PROCEDURE — 99499 RISK ADDL DX/OHS AUDIT: ICD-10-PCS | Mod: S$GLB,,, | Performed by: NURSE PRACTITIONER

## 2021-04-05 RX ORDER — TIZANIDINE 2 MG/1
4 TABLET ORAL 2 TIMES DAILY
Qty: 60 TABLET | Refills: 2 | Status: SHIPPED | OUTPATIENT
Start: 2021-04-05 | End: 2021-05-05

## 2021-04-08 ENCOUNTER — PATIENT MESSAGE (OUTPATIENT)
Dept: GASTROENTEROLOGY | Facility: CLINIC | Age: 36
End: 2021-04-08

## 2021-04-09 ENCOUNTER — PATIENT MESSAGE (OUTPATIENT)
Dept: FAMILY MEDICINE | Facility: CLINIC | Age: 36
End: 2021-04-09

## 2021-04-12 ENCOUNTER — PATIENT MESSAGE (OUTPATIENT)
Dept: RESEARCH | Facility: HOSPITAL | Age: 36
End: 2021-04-12

## 2021-04-12 ENCOUNTER — HOSPITAL ENCOUNTER (EMERGENCY)
Facility: HOSPITAL | Age: 36
Discharge: HOME | End: 2021-04-12
Attending: EMERGENCY MEDICINE
Payer: MEDICARE

## 2021-04-12 VITALS
OXYGEN SATURATION: 100 % | SYSTOLIC BLOOD PRESSURE: 107 MMHG | BODY MASS INDEX: 38.95 KG/M2 | RESPIRATION RATE: 18 BRPM | TEMPERATURE: 98 F | DIASTOLIC BLOOD PRESSURE: 57 MMHG | WEIGHT: 180 LBS | HEART RATE: 55 BPM

## 2021-04-12 DIAGNOSIS — R05.9 COUGH: ICD-10-CM

## 2021-04-12 DIAGNOSIS — R06.02 SOB (SHORTNESS OF BREATH): ICD-10-CM

## 2021-04-12 DIAGNOSIS — J06.9 VIRAL URI WITH COUGH: Primary | ICD-10-CM

## 2021-04-12 LAB
CTP QC/QA: YES
CTP QC/QA: YES
POC MOLECULAR INFLUENZA A AGN: NEGATIVE
POC MOLECULAR INFLUENZA B AGN: NEGATIVE
SARS-COV-2 RDRP RESP QL NAA+PROBE: NEGATIVE

## 2021-04-12 PROCEDURE — 87502 INFLUENZA DNA AMP PROBE: CPT

## 2021-04-12 PROCEDURE — 93010 ELECTROCARDIOGRAM REPORT: CPT | Mod: ,,, | Performed by: INTERNAL MEDICINE

## 2021-04-12 PROCEDURE — 93005 ELECTROCARDIOGRAM TRACING: CPT

## 2021-04-12 PROCEDURE — 99285 EMERGENCY DEPT VISIT HI MDM: CPT | Mod: 25

## 2021-04-12 PROCEDURE — 93010 EKG 12-LEAD: ICD-10-PCS | Mod: ,,, | Performed by: INTERNAL MEDICINE

## 2021-04-12 PROCEDURE — U0002 COVID-19 LAB TEST NON-CDC: HCPCS | Performed by: PHYSICIAN ASSISTANT

## 2021-04-12 RX ORDER — BENZONATATE 100 MG/1
100 CAPSULE ORAL 3 TIMES DAILY PRN
Qty: 20 CAPSULE | Refills: 0 | Status: SHIPPED | OUTPATIENT
Start: 2021-04-12 | End: 2021-04-22

## 2021-04-14 ENCOUNTER — OFFICE VISIT (OUTPATIENT)
Dept: FAMILY MEDICINE | Facility: CLINIC | Age: 36
End: 2021-04-14
Payer: MEDICARE

## 2021-04-14 VITALS
WEIGHT: 182.75 LBS | HEIGHT: 57 IN | DIASTOLIC BLOOD PRESSURE: 60 MMHG | HEART RATE: 60 BPM | TEMPERATURE: 98 F | SYSTOLIC BLOOD PRESSURE: 112 MMHG | BODY MASS INDEX: 39.42 KG/M2

## 2021-04-14 DIAGNOSIS — Z98.84 BARIATRIC SURGERY STATUS: ICD-10-CM

## 2021-04-14 DIAGNOSIS — R00.1 SINUS BRADYCARDIA: Primary | ICD-10-CM

## 2021-04-14 DIAGNOSIS — E66.9 CLASS 2 OBESITY: ICD-10-CM

## 2021-04-14 DIAGNOSIS — Q78.0 OSTEOGENESIS IMPERFECTA: ICD-10-CM

## 2021-04-14 DIAGNOSIS — J02.9 SORE THROAT: ICD-10-CM

## 2021-04-14 PROBLEM — R10.2 PELVIC PAIN: Status: RESOLVED | Noted: 2019-03-21 | Resolved: 2021-04-14

## 2021-04-14 PROBLEM — B96.81 GASTRIC ULCER DUE TO HELICOBACTER PYLORI: Status: RESOLVED | Noted: 2021-03-04 | Resolved: 2021-04-14

## 2021-04-14 PROBLEM — K25.9 GASTRIC ULCER DUE TO HELICOBACTER PYLORI: Status: RESOLVED | Noted: 2021-03-04 | Resolved: 2021-04-14

## 2021-04-14 LAB
CTP QC/QA: YES
MOLECULAR STREP A: NEGATIVE

## 2021-04-14 PROCEDURE — 3008F BODY MASS INDEX DOCD: CPT | Mod: CPTII,S$GLB,, | Performed by: INTERNAL MEDICINE

## 2021-04-14 PROCEDURE — 99999 PR PBB SHADOW E&M-EST. PATIENT-LVL IV: CPT | Mod: PBBFAC,,, | Performed by: INTERNAL MEDICINE

## 2021-04-14 PROCEDURE — 1126F AMNT PAIN NOTED NONE PRSNT: CPT | Mod: S$GLB,,, | Performed by: INTERNAL MEDICINE

## 2021-04-14 PROCEDURE — 87651 POCT STREP A MOLECULAR: ICD-10-PCS | Mod: QW,S$GLB,, | Performed by: INTERNAL MEDICINE

## 2021-04-14 PROCEDURE — 87081 CULTURE SCREEN ONLY: CPT | Performed by: INTERNAL MEDICINE

## 2021-04-14 PROCEDURE — 99214 OFFICE O/P EST MOD 30 MIN: CPT | Mod: S$GLB,,, | Performed by: INTERNAL MEDICINE

## 2021-04-14 PROCEDURE — 99999 PR PBB SHADOW E&M-EST. PATIENT-LVL IV: ICD-10-PCS | Mod: PBBFAC,,, | Performed by: INTERNAL MEDICINE

## 2021-04-14 PROCEDURE — 3008F PR BODY MASS INDEX (BMI) DOCUMENTED: ICD-10-PCS | Mod: CPTII,S$GLB,, | Performed by: INTERNAL MEDICINE

## 2021-04-14 PROCEDURE — 87651 STREP A DNA AMP PROBE: CPT | Mod: QW,S$GLB,, | Performed by: INTERNAL MEDICINE

## 2021-04-14 PROCEDURE — 1126F PR PAIN SEVERITY QUANTIFIED, NO PAIN PRESENT: ICD-10-PCS | Mod: S$GLB,,, | Performed by: INTERNAL MEDICINE

## 2021-04-14 PROCEDURE — 99214 PR OFFICE/OUTPT VISIT, EST, LEVL IV, 30-39 MIN: ICD-10-PCS | Mod: S$GLB,,, | Performed by: INTERNAL MEDICINE

## 2021-04-14 RX ORDER — PREDNISONE 20 MG/1
40 TABLET ORAL DAILY
Qty: 6 TABLET | Refills: 0 | Status: SHIPPED | OUTPATIENT
Start: 2021-04-14 | End: 2021-05-21 | Stop reason: SDUPTHER

## 2021-04-16 ENCOUNTER — TELEPHONE (OUTPATIENT)
Dept: FAMILY MEDICINE | Facility: CLINIC | Age: 36
End: 2021-04-16

## 2021-04-17 LAB — BACTERIA THROAT CULT: NORMAL

## 2021-04-22 ENCOUNTER — OFFICE VISIT (OUTPATIENT)
Dept: CARDIOLOGY | Facility: CLINIC | Age: 36
End: 2021-04-22
Payer: MEDICARE

## 2021-04-22 ENCOUNTER — PATIENT MESSAGE (OUTPATIENT)
Dept: PAIN MEDICINE | Facility: OTHER | Age: 36
End: 2021-04-22

## 2021-04-22 VITALS
HEART RATE: 54 BPM | HEIGHT: 57 IN | DIASTOLIC BLOOD PRESSURE: 70 MMHG | WEIGHT: 179 LBS | SYSTOLIC BLOOD PRESSURE: 112 MMHG | OXYGEN SATURATION: 99 % | BODY MASS INDEX: 38.62 KG/M2

## 2021-04-22 DIAGNOSIS — E66.09 CLASS 2 OBESITY DUE TO EXCESS CALORIES WITHOUT SERIOUS COMORBIDITY WITH BODY MASS INDEX (BMI) OF 38.0 TO 38.9 IN ADULT: ICD-10-CM

## 2021-04-22 DIAGNOSIS — R00.1 SINUS BRADYCARDIA: Primary | ICD-10-CM

## 2021-04-22 DIAGNOSIS — M53.87 SCIATICA OF RIGHT SIDE ASSOCIATED WITH DISORDER OF LUMBOSACRAL SPINE: ICD-10-CM

## 2021-04-22 DIAGNOSIS — M85.38 OSTEITIS CONDENSANS ILII: ICD-10-CM

## 2021-04-22 DIAGNOSIS — R53.83 FATIGUE, UNSPECIFIED TYPE: ICD-10-CM

## 2021-04-22 DIAGNOSIS — G89.29 OTHER CHRONIC PAIN: ICD-10-CM

## 2021-04-22 DIAGNOSIS — R00.2 PALPITATIONS: ICD-10-CM

## 2021-04-22 DIAGNOSIS — K21.9 GASTROESOPHAGEAL REFLUX DISEASE, UNSPECIFIED WHETHER ESOPHAGITIS PRESENT: ICD-10-CM

## 2021-04-22 DIAGNOSIS — Z98.84 BARIATRIC SURGERY STATUS: ICD-10-CM

## 2021-04-22 PROCEDURE — 99204 PR OFFICE/OUTPT VISIT, NEW, LEVL IV, 45-59 MIN: ICD-10-PCS | Mod: S$GLB,,, | Performed by: INTERNAL MEDICINE

## 2021-04-22 PROCEDURE — 3008F BODY MASS INDEX DOCD: CPT | Mod: CPTII,S$GLB,, | Performed by: INTERNAL MEDICINE

## 2021-04-22 PROCEDURE — 1125F PR PAIN SEVERITY QUANTIFIED, PAIN PRESENT: ICD-10-PCS | Mod: S$GLB,,, | Performed by: INTERNAL MEDICINE

## 2021-04-22 PROCEDURE — 99999 PR PBB SHADOW E&M-EST. PATIENT-LVL IV: ICD-10-PCS | Mod: PBBFAC,,, | Performed by: INTERNAL MEDICINE

## 2021-04-22 PROCEDURE — 3008F PR BODY MASS INDEX (BMI) DOCUMENTED: ICD-10-PCS | Mod: CPTII,S$GLB,, | Performed by: INTERNAL MEDICINE

## 2021-04-22 PROCEDURE — 99999 PR PBB SHADOW E&M-EST. PATIENT-LVL IV: CPT | Mod: PBBFAC,,, | Performed by: INTERNAL MEDICINE

## 2021-04-22 PROCEDURE — 1125F AMNT PAIN NOTED PAIN PRSNT: CPT | Mod: S$GLB,,, | Performed by: INTERNAL MEDICINE

## 2021-04-22 PROCEDURE — 99204 OFFICE O/P NEW MOD 45 MIN: CPT | Mod: S$GLB,,, | Performed by: INTERNAL MEDICINE

## 2021-04-23 ENCOUNTER — HOSPITAL ENCOUNTER (OUTPATIENT)
Facility: OTHER | Age: 36
Discharge: HOME | End: 2021-04-23
Attending: ANESTHESIOLOGY | Admitting: ANESTHESIOLOGY
Payer: MEDICARE

## 2021-04-23 VITALS
RESPIRATION RATE: 14 BRPM | HEART RATE: 66 BPM | TEMPERATURE: 98 F | SYSTOLIC BLOOD PRESSURE: 110 MMHG | DIASTOLIC BLOOD PRESSURE: 77 MMHG | OXYGEN SATURATION: 100 %

## 2021-04-23 DIAGNOSIS — M54.16 LUMBAR RADICULOPATHY: Primary | ICD-10-CM

## 2021-04-23 DIAGNOSIS — G89.29 CHRONIC PAIN: ICD-10-CM

## 2021-04-23 PROCEDURE — 64484 NJX AA&/STRD TFRM EPI L/S EA: CPT | Mod: RT,,, | Performed by: ANESTHESIOLOGY

## 2021-04-23 PROCEDURE — 64483 NJX AA&/STRD TFRM EPI L/S 1: CPT | Mod: RT,,, | Performed by: ANESTHESIOLOGY

## 2021-04-23 PROCEDURE — 64483 PR EPIDURAL INJ, ANES/STEROID, TRANSFORAMINAL, LUMB/SACR, SNGL LEVL: ICD-10-PCS | Mod: RT,,, | Performed by: ANESTHESIOLOGY

## 2021-04-23 PROCEDURE — 64484 PRA INJECT ANES/STEROID FORAMEN LUMBAR/SACRAL W IMG GUIDE ,EA ADD LEVEL: ICD-10-PCS | Mod: RT,,, | Performed by: ANESTHESIOLOGY

## 2021-04-23 PROCEDURE — 63600175 PHARM REV CODE 636 W HCPCS: Performed by: ANESTHESIOLOGY

## 2021-04-23 PROCEDURE — 64484 NJX AA&/STRD TFRM EPI L/S EA: CPT | Mod: RT | Performed by: ANESTHESIOLOGY

## 2021-04-23 PROCEDURE — 25500020 PHARM REV CODE 255: Performed by: ANESTHESIOLOGY

## 2021-04-23 PROCEDURE — 64483 NJX AA&/STRD TFRM EPI L/S 1: CPT | Mod: RT | Performed by: ANESTHESIOLOGY

## 2021-04-23 PROCEDURE — 25000003 PHARM REV CODE 250: Performed by: ANESTHESIOLOGY

## 2021-04-23 RX ORDER — DEXAMETHASONE SODIUM PHOSPHATE 10 MG/ML
INJECTION INTRAMUSCULAR; INTRAVENOUS
Status: DISCONTINUED | OUTPATIENT
Start: 2021-04-23 | End: 2021-04-23 | Stop reason: HOSPADM

## 2021-04-23 RX ORDER — SODIUM CHLORIDE 9 MG/ML
INJECTION, SOLUTION INTRAVENOUS CONTINUOUS
Status: DISCONTINUED | OUTPATIENT
Start: 2021-04-23 | End: 2021-04-23 | Stop reason: HOSPADM

## 2021-04-23 RX ORDER — LIDOCAINE HYDROCHLORIDE 5 MG/ML
INJECTION, SOLUTION INFILTRATION; INTRAVENOUS
Status: DISCONTINUED | OUTPATIENT
Start: 2021-04-23 | End: 2021-04-23 | Stop reason: HOSPADM

## 2021-04-23 RX ORDER — MIDAZOLAM HYDROCHLORIDE 1 MG/ML
INJECTION INTRAMUSCULAR; INTRAVENOUS
Status: DISCONTINUED | OUTPATIENT
Start: 2021-04-23 | End: 2021-04-23 | Stop reason: HOSPADM

## 2021-04-23 RX ORDER — LIDOCAINE HYDROCHLORIDE 10 MG/ML
INJECTION INFILTRATION; PERINEURAL
Status: DISCONTINUED | OUTPATIENT
Start: 2021-04-23 | End: 2021-04-23 | Stop reason: HOSPADM

## 2021-04-23 RX ORDER — FENTANYL CITRATE 50 UG/ML
INJECTION, SOLUTION INTRAMUSCULAR; INTRAVENOUS
Status: DISCONTINUED | OUTPATIENT
Start: 2021-04-23 | End: 2021-04-23 | Stop reason: HOSPADM

## 2021-04-29 ENCOUNTER — CLINICAL SUPPORT (OUTPATIENT)
Dept: REHABILITATION | Facility: HOSPITAL | Age: 36
End: 2021-04-29
Attending: ANESTHESIOLOGY
Payer: MEDICARE

## 2021-04-29 DIAGNOSIS — R26.9 ABNORMALITY OF GAIT AND MOBILITY: ICD-10-CM

## 2021-04-29 DIAGNOSIS — M25.551 HIP PAIN, BILATERAL: Primary | ICD-10-CM

## 2021-04-29 DIAGNOSIS — M47.817 LUMBOSACRAL SPONDYLOSIS WITHOUT MYELOPATHY: ICD-10-CM

## 2021-04-29 DIAGNOSIS — Q78.0 OSTEOGENESIS IMPERFECTA: ICD-10-CM

## 2021-04-29 DIAGNOSIS — R29.898 WEAKNESS OF BOTH HIPS: ICD-10-CM

## 2021-04-29 DIAGNOSIS — M85.38 OSTEITIS CONDENSANS ILII: ICD-10-CM

## 2021-04-29 DIAGNOSIS — M53.87 SCIATICA OF RIGHT SIDE ASSOCIATED WITH DISORDER OF LUMBOSACRAL SPINE: ICD-10-CM

## 2021-04-29 DIAGNOSIS — M25.552 HIP PAIN, BILATERAL: Primary | ICD-10-CM

## 2021-04-29 DIAGNOSIS — M51.36 DDD (DEGENERATIVE DISC DISEASE), LUMBAR: ICD-10-CM

## 2021-04-29 PROCEDURE — 97162 PT EVAL MOD COMPLEX 30 MIN: CPT | Mod: PN

## 2021-04-30 PROBLEM — R29.898 WEAKNESS OF BOTH HIPS: Status: ACTIVE | Noted: 2021-04-30

## 2021-04-30 PROBLEM — R26.9 ABNORMALITY OF GAIT AND MOBILITY: Status: ACTIVE | Noted: 2021-04-30

## 2021-05-05 ENCOUNTER — TELEPHONE (OUTPATIENT)
Dept: PAIN MEDICINE | Facility: CLINIC | Age: 36
End: 2021-05-05

## 2021-05-06 ENCOUNTER — HOSPITAL ENCOUNTER (OUTPATIENT)
Dept: CARDIOLOGY | Facility: HOSPITAL | Age: 36
Discharge: HOME | End: 2021-05-06
Attending: INTERNAL MEDICINE
Payer: MEDICARE

## 2021-05-06 ENCOUNTER — OFFICE VISIT (OUTPATIENT)
Dept: PAIN MEDICINE | Facility: CLINIC | Age: 36
End: 2021-05-06
Payer: MEDICARE

## 2021-05-06 VITALS — HEIGHT: 57 IN | WEIGHT: 179 LBS | BODY MASS INDEX: 38.62 KG/M2

## 2021-05-06 VITALS
HEIGHT: 57 IN | HEART RATE: 55 BPM | WEIGHT: 179 LBS | SYSTOLIC BLOOD PRESSURE: 118 MMHG | BODY MASS INDEX: 38.62 KG/M2 | DIASTOLIC BLOOD PRESSURE: 73 MMHG | TEMPERATURE: 97 F

## 2021-05-06 DIAGNOSIS — M51.36 DDD (DEGENERATIVE DISC DISEASE), LUMBAR: ICD-10-CM

## 2021-05-06 DIAGNOSIS — G89.4 CHRONIC PAIN DISORDER: ICD-10-CM

## 2021-05-06 DIAGNOSIS — M54.16 LUMBAR RADICULOPATHY: ICD-10-CM

## 2021-05-06 DIAGNOSIS — M47.817 LUMBOSACRAL SPONDYLOSIS WITHOUT MYELOPATHY: ICD-10-CM

## 2021-05-06 DIAGNOSIS — R00.1 SINUS BRADYCARDIA: ICD-10-CM

## 2021-05-06 DIAGNOSIS — M25.551 HIP PAIN, BILATERAL: ICD-10-CM

## 2021-05-06 DIAGNOSIS — M25.552 HIP PAIN, BILATERAL: ICD-10-CM

## 2021-05-06 DIAGNOSIS — Q78.0 OSTEOGENESIS IMPERFECTA: Primary | ICD-10-CM

## 2021-05-06 LAB
AORTIC ROOT ANNULUS: 2.85 CM
AORTIC VALVE CUSP SEPERATION: 2 CM
AV INDEX (PROSTH): 0.64
AV MEAN GRADIENT: 4 MMHG
AV PEAK GRADIENT: 6 MMHG
AV VALVE AREA: 2.14 CM2
AV VELOCITY RATIO: 0.85
BSA FOR ECHO PROCEDURE: 1.81 M2
CV ECHO LV RWT: 0.37 CM
DOP CALC AO PEAK VEL: 1.26 M/S
DOP CALC AO VTI: 33.68 CM
DOP CALC LVOT AREA: 3.4 CM2
DOP CALC LVOT DIAMETER: 2.07 CM
DOP CALC LVOT PEAK VEL: 1.07 M/S
DOP CALC LVOT STROKE VOLUME: 72.08 CM3
DOP CALCLVOT PEAK VEL VTI: 21.43 CM
E WAVE DECELERATION TIME: 115.64 MSEC
E/A RATIO: 2.36
E/E' RATIO: 11.16 M/S
ECHO LV POSTERIOR WALL: 0.8 CM (ref 0.6–1.1)
EJECTION FRACTION: 55 %
FRACTIONAL SHORTENING: 26 % (ref 28–44)
INTERVENTRICULAR SEPTUM: 0.93 CM (ref 0.6–1.1)
IVRT: 79.58 MSEC
LA MAJOR: 4.95 CM
LA MINOR: 5.09 CM
LA WIDTH: 4.39 CM
LEFT ATRIUM SIZE: 3.57 CM
LEFT ATRIUM VOLUME INDEX: 38.9 ML/M2
LEFT ATRIUM VOLUME: 66.86 CM3
LEFT INTERNAL DIMENSION IN SYSTOLE: 3.2 CM (ref 2.1–4)
LEFT VENTRICLE DIASTOLIC VOLUME INDEX: 49.26 ML/M2
LEFT VENTRICLE DIASTOLIC VOLUME: 84.72 ML
LEFT VENTRICLE MASS INDEX: 69 G/M2
LEFT VENTRICLE SYSTOLIC VOLUME INDEX: 23.8 ML/M2
LEFT VENTRICLE SYSTOLIC VOLUME: 40.93 ML
LEFT VENTRICULAR INTERNAL DIMENSION IN DIASTOLE: 4.34 CM (ref 3.5–6)
LEFT VENTRICULAR MASS: 118.66 G
LV LATERAL E/E' RATIO: 8.83 M/S
LV SEPTAL E/E' RATIO: 15.14 M/S
MV PEAK A VEL: 0.45 M/S
MV PEAK E VEL: 1.06 M/S
PISA TR MAX VEL: 2.42 M/S
PULM VEIN S/D RATIO: 1.1
PV PEAK D VEL: 0.61 M/S
PV PEAK S VEL: 0.67 M/S
PV PEAK VELOCITY: 0.99 CM/S
RA MAJOR: 4.44 CM
RA PRESSURE: 3 MMHG
RA WIDTH: 3.19 CM
RIGHT VENTRICULAR END-DIASTOLIC DIMENSION: 2.9 CM
RV TISSUE DOPPLER FREE WALL SYSTOLIC VELOCITY 1 (APICAL 4 CHAMBER VIEW): 6.94 CM/S
SINUS: 3.02 CM
STJ: 2.06 CM
TDI LATERAL: 0.12 M/S
TDI SEPTAL: 0.07 M/S
TDI: 0.1 M/S
TR MAX PG: 23 MMHG
TRICUSPID ANNULAR PLANE SYSTOLIC EXCURSION: 1.82 CM
TV REST PULMONARY ARTERY PRESSURE: 26 MMHG

## 2021-05-06 PROCEDURE — 99499 RISK ADDL DX/OHS AUDIT: ICD-10-PCS | Mod: S$GLB,,, | Performed by: NURSE PRACTITIONER

## 2021-05-06 PROCEDURE — 93306 TTE W/DOPPLER COMPLETE: CPT | Mod: 26,,, | Performed by: INTERNAL MEDICINE

## 2021-05-06 PROCEDURE — 93306 TTE W/DOPPLER COMPLETE: CPT

## 2021-05-06 PROCEDURE — 99999 PR PBB SHADOW E&M-EST. PATIENT-LVL III: ICD-10-PCS | Mod: PBBFAC,,, | Performed by: NURSE PRACTITIONER

## 2021-05-06 PROCEDURE — 1125F AMNT PAIN NOTED PAIN PRSNT: CPT | Mod: S$GLB,,, | Performed by: NURSE PRACTITIONER

## 2021-05-06 PROCEDURE — 93227 XTRNL ECG REC<48 HR R&I: CPT | Mod: ,,, | Performed by: INTERNAL MEDICINE

## 2021-05-06 PROCEDURE — 93226 XTRNL ECG REC<48 HR SCAN A/R: CPT

## 2021-05-06 PROCEDURE — 93306 ECHO (CUPID ONLY): ICD-10-PCS | Mod: 26,,, | Performed by: INTERNAL MEDICINE

## 2021-05-06 PROCEDURE — 3008F BODY MASS INDEX DOCD: CPT | Mod: CPTII,S$GLB,, | Performed by: NURSE PRACTITIONER

## 2021-05-06 PROCEDURE — 93227 HOLTER MONITOR - 48 HOUR (CUPID ONLY): ICD-10-PCS | Mod: ,,, | Performed by: INTERNAL MEDICINE

## 2021-05-06 PROCEDURE — 1125F PR PAIN SEVERITY QUANTIFIED, PAIN PRESENT: ICD-10-PCS | Mod: S$GLB,,, | Performed by: NURSE PRACTITIONER

## 2021-05-06 PROCEDURE — 3008F PR BODY MASS INDEX (BMI) DOCUMENTED: ICD-10-PCS | Mod: CPTII,S$GLB,, | Performed by: NURSE PRACTITIONER

## 2021-05-06 PROCEDURE — 99213 PR OFFICE/OUTPT VISIT, EST, LEVL III, 20-29 MIN: ICD-10-PCS | Mod: S$GLB,,, | Performed by: NURSE PRACTITIONER

## 2021-05-06 PROCEDURE — 99499 UNLISTED E&M SERVICE: CPT | Mod: S$GLB,,, | Performed by: NURSE PRACTITIONER

## 2021-05-06 PROCEDURE — 99213 OFFICE O/P EST LOW 20 MIN: CPT | Mod: S$GLB,,, | Performed by: NURSE PRACTITIONER

## 2021-05-06 PROCEDURE — 99999 PR PBB SHADOW E&M-EST. PATIENT-LVL III: CPT | Mod: PBBFAC,,, | Performed by: NURSE PRACTITIONER

## 2021-05-13 LAB
OHS CV EVENT MONITOR DAY: 0
OHS CV HOLTER LENGTH DECIMAL HOURS: 47.98
OHS CV HOLTER LENGTH HOURS: 47
OHS CV HOLTER LENGTH MINUTES: 59

## 2021-05-20 ENCOUNTER — CLINICAL SUPPORT (OUTPATIENT)
Dept: REHABILITATION | Facility: HOSPITAL | Age: 36
End: 2021-05-20
Attending: ANESTHESIOLOGY
Payer: MEDICARE

## 2021-05-20 ENCOUNTER — PATIENT MESSAGE (OUTPATIENT)
Dept: FAMILY MEDICINE | Facility: CLINIC | Age: 36
End: 2021-05-20

## 2021-05-20 DIAGNOSIS — M85.38 OSTEITIS CONDENSANS ILII: ICD-10-CM

## 2021-05-20 DIAGNOSIS — R29.898 WEAKNESS OF BOTH HIPS: ICD-10-CM

## 2021-05-20 DIAGNOSIS — R26.9 ABNORMALITY OF GAIT AND MOBILITY: ICD-10-CM

## 2021-05-20 DIAGNOSIS — Q78.0 OSTEOGENESIS IMPERFECTA: ICD-10-CM

## 2021-05-20 DIAGNOSIS — M53.87 SCIATICA OF RIGHT SIDE ASSOCIATED WITH DISORDER OF LUMBOSACRAL SPINE: ICD-10-CM

## 2021-05-20 PROCEDURE — 97110 THERAPEUTIC EXERCISES: CPT | Mod: PN

## 2021-05-20 PROCEDURE — 97116 GAIT TRAINING THERAPY: CPT | Mod: PN

## 2021-05-21 ENCOUNTER — OFFICE VISIT (OUTPATIENT)
Dept: FAMILY MEDICINE | Facility: CLINIC | Age: 36
End: 2021-05-21
Payer: MEDICARE

## 2021-05-21 ENCOUNTER — LAB VISIT (OUTPATIENT)
Dept: FAMILY MEDICINE | Facility: CLINIC | Age: 36
End: 2021-05-21
Payer: MEDICARE

## 2021-05-21 VITALS
BODY MASS INDEX: 39.29 KG/M2 | SYSTOLIC BLOOD PRESSURE: 102 MMHG | HEART RATE: 65 BPM | HEIGHT: 57 IN | OXYGEN SATURATION: 98 % | TEMPERATURE: 98 F | WEIGHT: 182.13 LBS | DIASTOLIC BLOOD PRESSURE: 64 MMHG

## 2021-05-21 DIAGNOSIS — R05.9 COUGH: ICD-10-CM

## 2021-05-21 DIAGNOSIS — J02.9 SORE THROAT: ICD-10-CM

## 2021-05-21 DIAGNOSIS — R05.9 COUGH: Primary | ICD-10-CM

## 2021-05-21 PROCEDURE — 3008F BODY MASS INDEX DOCD: CPT | Mod: CPTII,S$GLB,, | Performed by: INTERNAL MEDICINE

## 2021-05-21 PROCEDURE — 99999 PR PBB SHADOW E&M-EST. PATIENT-LVL IV: ICD-10-PCS | Mod: PBBFAC,,, | Performed by: INTERNAL MEDICINE

## 2021-05-21 PROCEDURE — 99213 PR OFFICE/OUTPT VISIT, EST, LEVL III, 20-29 MIN: ICD-10-PCS | Mod: S$GLB,,, | Performed by: INTERNAL MEDICINE

## 2021-05-21 PROCEDURE — 99213 OFFICE O/P EST LOW 20 MIN: CPT | Mod: S$GLB,,, | Performed by: INTERNAL MEDICINE

## 2021-05-21 PROCEDURE — U0005 INFEC AGEN DETEC AMPLI PROBE: HCPCS | Performed by: INTERNAL MEDICINE

## 2021-05-21 PROCEDURE — 1125F PR PAIN SEVERITY QUANTIFIED, PAIN PRESENT: ICD-10-PCS | Mod: S$GLB,,, | Performed by: INTERNAL MEDICINE

## 2021-05-21 PROCEDURE — 3008F PR BODY MASS INDEX (BMI) DOCUMENTED: ICD-10-PCS | Mod: CPTII,S$GLB,, | Performed by: INTERNAL MEDICINE

## 2021-05-21 PROCEDURE — 99999 PR PBB SHADOW E&M-EST. PATIENT-LVL IV: CPT | Mod: PBBFAC,,, | Performed by: INTERNAL MEDICINE

## 2021-05-21 PROCEDURE — 1125F AMNT PAIN NOTED PAIN PRSNT: CPT | Mod: S$GLB,,, | Performed by: INTERNAL MEDICINE

## 2021-05-21 PROCEDURE — U0003 INFECTIOUS AGENT DETECTION BY NUCLEIC ACID (DNA OR RNA); SEVERE ACUTE RESPIRATORY SYNDROME CORONAVIRUS 2 (SARS-COV-2) (CORONAVIRUS DISEASE [COVID-19]), AMPLIFIED PROBE TECHNIQUE, MAKING USE OF HIGH THROUGHPUT TECHNOLOGIES AS DESCRIBED BY CMS-2020-01-R: HCPCS | Performed by: INTERNAL MEDICINE

## 2021-05-21 RX ORDER — PREDNISONE 20 MG/1
40 TABLET ORAL DAILY
Qty: 6 TABLET | Refills: 0 | Status: SHIPPED | OUTPATIENT
Start: 2021-05-21 | End: 2021-05-24

## 2021-05-22 LAB — SARS-COV-2 RNA RESP QL NAA+PROBE: NOT DETECTED

## 2021-05-27 ENCOUNTER — TELEPHONE (OUTPATIENT)
Dept: REHABILITATION | Facility: HOSPITAL | Age: 36
End: 2021-05-27

## 2021-06-01 ENCOUNTER — CLINICAL SUPPORT (OUTPATIENT)
Dept: REHABILITATION | Facility: HOSPITAL | Age: 36
End: 2021-06-01
Attending: ANESTHESIOLOGY
Payer: MEDICARE

## 2021-06-01 DIAGNOSIS — M53.87 SCIATICA OF RIGHT SIDE ASSOCIATED WITH DISORDER OF LUMBOSACRAL SPINE: ICD-10-CM

## 2021-06-01 DIAGNOSIS — M85.38 OSTEITIS CONDENSANS ILII: ICD-10-CM

## 2021-06-01 DIAGNOSIS — R26.9 ABNORMALITY OF GAIT AND MOBILITY: ICD-10-CM

## 2021-06-01 DIAGNOSIS — R29.898 WEAKNESS OF BOTH HIPS: ICD-10-CM

## 2021-06-01 DIAGNOSIS — Q78.0 OSTEOGENESIS IMPERFECTA: ICD-10-CM

## 2021-06-01 PROCEDURE — 97110 THERAPEUTIC EXERCISES: CPT | Mod: PN

## 2021-06-08 ENCOUNTER — CLINICAL SUPPORT (OUTPATIENT)
Dept: REHABILITATION | Facility: HOSPITAL | Age: 36
End: 2021-06-08
Attending: ANESTHESIOLOGY
Payer: MEDICARE

## 2021-06-08 DIAGNOSIS — R29.898 WEAKNESS OF BOTH HIPS: ICD-10-CM

## 2021-06-08 DIAGNOSIS — Q78.0 OSTEOGENESIS IMPERFECTA: ICD-10-CM

## 2021-06-08 DIAGNOSIS — R26.9 ABNORMALITY OF GAIT AND MOBILITY: ICD-10-CM

## 2021-06-08 DIAGNOSIS — M85.38 OSTEITIS CONDENSANS ILII: ICD-10-CM

## 2021-06-08 DIAGNOSIS — M53.87 SCIATICA OF RIGHT SIDE ASSOCIATED WITH DISORDER OF LUMBOSACRAL SPINE: ICD-10-CM

## 2021-06-08 PROCEDURE — 97110 THERAPEUTIC EXERCISES: CPT | Mod: PN

## 2021-06-10 ENCOUNTER — CLINICAL SUPPORT (OUTPATIENT)
Dept: REHABILITATION | Facility: HOSPITAL | Age: 36
End: 2021-06-10
Attending: ANESTHESIOLOGY
Payer: MEDICARE

## 2021-06-10 DIAGNOSIS — Q78.0 OSTEOGENESIS IMPERFECTA: ICD-10-CM

## 2021-06-10 DIAGNOSIS — M85.38 OSTEITIS CONDENSANS ILII: ICD-10-CM

## 2021-06-10 DIAGNOSIS — R29.898 WEAKNESS OF BOTH HIPS: ICD-10-CM

## 2021-06-10 DIAGNOSIS — M53.87 SCIATICA OF RIGHT SIDE ASSOCIATED WITH DISORDER OF LUMBOSACRAL SPINE: ICD-10-CM

## 2021-06-10 DIAGNOSIS — R26.9 ABNORMALITY OF GAIT AND MOBILITY: ICD-10-CM

## 2021-06-10 PROCEDURE — 97110 THERAPEUTIC EXERCISES: CPT | Mod: PN

## 2021-06-15 ENCOUNTER — CLINICAL SUPPORT (OUTPATIENT)
Dept: REHABILITATION | Facility: HOSPITAL | Age: 36
End: 2021-06-15
Attending: ANESTHESIOLOGY
Payer: MEDICARE

## 2021-06-15 DIAGNOSIS — Q78.0 OSTEOGENESIS IMPERFECTA: ICD-10-CM

## 2021-06-15 DIAGNOSIS — R29.898 WEAKNESS OF BOTH HIPS: ICD-10-CM

## 2021-06-15 DIAGNOSIS — R26.9 ABNORMALITY OF GAIT AND MOBILITY: ICD-10-CM

## 2021-06-15 DIAGNOSIS — M53.87 SCIATICA OF RIGHT SIDE ASSOCIATED WITH DISORDER OF LUMBOSACRAL SPINE: ICD-10-CM

## 2021-06-15 DIAGNOSIS — M85.38 OSTEITIS CONDENSANS ILII: ICD-10-CM

## 2021-06-15 PROCEDURE — 97110 THERAPEUTIC EXERCISES: CPT | Mod: PN

## 2021-06-17 ENCOUNTER — CLINICAL SUPPORT (OUTPATIENT)
Dept: REHABILITATION | Facility: HOSPITAL | Age: 36
End: 2021-06-17
Attending: ANESTHESIOLOGY
Payer: MEDICARE

## 2021-06-17 DIAGNOSIS — R29.898 WEAKNESS OF BOTH HIPS: ICD-10-CM

## 2021-06-17 DIAGNOSIS — R26.9 ABNORMALITY OF GAIT AND MOBILITY: ICD-10-CM

## 2021-06-17 DIAGNOSIS — M85.38 OSTEITIS CONDENSANS ILII: ICD-10-CM

## 2021-06-17 DIAGNOSIS — Q78.0 OSTEOGENESIS IMPERFECTA: ICD-10-CM

## 2021-06-17 DIAGNOSIS — M53.87 SCIATICA OF RIGHT SIDE ASSOCIATED WITH DISORDER OF LUMBOSACRAL SPINE: ICD-10-CM

## 2021-06-17 PROCEDURE — 97110 THERAPEUTIC EXERCISES: CPT | Mod: PN

## 2021-06-30 ENCOUNTER — PATIENT MESSAGE (OUTPATIENT)
Dept: PAIN MEDICINE | Facility: CLINIC | Age: 36
End: 2021-06-30

## 2021-07-01 ENCOUNTER — OFFICE VISIT (OUTPATIENT)
Dept: PAIN MEDICINE | Facility: CLINIC | Age: 36
End: 2021-07-01
Payer: MEDICARE

## 2021-07-01 VITALS
WEIGHT: 175.25 LBS | SYSTOLIC BLOOD PRESSURE: 112 MMHG | DIASTOLIC BLOOD PRESSURE: 72 MMHG | HEIGHT: 57 IN | BODY MASS INDEX: 37.81 KG/M2 | HEART RATE: 62 BPM | RESPIRATION RATE: 18 BRPM

## 2021-07-01 DIAGNOSIS — M25.552 HIP PAIN, BILATERAL: Primary | ICD-10-CM

## 2021-07-01 DIAGNOSIS — M54.16 LUMBAR RADICULOPATHY: ICD-10-CM

## 2021-07-01 DIAGNOSIS — M25.551 HIP PAIN, BILATERAL: Primary | ICD-10-CM

## 2021-07-01 DIAGNOSIS — M47.817 LUMBOSACRAL SPONDYLOSIS WITHOUT MYELOPATHY: ICD-10-CM

## 2021-07-01 DIAGNOSIS — G89.29 OTHER CHRONIC PAIN: ICD-10-CM

## 2021-07-01 PROCEDURE — 99213 OFFICE O/P EST LOW 20 MIN: CPT | Mod: S$GLB,,, | Performed by: NURSE PRACTITIONER

## 2021-07-01 PROCEDURE — 99999 PR PBB SHADOW E&M-EST. PATIENT-LVL III: ICD-10-PCS | Mod: PBBFAC,,, | Performed by: NURSE PRACTITIONER

## 2021-07-01 PROCEDURE — 99213 PR OFFICE/OUTPT VISIT, EST, LEVL III, 20-29 MIN: ICD-10-PCS | Mod: S$GLB,,, | Performed by: NURSE PRACTITIONER

## 2021-07-01 PROCEDURE — 3008F PR BODY MASS INDEX (BMI) DOCUMENTED: ICD-10-PCS | Mod: CPTII,S$GLB,, | Performed by: NURSE PRACTITIONER

## 2021-07-01 PROCEDURE — 1125F PR PAIN SEVERITY QUANTIFIED, PAIN PRESENT: ICD-10-PCS | Mod: S$GLB,,, | Performed by: NURSE PRACTITIONER

## 2021-07-01 PROCEDURE — 3008F BODY MASS INDEX DOCD: CPT | Mod: CPTII,S$GLB,, | Performed by: NURSE PRACTITIONER

## 2021-07-01 PROCEDURE — 1125F AMNT PAIN NOTED PAIN PRSNT: CPT | Mod: S$GLB,,, | Performed by: NURSE PRACTITIONER

## 2021-07-01 PROCEDURE — 99999 PR PBB SHADOW E&M-EST. PATIENT-LVL III: CPT | Mod: PBBFAC,,, | Performed by: NURSE PRACTITIONER

## 2021-07-12 ENCOUNTER — PATIENT MESSAGE (OUTPATIENT)
Dept: FAMILY MEDICINE | Facility: CLINIC | Age: 36
End: 2021-07-12

## 2021-07-18 ENCOUNTER — OFFICE VISIT (OUTPATIENT)
Dept: URGENT CARE | Facility: CLINIC | Age: 36
End: 2021-07-18
Payer: MEDICARE

## 2021-07-18 VITALS
HEIGHT: 57 IN | OXYGEN SATURATION: 100 % | TEMPERATURE: 98 F | RESPIRATION RATE: 12 BRPM | SYSTOLIC BLOOD PRESSURE: 114 MMHG | HEART RATE: 62 BPM | WEIGHT: 175 LBS | BODY MASS INDEX: 37.76 KG/M2 | DIASTOLIC BLOOD PRESSURE: 76 MMHG

## 2021-07-18 DIAGNOSIS — J30.9 ALLERGIC RHINITIS, UNSPECIFIED SEASONALITY, UNSPECIFIED TRIGGER: Primary | ICD-10-CM

## 2021-07-18 DIAGNOSIS — R09.81 SINUS CONGESTION: ICD-10-CM

## 2021-07-18 LAB
CTP QC/QA: YES
SARS-COV-2 RDRP RESP QL NAA+PROBE: NEGATIVE

## 2021-07-18 PROCEDURE — 99214 PR OFFICE/OUTPT VISIT, EST, LEVL IV, 30-39 MIN: ICD-10-PCS | Mod: S$GLB,,, | Performed by: NURSE PRACTITIONER

## 2021-07-18 PROCEDURE — U0002 COVID-19 LAB TEST NON-CDC: HCPCS | Mod: QW,S$GLB,, | Performed by: NURSE PRACTITIONER

## 2021-07-18 PROCEDURE — 1126F AMNT PAIN NOTED NONE PRSNT: CPT | Mod: CPTII,S$GLB,, | Performed by: NURSE PRACTITIONER

## 2021-07-18 PROCEDURE — 99214 OFFICE O/P EST MOD 30 MIN: CPT | Mod: S$GLB,,, | Performed by: NURSE PRACTITIONER

## 2021-07-18 PROCEDURE — 3008F BODY MASS INDEX DOCD: CPT | Mod: CPTII,S$GLB,, | Performed by: NURSE PRACTITIONER

## 2021-07-18 PROCEDURE — 1126F PR PAIN SEVERITY QUANTIFIED, NO PAIN PRESENT: ICD-10-PCS | Mod: CPTII,S$GLB,, | Performed by: NURSE PRACTITIONER

## 2021-07-18 PROCEDURE — U0002: ICD-10-PCS | Mod: QW,S$GLB,, | Performed by: NURSE PRACTITIONER

## 2021-07-18 PROCEDURE — 3008F PR BODY MASS INDEX (BMI) DOCUMENTED: ICD-10-PCS | Mod: CPTII,S$GLB,, | Performed by: NURSE PRACTITIONER

## 2021-07-18 RX ORDER — FLUTICASONE PROPIONATE 50 MCG
1 SPRAY, SUSPENSION (ML) NASAL 2 TIMES DAILY
Qty: 16 G | Refills: 0 | Status: SHIPPED | OUTPATIENT
Start: 2021-07-18 | End: 2021-07-18

## 2021-07-18 RX ORDER — CETIRIZINE HYDROCHLORIDE 10 MG/1
10 TABLET ORAL DAILY
Qty: 30 TABLET | Refills: 0 | Status: SHIPPED | OUTPATIENT
Start: 2021-07-18 | End: 2022-08-09

## 2021-09-22 ENCOUNTER — TELEPHONE (OUTPATIENT)
Dept: PAIN MEDICINE | Facility: CLINIC | Age: 36
End: 2021-09-22

## 2021-09-23 ENCOUNTER — OFFICE VISIT (OUTPATIENT)
Dept: PAIN MEDICINE | Facility: CLINIC | Age: 36
End: 2021-09-23
Payer: MEDICARE

## 2021-09-23 VITALS
DIASTOLIC BLOOD PRESSURE: 65 MMHG | SYSTOLIC BLOOD PRESSURE: 105 MMHG | TEMPERATURE: 98 F | WEIGHT: 174.19 LBS | RESPIRATION RATE: 18 BRPM | HEIGHT: 57 IN | HEART RATE: 51 BPM | BODY MASS INDEX: 37.58 KG/M2

## 2021-09-23 DIAGNOSIS — R29.898 WEAKNESS OF BOTH HIPS: Primary | ICD-10-CM

## 2021-09-23 DIAGNOSIS — R26.9 ABNORMALITY OF GAIT AND MOBILITY: ICD-10-CM

## 2021-09-23 DIAGNOSIS — M53.87 SCIATICA OF RIGHT SIDE ASSOCIATED WITH DISORDER OF LUMBOSACRAL SPINE: ICD-10-CM

## 2021-09-23 PROCEDURE — 3074F SYST BP LT 130 MM HG: CPT | Mod: HCNC,CPTII,S$GLB, | Performed by: NURSE PRACTITIONER

## 2021-09-23 PROCEDURE — 1160F RVW MEDS BY RX/DR IN RCRD: CPT | Mod: HCNC,CPTII,S$GLB, | Performed by: NURSE PRACTITIONER

## 2021-09-23 PROCEDURE — 99213 PR OFFICE/OUTPT VISIT, EST, LEVL III, 20-29 MIN: ICD-10-PCS | Mod: HCNC,S$GLB,, | Performed by: NURSE PRACTITIONER

## 2021-09-23 PROCEDURE — 99999 PR PBB SHADOW E&M-EST. PATIENT-LVL III: ICD-10-PCS | Mod: PBBFAC,HCNC,, | Performed by: NURSE PRACTITIONER

## 2021-09-23 PROCEDURE — 3078F DIAST BP <80 MM HG: CPT | Mod: HCNC,CPTII,S$GLB, | Performed by: NURSE PRACTITIONER

## 2021-09-23 PROCEDURE — 3008F BODY MASS INDEX DOCD: CPT | Mod: HCNC,CPTII,S$GLB, | Performed by: NURSE PRACTITIONER

## 2021-09-23 PROCEDURE — 99213 OFFICE O/P EST LOW 20 MIN: CPT | Mod: HCNC,S$GLB,, | Performed by: NURSE PRACTITIONER

## 2021-09-23 PROCEDURE — 1159F MED LIST DOCD IN RCRD: CPT | Mod: HCNC,CPTII,S$GLB, | Performed by: NURSE PRACTITIONER

## 2021-09-23 PROCEDURE — 99999 PR PBB SHADOW E&M-EST. PATIENT-LVL III: CPT | Mod: PBBFAC,HCNC,, | Performed by: NURSE PRACTITIONER

## 2021-09-23 PROCEDURE — 3008F PR BODY MASS INDEX (BMI) DOCUMENTED: ICD-10-PCS | Mod: HCNC,CPTII,S$GLB, | Performed by: NURSE PRACTITIONER

## 2021-09-23 PROCEDURE — 1159F PR MEDICATION LIST DOCUMENTED IN MEDICAL RECORD: ICD-10-PCS | Mod: HCNC,CPTII,S$GLB, | Performed by: NURSE PRACTITIONER

## 2021-09-23 PROCEDURE — 1160F PR REVIEW ALL MEDS BY PRESCRIBER/CLIN PHARMACIST DOCUMENTED: ICD-10-PCS | Mod: HCNC,CPTII,S$GLB, | Performed by: NURSE PRACTITIONER

## 2021-09-23 PROCEDURE — 3074F PR MOST RECENT SYSTOLIC BLOOD PRESSURE < 130 MM HG: ICD-10-PCS | Mod: HCNC,CPTII,S$GLB, | Performed by: NURSE PRACTITIONER

## 2021-09-23 PROCEDURE — 3078F PR MOST RECENT DIASTOLIC BLOOD PRESSURE < 80 MM HG: ICD-10-PCS | Mod: HCNC,CPTII,S$GLB, | Performed by: NURSE PRACTITIONER

## 2021-09-23 RX ORDER — TIZANIDINE 4 MG/1
4 TABLET ORAL 2 TIMES DAILY PRN
Qty: 60 TABLET | Refills: 5 | Status: SHIPPED | OUTPATIENT
Start: 2021-09-23 | End: 2021-10-23

## 2021-12-22 ENCOUNTER — HOSPITAL ENCOUNTER (EMERGENCY)
Facility: HOSPITAL | Age: 36
Discharge: HOME | End: 2021-12-22
Attending: EMERGENCY MEDICINE
Payer: MEDICARE

## 2021-12-22 VITALS
DIASTOLIC BLOOD PRESSURE: 66 MMHG | BODY MASS INDEX: 37.54 KG/M2 | OXYGEN SATURATION: 99 % | TEMPERATURE: 99 F | WEIGHT: 174 LBS | SYSTOLIC BLOOD PRESSURE: 122 MMHG | RESPIRATION RATE: 19 BRPM | HEIGHT: 57 IN | HEART RATE: 70 BPM

## 2021-12-22 DIAGNOSIS — T78.40XA ALLERGIC REACTION, INITIAL ENCOUNTER: Primary | ICD-10-CM

## 2021-12-22 LAB
CTP QC/QA: YES
SARS-COV-2 RDRP RESP QL NAA+PROBE: NEGATIVE

## 2021-12-22 PROCEDURE — 99285 PR EMERGENCY DEPT VISIT,LEVEL V: ICD-10-PCS | Mod: CS,,, | Performed by: EMERGENCY MEDICINE

## 2021-12-22 PROCEDURE — 86803 HEPATITIS C AB TEST: CPT | Performed by: EMERGENCY MEDICINE

## 2021-12-22 PROCEDURE — U0002 COVID-19 LAB TEST NON-CDC: HCPCS | Performed by: EMERGENCY MEDICINE

## 2021-12-22 PROCEDURE — 99284 EMERGENCY DEPT VISIT MOD MDM: CPT | Mod: 25

## 2021-12-22 PROCEDURE — 63600175 PHARM REV CODE 636 W HCPCS: Performed by: EMERGENCY MEDICINE

## 2021-12-22 PROCEDURE — 96375 TX/PRO/DX INJ NEW DRUG ADDON: CPT

## 2021-12-22 PROCEDURE — 87389 HIV-1 AG W/HIV-1&-2 AB AG IA: CPT | Performed by: EMERGENCY MEDICINE

## 2021-12-22 PROCEDURE — 25000003 PHARM REV CODE 250: Performed by: EMERGENCY MEDICINE

## 2021-12-22 PROCEDURE — 99285 EMERGENCY DEPT VISIT HI MDM: CPT | Mod: CS,,, | Performed by: EMERGENCY MEDICINE

## 2021-12-22 PROCEDURE — 96374 THER/PROPH/DIAG INJ IV PUSH: CPT

## 2021-12-22 RX ORDER — FAMOTIDINE 20 MG/1
20 TABLET, FILM COATED ORAL 2 TIMES DAILY
Qty: 10 TABLET | Refills: 0 | Status: SHIPPED | OUTPATIENT
Start: 2021-12-22 | End: 2022-08-09

## 2021-12-22 RX ORDER — EPINEPHRINE 0.3 MG/.3ML
1 INJECTION SUBCUTANEOUS
Qty: 1 EACH | Refills: 1 | Status: SHIPPED | OUTPATIENT
Start: 2021-12-22 | End: 2022-12-22

## 2021-12-22 RX ORDER — DIPHENHYDRAMINE HCL 25 MG
25 CAPSULE ORAL EVERY 6 HOURS
Qty: 20 CAPSULE | Refills: 0 | Status: SHIPPED | OUTPATIENT
Start: 2021-12-22 | End: 2021-12-27

## 2021-12-22 RX ORDER — DIPHENHYDRAMINE HYDROCHLORIDE 50 MG/ML
12.5 INJECTION INTRAMUSCULAR; INTRAVENOUS
Status: COMPLETED | OUTPATIENT
Start: 2021-12-22 | End: 2021-12-22

## 2021-12-22 RX ORDER — HYDROCODONE BITARTRATE AND ACETAMINOPHEN 5; 325 MG/1; MG/1
1 TABLET ORAL
Status: COMPLETED | OUTPATIENT
Start: 2021-12-22 | End: 2021-12-22

## 2021-12-22 RX ORDER — FAMOTIDINE 10 MG/ML
20 INJECTION INTRAVENOUS
Status: COMPLETED | OUTPATIENT
Start: 2021-12-22 | End: 2021-12-22

## 2021-12-22 RX ORDER — PREDNISONE 50 MG/1
50 TABLET ORAL DAILY
Qty: 4 TABLET | Refills: 0 | Status: SHIPPED | OUTPATIENT
Start: 2021-12-22 | End: 2021-12-26

## 2021-12-22 RX ORDER — METHYLPREDNISOLONE SOD SUCC 125 MG
125 VIAL (EA) INJECTION
Status: COMPLETED | OUTPATIENT
Start: 2021-12-22 | End: 2021-12-22

## 2021-12-22 RX ORDER — KETOROLAC TROMETHAMINE 30 MG/ML
10 INJECTION, SOLUTION INTRAMUSCULAR; INTRAVENOUS
Status: DISCONTINUED | OUTPATIENT
Start: 2021-12-22 | End: 2021-12-22

## 2021-12-22 RX ADMIN — FAMOTIDINE 20 MG: 10 INJECTION, SOLUTION INTRAVENOUS at 08:12

## 2021-12-22 RX ADMIN — HYDROCODONE BITARTRATE AND ACETAMINOPHEN 1 TABLET: 5; 325 TABLET ORAL at 10:12

## 2021-12-22 RX ADMIN — DIPHENHYDRAMINE HYDROCHLORIDE 12.5 MG: 50 INJECTION INTRAMUSCULAR; INTRAVENOUS at 08:12

## 2021-12-22 RX ADMIN — METHYLPREDNISOLONE SODIUM SUCCINATE 125 MG: 125 INJECTION, POWDER, FOR SOLUTION INTRAMUSCULAR; INTRAVENOUS at 08:12

## 2021-12-22 NOTE — Clinical Note
"Prisca"Court Zhu was seen and treated in our emergency department on 12/22/2021.  She may return to work on 12/23/2021.       If you have any questions or concerns, please don't hesitate to call.      Edna Mae MD"

## 2021-12-23 LAB
HCV AB SERPL QL IA: NEGATIVE
HIV 1+2 AB+HIV1 P24 AG SERPL QL IA: NEGATIVE

## 2021-12-23 NOTE — ED NOTES
Repeat vs obtained, HL dc'd intact and patient was given discharge and follow up instructions along with medication instructions a this time.  Denied any questions or concerns and ambulated out of CCR without any difficulty at this time.

## 2021-12-23 NOTE — ED PROVIDER NOTES
"Encounter Date: 12/22/2021       History     Chief Complaint   Patient presents with    Medication Reaction     Thinks she took her child's medication around 1300 that may have had penicillin in it but patient is unsure. She reports L sided rib pain and "pins and needles on her L side" that has been intermittent since she woke up around 1500     This is a 36-year-old female who presents to the emergency department today stating that she has had some recent URI symptoms including rhinorrhea, congestion, and occasional cough and was going to take some cough medicine.  She went into the counter to get the cough medicine and believes that she may have actually taken her child penicillin instead.  She has a history of penicillin allergy.  This in the past has caused her to have some facial swelling.  This occurred around 1:00 p.m..  At this time she continues to have the URI symptoms.  She also has some sensation of throat fullness.  She has had no vomiting or diarrhea.  No abdominal pain.  No presyncope or syncope.  No urticaria or other skin lesions.  She also has no wheezing or susan shortness of breath.  No tongue swelling or lip swelling.  She does complain of a pins and needles type sensation throughout the entirety of the left side of her body.        Review of patient's allergies indicates:   Allergen Reactions    Pcn [penicillins] Shortness Of Breath     Past Medical History:   Diagnosis Date    Anemia     BMI 50.0-59.9, adult     Encounter for IUD removal 5/23/2019    GERD (gastroesophageal reflux disease) 4/2/2019    History of blood transfusion 2001    Malpositioned intrauterine device 3/21/2019    April 2019 OB/GYN - Dr Guzman - Discussed with patient that we will proceed with imaging to locate the IUD after which we can then proceed to book case in OR to retrieve displaced IUD    Obesity     OI (osteogenesis imperfecta)     Osteopetrosis     Tendonitis      Past Surgical History:   Procedure " Laterality Date     SECTION      2016    ESOPHAGOGASTRODUODENOSCOPY N/A 2019    Procedure: ESOPHAGOGASTRODUODENOSCOPY (EGD);  Surgeon: Segun Dominguez MD;  Location: [facility] (2ND FLR);  Service: Endoscopy;  Laterality: N/A;  BMI 51    ESOPHAGOGASTRODUODENOSCOPY N/A 2020    Procedure: EGD (ESOPHAGOGASTRODUODENOSCOPY);  Surgeon: Boom Farris MD;  Location: [facility];  Service: Endoscopy;  Laterality: N/A;  Dr. Jolly said we can do a rapid on her    ESOPHAGOGASTRODUODENOSCOPY N/A 3/4/2021    Procedure: EGD (ESOPHAGOGASTRODUODENOSCOPY), Please check for H. Pylori;  Surgeon: Ntaaliia Bradshaw MD;  Location: [facility];  Service: Endoscopy;  Laterality: N/A;  Please check for H. Pylori  3/1-covid [facility]-inst portal-tb    FEMUR FRACTURE SURGERY Bilateral     hardware/rods in both legs    FRACTURE SURGERY      femur    LAPAROSCOPIC GASTROENTEROSTOMY N/A 7/10/2020    Procedure: GASTROENTEROSTOMY, LAPAROSCOPIC, with intraop EGD;  Surgeon: Guillermo Villanueva MD;  Location: [facility] OR 2ND FLR;  Service: General;  Laterality: N/A;    REMOVAL OF INTRAUTERINE DEVICE (IUD) N/A 6/3/2019    Procedure: REMOVAL, INTRAUTERINE DEVICE;  Surgeon: Ashley Greenberg MD;  Location: [facility];  Service: OB/GYN;  Laterality: N/A;  RN PRE OP 0-83-23----BMI-51.29    TRANSFORAMINAL EPIDURAL INJECTION OF STEROID Right 2021    Procedure: INJECTION, STEROID, EPIDURAL, TRANSFORAMINAL, APPROACH , L5-S1 and S1;  Surgeon: Brandon Lee MD;  Location: [facility]IN [facility];  Service: Pain Management;  Laterality: Right;    TRANSFORAMINAL EPIDURAL INJECTION OF STEROID Right 2021    Procedure: INJECTION, STEROID, EPIDURAL, TRANSFORAMINAL APPROACH L5/S1, S1;  Surgeon: Brandon Lee MD;  Location: [facility];  Service: Pain Management;  Laterality: Right;    WISDOM TOOTH EXTRACTION       Family History   Problem Relation Age of Onset    Hypertension Mother     Hyperlipidemia Mother     Asthma Mother      Anxiety disorder Mother     No Known Problems Father     Liver cancer Maternal Grandfather     Celiac disease Neg Hx     Colon cancer Neg Hx     Colon polyps Neg Hx     Crohn's disease Neg Hx     Cystic fibrosis Neg Hx     Esophageal cancer Neg Hx     Irritable bowel syndrome Neg Hx     Rectal cancer Neg Hx     Stomach cancer Neg Hx      Social History     Tobacco Use    Smoking status: Never Smoker    Smokeless tobacco: Never Used   Substance Use Topics    Alcohol use: No    Drug use: Never     Review of Systems   Constitutional: Negative for chills and fever.   HENT: Positive for congestion and rhinorrhea. Negative for sore throat.    Eyes: Negative for visual disturbance.   Respiratory: Negative for shortness of breath.         + throat tightness   Cardiovascular: Negative for chest pain.   Gastrointestinal: Negative for nausea.   Endocrine: Negative for polyuria.   Genitourinary: Negative for dysuria.   Musculoskeletal: Negative for back pain.   Skin: Negative for rash.   Neurological: Negative for weakness and numbness.   Hematological: Does not bruise/bleed easily.   Psychiatric/Behavioral: Negative for confusion.       Physical Exam     Initial Vitals   BP Pulse Resp Temp SpO2   12/22/21 0400 12/22/21 0400 12/22/21 1933 12/22/21 1933 12/22/21 0400   122/66 72 18 99.4 °F (37.4 °C) 99 %      MAP       --                Physical Exam    Nursing note and vitals reviewed.  Constitutional: She appears well-developed and well-nourished. No distress.   HENT:   Head: Normocephalic and atraumatic.   Nose: Nose normal.   Mouth/Throat: Oropharynx is clear and moist.   Eyes: Conjunctivae and EOM are normal. Pupils are equal, round, and reactive to light.   Neck: Neck supple.   Normal range of motion.  Cardiovascular: Normal rate, regular rhythm, normal heart sounds and intact distal pulses. Exam reveals no gallop and no friction rub.    No murmur heard.  Pulmonary/Chest: Breath sounds normal. No  respiratory distress. She has no wheezes. She has no rhonchi. She has no rales.   Abdominal: Abdomen is soft. Bowel sounds are normal. She exhibits no distension. There is no abdominal tenderness. There is no rebound and no guarding.   Musculoskeletal:         General: No edema. Normal range of motion.      Cervical back: Normal range of motion and neck supple.     Neurological: She is alert and oriented to person, place, and time. She has normal strength. No sensory deficit. GCS score is 15. GCS eye subscore is 4. GCS verbal subscore is 5. GCS motor subscore is 6.   Skin: Skin is warm and dry. Capillary refill takes less than 2 seconds.         ED Course   Procedures  Labs Reviewed   HIV 1 / 2 ANTIBODY    Narrative:     Release to patient->Immediate   HEPATITIS C ANTIBODY    Narrative:     Release to patient->Immediate   SARS-COV-2 RDRP GENE    Narrative:     This test utilizes isothermal nucleic acid amplification   technology to detect the SARS-CoV-2 RdRp nucleic acid segment.   The analytical sensitivity (limit of detection) is 125 genome   equivalents/mL.   A POSITIVE result implies infection with the SARS-CoV-2 virus;   the patient is presumed to be contagious.     A NEGATIVE result means that SARS-CoV-2 nucleic acids are not   present above the limit of detection. A NEGATIVE result should be   treated as presumptive. It does not rule out the possibility of   COVID-19 and should not be the sole basis for treatment decisions.   If COVID-19 is strongly suspected based on clinical and exposure   history, re-testing using an alternate molecular assay should be   considered.   This test is only for use under the Food and Drug   Administration s Emergency Use Authorization (EUA).   Commercial kits are provided by [employer].   Performance characteristics of the EUA have been independently   verified by [facility] Department of   Pathology and Laboratory Medicine.    _________________________________________________________________   The authorized Fact Sheet for Healthcare Providers and the authorized Fact   Sheet for Patients of the ID NOW COVID-19 are available on the FDA   website:     https://www.fda.gov/media/513469/download  https://www.fda.gov/media/930733/download              Imaging Results    None          Medications   methylPREDNISolone sodium succinate injection 125 mg (125 mg Intravenous Given 12/22/21 2033)   famotidine (PF) injection 20 mg (20 mg Intravenous Given 12/22/21 2033)   diphenhydrAMINE injection 12.5 mg (12.5 mg Intravenous Given 12/22/21 2033)   HYDROcodone-acetaminophen 5-325 mg per tablet 1 tablet (1 tablet Oral Given 12/22/21 2229)     Medical Decision Making:   History:   Old Medical Records: I decided to obtain old medical records.  Old Records Summarized: records from clinic visits.       <> Summary of Records: Most of her recent notes are in regard to chronic pain/back pain.  Initial Assessment:   37 yo female who presents after accidentally ingesting her child's penicillin. Arrives with sensation of throat tightness. Has known allergy to pcn. Also complaints of tingling/pins and needles that affects the entirety of her left side of her body.   Differential Diagnosis:   My greatest concern as that her symptom of throat tightness was a symptom of allergic reaction given her known allergy to penicillin and her concern for accidental ingestion of her child's pcn. She has not had any skin, gi, or circulatory findings fortunately.   She also complained of this pins and needles sensation that had an odd distribution. It affected her entire left side and did not follow a specific dermatomal pattern. I felt that there was low likelihood that there was an acute emergent neurologic cause to this symptom. Perhaps it was an odd sensation related to her allergic reaction.   The patient also has had URI symptoms so we did want to r/o covid.   Clinical  Tests:   Lab Tests: Ordered and Reviewed  ED Management:  The patient has a negative COVID. I have treated her for allergic reaction with benadryl, pepcid, and steroids. She does not meet criteria of two or more systems for true anaphylaxis and has not required epi. So far we have watched her for about 3 hours. She has had improvement thus far in her symptoms. At time of sign out of care, I have gone ahead and written her for prescriptions for five days of pepcid, benadryl, prednisone, and an epi pen prn anaphylaxis. I will sign out with plan that if she does well for the next hour that she may be able to be discharged.   ED Diagnosis:  1. Acute allergic reaction.                       Clinical Impression:   Final diagnoses:  [T78.40XA] Allergic reaction, initial encounter (Primary)          ED Disposition Condition    Discharge Stable        ED Prescriptions     Medication Sig Dispense Start Date End Date Auth. Provider    diphenhydrAMINE (BENADRYL) 25 mg capsule Take 1 capsule (25 mg total) by mouth every 6 (six) hours. for 5 days 20 capsule 12/22/2021 12/27/2021 Edna Mae MD    famotidine (PEPCID) 20 MG tablet Take 1 tablet (20 mg total) by mouth 2 (two) times daily. for 5 days 10 tablet 12/22/2021 12/27/2021 Edna Mae MD    predniSONE (DELTASONE) 50 MG Tab Take 1 tablet (50 mg total) by mouth once daily. for 4 days 4 tablet 12/22/2021 12/26/2021 Enda Mae MD    EPINEPHrine (EPIPEN) 0.3 mg/0.3 mL AtIn Inject 0.3 mLs (0.3 mg total) into the muscle as needed. 1 each 12/22/2021 12/22/2022 Edna Mae MD        Follow-up Information     Follow up With Specialties Details Why Contact Info    Mathew Carpenter MD Internal Medicine, Pediatrics In 3 days for recheck [address]  [place] [address]  [phone]             Edna Mae MD  12/24/21 [id number]

## 2021-12-23 NOTE — ED NOTES
"Prisca Zhu, a 36 y.o. female presents to the ED w/ complaint of cold like symptoms. Pt reports taking Childrens penicillin, pt reports previous reaction to PCN w/ swelling to face. Pt reports left side rib pain. Airway clear    Triage note:  Chief Complaint   Patient presents with    Medication Reaction     Thinks she took her child's medication around 1300 that may have had penicillin in it but patient is unsure. She reports L sided rib pain and "pins and needles on her L side" that has been intermittent since she woke up around 1500     Review of patient's allergies indicates:   Allergen Reactions    Pcn [penicillins] Shortness Of Breath     Past Medical History:   Diagnosis Date    Anemia     BMI 50.0-59.9, adult     Encounter for IUD removal 5/23/2019    GERD (gastroesophageal reflux disease) 4/2/2019    History of blood transfusion 2001    Malpositioned intrauterine device 3/21/2019    April 2019 OB/GYN - Dr Guzman - Discussed with patient that we will proceed with imaging to locate the IUD after which we can then proceed to book case in OR to retrieve displaced IUD    Obesity     OI (osteogenesis imperfecta)     Osteopetrosis     Tendonitis      LOC: The patient is awake, alert and aware of environment with an appropriate affect, the patient is oriented x 3 and speaking appropriately.   APPEARANCE: Patient appears comfortable and in no acute distress, patient is clean and well groomed.  SKIN: The skin is warm and dry, color consistent with ethnicity, patient has normal skin turgor and moist mucus membranes, skin intact, no breakdown or bruising noted.   MUSCULOSKELETAL: Patient moving all extremities spontaneously, no swelling noted.  RESPIRATORY: Airway is open and patent, respirations are spontaneous, patient has a normal effort and rate, no accessory muscle use noted,  with O2 sats noted at 100% on room air.  CARDIAC: . Patient has a normal rate and regular rhythm, no edema noted, " capillary refill < 3 seconds.   GASTRO: Soft and non tender to palpation, no distention noted, normoactive bowel sounds present in all four quadrants. Pt states bowel movements have been regular.  : Pt denies any pain or frequency with urination.  NEURO: Pt opens eyes spontaneously, behavior appropriate to situation, follows commands, facial expression symmetrical, bilateral hand grasp equal and even, purposeful motor response noted, normal sensation in all extremities when touched with a finger.

## 2022-01-28 ENCOUNTER — TELEPHONE (OUTPATIENT)
Dept: BARIATRICS | Facility: CLINIC | Age: 37
End: 2022-01-28
Payer: MEDICARE

## 2022-01-28 NOTE — TELEPHONE ENCOUNTER
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 Villanueva.  Patient due for yearly follow up.  Spoke with patient and confirmed appointment with JERMAINE Mejia for 3/9/2022.

## 2022-02-26 ENCOUNTER — HOSPITAL ENCOUNTER (EMERGENCY)
Facility: HOSPITAL | Age: 37
Discharge: HOME | End: 2022-02-27
Attending: EMERGENCY MEDICINE
Payer: MEDICARE

## 2022-02-26 DIAGNOSIS — B96.89 BACTERIAL VAGINOSIS: ICD-10-CM

## 2022-02-26 DIAGNOSIS — N76.0 BACTERIAL VAGINOSIS: ICD-10-CM

## 2022-02-26 DIAGNOSIS — R10.30 LOWER ABDOMINAL PAIN: ICD-10-CM

## 2022-02-26 DIAGNOSIS — N73.9 PID (PELVIC INFLAMMATORY DISEASE): Primary | ICD-10-CM

## 2022-02-26 LAB
ALBUMIN SERPL BCP-MCNC: 3.9 G/DL (ref 3.5–5.2)
ALP SERPL-CCNC: 81 U/L (ref 55–135)
ALT SERPL W/O P-5'-P-CCNC: 21 U/L (ref 10–44)
ANION GAP SERPL CALC-SCNC: 11 MMOL/L (ref 8–16)
AST SERPL-CCNC: 30 U/L (ref 10–40)
B-HCG UR QL: NEGATIVE
BACTERIA #/AREA URNS HPF: ABNORMAL /HPF
BASOPHILS # BLD AUTO: 0.04 K/UL (ref 0–0.2)
BASOPHILS NFR BLD: 0.6 % (ref 0–1.9)
BILIRUB SERPL-MCNC: 0.4 MG/DL (ref 0.1–1)
BILIRUB UR QL STRIP: NEGATIVE
BUN SERPL-MCNC: 8 MG/DL (ref 6–20)
CALCIUM SERPL-MCNC: 9 MG/DL (ref 8.7–10.5)
CHLORIDE SERPL-SCNC: 106 MMOL/L (ref 95–110)
CLARITY UR: CLEAR
CO2 SERPL-SCNC: 22 MMOL/L (ref 23–29)
COLOR UR: YELLOW
CREAT SERPL-MCNC: 0.8 MG/DL (ref 0.5–1.4)
CTP QC/QA: YES
DIFFERENTIAL METHOD: NORMAL
EOSINOPHIL # BLD AUTO: 0.1 K/UL (ref 0–0.5)
EOSINOPHIL NFR BLD: 1.3 % (ref 0–8)
ERYTHROCYTE [DISTWIDTH] IN BLOOD BY AUTOMATED COUNT: 13.3 % (ref 11.5–14.5)
EST. GFR  (AFRICAN AMERICAN): >60 ML/MIN/1.73 M^2
EST. GFR  (NON AFRICAN AMERICAN): >60 ML/MIN/1.73 M^2
GLUCOSE SERPL-MCNC: 78 MG/DL (ref 70–110)
GLUCOSE UR QL STRIP: NEGATIVE
HCT VFR BLD AUTO: 40.3 % (ref 37–48.5)
HGB BLD-MCNC: 12.9 G/DL (ref 12–16)
HGB UR QL STRIP: NEGATIVE
IMM GRANULOCYTES # BLD AUTO: 0.01 K/UL (ref 0–0.04)
IMM GRANULOCYTES NFR BLD AUTO: 0.1 % (ref 0–0.5)
KETONES UR QL STRIP: NEGATIVE
LEUKOCYTE ESTERASE UR QL STRIP: ABNORMAL
LIPASE SERPL-CCNC: 39 U/L (ref 4–60)
LYMPHOCYTES # BLD AUTO: 3 K/UL (ref 1–4.8)
LYMPHOCYTES NFR BLD: 42 % (ref 18–48)
MAGNESIUM SERPL-MCNC: 2.2 MG/DL (ref 1.6–2.6)
MCH RBC QN AUTO: 29.7 PG (ref 27–31)
MCHC RBC AUTO-ENTMCNC: 32 G/DL (ref 32–36)
MCV RBC AUTO: 93 FL (ref 82–98)
MICROSCOPIC COMMENT: ABNORMAL
MONOCYTES # BLD AUTO: 0.6 K/UL (ref 0.3–1)
MONOCYTES NFR BLD: 8.3 % (ref 4–15)
NEUTROPHILS # BLD AUTO: 3.4 K/UL (ref 1.8–7.7)
NEUTROPHILS NFR BLD: 47.7 % (ref 38–73)
NITRITE UR QL STRIP: NEGATIVE
NRBC BLD-RTO: 0 /100 WBC
PH UR STRIP: 7 [PH] (ref 5–8)
PLATELET # BLD AUTO: 260 K/UL (ref 150–450)
PMV BLD AUTO: 10.8 FL (ref 9.2–12.9)
POTASSIUM SERPL-SCNC: 4.3 MMOL/L (ref 3.5–5.1)
PROT SERPL-MCNC: 8.2 G/DL (ref 6–8.4)
PROT UR QL STRIP: NEGATIVE
RBC # BLD AUTO: 4.34 M/UL (ref 4–5.4)
SODIUM SERPL-SCNC: 139 MMOL/L (ref 136–145)
SP GR UR STRIP: 1.02 (ref 1–1.03)
SQUAMOUS #/AREA URNS HPF: 2 /HPF
URN SPEC COLLECT METH UR: ABNORMAL
UROBILINOGEN UR STRIP-ACNC: NEGATIVE EU/DL
WBC # BLD AUTO: 7.07 K/UL (ref 3.9–12.7)
WBC #/AREA URNS HPF: 3 /HPF (ref 0–5)

## 2022-02-26 PROCEDURE — 96361 HYDRATE IV INFUSION ADD-ON: CPT

## 2022-02-26 PROCEDURE — 96374 THER/PROPH/DIAG INJ IV PUSH: CPT

## 2022-02-26 PROCEDURE — 96375 TX/PRO/DX INJ NEW DRUG ADDON: CPT

## 2022-02-26 PROCEDURE — 81025 URINE PREGNANCY TEST: CPT | Performed by: EMERGENCY MEDICINE

## 2022-02-26 PROCEDURE — 83735 ASSAY OF MAGNESIUM: CPT | Performed by: EMERGENCY MEDICINE

## 2022-02-26 PROCEDURE — 83690 ASSAY OF LIPASE: CPT | Performed by: EMERGENCY MEDICINE

## 2022-02-26 PROCEDURE — 81000 URINALYSIS NONAUTO W/SCOPE: CPT | Performed by: EMERGENCY MEDICINE

## 2022-02-26 PROCEDURE — 63600175 PHARM REV CODE 636 W HCPCS: Performed by: EMERGENCY MEDICINE

## 2022-02-26 PROCEDURE — 80053 COMPREHEN METABOLIC PANEL: CPT | Performed by: EMERGENCY MEDICINE

## 2022-02-26 PROCEDURE — 25000003 PHARM REV CODE 250: Performed by: EMERGENCY MEDICINE

## 2022-02-26 PROCEDURE — 85025 COMPLETE CBC W/AUTO DIFF WBC: CPT | Performed by: EMERGENCY MEDICINE

## 2022-02-26 PROCEDURE — 99285 EMERGENCY DEPT VISIT HI MDM: CPT | Mod: 25

## 2022-02-26 RX ORDER — KETOROLAC TROMETHAMINE 30 MG/ML
15 INJECTION, SOLUTION INTRAMUSCULAR; INTRAVENOUS
Status: COMPLETED | OUTPATIENT
Start: 2022-02-26 | End: 2022-02-26

## 2022-02-26 RX ORDER — ONDANSETRON 2 MG/ML
4 INJECTION INTRAMUSCULAR; INTRAVENOUS
Status: COMPLETED | OUTPATIENT
Start: 2022-02-26 | End: 2022-02-26

## 2022-02-26 RX ADMIN — IOHEXOL 100 ML: 350 INJECTION, SOLUTION INTRAVENOUS at 11:02

## 2022-02-26 RX ADMIN — ONDANSETRON 4 MG: 2 INJECTION INTRAMUSCULAR; INTRAVENOUS at 10:02

## 2022-02-26 RX ADMIN — KETOROLAC TROMETHAMINE 15 MG: 30 INJECTION, SOLUTION INTRAMUSCULAR at 10:02

## 2022-02-26 RX ADMIN — SODIUM CHLORIDE 1000 ML: 0.9 INJECTION, SOLUTION INTRAVENOUS at 10:02

## 2022-02-27 VITALS
TEMPERATURE: 98 F | RESPIRATION RATE: 16 BRPM | OXYGEN SATURATION: 100 % | BODY MASS INDEX: 30.21 KG/M2 | HEIGHT: 61 IN | SYSTOLIC BLOOD PRESSURE: 147 MMHG | WEIGHT: 160 LBS | DIASTOLIC BLOOD PRESSURE: 78 MMHG | HEART RATE: 62 BPM

## 2022-02-27 LAB
BACTERIA GENITAL QL WET PREP: ABNORMAL
CLUE CELLS VAG QL WET PREP: ABNORMAL
FILAMENT FUNGI VAG WET PREP-#/AREA: ABNORMAL
SPECIMEN SOURCE: ABNORMAL
T VAGINALIS GENITAL QL WET PREP: ABNORMAL
WBC #/AREA VAG WET PREP: ABNORMAL
YEAST GENITAL QL WET PREP: ABNORMAL

## 2022-02-27 PROCEDURE — 25000003 PHARM REV CODE 250: Performed by: EMERGENCY MEDICINE

## 2022-02-27 PROCEDURE — 87591 N.GONORRHOEAE DNA AMP PROB: CPT | Performed by: EMERGENCY MEDICINE

## 2022-02-27 PROCEDURE — 96372 THER/PROPH/DIAG INJ SC/IM: CPT

## 2022-02-27 PROCEDURE — 87210 SMEAR WET MOUNT SALINE/INK: CPT | Performed by: EMERGENCY MEDICINE

## 2022-02-27 PROCEDURE — 63600175 PHARM REV CODE 636 W HCPCS: Performed by: EMERGENCY MEDICINE

## 2022-02-27 PROCEDURE — 25500020 PHARM REV CODE 255: Performed by: EMERGENCY MEDICINE

## 2022-02-27 PROCEDURE — 87491 CHLMYD TRACH DNA AMP PROBE: CPT | Performed by: EMERGENCY MEDICINE

## 2022-02-27 RX ORDER — METRONIDAZOLE 500 MG/1
500 TABLET ORAL EVERY 12 HOURS
Qty: 28 TABLET | Refills: 0 | Status: SHIPPED | OUTPATIENT
Start: 2022-02-27 | End: 2022-03-13

## 2022-02-27 RX ORDER — DOXYCYCLINE HYCLATE 100 MG
100 TABLET ORAL
Status: COMPLETED | OUTPATIENT
Start: 2022-02-27 | End: 2022-02-27

## 2022-02-27 RX ORDER — METRONIDAZOLE 500 MG/1
500 TABLET ORAL
Status: COMPLETED | OUTPATIENT
Start: 2022-02-27 | End: 2022-02-27

## 2022-02-27 RX ORDER — DOXYCYCLINE 100 MG/1
100 CAPSULE ORAL 2 TIMES DAILY
Qty: 28 CAPSULE | Refills: 0 | Status: SHIPPED | OUTPATIENT
Start: 2022-02-27 | End: 2022-03-13

## 2022-02-27 RX ORDER — NAPROXEN 500 MG/1
500 TABLET ORAL 2 TIMES DAILY WITH MEALS
Qty: 14 TABLET | Refills: 0 | Status: SHIPPED | OUTPATIENT
Start: 2022-02-27 | End: 2022-03-06

## 2022-02-27 RX ADMIN — DOXYCYCLINE HYCLATE 100 MG: 100 TABLET, COATED ORAL at 01:02

## 2022-02-27 RX ADMIN — CEFTRIAXONE 500 MG: 1 INJECTION, POWDER, FOR SOLUTION INTRAMUSCULAR; INTRAVENOUS at 01:02

## 2022-02-27 RX ADMIN — METRONIDAZOLE 500 MG: 500 TABLET ORAL at 02:02

## 2022-02-27 NOTE — ED PROVIDER NOTES
"Encounter Date: 2022    SCRIBE #1 NOTE: I, Jamila Rodri, am scribing for, and in the presence of,  Enrique Whaley MD. I have scribed the following portions of the note - Other sections scribed: HPI, ROS, PE.       History     Chief Complaint   Patient presents with    Abdominal Pain     Complaining of intermittent LLQ abdominal pain described as a pinching sensation with accompanied by umbilical swelling, denies urinary symptoms, denies vaginal bleeding or discharge, also reports bilateral hands and feet swelling ongoing for 2 weeks     HPI:  36 year old female with PMHx of GERD and osteogenesis imperfecta presents to the ED for evaluation of worsening lower abdominal pain for three days. The patient states that the pain is intermittent and describes it as a "pinching" sensation. She rates the pain a 7/10 currently but states that it is exacerbated by standing. She denies nausea, vomiting, dysuria, urinary frequency, vaginal bleeding, vaginal discharge, or fever. No history of similar symptoms reported. No medications were taken prior to arrival. Abdominal surgical history includes gastric bypass () and . Patient has had several EGDs with the last performed in 2021.     The history is provided by the patient. No  was used.     Review of patient's allergies indicates:   Allergen Reactions    Pcn [penicillins] Shortness Of Breath     Past Medical History:   Diagnosis Date    Anemia     BMI 50.0-59.9, adult     Encounter for IUD removal 2019    GERD (gastroesophageal reflux disease) 2019    History of blood transfusion 2001    Malpositioned intrauterine device 3/21/2019    2019 OB/GYN - Dr Guzman - Discussed with patient that we will proceed with imaging to locate the IUD after which we can then proceed to book case in OR to retrieve displaced IUD    Obesity     OI (osteogenesis imperfecta)     Osteopetrosis     Tendonitis      Past Surgical " History:   Procedure Laterality Date     SECTION      2016    ESOPHAGOGASTRODUODENOSCOPY N/A 2019    Procedure: ESOPHAGOGASTRODUODENOSCOPY (EGD);  Surgeon: Segun Dominguez MD;  Location: [facility] (2ND FLR);  Service: Endoscopy;  Laterality: N/A;  BMI 51    ESOPHAGOGASTRODUODENOSCOPY N/A 2020    Procedure: EGD (ESOPHAGOGASTRODUODENOSCOPY);  Surgeon: Boom Farris MD;  Location: [facility];  Service: Endoscopy;  Laterality: N/A;  Dr. Jolly said we can do a rapid on her    ESOPHAGOGASTRODUODENOSCOPY N/A 3/4/2021    Procedure: EGD (ESOPHAGOGASTRODUODENOSCOPY), Please check for H. Pylori;  Surgeon: Nataliia Bradshaw MD;  Location: [facility];  Service: Endoscopy;  Laterality: N/A;  Please check for H. Pylori  3/1-covid [facility]-inst portal-tb    FEMUR FRACTURE SURGERY Bilateral     hardware/rods in both legs    FRACTURE SURGERY      femur    LAPAROSCOPIC GASTROENTEROSTOMY N/A 7/10/2020    Procedure: GASTROENTEROSTOMY, LAPAROSCOPIC, with intraop EGD;  Surgeon: Guillermo Villanueva MD;  Location: [facility] OR 2ND FLR;  Service: General;  Laterality: N/A;    REMOVAL OF INTRAUTERINE DEVICE (IUD) N/A 6/3/2019    Procedure: REMOVAL, INTRAUTERINE DEVICE;  Surgeon: Ashley Greenberg MD;  Location: [facility];  Service: OB/GYN;  Laterality: N/A;  RN PRE OP 19----BMI-51.29    TRANSFORAMINAL EPIDURAL INJECTION OF STEROID Right 2021    Procedure: INJECTION, STEROID, EPIDURAL, TRANSFORAMINAL, APPROACH , L5-S1 and S1;  Surgeon: Brandon Lee MD;  Location: [facility] PAIN MGT;  Service: Pain Management;  Laterality: Right;    TRANSFORAMINAL EPIDURAL INJECTION OF STEROID Right 2021    Procedure: INJECTION, STEROID, EPIDURAL, TRANSFORAMINAL APPROACH L5/S1, S1;  Surgeon: Brandon Lee MD;  Location: [facility] PAIN MGT;  Service: Pain Management;  Laterality: Right;    WISDOM TOOTH EXTRACTION       Family History   Problem Relation Age of Onset    Hypertension Mother     Hyperlipidemia  Mother     Asthma Mother     Anxiety disorder Mother     No Known Problems Father     Liver cancer Maternal Grandfather     Celiac disease Neg Hx     Colon cancer Neg Hx     Colon polyps Neg Hx     Crohn's disease Neg Hx     Cystic fibrosis Neg Hx     Esophageal cancer Neg Hx     Irritable bowel syndrome Neg Hx     Rectal cancer Neg Hx     Stomach cancer Neg Hx      Social History     Tobacco Use    Smoking status: Never Smoker    Smokeless tobacco: Never Used   Substance Use Topics    Alcohol use: No    Drug use: Never     Review of Systems   Constitutional: Negative for chills and fever.   HENT: Negative for congestion and sore throat.    Eyes: Negative for visual disturbance.   Respiratory: Negative for cough and shortness of breath.    Cardiovascular: Negative for chest pain.   Gastrointestinal: Positive for abdominal pain. Negative for nausea and vomiting.   Genitourinary: Negative for dysuria and vaginal discharge.   Musculoskeletal: Negative for back pain.   Skin: Negative for rash.   Neurological: Negative for headaches.       Physical Exam     Initial Vitals [02/26/22 1903]   BP Pulse Resp Temp SpO2   (!) 131/92 60 19 98.4 °F (36.9 °C) 95 %      MAP       --         Physical Exam    Nursing note and vitals reviewed.  Constitutional: She appears well-developed and well-nourished. She is not diaphoretic. No distress.   HENT:   Head: Normocephalic and atraumatic.   Mouth/Throat: Oropharynx is clear and moist.   Eyes: Conjunctivae are normal. Right eye exhibits no discharge. Left eye exhibits no discharge. No scleral icterus.   Neck: No tracheal deviation present. No JVD present.   Cardiovascular: Normal rate, regular rhythm, normal heart sounds and intact distal pulses. Exam reveals no gallop and no friction rub.    No murmur heard.  Pulmonary/Chest: Breath sounds normal. No stridor. No respiratory distress. She has no wheezes. She has no rhonchi. She has no rales. She exhibits no tenderness.    Abdominal: Abdomen is soft. She exhibits no distension and no mass. There is abdominal tenderness (mild to moderate LLQ ). There is no rebound and no guarding.   Genitourinary:    Genitourinary Comments: Female chaperone present for exam.  Visual exam of the external genitalia was unremarkable.  Speculum exam revealed a moderate amount of frothy yellow discharge overlying the closed cervix.       Musculoskeletal:         General: No tenderness or edema. Normal range of motion.     Neurological: She is alert and oriented to person, place, and time. She has normal strength. GCS score is 15. GCS eye subscore is 4. GCS verbal subscore is 5. GCS motor subscore is 6.   STEPHEN with NGND's   Skin: Skin is warm and dry. Capillary refill takes less than 2 seconds. No rash and no abscess noted. No erythema. No pallor.   Psychiatric: She has a normal mood and affect. Her behavior is normal. Judgment and thought content normal.         ED Course   Procedures  Labs Reviewed   URINALYSIS, REFLEX TO URINE CULTURE - Abnormal; Notable for the following components:       Result Value    Leukocytes, UA 1+ (*)     All other components within normal limits    Narrative:     Specimen Source->Urine   URINALYSIS MICROSCOPIC - Abnormal; Notable for the following components:    Bacteria Few (*)     All other components within normal limits    Narrative:     Specimen Source->Urine   COMPREHENSIVE METABOLIC PANEL - Abnormal; Notable for the following components:    CO2 22 (*)     All other components within normal limits   VAGINAL SCREEN - Abnormal; Notable for the following components:    WBC - Vaginal Screen Moderate (*)     Bacteria - Vaginal Screen Moderate (*)     All other components within normal limits    Narrative:     Release to patient->Immediate   C. TRACHOMATIS/N. GONORRHOEAE BY AMP DNA   CBC W/ AUTO DIFFERENTIAL   LIPASE   MAGNESIUM   POCT URINE PREGNANCY          Imaging Results          CT Abdomen Pelvis With Contrast (Final  result)  Result time 02/27/22 00:08:04    Final result by aCrolin Rubio MD (02/27/22 00:08:04)                 Impression:      1. No acute intra-abdominal abnormalities identified.  2. Postsurgical changes and additional findings as detailed above.      Electronically signed by: Carolin Rubio MD  Date:    02/27/2022  Time:    00:08             Narrative:    EXAMINATION:  CT ABDOMEN PELVIS WITH CONTRAST    CLINICAL HISTORY:  LLQ pain, Hx of gastric bypass;    TECHNIQUE:  Low dose axial images, sagittal and coronal reformations were obtained from the lung bases to the pubic symphysis following the IV administration of 100 mL of Omnipaque 350 .  Oral contrast was not given.    COMPARISON:  CT abdomen and pelvis from March 2021.    FINDINGS:  The visualized portion of the heart is unremarkable.  The lung bases are clear.    Stable small 1.2 cm enhancing lesion is seen at the inferior margin of the left hepatic lobe, nonspecific but may reflect small flash filling hemangioma.  There is no intra-or extrahepatic biliary ductal dilatation.  The gallbladder is unremarkable.  The pancreas, spleen, and adrenal glands are unremarkable.    Kidneys enhance normally with no evidence of hydronephrosis.  Small left renal hypodensity, probable cyst is seen.  Ureters are difficult to track.  Urinary bladder and uterus are unremarkable.  No definite adnexal abnormalities are seen.    Postsurgical changes of gastric bypass are seen.  Appendix is visualized and is unremarkable.  The visualized loops of small and large bowel show no evidence of obstruction or inflammation.  No free air or free fluid.    Aorta tapers normally.    No acute osseous abnormality identified.  Postsurgical changes with intramedullary rods are partially visualized involving the proximal femurs.  Subcutaneous soft tissue show no significant abnormalities.                                 Medications   sodium chloride 0.9% bolus 1,000 mL (0 mLs Intravenous  Stopped 2/26/22 2333)   ketorolac injection 15 mg (15 mg Intravenous Given 2/26/22 2242)   ondansetron injection 4 mg (4 mg Intravenous Given 2/26/22 2241)   iohexoL (OMNIPAQUE 350) injection 100 mL (100 mLs Intravenous Given 2/26/22 2348)   cefTRIAXone (ROCEPHIN) 500 mg in LIDOcaine HCL 10 mg/ml (1%) IM only syringe (500 mg Intramuscular Given 2/27/22 0113)   doxycycline tablet 100 mg (100 mg Oral Given 2/27/22 0114)   metroNIDAZOLE tablet 500 mg (500 mg Oral Given 2/27/22 0209)       Pt arrived alert, afebrile, non-toxic in appearance, in no acute respiratory distress with VSS.   Pain well improved following administration of meds.  CT of the abdomen and pelvis returned with no acute findings.  Pelvic exam revealed findings concerning for PID so ABX given.  Secondary exam was unremarkable with no findings to warrant further evaluation.  Pt discharged and counseled on the need to return to the nearest emergency room if they experience any other concerning signs or symptoms.  Pt given information for outpatient follow-up as well.    Enrique Whaley MD                     Scribe Attestation:   Scribe #1: I performed the above scribed service and the documentation accurately describes the services I performed. I attest to the accuracy of the note.                I, Enrique Whaley, personally performed the services described in this documentation. All medical record entries made by the scribe were at my direction and in my presence.  I have reviewed the chart and agree that the record reflects my personal performance and is accurate and complete.    Enrique Whaley MD          Clinical Impression:   Final diagnoses:  [R10.30] Lower abdominal pain  [N73.9] PID (pelvic inflammatory disease) (Primary)  [N76.0, B96.89] Bacterial vaginosis          ED Disposition Condition    Discharge Stable        ED Prescriptions     Medication Sig Dispense Start Date End Date Auth. Provider    doxycycline (VIBRAMYCIN) 100 MG  Cap Take 1 capsule (100 mg total) by mouth 2 (two) times daily. for 14 days 28 capsule 2/27/2022 3/13/2022 Enrique Whaley MD    metroNIDAZOLE (FLAGYL) 500 MG tablet Take 1 tablet (500 mg total) by mouth every 12 (twelve) hours. for 14 days 28 tablet 2/27/2022 3/13/2022 Enrique Whaley MD    naproxen (NAPROSYN) 500 MG tablet Take 1 tablet (500 mg total) by mouth 2 (two) times daily with meals. for 7 days 14 tablet 2/27/2022 3/6/2022 Enrique Whaley MD        Follow-up Information     Follow up With Specialties Details Why Contact Info    Mathew Carpenter MD Internal Medicine, Pediatrics Schedule an appointment as soon as possible for a visit in 1 week to follow-up on today's visit [address] [facility]  [place] [address]  [phone]      [facility] Emergency Medicine Go to  As needed, If symptoms worsen [address] [place], [place] [address] [address] [phone]           Enrique Whaley MD  02/28/22 2035

## 2022-03-01 LAB
C TRACH DNA SPEC QL NAA+PROBE: NOT DETECTED
N GONORRHOEA DNA SPEC QL NAA+PROBE: NOT DETECTED

## 2022-03-07 ENCOUNTER — PATIENT MESSAGE (OUTPATIENT)
Dept: BARIATRICS | Facility: CLINIC | Age: 37
End: 2022-03-07
Payer: MEDICARE

## 2022-03-09 ENCOUNTER — OFFICE VISIT (OUTPATIENT)
Dept: BARIATRICS | Facility: CLINIC | Age: 37
End: 2022-03-09
Payer: MEDICARE

## 2022-03-09 ENCOUNTER — LAB VISIT (OUTPATIENT)
Dept: LAB | Facility: HOSPITAL | Age: 37
End: 2022-03-09
Payer: MEDICARE

## 2022-03-09 VITALS
HEART RATE: 52 BPM | HEIGHT: 61 IN | DIASTOLIC BLOOD PRESSURE: 82 MMHG | OXYGEN SATURATION: 100 % | SYSTOLIC BLOOD PRESSURE: 122 MMHG | WEIGHT: 156.31 LBS | BODY MASS INDEX: 29.51 KG/M2

## 2022-03-09 DIAGNOSIS — L98.7 EXCESS SKIN OF ABDOMEN: ICD-10-CM

## 2022-03-09 DIAGNOSIS — D53.9 NUTRITIONAL ANEMIA, UNSPECIFIED: ICD-10-CM

## 2022-03-09 DIAGNOSIS — R10.32 LLQ PAIN: Primary | ICD-10-CM

## 2022-03-09 DIAGNOSIS — Z98.84 BARIATRIC SURGERY STATUS: ICD-10-CM

## 2022-03-09 DIAGNOSIS — R79.9 ABNORMAL FINDING OF BLOOD CHEMISTRY, UNSPECIFIED: ICD-10-CM

## 2022-03-09 LAB
25(OH)D3+25(OH)D2 SERPL-MCNC: 17 NG/ML (ref 30–96)
ALBUMIN SERPL BCP-MCNC: 3.8 G/DL (ref 3.5–5.2)
ALP SERPL-CCNC: 93 U/L (ref 55–135)
ALT SERPL W/O P-5'-P-CCNC: 14 U/L (ref 10–44)
ANION GAP SERPL CALC-SCNC: 9 MMOL/L (ref 8–16)
AST SERPL-CCNC: 22 U/L (ref 10–40)
BASOPHILS # BLD AUTO: 0.04 K/UL (ref 0–0.2)
BASOPHILS NFR BLD: 0.6 % (ref 0–1.9)
BILIRUB SERPL-MCNC: 0.5 MG/DL (ref 0.1–1)
BUN SERPL-MCNC: 7 MG/DL (ref 6–20)
CALCIUM SERPL-MCNC: 8.8 MG/DL (ref 8.7–10.5)
CHLORIDE SERPL-SCNC: 104 MMOL/L (ref 95–110)
CHOLEST SERPL-MCNC: 175 MG/DL (ref 120–199)
CHOLEST/HDLC SERPL: 2.7 {RATIO} (ref 2–5)
CO2 SERPL-SCNC: 25 MMOL/L (ref 23–29)
CREAT SERPL-MCNC: 0.8 MG/DL (ref 0.5–1.4)
DIFFERENTIAL METHOD: NORMAL
EOSINOPHIL # BLD AUTO: 0.1 K/UL (ref 0–0.5)
EOSINOPHIL NFR BLD: 1.1 % (ref 0–8)
ERYTHROCYTE [DISTWIDTH] IN BLOOD BY AUTOMATED COUNT: 13.4 % (ref 11.5–14.5)
EST. GFR  (AFRICAN AMERICAN): >60 ML/MIN/1.73 M^2
EST. GFR  (NON AFRICAN AMERICAN): >60 ML/MIN/1.73 M^2
GLUCOSE SERPL-MCNC: 69 MG/DL (ref 70–110)
HCT VFR BLD AUTO: 41.9 % (ref 37–48.5)
HDLC SERPL-MCNC: 66 MG/DL (ref 40–75)
HDLC SERPL: 37.7 % (ref 20–50)
HGB BLD-MCNC: 13.7 G/DL (ref 12–16)
IMM GRANULOCYTES # BLD AUTO: 0.01 K/UL (ref 0–0.04)
IMM GRANULOCYTES NFR BLD AUTO: 0.1 % (ref 0–0.5)
IRON SERPL-MCNC: 66 UG/DL (ref 30–160)
LDLC SERPL CALC-MCNC: 99 MG/DL (ref 63–159)
LYMPHOCYTES # BLD AUTO: 2.3 K/UL (ref 1–4.8)
LYMPHOCYTES NFR BLD: 32.8 % (ref 18–48)
MCH RBC QN AUTO: 30.5 PG (ref 27–31)
MCHC RBC AUTO-ENTMCNC: 32.7 G/DL (ref 32–36)
MCV RBC AUTO: 93 FL (ref 82–98)
MONOCYTES # BLD AUTO: 0.6 K/UL (ref 0.3–1)
MONOCYTES NFR BLD: 8.1 % (ref 4–15)
NEUTROPHILS # BLD AUTO: 4 K/UL (ref 1.8–7.7)
NEUTROPHILS NFR BLD: 57.3 % (ref 38–73)
NONHDLC SERPL-MCNC: 109 MG/DL
NRBC BLD-RTO: 0 /100 WBC
PLATELET # BLD AUTO: 268 K/UL (ref 150–450)
PMV BLD AUTO: 11.4 FL (ref 9.2–12.9)
POTASSIUM SERPL-SCNC: 3.8 MMOL/L (ref 3.5–5.1)
PROT SERPL-MCNC: 7.7 G/DL (ref 6–8.4)
RBC # BLD AUTO: 4.49 M/UL (ref 4–5.4)
SATURATED IRON: 16 % (ref 20–50)
SODIUM SERPL-SCNC: 138 MMOL/L (ref 136–145)
TOTAL IRON BINDING CAPACITY: 411 UG/DL (ref 250–450)
TRANSFERRIN SERPL-MCNC: 278 MG/DL (ref 200–375)
TRIGL SERPL-MCNC: 50 MG/DL (ref 30–150)
VIT B12 SERPL-MCNC: 426 PG/ML (ref 210–950)
WBC # BLD AUTO: 7.05 K/UL (ref 3.9–12.7)

## 2022-03-09 PROCEDURE — 99999 PR PBB SHADOW E&M-EST. PATIENT-LVL V: CPT | Mod: PBBFAC,,, | Performed by: PHYSICIAN ASSISTANT

## 2022-03-09 PROCEDURE — 99499 UNLISTED E&M SERVICE: CPT | Mod: S$GLB,,, | Performed by: PHYSICIAN ASSISTANT

## 2022-03-09 PROCEDURE — 82306 VITAMIN D 25 HYDROXY: CPT | Performed by: PHYSICIAN ASSISTANT

## 2022-03-09 PROCEDURE — 84466 ASSAY OF TRANSFERRIN: CPT | Performed by: PHYSICIAN ASSISTANT

## 2022-03-09 PROCEDURE — 85025 COMPLETE CBC W/AUTO DIFF WBC: CPT | Performed by: PHYSICIAN ASSISTANT

## 2022-03-09 PROCEDURE — 80061 LIPID PANEL: CPT | Performed by: PHYSICIAN ASSISTANT

## 2022-03-09 PROCEDURE — 1159F MED LIST DOCD IN RCRD: CPT | Mod: CPTII,S$GLB,, | Performed by: PHYSICIAN ASSISTANT

## 2022-03-09 PROCEDURE — 82607 VITAMIN B-12: CPT | Performed by: PHYSICIAN ASSISTANT

## 2022-03-09 PROCEDURE — 3008F PR BODY MASS INDEX (BMI) DOCUMENTED: ICD-10-PCS | Mod: CPTII,S$GLB,, | Performed by: PHYSICIAN ASSISTANT

## 2022-03-09 PROCEDURE — 99999 PR PBB SHADOW E&M-EST. PATIENT-LVL V: ICD-10-PCS | Mod: PBBFAC,,, | Performed by: PHYSICIAN ASSISTANT

## 2022-03-09 PROCEDURE — 3074F SYST BP LT 130 MM HG: CPT | Mod: CPTII,S$GLB,, | Performed by: PHYSICIAN ASSISTANT

## 2022-03-09 PROCEDURE — 3079F DIAST BP 80-89 MM HG: CPT | Mod: CPTII,S$GLB,, | Performed by: PHYSICIAN ASSISTANT

## 2022-03-09 PROCEDURE — 3074F PR MOST RECENT SYSTOLIC BLOOD PRESSURE < 130 MM HG: ICD-10-PCS | Mod: CPTII,S$GLB,, | Performed by: PHYSICIAN ASSISTANT

## 2022-03-09 PROCEDURE — 99213 OFFICE O/P EST LOW 20 MIN: CPT | Mod: S$GLB,,, | Performed by: PHYSICIAN ASSISTANT

## 2022-03-09 PROCEDURE — 80053 COMPREHEN METABOLIC PANEL: CPT | Performed by: PHYSICIAN ASSISTANT

## 2022-03-09 PROCEDURE — 36415 COLL VENOUS BLD VENIPUNCTURE: CPT | Performed by: PHYSICIAN ASSISTANT

## 2022-03-09 PROCEDURE — 84425 ASSAY OF VITAMIN B-1: CPT | Performed by: PHYSICIAN ASSISTANT

## 2022-03-09 PROCEDURE — 3079F PR MOST RECENT DIASTOLIC BLOOD PRESSURE 80-89 MM HG: ICD-10-PCS | Mod: CPTII,S$GLB,, | Performed by: PHYSICIAN ASSISTANT

## 2022-03-09 PROCEDURE — 1159F PR MEDICATION LIST DOCUMENTED IN MEDICAL RECORD: ICD-10-PCS | Mod: CPTII,S$GLB,, | Performed by: PHYSICIAN ASSISTANT

## 2022-03-09 PROCEDURE — 99499 RISK ADDL DX/OHS AUDIT: ICD-10-PCS | Mod: S$GLB,,, | Performed by: PHYSICIAN ASSISTANT

## 2022-03-09 PROCEDURE — 99213 PR OFFICE/OUTPT VISIT, EST, LEVL III, 20-29 MIN: ICD-10-PCS | Mod: S$GLB,,, | Performed by: PHYSICIAN ASSISTANT

## 2022-03-09 PROCEDURE — 3008F BODY MASS INDEX DOCD: CPT | Mod: CPTII,S$GLB,, | Performed by: PHYSICIAN ASSISTANT

## 2022-03-09 NOTE — PATIENT INSTRUCTIONS
"Snacks: (100-200 calories; >5g protein)    - 1 low-fat cheese stick with 8 cherry tomatoes or 1 serving fresh fruit  - 4 thin slices fat-free turkey breast and 1 slice low-fat cheese  - 4 thin slices fat-free honey ham with wedge of melon  - 2 slices of turkey ivy  - Boiled eggs (can buy at [employer] already boiled w/ shell removed)  - for convenience,  Montara read, snack, go (deli meat and cheese rolls)  - P3 packets (Protein packs w/ cheese, nuts, lean deli meat)  - MHP Fit and Lean Protein Pudding (find at [employer] - per 1 cup serving = 100 calories, 15 g protein, 0 g sugar)  - 6-8 edamame pods (equivalent to about 1/4 cup edamame without pods).   - 1/4 cup unsalted nuts with ½ cup fruit  - 6-oz container Dannon Light n Fit vanilla yogurt, topped with 1oz unsalted nuts         - apple, celery or baby carrots spread with 2 Tbsp PB2  - apple slices with 1 oz slice low-fat cheese  - Apple slices dipped in 2 Tbsp of PB2  - 2 Tbsp PB2 mixed in light or greek yogurt or sugar-free pudding  - celery, cucumber, bell pepper or baby carrots dipped in ¼ cup hummus bean spread   - celery, cucumber, baby carrots dipped in high protein greek yogurt (Mix 16 oz plain greek yogurt + 1 packet of hidden valley ranch dip mix)  - Cesar Links Beef Steak - 14g protein! (similar to beef jerky but very lean)  - 2 wedges Laughing Cow - Light Herb & Garlic Cheese with sliced cucumber or green bell pepper  - 1/2 cup low-fat cottage cheese with ¼ cup fruit or ¼ cup salsa  - 1/2 cup low fat cottage cheese with 10-15 cherry tomatoes  - 8 oz glass of FAIRLIFE fat free milk (13 g protein)  - 8 oz glass of FAIRLIFE fat free milk + 1 packet of sugar-free hot cocoa  - Add Atkins advantage Cafe Caramel shake to decaf coffee. Serve hot or blend with ice for "frappaccino" like drink  - RTD Protein drinks: Atkins, Low Carb Slim Fast, EAS light, Muscle Milk Light, etc.  - Homemade Protein drinks: GNC Soy95, Isopure, Nectar, UNJURY, Whey " Gourmet, etc. Mix 1 scoop powder with 8oz skim/1% milk or light soymilk.  - Protein bars: Atkins, EAS, Pure Protein,  Quest, Think Thin, Detour, etc. Must have 0-4 grams sugar - Read the label.    ** Be CREATIVE. You can always snack on bites of grilled chicken or tuna salad made with low fat sanchez, if needed!

## 2022-03-09 NOTE — PROGRESS NOTES
Subjective:       Patient ID: Prisca Zhu is a 36 y.o. female.    Chief Complaint: No chief complaint on file.    HPI S/p bypass 7/10/20. Pt states that she is doing ok, has been richard states that she gets a lot of abdominal pain and discomfort , left lower side, states that it is daily pain. Also has decreased appetite along with some epigastric burning, states that she takes the pepcid as needed. Worried about losing more weight would like to stop but does not have an appetite.     Recent ED visit for abdominal pain found to have UTI, CT of abdomen and pelvis came back normal   Has GYN at [facility], missed appt in December of last year     Last EGD 3/3031:   Impression:  - 2 cm hiatal hernia.                        - Widely patent Schatzki ring.                        - Gastric bypass with a pouch 8 cm in length and                        intact staple line. Gastrojejunal anastomosis                        characterized by friable mucosa and ulceration.                        Biopsied.   Denies nausea or vomiting, dysphagia, globus sensation diarrhea     Good weight loss: 94 lbs 74%EW  No vitamin regimen     .  Review of Systems   Constitutional: Negative for fever.   Respiratory: Negative for cough, chest tightness and shortness of breath.    Cardiovascular: Negative for chest pain and palpitations.   Gastrointestinal: Negative for constipation, diarrhea, nausea and vomiting.   Genitourinary: Negative for difficulty urinating and dysuria.   Neurological: Negative for light-headedness.       Objective:     Physical Exam  Vitals reviewed.   Constitutional:       Appearance: Normal appearance.   Neurological:      General: No focal deficit present.      Mental Status: She is alert and oriented to person, place, and time.   Psychiatric:         Behavior: Behavior normal.         Thought Content: Thought content normal.         Judgment: Judgment normal.         Assessment:     LLQ pain     Intermittent reflux      Excess skin       Plan:   - Follow up with GYN   - refer you to GI   - refer to plastics.   - dietician

## 2022-03-16 LAB — VIT B1 BLD-MCNC: 59 UG/L (ref 38–122)

## 2022-03-30 ENCOUNTER — TELEPHONE (OUTPATIENT)
Dept: BARIATRICS | Facility: CLINIC | Age: 37
End: 2022-03-30
Payer: MEDICARE

## 2022-03-30 ENCOUNTER — PATIENT MESSAGE (OUTPATIENT)
Dept: BARIATRICS | Facility: CLINIC | Age: 37
End: 2022-03-30
Payer: MEDICARE

## 2022-03-30 NOTE — TELEPHONE ENCOUNTER
----- Message from Shasta Mahajan PA-C sent at 3/9/2022 10:58 AM CST -----  Hey ladies   She wants to stop losing weight but unsure how to go about it can someone reach out to her and discuss carbs.     BM

## 2022-03-30 NOTE — TELEPHONE ENCOUNTER
Called per BM. Sleeve 2020 c/o poor appetite and worried about losing too much weight. 74% EWL. She states she has lost 3 more pounds since her appt with BM. No protein shakes and no vitamins. States she was told she has a Vit D deficiency but she went to [facility] but there was no Rx.     Diet Recall:    - Sprite  - grab bag of chips  - movie size box of Anjum's throughout the day    Discussion:  - back on track with bariatric diet plan. Will send sample menu in portal  - resume protein shake 1-2 per day  - resume bariatric vit/min regimen. Will send list in portal    Will f/u as needed.

## 2022-03-31 ENCOUNTER — PATIENT MESSAGE (OUTPATIENT)
Dept: PAIN MEDICINE | Facility: OTHER | Age: 37
End: 2022-03-31
Payer: MEDICARE

## 2022-03-31 ENCOUNTER — OFFICE VISIT (OUTPATIENT)
Dept: SPINE | Facility: CLINIC | Age: 37
End: 2022-03-31
Payer: MEDICARE

## 2022-03-31 ENCOUNTER — TELEPHONE (OUTPATIENT)
Dept: PAIN MEDICINE | Facility: CLINIC | Age: 37
End: 2022-03-31
Payer: MEDICARE

## 2022-03-31 VITALS
BODY MASS INDEX: 29.51 KG/M2 | DIASTOLIC BLOOD PRESSURE: 68 MMHG | SYSTOLIC BLOOD PRESSURE: 135 MMHG | HEART RATE: 50 BPM | WEIGHT: 156.31 LBS | HEIGHT: 61 IN

## 2022-03-31 DIAGNOSIS — M51.36 DDD (DEGENERATIVE DISC DISEASE), LUMBAR: ICD-10-CM

## 2022-03-31 DIAGNOSIS — M54.16 LUMBAR RADICULOPATHY: Primary | ICD-10-CM

## 2022-03-31 DIAGNOSIS — G89.4 CHRONIC PAIN DISORDER: ICD-10-CM

## 2022-03-31 PROCEDURE — 1160F RVW MEDS BY RX/DR IN RCRD: CPT | Mod: CPTII,S$GLB,, | Performed by: ANESTHESIOLOGY

## 2022-03-31 PROCEDURE — 99999 PR PBB SHADOW E&M-EST. PATIENT-LVL III: ICD-10-PCS | Mod: PBBFAC,,, | Performed by: ANESTHESIOLOGY

## 2022-03-31 PROCEDURE — 99214 OFFICE O/P EST MOD 30 MIN: CPT | Mod: S$GLB,,, | Performed by: ANESTHESIOLOGY

## 2022-03-31 PROCEDURE — 1159F PR MEDICATION LIST DOCUMENTED IN MEDICAL RECORD: ICD-10-PCS | Mod: CPTII,S$GLB,, | Performed by: ANESTHESIOLOGY

## 2022-03-31 PROCEDURE — 3075F SYST BP GE 130 - 139MM HG: CPT | Mod: CPTII,S$GLB,, | Performed by: ANESTHESIOLOGY

## 2022-03-31 PROCEDURE — 1160F PR REVIEW ALL MEDS BY PRESCRIBER/CLIN PHARMACIST DOCUMENTED: ICD-10-PCS | Mod: CPTII,S$GLB,, | Performed by: ANESTHESIOLOGY

## 2022-03-31 PROCEDURE — 99214 PR OFFICE/OUTPT VISIT, EST, LEVL IV, 30-39 MIN: ICD-10-PCS | Mod: S$GLB,,, | Performed by: ANESTHESIOLOGY

## 2022-03-31 PROCEDURE — 3078F DIAST BP <80 MM HG: CPT | Mod: CPTII,S$GLB,, | Performed by: ANESTHESIOLOGY

## 2022-03-31 PROCEDURE — 3078F PR MOST RECENT DIASTOLIC BLOOD PRESSURE < 80 MM HG: ICD-10-PCS | Mod: CPTII,S$GLB,, | Performed by: ANESTHESIOLOGY

## 2022-03-31 PROCEDURE — 99213 OFFICE O/P EST LOW 20 MIN: CPT | Mod: PBBFAC | Performed by: ANESTHESIOLOGY

## 2022-03-31 PROCEDURE — 3008F BODY MASS INDEX DOCD: CPT | Mod: CPTII,S$GLB,, | Performed by: ANESTHESIOLOGY

## 2022-03-31 PROCEDURE — 3008F PR BODY MASS INDEX (BMI) DOCUMENTED: ICD-10-PCS | Mod: CPTII,S$GLB,, | Performed by: ANESTHESIOLOGY

## 2022-03-31 PROCEDURE — 99999 PR PBB SHADOW E&M-EST. PATIENT-LVL III: CPT | Mod: PBBFAC,,, | Performed by: ANESTHESIOLOGY

## 2022-03-31 PROCEDURE — 3075F PR MOST RECENT SYSTOLIC BLOOD PRESS GE 130-139MM HG: ICD-10-PCS | Mod: CPTII,S$GLB,, | Performed by: ANESTHESIOLOGY

## 2022-03-31 PROCEDURE — 1159F MED LIST DOCD IN RCRD: CPT | Mod: CPTII,S$GLB,, | Performed by: ANESTHESIOLOGY

## 2022-03-31 NOTE — TELEPHONE ENCOUNTER
----- Message from Walter Armas sent at 3/31/2022  8:43 AM CDT -----  Regarding: Late  Name of Who is Calling: TIMOTHY SEGOVIA [[id number]]           What is the request in detail: Patient is running about 15 minutes late.           Can the clinic reply by [facility]: NO           [facility]: [phone]

## 2022-03-31 NOTE — H&P (VIEW-ONLY)
"  Chronic Pain Established Note (Follow up visit)      SUBJECTIVE:    Interval History 3/31/2022:  Patient returns to clinic in f/u of lumbosacral radiculopathy. She states she got around 50-60% relief improvement of back and leg pain with her last TF LOKESH until up to around one month ago. Today, she reports her pain has returned, same in location and nature as previous. She describes a tight, achy pain in her low back that radiates down the right leg (described as "hotness" in her leg - also sometimes in her left leg). She continues to take gabapentin 300 mg BID and very occasionally takes the Flexeril that help some. Occasional Tylenol with questionable if any benefit. She sleeps with heated electric blanket that helps her pain some. She finished her latest round of therapy around May of last year. She states that she is still doing well, and is not necessarily interested in more PT at this time due to time constraints/work.    Interval History 2/2/2021:  Patient with history of lumbosacral radiculopathy is here for follow up s/p right L5/S1 and S1 TFESI done on 1/7/2021 with more than 50% improvement in pain. Overall, the patient feels it has definitely improved her pain from a 10/10 to an average of 6/10 which she feels is tolerable. She has been self increasing her gabapentin, taking about 600mg TID and notes it is also significantly helping her neuropathic pain. She does feel she still has low back tightness at night. Otherwise she is happy with the improvement. She will be beginning PT/aquatherapy soon.    Interval history 12/10/2020:  Prisca Marko presents tele-medicine appointment for a follow-up appointment for low back pain. Since the last visit, Prisca Marko states the pain has been persistant.  She continues to have low back pain which radiates down the right lower extremity down to toes. She has been taking gabapentin 300 mg 4 times a day accompanied by Tylenol 1 g 4 times a day.  She has not " started physical therapy as she is waiting for results of imaging to be reviewed.  MRI of lumbar spine shows Mild circumferential annular disc bulge L5-S1 disc space, No significant central canal spinal stenosis.  There is facet arthropathy L3-4, L4-5.  EMG/NCS done yesterday shows evidence of a right lumbosacral radiculopathy with active denervation, likely L5/S1.  Imaging results were reviewed with the patient today.  She was also instructed to limit Tylenol to 3 g per day.      Initial Visit 11/18/2020  Prisca Munguia a 36 y/o female with history of GERD, s/p gastric bypass, osteogenesis imperfecta, s/p internal fixation of both femurs in childhood, obesity who presents to the clinic for the evaluation of back and leg pain.  Pain started in September 2020 after she went down a water slide.  She had been having low back pain for many years prior to this which was mild and tolerable.  After she went down a water slide in September, she developed pain in her right hip which started to radiate down the right lower extremity up to the toes.  Over the past couple of weeks she now has developed pain in the left lower extremity.  She describes the pain as aching, throbbing.  Aggravated by any sort of activity.  Sleep is disturbed due to the pain.  She has also been getting weaker in the right leg and is requiring a walker or a wheelchair more often for longer distances. She was seen by Orthopedics.  X-ray of her right hip showed no acute pathology.  She reports spending 2 hours per day reclining. The patient reports 4 hours of uninterrupted sleep per night.     She denies any urinary or bowel incontinence.      She was previously undergoing physical therapy for neck pain and a right rotator cuff injury but has not received any sessions since the COVID lockdown.      Has been seen at an [facility] for neck pain and was found to have neural foraminal narrowing at C4 through C7.  A cervical LOKESH was recommended  but was deferred until she underwent gastric bypass surgery and weight loss.     He was seen in the ER for her low back pain and received 2 cortisone shots after which she developed a GI bleed.  She was advised after that to only take Tylenol for her pain and nothing else.     Physical Therapy/Home Exercise: no      Pain Medications:  Gabapentin 300 mg qid  Tylenol      report:  Reviewed and consistent with medication use as prescribed.     Pain Procedures:   - 2021: Right L5/S1 and S1 TFESI  - 2021: Right L5/S1 and S1 TF LOKESH with ~50-60% relief for about 10 months.     Imaging:   NCS/EMG 2020  Narrative    Dinh Orta DO     2020 11:51 AM     Test Date:  2020     Patient: Prisca Zhu : 1985 Physician: Dinh Orta D.O.   ID#:  SEX: Female Ref. Phys: Brandon Lee MD     HPI: Prisca Zhu is a 35 y.o.female who presents for   NCS/EMG to evaluate lumbosacral radiculopathy     NCV & EMG Findings:   ? All examined nerves (as indicated in the following tables) were   within normal limits.   ? Needle evaluation of the right Vastus Medius muscle showed   increased insertional activity and moderately increased   spontaneous activity.   ? The right Tibialis Posterior and the right Lumbo Paraspinals   (Lower) muscles showed increased insertional activity and   slightly increased spontaneous activity.   ? The right Biceps Femoris (Short Hd) muscle showed increased   insertional activity.   ? All remaining muscles (as indicated in the following table)   showed no evidence of electrical instability.     Impression:   1. Technically, limited study due to morbid obesity (difficult   time getting to many muscles on needle EMG, and significant   volume conduction issues on NCS), pain with activation of muscles   on needle EMG limiting activation, and difficult time getting   surface electrodes to adhere to her skin, even with skinprep.     2. There is evidence of  a right lumbosacral radiculopathy with   active denervation, likely L5/S1 (though also with active   denervation in the vastus medialis, so L2-4 possible)     ___________________________   Dinh Orta D.O.        Narrative & Impression     EXAMINATION:  MRI LUMBAR SPINE WITHOUT CONTRAST     CLINICAL HISTORY:  Back pain or radiculopathy, > 6 wks; Radiculopathy, lumbar region     TECHNIQUE:  Multiplanar, multisequence MR images were acquired from the thoracolumbar junction to the sacrum without the administration of contrast.     COMPARISON:  Lumbar spine radiograph 11/05/2020     FINDINGS:  There is again a normal anatomic alignment of the osseous segments of the lumbar spine.  No spondylolisthesis or spondylolysis.  There is no marrow edema throughout the lumbar vertebral bodies to suggest acute fractures.  Incidental note of hypointense T1 and T2 marrow abnormality associated with the left sacroiliac joint and minor similar changes along the right sacroiliac joint.  This finding was seen also on the lumbar spine radiograph and represented sclerotic SI joint arthropathy.  There is also a hemangioma of the S2 segment of the sacrum.     Paraspinal soft tissues are unremarkable.  Vertebral body heights are within normal limits.  The tip of the conus is at T12-L1 disc space.     L5-S1: There is a mild circumferential annular disc bulge without significant compression of the thecal sac.  No significant spinal stenosis.  No significant foraminal stenosis.     L4-5: Normal intervertebral disc height.  No significant focal disc protrusions or spinal stenosis or foraminal stenosis.  There is mild facet arthropathy.     L3-4: There is no significant central canal spinal stenosis or disc protrusions.  Facet arthropathy noted bilaterally.  No foraminal disc protrusions noted.  Mild foraminal narrowing.     L2-3: No significant central canal spinal stenosis.  Mild left foraminal disc bulge associated with mild left  foraminal stenosis.  There is also mild right foraminal stenosis.     L1-2: No significant central canal disc protrusions or spinal stenosis or significant foraminal stenosis.     T12-L1: No disc protrusions or spinal stenosis or foraminal stenosis.     Incidental note of a retroverted uterus also noted.     Impression:     1. No acute lumbar spine fractures.  2. Mild circumferential annular disc bulge L5-S1 disc space.  3. No significant central canal spinal stenosis.  There is facet arthropathy L3-4, L4-5.        Electronically signed by: Benjamin Regalado MD  Date:                                            11/19/2020  Time:                                           13:31         Narrative & Impression       EXAMINATION:  XR ABDOMEN FLAT AND ERECT     CLINICAL HISTORY:  Unspecified abdominal pain     TECHNIQUE:  Flat and erect AP views of the abdomen were performed.     COMPARISON:  CT abdomen pelvis performed 05/18/2019.  Same day radiographs of the lumbar spine.     FINDINGS:  Moderate devices overlie the lower chest and abdomen.  No dilated loops of small or large bowel are noted.  No free air is suggested on the upright view.  No fluid levels.  Suture material is noted in the left abdomen.  Degenerative findings in lumbar spine are better assessed on separately reported same day radiographs.     Impression:     Nonspecific, nonobstructive bowel gas pattern.  No evidence of free air.        Electronically signed by: Nikita Collins  Date:                                            11/05/2020  Time:                                           13:37      Narrative & Impression       EXAMINATION:  XR LUMBAR SPINE AP AND LATERAL     CLINICAL HISTORY:  Back pain or radiculopathy, > 6 wks;Dorsalgia, unspecified     TECHNIQUE:  AP, lateral and spot images were performed of the lumbar spine.     COMPARISON:  None     FINDINGS:  Posterior vertebral alignment is satisfactory.  Vertebral body heights are well maintained.  There  is no evidence for acute fracture or bone destruction.  There are mild degenerative changes present.  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  There are surgical changes within the left upper abdomen.  Intramedullary rods are identified within the femurs bilaterally.     Impression:     No evidence for acute fracture, bone destruction, or subluxation.     Mild lumbar spondylosis.     Surgical changes.        Electronically signed by: Jesús Conklin MD  Date:                                            11/05/2020  Time:                                           09:57      Narrative & Impression       EXAMINATION:  XR HIP 2 VIEW RIGHT     CLINICAL HISTORY:  Pain, unspecified     TECHNIQUE:  AP view of the pelvis and frog leg lateral view of the right hip were performed.     COMPARISON:  None     FINDINGS:  Two views left hip.     The bilateral sacroiliac joints are intact noting degenerative changes, left greater than right suggesting that of sacroiliitis.  The pubic symphysis is intact.  The bilateral femoroacetabular joints are intact.  Surgical changes are noted of the right femur and left femur.  No radiopaque foreign body.     Impression:     1. No acute displaced fracture or dislocation of the left hip.        Electronically signed by: Luciano Gomes MD  Date:                                            11/01/2020  Time:                                           12:26          Narrative & Impression       EXAMINATION:  XR PELVIS ROUTINE AP     CLINICAL HISTORY:  acute hip pain s/p fall history of surgery s/p instrumentation;  Pain in right hip     TECHNIQUE:  AP view of the pelvis was performed.     COMPARISON:  05/18/2019     FINDINGS:  No evidence for pelvic fracture.  Sclerosis along the iliac aspect of the bilateral SI joints, similar to prior study.  Intramedullary nails are seen in the bilateral femurs.  Hip cartilage spaces are  maintained.     Impression:     As above.        Electronically signed by: Matthias Velasco MD  Date:                                            10/16/2020  Time:                                           12:24       Past Medical History:   Diagnosis Date    Anemia     BMI 50.0-59.9, adult     Encounter for IUD removal 2019    GERD (gastroesophageal reflux disease) 2019    History of blood transfusion 2001    Malpositioned intrauterine device 3/21/2019    2019 OB/GYN - Dr Guzman - Discussed with patient that we will proceed with imaging to locate the IUD after which we can then proceed to book case in OR to retrieve displaced IUD    Obesity     OI (osteogenesis imperfecta)     Osteopetrosis     Tendonitis      Past Surgical History:   Procedure Laterality Date     SECTION      ,     ESOPHAGOGASTRODUODENOSCOPY N/A 2019    Procedure: ESOPHAGOGASTRODUODENOSCOPY (EGD);  Surgeon: Segun Dominguez MD;  Location: [facility] ([address] [place] [address]);  Service: Endoscopy;  Laterality: N/A;  BMI 51    ESOPHAGOGASTRODUODENOSCOPY N/A 2020    Procedure: EGD (ESOPHAGOGASTRODUODENOSCOPY);  Surgeon: Boom Farris MD;  Location: [facility];  Service: Endoscopy;  Laterality: N/A;  Dr. Jolly said we can do a rapid on her    ESOPHAGOGASTRODUODENOSCOPY N/A 3/4/2021    Procedure: EGD (ESOPHAGOGASTRODUODENOSCOPY), Please check for H. Pylori;  Surgeon: Nataliia Bradshaw MD;  Location: [facility];  Service: Endoscopy;  Laterality: N/A;  Please check for H. Pylori  3/1-covid [facility]-inst portal-tb    FEMUR FRACTURE SURGERY Bilateral     hardware/rods in both legs    FRACTURE SURGERY      femur    LAPAROSCOPIC GASTROENTEROSTOMY N/A 7/10/2020    Procedure: GASTROENTEROSTOMY, LAPAROSCOPIC, with intraop EGD;  Surgeon: Guillermo Villanueva MD;  Location: [address];  Service: General;  Laterality: N/A;    REMOVAL OF INTRAUTERINE DEVICE (IUD) N/A 6/3/2019    Procedure: REMOVAL, INTRAUTERINE DEVICE;   Surgeon: Ashley Greenberg MD;  Location: [facility] OR;  Service: OB/GYN;  Laterality: N/A;  RN PRE OP 5-23-19----BMI-51.29    TRANSFORAMINAL EPIDURAL INJECTION OF STEROID Right 1/7/2021    Procedure: INJECTION, STEROID, EPIDURAL, TRANSFORAMINAL, APPROACH , L5-S1 and S1;  Surgeon: Brandon Lee MD;  Location: [facility];  Service: Pain Management;  Laterality: Right;    TRANSFORAMINAL EPIDURAL INJECTION OF STEROID Right 4/23/2021    Procedure: INJECTION, STEROID, EPIDURAL, TRANSFORAMINAL APPROACH L5/S1, S1;  Surgeon: Brandon Lee MD;  Location: [facility];  Service: Pain Management;  Laterality: Right;    WISDOM TOOTH EXTRACTION       Social History     Socioeconomic History    Marital status: Single   Tobacco Use    Smoking status: Never Smoker    Smokeless tobacco: Never Used   Substance and Sexual Activity    Alcohol use: No    Drug use: Never    Sexual activity: Yes     Partners: Male     Birth control/protection: None, I.U.D.   Social History Narrative    Disabled 2/2 chronic left shoulder pain     Family History   Problem Relation Age of Onset    Hypertension Mother     Hyperlipidemia Mother     Asthma Mother     Anxiety disorder Mother     Cancer Mother     No Known Problems Father     Liver cancer Maternal Grandfather     Celiac disease Neg Hx     Colon cancer Neg Hx     Colon polyps Neg Hx     Crohn's disease Neg Hx     Cystic fibrosis Neg Hx     Esophageal cancer Neg Hx     Irritable bowel syndrome Neg Hx     Rectal cancer Neg Hx     Stomach cancer Neg Hx        Review of patient's allergies indicates:   Allergen Reactions    Pcn [penicillins] Shortness Of Breath       Current Outpatient Medications   Medication Sig    CALCIUM ORAL Take by mouth every morning.     cetirizine (ZYRTEC) 10 MG tablet Take 1 tablet (10 mg total) by mouth once daily.    cyanocobalamin (VITAMIN B-12) 100 MCG tablet Take 100 mcg by mouth every morning.     ergocalciferol  (ERGOCALCIFEROL) 50,000 unit Cap TAKE 1 CAPSULE BY MOUTH EVERY 7 DAYS    fish oil-omega-3 fatty acids 300-1,000 mg capsule Take by mouth every morning.     fluticasone propionate (FLONASE) 50 mcg/actuation nasal spray SHAKE LIQUID AND USE 1 SPRAY(50 MCG) IN EACH NOSTRIL TWICE DAILY FOR 14 DAYS    gabapentin (NEURONTIN) 600 MG tablet Take 1 tablet (600 mg total) by mouth 3 (three) times daily.    lidocaine (LIDODERM) 5 % Place 1 patch onto the skin once daily. Remove & Discard patch within 12 hours or as directed by MD    omeprazole (PRILOSEC) 40 MG capsule Take 1 capsule (40 mg total) by mouth 2 (two) times daily before meals.    omeprazole (PRILOSEC) 40 MG capsule Take 1 capsule (40 mg total) by mouth 2 (two) times daily before meals.    drospirenone-ethinyl estradiol (ROSALBA) 3-0.02 mg per tablet Take 1 tablet by mouth once daily.    EPINEPHrine (EPIPEN) 0.3 mg/0.3 mL AtIn Inject 0.3 mLs (0.3 mg total) into the muscle as needed. (Patient not taking: No sig reported)    famotidine (PEPCID) 20 MG tablet Take 1 tablet (20 mg total) by mouth 2 (two) times daily. for 5 days    ursodioL (COSME FORTE) 500 MG tablet Crush one tablet daily for gall bladder. Please compound into liquid and supply for 90 days if insurance approves (Patient not taking: No sig reported)     Current Facility-Administered Medications   Medication    medroxyPROGESTERone (DEPO-PROVERA) injection 150 mg       REVIEW OF SYSTEMS:    GENERAL:  No weight loss, malaise or fevers.  HEENT:  Negative for frequent or significant headaches.  NECK:  Negative for lumps, goiter, pain and significant neck swelling.  RESPIRATORY:  Negative for cough, wheezing or shortness of breath.  CARDIOVASCULAR:  Negative for chest pain, leg swelling or palpitations.  GI:  Negative for abdominal discomfort, blood in stools or black stools or change in bowel habits.  MUSCULOSKELETAL:  See HPI.  SKIN:  Negative for lesions, rash, and itching.  PSYCH:  Positive for sleep  disturbance. Negative for mood disorder and recent psychosocial stressors.  HEMATOLOGY/LYMPHOLOGY:  Negative for prolonged bleeding, bruising easily or swollen nodes.  NEURO:   No history of headaches, syncope, paralysis, seizures or tremors. + Weakness  All other reviewed and negative other than HPI.    OBJECTIVE:    Vitals:    03/31/22 0918   BP: 135/68   Pulse: (!) 50       General appearance: Well appearing, in no acute distress, alert and oriented x3.  Psych:  Mood and affect appropriate.  Skin: Skin color, texture, turgor normal, no rashes or lesions, in both upper and lower body.  Head/face:  Normocephalic, atraumatic. No palpable lymph nodes.  Cor: RRR  Pulm: CTA  GI:  Soft and non-tender.  Back: Straight leg raise is negative to radicular pain on the left, positive on the right. No pain to palpation over the spine or costovertebral angles. Limited ROM of lumbar spine.   Extremities: Peripheral joint ROM is full and pain free without obvious instability or laxity in all four extremities. No deformities, edema, or skin discoloration. Good capillary refill.  No atrophy or tone abnormalities are noted.  Neuro: Bilateral upper and lower extremity coordination and muscle stretch reflexes are physiologic and symmetric. Decreased sensation to light touch and pinprick in the RLE.  Gait: antalgic. Ambulates without use of assistive device.    ASSESSMENT: 36 y.o. year old female with low back pain consistent with L5/S1 radiculopathy, supported by EMG/NCS and MRI lumbar spine findings. Patient had more than 50% of pain relief from the recent right L5/S1 and S1 TFESI for several months.    1. Lumbar radiculopathy  Procedure Order to Pain Management   2. Chronic pain disorder     3. DDD (degenerative disc disease), lumbar           PLAN:     - I have stressed the importance of physical activity and a home exercise plan to help with pain and improve health. Offered formal physical therapy today, but patient would like to  hold off at this time due to time constraints/work.  - Continue gabapentin as prescribed.  - patient can continue Tylenol, but limited to 3 g per day  - Continue PRN Flexeril.  - Can consider adding TCA medication for further neuropathic pain in the future if needed  - Schedule for right L5/S1 and S1 TF LOKESH.  - RTC 2 weeks after procedure.      RUBEN Hsu M.D.  [employer] Pain Fellow    I have reviewed and concur with the resident's history, physical, assessment, and plan.  I have personally interviewed and examined the patient at bedside.  See below addendum for my evaluation and additional findings.    Brandon Lee MD

## 2022-03-31 NOTE — PROGRESS NOTES
"  Chronic Pain Established Note (Follow up visit)      SUBJECTIVE:    Interval History 3/31/2022:  Patient returns to clinic in f/u of lumbosacral radiculopathy. She states she got around 50-60% relief improvement of back and leg pain with her last TF LOKESH until up to around one month ago. Today, she reports her pain has returned, same in location and nature as previous. She describes a tight, achy pain in her low back that radiates down the right leg (described as "hotness" in her leg - also sometimes in her left leg). She continues to take gabapentin 300 mg BID and very occasionally takes the Flexeril that help some. Occasional Tylenol with questionable if any benefit. She sleeps with heated electric blanket that helps her pain some. She finished her latest round of therapy around May of last year. She states that she is still doing well, and is not necessarily interested in more PT at this time due to time constraints/work.    Interval History 2/2/2021:  Patient with history of lumbosacral radiculopathy is here for follow up s/p right L5/S1 and S1 TFESI done on 1/7/2021 with more than 50% improvement in pain. Overall, the patient feels it has definitely improved her pain from a 10/10 to an average of 6/10 which she feels is tolerable. She has been self increasing her gabapentin, taking about 600mg TID and notes it is also significantly helping her neuropathic pain. She does feel she still has low back tightness at night. Otherwise she is happy with the improvement. She will be beginning PT/aquatherapy soon.    Interval history 12/10/2020:  Prisca Marko presents tele-medicine appointment for a follow-up appointment for low back pain. Since the last visit, Prisca Marko states the pain has been persistant.  She continues to have low back pain which radiates down the right lower extremity down to toes. She has been taking gabapentin 300 mg 4 times a day accompanied by Tylenol 1 g 4 times a day.  She has not " started physical therapy as she is waiting for results of imaging to be reviewed.  MRI of lumbar spine shows Mild circumferential annular disc bulge L5-S1 disc space, No significant central canal spinal stenosis.  There is facet arthropathy L3-4, L4-5.  EMG/NCS done yesterday shows evidence of a right lumbosacral radiculopathy with active denervation, likely L5/S1.  Imaging results were reviewed with the patient today.  She was also instructed to limit Tylenol to 3 g per day.      Initial Visit 11/18/2020  Prisca Munguia a 34 y/o female with history of GERD, s/p gastric bypass, osteogenesis imperfecta, s/p internal fixation of both femurs in childhood, obesity who presents to the clinic for the evaluation of back and leg pain.  Pain started in September 2020 after she went down a water slide.  She had been having low back pain for many years prior to this which was mild and tolerable.  After she went down a water slide in September, she developed pain in her right hip which started to radiate down the right lower extremity up to the toes.  Over the past couple of weeks she now has developed pain in the left lower extremity.  She describes the pain as aching, throbbing.  Aggravated by any sort of activity.  Sleep is disturbed due to the pain.  She has also been getting weaker in the right leg and is requiring a walker or a wheelchair more often for longer distances. She was seen by Orthopedics.  X-ray of her right hip showed no acute pathology.  She reports spending 2 hours per day reclining. The patient reports 4 hours of uninterrupted sleep per night.     She denies any urinary or bowel incontinence.      She was previously undergoing physical therapy for neck pain and a right rotator cuff injury but has not received any sessions since the COVID lockdown.      Has been seen at an [facility] ([facility] for neck pain and was found to have neural foraminal narrowing at C4 through C7.  A cervical LOKESH was recommended  but was deferred until she underwent gastric bypass surgery and weight loss.     He was seen in the ER for her low back pain and received 2 cortisone shots after which she developed a GI bleed.  She was advised after that to only take Tylenol for her pain and nothing else.     Physical Therapy/Home Exercise: no      Pain Medications:  Gabapentin 300 mg qid  Tylenol      report:  Reviewed and consistent with medication use as prescribed.     Pain Procedures:   - 2021: Right L5/S1 and S1 TFESI  - 2021: Right L5/S1 and S1 TF LOKESH with ~50-60% relief for about 10 months.     Imaging:   NCS/EMG 2020  Narrative    Dinh Orta DO     2020 11:51 AM     Test Date:  2020     Patient: Prisca Zhu : 1985 Physician: Dinh Orta D.O.   ID#:  SEX: Female Ref. Phys: Brandon Lee MD     HPI: Prisca Zhu is a 35 y.o.female who presents for   NCS/EMG to evaluate lumbosacral radiculopathy     NCV & EMG Findings:   ? All examined nerves (as indicated in the following tables) were   within normal limits.   ? Needle evaluation of the right Vastus Medius muscle showed   increased insertional activity and moderately increased   spontaneous activity.   ? The right Tibialis Posterior and the right Lumbo Paraspinals   (Lower) muscles showed increased insertional activity and   slightly increased spontaneous activity.   ? The right Biceps Femoris (Short Hd) muscle showed increased   insertional activity.   ? All remaining muscles (as indicated in the following table)   showed no evidence of electrical instability.     Impression:   1. Technically, limited study due to morbid obesity (difficult   time getting to many muscles on needle EMG, and significant   volume conduction issues on NCS), pain with activation of muscles   on needle EMG limiting activation, and difficult time getting   surface electrodes to adhere to her skin, even with skinprep.     2. There is evidence of  a right lumbosacral radiculopathy with   active denervation, likely L5/S1 (though also with active   denervation in the vastus medialis, so L2-4 possible)     ___________________________   Dinh Orta D.O.        Narrative & Impression     EXAMINATION:  MRI LUMBAR SPINE WITHOUT CONTRAST     CLINICAL HISTORY:  Back pain or radiculopathy, > 6 wks; Radiculopathy, lumbar region     TECHNIQUE:  Multiplanar, multisequence MR images were acquired from the thoracolumbar junction to the sacrum without the administration of contrast.     COMPARISON:  Lumbar spine radiograph 11/05/2020     FINDINGS:  There is again a normal anatomic alignment of the osseous segments of the lumbar spine.  No spondylolisthesis or spondylolysis.  There is no marrow edema throughout the lumbar vertebral bodies to suggest acute fractures.  Incidental note of hypointense T1 and T2 marrow abnormality associated with the left sacroiliac joint and minor similar changes along the right sacroiliac joint.  This finding was seen also on the lumbar spine radiograph and represented sclerotic SI joint arthropathy.  There is also a hemangioma of the S2 segment of the sacrum.     Paraspinal soft tissues are unremarkable.  Vertebral body heights are within normal limits.  The tip of the conus is at T12-L1 disc space.     L5-S1: There is a mild circumferential annular disc bulge without significant compression of the thecal sac.  No significant spinal stenosis.  No significant foraminal stenosis.     L4-5: Normal intervertebral disc height.  No significant focal disc protrusions or spinal stenosis or foraminal stenosis.  There is mild facet arthropathy.     L3-4: There is no significant central canal spinal stenosis or disc protrusions.  Facet arthropathy noted bilaterally.  No foraminal disc protrusions noted.  Mild foraminal narrowing.     L2-3: No significant central canal spinal stenosis.  Mild left foraminal disc bulge associated with mild left  foraminal stenosis.  There is also mild right foraminal stenosis.     L1-2: No significant central canal disc protrusions or spinal stenosis or significant foraminal stenosis.     T12-L1: No disc protrusions or spinal stenosis or foraminal stenosis.     Incidental note of a retroverted uterus also noted.     Impression:     1. No acute lumbar spine fractures.  2. Mild circumferential annular disc bulge L5-S1 disc space.  3. No significant central canal spinal stenosis.  There is facet arthropathy L3-4, L4-5.        Electronically signed by: Benjamin Regalado MD  Date:                                            11/19/2020  Time:                                           13:31         Narrative & Impression       EXAMINATION:  XR ABDOMEN FLAT AND ERECT     CLINICAL HISTORY:  Unspecified abdominal pain     TECHNIQUE:  Flat and erect AP views of the abdomen were performed.     COMPARISON:  CT abdomen pelvis performed 05/18/2019.  Same day radiographs of the lumbar spine.     FINDINGS:  Moderate devices overlie the lower chest and abdomen.  No dilated loops of small or large bowel are noted.  No free air is suggested on the upright view.  No fluid levels.  Suture material is noted in the left abdomen.  Degenerative findings in lumbar spine are better assessed on separately reported same day radiographs.     Impression:     Nonspecific, nonobstructive bowel gas pattern.  No evidence of free air.        Electronically signed by: Nikita Collins  Date:                                            11/05/2020  Time:                                           13:37      Narrative & Impression       EXAMINATION:  XR LUMBAR SPINE AP AND LATERAL     CLINICAL HISTORY:  Back pain or radiculopathy, > 6 wks;Dorsalgia, unspecified     TECHNIQUE:  AP, lateral and spot images were performed of the lumbar spine.     COMPARISON:  None     FINDINGS:  Posterior vertebral alignment is satisfactory.  Vertebral body heights are well maintained.  There  is no evidence for acute fracture or bone destruction.  There are mild degenerative changes present.  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  There are surgical changes within the left upper abdomen.  Intramedullary rods are identified within the femurs bilaterally.     Impression:     No evidence for acute fracture, bone destruction, or subluxation.     Mild lumbar spondylosis.     Surgical changes.        Electronically signed by: Jesús Conklin MD  Date:                                            11/05/2020  Time:                                           09:57      Narrative & Impression       EXAMINATION:  XR HIP 2 VIEW RIGHT     CLINICAL HISTORY:  Pain, unspecified     TECHNIQUE:  AP view of the pelvis and frog leg lateral view of the right hip were performed.     COMPARISON:  None     FINDINGS:  Two views left hip.     The bilateral sacroiliac joints are intact noting degenerative changes, left greater than right suggesting that of sacroiliitis.  The pubic symphysis is intact.  The bilateral femoroacetabular joints are intact.  Surgical changes are noted of the right femur and left femur.  No radiopaque foreign body.     Impression:     1. No acute displaced fracture or dislocation of the left hip.        Electronically signed by: Luciano Gomes MD  Date:                                            11/01/2020  Time:                                           12:26          Narrative & Impression       EXAMINATION:  XR PELVIS ROUTINE AP     CLINICAL HISTORY:  acute hip pain s/p fall history of surgery s/p instrumentation;  Pain in right hip     TECHNIQUE:  AP view of the pelvis was performed.     COMPARISON:  05/18/2019     FINDINGS:  No evidence for pelvic fracture.  Sclerosis along the iliac aspect of the bilateral SI joints, similar to prior study.  Intramedullary nails are seen in the bilateral femurs.  Hip cartilage spaces are  maintained.     Impression:     As above.        Electronically signed by: Matthias Velasco MD  Date:                                            10/16/2020  Time:                                           12:24       Past Medical History:   Diagnosis Date    Anemia     BMI 50.0-59.9, adult     Encounter for IUD removal 2019    GERD (gastroesophageal reflux disease) 2019    History of blood transfusion 2001    Malpositioned intrauterine device 3/21/2019    2019 OB/GYN - Dr Guzman - Discussed with patient that we will proceed with imaging to locate the IUD after which we can then proceed to book case in OR to retrieve displaced IUD    Obesity     OI (osteogenesis imperfecta)     Osteopetrosis     Tendonitis      Past Surgical History:   Procedure Laterality Date     SECTION      ,     ESOPHAGOGASTRODUODENOSCOPY N/A 2019    Procedure: ESOPHAGOGASTRODUODENOSCOPY (EGD);  Surgeon: Segun Dominguez MD;  Location: [facility] ([address] [place] [address]);  Service: Endoscopy;  Laterality: N/A;  BMI 51    ESOPHAGOGASTRODUODENOSCOPY N/A 2020    Procedure: EGD (ESOPHAGOGASTRODUODENOSCOPY);  Surgeon: Boom Farris MD;  Location: [facility];  Service: Endoscopy;  Laterality: N/A;  Dr. Jolly said we can do a rapid on her    ESOPHAGOGASTRODUODENOSCOPY N/A 3/4/2021    Procedure: EGD (ESOPHAGOGASTRODUODENOSCOPY), Please check for H. Pylori;  Surgeon: Nataliia Bradshaw MD;  Location: [facility];  Service: Endoscopy;  Laterality: N/A;  Please check for H. Pylori  3/1-covid [facility]-inst portal-tb    FEMUR FRACTURE SURGERY Bilateral     hardware/rods in both legs    FRACTURE SURGERY      femur    LAPAROSCOPIC GASTROENTEROSTOMY N/A 7/10/2020    Procedure: GASTROENTEROSTOMY, LAPAROSCOPIC, with intraop EGD;  Surgeon: Guillermo Villanueva MD;  Location: [address];  Service: General;  Laterality: N/A;    REMOVAL OF INTRAUTERINE DEVICE (IUD) N/A 6/3/2019    Procedure: REMOVAL, INTRAUTERINE DEVICE;   Surgeon: Ashley Greenberg MD;  Location: [facility] OR;  Service: OB/GYN;  Laterality: N/A;  RN PRE OP 5-23-19----BMI-51.29    TRANSFORAMINAL EPIDURAL INJECTION OF STEROID Right 1/7/2021    Procedure: INJECTION, STEROID, EPIDURAL, TRANSFORAMINAL, APPROACH , L5-S1 and S1;  Surgeon: Brandon Lee MD;  Location: [facility];  Service: Pain Management;  Laterality: Right;    TRANSFORAMINAL EPIDURAL INJECTION OF STEROID Right 4/23/2021    Procedure: INJECTION, STEROID, EPIDURAL, TRANSFORAMINAL APPROACH L5/S1, S1;  Surgeon: Brandon Lee MD;  Location: [facility];  Service: Pain Management;  Laterality: Right;    WISDOM TOOTH EXTRACTION       Social History     Socioeconomic History    Marital status: Single   Tobacco Use    Smoking status: Never Smoker    Smokeless tobacco: Never Used   Substance and Sexual Activity    Alcohol use: No    Drug use: Never    Sexual activity: Yes     Partners: Male     Birth control/protection: None, I.U.D.   Social History Narrative    Disabled 2/2 chronic left shoulder pain     Family History   Problem Relation Age of Onset    Hypertension Mother     Hyperlipidemia Mother     Asthma Mother     Anxiety disorder Mother     Cancer Mother     No Known Problems Father     Liver cancer Maternal Grandfather     Celiac disease Neg Hx     Colon cancer Neg Hx     Colon polyps Neg Hx     Crohn's disease Neg Hx     Cystic fibrosis Neg Hx     Esophageal cancer Neg Hx     Irritable bowel syndrome Neg Hx     Rectal cancer Neg Hx     Stomach cancer Neg Hx        Review of patient's allergies indicates:   Allergen Reactions    Pcn [penicillins] Shortness Of Breath       Current Outpatient Medications   Medication Sig    CALCIUM ORAL Take by mouth every morning.     cetirizine (ZYRTEC) 10 MG tablet Take 1 tablet (10 mg total) by mouth once daily.    cyanocobalamin (VITAMIN B-12) 100 MCG tablet Take 100 mcg by mouth every morning.     ergocalciferol  (ERGOCALCIFEROL) 50,000 unit Cap TAKE 1 CAPSULE BY MOUTH EVERY 7 DAYS    fish oil-omega-3 fatty acids 300-1,000 mg capsule Take by mouth every morning.     fluticasone propionate (FLONASE) 50 mcg/actuation nasal spray SHAKE LIQUID AND USE 1 SPRAY(50 MCG) IN EACH NOSTRIL TWICE DAILY FOR 14 DAYS    gabapentin (NEURONTIN) 600 MG tablet Take 1 tablet (600 mg total) by mouth 3 (three) times daily.    lidocaine (LIDODERM) 5 % Place 1 patch onto the skin once daily. Remove & Discard patch within 12 hours or as directed by MD    omeprazole (PRILOSEC) 40 MG capsule Take 1 capsule (40 mg total) by mouth 2 (two) times daily before meals.    omeprazole (PRILOSEC) 40 MG capsule Take 1 capsule (40 mg total) by mouth 2 (two) times daily before meals.    drospirenone-ethinyl estradiol (ROSALBA) 3-0.02 mg per tablet Take 1 tablet by mouth once daily.    EPINEPHrine (EPIPEN) 0.3 mg/0.3 mL AtIn Inject 0.3 mLs (0.3 mg total) into the muscle as needed. (Patient not taking: No sig reported)    famotidine (PEPCID) 20 MG tablet Take 1 tablet (20 mg total) by mouth 2 (two) times daily. for 5 days    ursodioL (COSME FORTE) 500 MG tablet Crush one tablet daily for gall bladder. Please compound into liquid and supply for 90 days if insurance approves (Patient not taking: No sig reported)     Current Facility-Administered Medications   Medication    medroxyPROGESTERone (DEPO-PROVERA) injection 150 mg       REVIEW OF SYSTEMS:    GENERAL:  No weight loss, malaise or fevers.  HEENT:  Negative for frequent or significant headaches.  NECK:  Negative for lumps, goiter, pain and significant neck swelling.  RESPIRATORY:  Negative for cough, wheezing or shortness of breath.  CARDIOVASCULAR:  Negative for chest pain, leg swelling or palpitations.  GI:  Negative for abdominal discomfort, blood in stools or black stools or change in bowel habits.  MUSCULOSKELETAL:  See HPI.  SKIN:  Negative for lesions, rash, and itching.  PSYCH:  Positive for sleep  disturbance. Negative for mood disorder and recent psychosocial stressors.  HEMATOLOGY/LYMPHOLOGY:  Negative for prolonged bleeding, bruising easily or swollen nodes.  NEURO:   No history of headaches, syncope, paralysis, seizures or tremors. + Weakness  All other reviewed and negative other than HPI.    OBJECTIVE:    Vitals:    03/31/22 0918   BP: 135/68   Pulse: (!) 50       General appearance: Well appearing, in no acute distress, alert and oriented x3.  Psych:  Mood and affect appropriate.  Skin: Skin color, texture, turgor normal, no rashes or lesions, in both upper and lower body.  Head/face:  Normocephalic, atraumatic. No palpable lymph nodes.  Cor: RRR  Pulm: CTA  GI:  Soft and non-tender.  Back: Straight leg raise is negative to radicular pain on the left, positive on the right. No pain to palpation over the spine or costovertebral angles. Limited ROM of lumbar spine.   Extremities: Peripheral joint ROM is full and pain free without obvious instability or laxity in all four extremities. No deformities, edema, or skin discoloration. Good capillary refill.  No atrophy or tone abnormalities are noted.  Neuro: Bilateral upper and lower extremity coordination and muscle stretch reflexes are physiologic and symmetric. Decreased sensation to light touch and pinprick in the RLE.  Gait: antalgic. Ambulates without use of assistive device.    ASSESSMENT: 36 y.o. year old female with low back pain consistent with L5/S1 radiculopathy, supported by EMG/NCS and MRI lumbar spine findings. Patient had more than 50% of pain relief from the recent right L5/S1 and S1 TFESI for several months.    1. Lumbar radiculopathy  Procedure Order to Pain Management   2. Chronic pain disorder     3. DDD (degenerative disc disease), lumbar           PLAN:     - I have stressed the importance of physical activity and a home exercise plan to help with pain and improve health. Offered formal physical therapy today, but patient would like to  hold off at this time due to time constraints/work.  - Continue gabapentin as prescribed.  - patient can continue Tylenol, but limited to 3 g per day  - Continue PRN Flexeril.  - Can consider adding TCA medication for further neuropathic pain in the future if needed  - Schedule for right L5/S1 and S1 TF LOKESH.  - RTC 2 weeks after procedure.      RUBEN Hsu M.D.  [facility] Pain Fellow    I have reviewed and concur with the resident's history, physical, assessment, and plan.  I have personally interviewed and examined the patient at bedside.  See below addendum for my evaluation and additional findings.    Brandon Lee MD

## 2022-04-07 ENCOUNTER — PATIENT MESSAGE (OUTPATIENT)
Dept: BARIATRICS | Facility: CLINIC | Age: 37
End: 2022-04-07
Payer: MEDICARE

## 2022-04-12 ENCOUNTER — PATIENT MESSAGE (OUTPATIENT)
Dept: BARIATRICS | Facility: CLINIC | Age: 37
End: 2022-04-12
Payer: MEDICARE

## 2022-04-14 ENCOUNTER — HOSPITAL ENCOUNTER (OUTPATIENT)
Facility: OTHER | Age: 37
Discharge: HOME | End: 2022-04-14
Attending: ANESTHESIOLOGY | Admitting: ANESTHESIOLOGY
Payer: MEDICARE

## 2022-04-14 VITALS
TEMPERATURE: 98 F | DIASTOLIC BLOOD PRESSURE: 88 MMHG | OXYGEN SATURATION: 98 % | HEART RATE: 52 BPM | HEIGHT: 57 IN | BODY MASS INDEX: 34.52 KG/M2 | SYSTOLIC BLOOD PRESSURE: 142 MMHG | RESPIRATION RATE: 16 BRPM | WEIGHT: 160 LBS

## 2022-04-14 DIAGNOSIS — M54.16 LUMBAR RADICULOPATHY: Primary | ICD-10-CM

## 2022-04-14 DIAGNOSIS — G89.29 CHRONIC PAIN: ICD-10-CM

## 2022-04-14 PROCEDURE — 64483 PR EPIDURAL INJ, ANES/STEROID, TRANSFORAMINAL, LUMB/SACR, SNGL LEVL: ICD-10-PCS | Mod: RT,,, | Performed by: ANESTHESIOLOGY

## 2022-04-14 PROCEDURE — 64483 NJX AA&/STRD TFRM EPI L/S 1: CPT | Mod: RT | Performed by: ANESTHESIOLOGY

## 2022-04-14 PROCEDURE — 64483 NJX AA&/STRD TFRM EPI L/S 1: CPT | Mod: RT,,, | Performed by: ANESTHESIOLOGY

## 2022-04-14 PROCEDURE — 99152 MOD SED SAME PHYS/QHP 5/>YRS: CPT | Performed by: ANESTHESIOLOGY

## 2022-04-14 PROCEDURE — 64484 PRA INJECT ANES/STEROID FORAMEN LUMBAR/SACRAL W IMG GUIDE ,EA ADD LEVEL: ICD-10-PCS | Mod: RT,,, | Performed by: ANESTHESIOLOGY

## 2022-04-14 PROCEDURE — 63600175 PHARM REV CODE 636 W HCPCS: Performed by: ANESTHESIOLOGY

## 2022-04-14 PROCEDURE — 99152 MOD SED SAME PHYS/QHP 5/>YRS: CPT | Mod: ,,, | Performed by: ANESTHESIOLOGY

## 2022-04-14 PROCEDURE — 25000003 PHARM REV CODE 250: Performed by: STUDENT IN AN ORGANIZED HEALTH CARE EDUCATION/TRAINING PROGRAM

## 2022-04-14 PROCEDURE — 25000003 PHARM REV CODE 250: Performed by: ANESTHESIOLOGY

## 2022-04-14 PROCEDURE — 99152 PR MOD CONSCIOUS SEDATION, SAME PHYS, 5+ YRS, FIRST 15 MIN: ICD-10-PCS | Mod: ,,, | Performed by: ANESTHESIOLOGY

## 2022-04-14 PROCEDURE — 25500020 PHARM REV CODE 255: Performed by: ANESTHESIOLOGY

## 2022-04-14 PROCEDURE — 64484 NJX AA&/STRD TFRM EPI L/S EA: CPT | Mod: RT,,, | Performed by: ANESTHESIOLOGY

## 2022-04-14 PROCEDURE — 64484 NJX AA&/STRD TFRM EPI L/S EA: CPT | Mod: RT | Performed by: ANESTHESIOLOGY

## 2022-04-14 RX ORDER — FENTANYL CITRATE 50 UG/ML
INJECTION, SOLUTION INTRAMUSCULAR; INTRAVENOUS
Status: DISCONTINUED | OUTPATIENT
Start: 2022-04-14 | End: 2022-04-14 | Stop reason: HOSPADM

## 2022-04-14 RX ORDER — LIDOCAINE HYDROCHLORIDE 10 MG/ML
INJECTION, SOLUTION EPIDURAL; INFILTRATION; INTRACAUDAL; PERINEURAL
Status: DISCONTINUED | OUTPATIENT
Start: 2022-04-14 | End: 2022-04-14 | Stop reason: HOSPADM

## 2022-04-14 RX ORDER — DEXAMETHASONE SODIUM PHOSPHATE 10 MG/ML
INJECTION INTRAMUSCULAR; INTRAVENOUS
Status: DISCONTINUED | OUTPATIENT
Start: 2022-04-14 | End: 2022-04-14 | Stop reason: HOSPADM

## 2022-04-14 RX ORDER — LIDOCAINE HYDROCHLORIDE 20 MG/ML
INJECTION, SOLUTION INFILTRATION; PERINEURAL
Status: DISCONTINUED | OUTPATIENT
Start: 2022-04-14 | End: 2022-04-14 | Stop reason: HOSPADM

## 2022-04-14 RX ORDER — MIDAZOLAM HYDROCHLORIDE 1 MG/ML
INJECTION INTRAMUSCULAR; INTRAVENOUS
Status: DISCONTINUED | OUTPATIENT
Start: 2022-04-14 | End: 2022-04-14 | Stop reason: HOSPADM

## 2022-04-14 RX ORDER — SODIUM CHLORIDE 9 MG/ML
500 INJECTION, SOLUTION INTRAVENOUS CONTINUOUS
Status: DISCONTINUED | OUTPATIENT
Start: 2022-04-14 | End: 2022-04-14 | Stop reason: HOSPADM

## 2022-04-14 NOTE — OP NOTE
Lumbar Transforaminal Epidural Steroid Injection under Fluoroscopic Guidance    The procedure, risks, benefits, and options were discussed with the patient. There are no contraindications to the procedure. The patent expressed understanding and agreed to the procedure. Informed written consent was obtained prior to the start of the procedure and can be found in the patient's chart.    PATIENT NAME: Prisca Zhu   MRN: [id number]     DATE OF PROCEDURE: 04/14/2022    PROCEDURE:  Right  L5/S1 and S1 Lumbar Transforaminal Epidural Steroid Injection under Fluoroscopic Guidance    PRE-OP DIAGNOSIS: Lumbar radiculopathy [M54.16] Lumbar radiculopathy [M54.16]    POST-OP DIAGNOSIS: Same    PHYSICIAN: Brandon Lee MD    ASSISTANTS: Dr. aRmon     MEDICATIONS INJECTED: Preservative-free Decadron 10mg with 5cc of Lidocaine 1% MPF     LOCAL ANESTHETIC INJECTED: Xylocaine 2%     SEDATION:   Versed 2mg and Fentanyl 50mcg                                                                                                                                                                                     Conscious sedation ordered by M.D. Patient re-evaluation prior to administration of conscious sedation. No changes noted in patient's status from initial evaluation. The patient's vital signs were monitored by RN and patient remained hemodynamically stable throughout the procedure.    Event Time In   Sedation Start 1541   Sedation End 1552       ESTIMATED BLOOD LOSS: None    COMPLICATIONS: None    TECHNIQUE: Time-out was performed to identify the patient and procedure to be performed. With the patient laying in a prone position, the surgical area was prepped and draped in the usual sterile fashion using ChloraPrep and a fenestrated drape.The levels were determined under fluoroscopy guidance. Skin anesthesia was achieved by injecting Lidocaine 2% over the injection sites. The transforaminal spaces were then approached with a 22 gauge,  5 inch spinal quinke needle that was introduced under fluoroscopic guidance in the AP and Lateral views. Once the needle tip was in the area of the transforaminal space, and there was no blood, CSF or paraesthesias, contrast dye Omnipaque (300mg/ml) was injected to confirm placement and there was no vascular runoff. Fluoroscopic imaging in the AP and lateral views revealed a clear outline of the spinal nerve with proximal spread of agent through the neural foramen into the epidural space. 3 mL of the medication mixture listed above was injected slowly at each site. Displacement of the radio opaque contrast after injection of the medication confirmed that the medication went into the area of the transforaminal spaces. The needles were removed and bleeding was nil. A sterile dressing was applied. No specimens collected. The patient tolerated the procedure well.       The patient was monitored after the procedure in the recovery area. They were given post-procedure and discharge instructions to follow at home. The patient was discharged in a stable condition.    I reviewed and edited the fellow's note. I conducted my own interview and physical examination. I agree with the findings. I was present and supervising all critical portions of the procedure.    Brandon Lee MD

## 2022-04-14 NOTE — DISCHARGE SUMMARY
Discharge Note  Short Stay      SUMMARY     Admit Date: 4/14/2022    Attending Physician: Brandon Lee      Discharge Physician: Brandon Lee      Discharge Date: 4/14/2022 3:53 PM    Procedure(s) (LRB):  INJECTION, STEROID, EPIDURAL, TRANSFORAMINAL APPROACH, RIGHT L5-S1 AND S1 (Right)    Final Diagnosis: Lumbar radiculopathy [M54.16]    Disposition: Home or self care    Patient Instructions:   Current Discharge Medication List      CONTINUE these medications which have NOT CHANGED    Details   CALCIUM ORAL Take by mouth every morning.       cetirizine (ZYRTEC) 10 MG tablet Take 1 tablet (10 mg total) by mouth once daily.  Qty: 30 tablet, Refills: 0    Associated Diagnoses: Allergic rhinitis, unspecified seasonality, unspecified trigger      cyanocobalamin (VITAMIN B-12) 100 MCG tablet Take 100 mcg by mouth every morning.       drospirenone-ethinyl estradiol (ROSALBA) 3-0.02 mg per tablet Take 1 tablet by mouth once daily.  Qty: 84 tablet, Refills: 3    Associated Diagnoses: Encounter for surveillance of contraceptive pills      EPINEPHrine (EPIPEN) 0.3 mg/0.3 mL AtIn Inject 0.3 mLs (0.3 mg total) into the muscle as needed.  Qty: 1 each, Refills: 1      ergocalciferol (ERGOCALCIFEROL) 50,000 unit Cap TAKE 1 CAPSULE BY MOUTH EVERY 7 DAYS  Qty: 4 capsule, Refills: 2    Associated Diagnoses: Vitamin D deficiency      famotidine (PEPCID) 20 MG tablet Take 1 tablet (20 mg total) by mouth 2 (two) times daily. for 5 days  Qty: 10 tablet, Refills: 0      fish oil-omega-3 fatty acids 300-1,000 mg capsule Take by mouth every morning.       fluticasone propionate (FLONASE) 50 mcg/actuation nasal spray SHAKE LIQUID AND USE 1 SPRAY(50 MCG) IN EACH NOSTRIL TWICE DAILY FOR 14 DAYS  Qty: 48 g, Refills: 0    Comments: **Patient requests 90 days supply**  Associated Diagnoses: Sinus congestion; Allergic rhinitis, unspecified seasonality, unspecified trigger      gabapentin (NEURONTIN) 600 MG tablet Take 1 tablet (600 mg total) by  mouth 3 (three) times daily.  Qty: 90 tablet, Refills: 2      lidocaine (LIDODERM) 5 % Place 1 patch onto the skin once daily. Remove & Discard patch within 12 hours or as directed by MD  Qty: 15 patch, Refills: 0      !! omeprazole (PRILOSEC) 40 MG capsule Take 1 capsule (40 mg total) by mouth 2 (two) times daily before meals.  Qty: 30 capsule, Refills: 2    Associated Diagnoses: Abdominal burning sensation in left upper quadrant      !! omeprazole (PRILOSEC) 40 MG capsule Take 1 capsule (40 mg total) by mouth 2 (two) times daily before meals.  Qty: 60 capsule, Refills: 12      ursodioL (COSME FORTE) 500 MG tablet Crush one tablet daily for gall bladder. Please compound into liquid and supply for 90 days if insurance approves  Qty: 90 tablet, Refills: 1       !! - Potential duplicate medications found. Please discuss with provider.              Discharge Diagnosis: Lumbar radiculopathy [M54.16]  Condition on Discharge: Stable with no complications to procedure   Diet on Discharge: Same as before.  Activity: as per instruction sheet.  Discharge to: Home with a responsible adult.  Follow up: 2-4 weeks

## 2022-04-14 NOTE — DISCHARGE INSTRUCTIONS

## 2022-05-05 ENCOUNTER — PATIENT MESSAGE (OUTPATIENT)
Dept: BARIATRICS | Facility: CLINIC | Age: 37
End: 2022-05-05
Payer: MEDICAID

## 2022-05-10 ENCOUNTER — PATIENT MESSAGE (OUTPATIENT)
Dept: BARIATRICS | Facility: CLINIC | Age: 37
End: 2022-05-10
Payer: MEDICAID

## 2022-05-26 ENCOUNTER — PATIENT MESSAGE (OUTPATIENT)
Dept: BARIATRICS | Facility: CLINIC | Age: 37
End: 2022-05-26
Payer: MEDICAID

## 2022-05-31 ENCOUNTER — PATIENT MESSAGE (OUTPATIENT)
Dept: ADMINISTRATIVE | Facility: HOSPITAL | Age: 37
End: 2022-05-31
Payer: MEDICAID

## 2022-06-09 ENCOUNTER — OFFICE VISIT (OUTPATIENT)
Dept: OBSTETRICS AND GYNECOLOGY | Facility: CLINIC | Age: 37
End: 2022-06-09
Payer: MEDICARE

## 2022-06-09 VITALS — SYSTOLIC BLOOD PRESSURE: 122 MMHG | BODY MASS INDEX: 31.8 KG/M2 | DIASTOLIC BLOOD PRESSURE: 78 MMHG | WEIGHT: 146.94 LBS

## 2022-06-09 DIAGNOSIS — Z32.02 NEGATIVE PREGNANCY TEST: ICD-10-CM

## 2022-06-09 DIAGNOSIS — Z30.42 ENCOUNTER FOR MANAGEMENT AND INJECTION OF DEPO-PROVERA: ICD-10-CM

## 2022-06-09 DIAGNOSIS — Z01.419 WELL WOMAN EXAM WITH ROUTINE GYNECOLOGICAL EXAM: Primary | ICD-10-CM

## 2022-06-09 DIAGNOSIS — N89.8 VAGINAL ODOR: ICD-10-CM

## 2022-06-09 LAB
B-HCG UR QL: NEGATIVE
CTP QC/QA: YES

## 2022-06-09 PROCEDURE — 1159F MED LIST DOCD IN RCRD: CPT | Mod: CPTII,,, | Performed by: OBSTETRICS & GYNECOLOGY

## 2022-06-09 PROCEDURE — 87481 CANDIDA DNA AMP PROBE: CPT | Mod: 59 | Performed by: OBSTETRICS & GYNECOLOGY

## 2022-06-09 PROCEDURE — 81025 URINE PREGNANCY TEST: CPT | Mod: PBBFAC | Performed by: OBSTETRICS & GYNECOLOGY

## 2022-06-09 PROCEDURE — 87491 CHLMYD TRACH DNA AMP PROBE: CPT | Mod: 59 | Performed by: OBSTETRICS & GYNECOLOGY

## 2022-06-09 PROCEDURE — 1160F RVW MEDS BY RX/DR IN RCRD: CPT | Mod: CPTII,,, | Performed by: OBSTETRICS & GYNECOLOGY

## 2022-06-09 PROCEDURE — 3074F PR MOST RECENT SYSTOLIC BLOOD PRESSURE < 130 MM HG: ICD-10-PCS | Mod: CPTII,,, | Performed by: OBSTETRICS & GYNECOLOGY

## 2022-06-09 PROCEDURE — 96372 THER/PROPH/DIAG INJ SC/IM: CPT | Mod: PBBFAC

## 2022-06-09 PROCEDURE — 1160F PR REVIEW ALL MEDS BY PRESCRIBER/CLIN PHARMACIST DOCUMENTED: ICD-10-PCS | Mod: CPTII,,, | Performed by: OBSTETRICS & GYNECOLOGY

## 2022-06-09 PROCEDURE — G0101 CA SCREEN;PELVIC/BREAST EXAM: HCPCS | Mod: S$PBB,,, | Performed by: OBSTETRICS & GYNECOLOGY

## 2022-06-09 PROCEDURE — 1159F PR MEDICATION LIST DOCUMENTED IN MEDICAL RECORD: ICD-10-PCS | Mod: CPTII,,, | Performed by: OBSTETRICS & GYNECOLOGY

## 2022-06-09 PROCEDURE — 3078F DIAST BP <80 MM HG: CPT | Mod: CPTII,,, | Performed by: OBSTETRICS & GYNECOLOGY

## 2022-06-09 PROCEDURE — 87591 N.GONORRHOEAE DNA AMP PROB: CPT | Mod: 59 | Performed by: OBSTETRICS & GYNECOLOGY

## 2022-06-09 PROCEDURE — 3074F SYST BP LT 130 MM HG: CPT | Mod: CPTII,,, | Performed by: OBSTETRICS & GYNECOLOGY

## 2022-06-09 PROCEDURE — 99999 PR PBB SHADOW E&M-EST. PATIENT-LVL III: ICD-10-PCS | Mod: PBBFAC,,, | Performed by: OBSTETRICS & GYNECOLOGY

## 2022-06-09 PROCEDURE — G0101 PR CA SCREEN;PELVIC/BREAST EXAM: ICD-10-PCS | Mod: S$PBB,,, | Performed by: OBSTETRICS & GYNECOLOGY

## 2022-06-09 PROCEDURE — 3008F BODY MASS INDEX DOCD: CPT | Mod: CPTII,,, | Performed by: OBSTETRICS & GYNECOLOGY

## 2022-06-09 PROCEDURE — 3008F PR BODY MASS INDEX (BMI) DOCUMENTED: ICD-10-PCS | Mod: CPTII,,, | Performed by: OBSTETRICS & GYNECOLOGY

## 2022-06-09 PROCEDURE — 99999 PR PBB SHADOW E&M-EST. PATIENT-LVL III: CPT | Mod: PBBFAC,,, | Performed by: OBSTETRICS & GYNECOLOGY

## 2022-06-09 PROCEDURE — 3078F PR MOST RECENT DIASTOLIC BLOOD PRESSURE < 80 MM HG: ICD-10-PCS | Mod: CPTII,,, | Performed by: OBSTETRICS & GYNECOLOGY

## 2022-06-09 RX ORDER — MEDROXYPROGESTERONE ACETATE 150 MG/ML
150 INJECTION, SUSPENSION INTRAMUSCULAR
Status: SHIPPED | OUTPATIENT
Start: 2022-06-09 | End: 2023-06-04

## 2022-06-09 RX ADMIN — MEDROXYPROGESTERONE ACETATE 150 MG: 150 INJECTION, SUSPENSION INTRAMUSCULAR at 04:06

## 2022-06-10 ENCOUNTER — PATIENT MESSAGE (OUTPATIENT)
Dept: BARIATRICS | Facility: CLINIC | Age: 37
End: 2022-06-10
Payer: MEDICAID

## 2022-06-14 ENCOUNTER — PATIENT MESSAGE (OUTPATIENT)
Dept: BARIATRICS | Facility: CLINIC | Age: 37
End: 2022-06-14
Payer: MEDICAID

## 2022-06-14 LAB
C TRACH DNA SPEC QL NAA+PROBE: NOT DETECTED
N GONORRHOEA DNA SPEC QL NAA+PROBE: NOT DETECTED

## 2022-06-30 NOTE — PROGRESS NOTES
History & Physical  Gynecology      SUBJECTIVE:     Chief Complaint: Vaginal Discharge and Well Woman       History of Present Illness:  Annual Exam-Premenopausal  Ms. Zhu is a 37 y/o female who presents for annual exam. The patient complains of vaginal discharge. She reports that she would like to start contraception. The patient is sexually active. GYN screening history: last pap: approximate date  and was normal. The patient wears seatbelts: yes. The patient participates in regular exercise: no. Has the patient ever been transfused or tattooed?: yes. The patient reports that there is not domestic violence in her life.      Review of patient's allergies indicates:   Allergen Reactions    Pcn [penicillins] Shortness Of Breath       Past Medical History:   Diagnosis Date    Anemia     BMI 50.0-59.9, adult     Encounter for IUD removal 2019    GERD (gastroesophageal reflux disease) 2019    History of blood transfusion 2001    Malpositioned intrauterine device 3/21/2019    2019 OB/GYN - Dr Guzman - Discussed with patient that we will proceed with imaging to locate the IUD after which we can then proceed to book case in OR to retrieve displaced IUD    Obesity     OI (osteogenesis imperfecta)     Osteopetrosis     Tendonitis      Past Surgical History:   Procedure Laterality Date     SECTION      ,     ESOPHAGOGASTRODUODENOSCOPY N/A 2019    Procedure: ESOPHAGOGASTRODUODENOSCOPY (EGD);  Surgeon: Segun Dominguez MD;  Location: [address];  Service: Endoscopy;  Laterality: N/A;  BMI 51    ESOPHAGOGASTRODUODENOSCOPY N/A 2020    Procedure: EGD (ESOPHAGOGASTRODUODENOSCOPY);  Surgeon: Boom Farris MD;  Location: [facility];  Service: Endoscopy;  Laterality: N/A;  Dr. Jolly said we can do a rapid on her    ESOPHAGOGASTRODUODENOSCOPY N/A 3/4/2021    Procedure: EGD (ESOPHAGOGASTRODUODENOSCOPY), Please check for H. Pylori;  Surgeon: Nataliia Bradshaw,  MD;  Location: [facility] ENDO;  Service: Endoscopy;  Laterality: N/A;  Please check for H. Pylori  3/1-covid lapalco-inst portal-tb    FEMUR FRACTURE SURGERY Bilateral     hardware/rods in both legs    FRACTURE SURGERY      femur    LAPAROSCOPIC GASTROENTEROSTOMY N/A 7/10/2020    Procedure: GASTROENTEROSTOMY, LAPAROSCOPIC, with intraop EGD;  Surgeon: Guillermo Villanueva MD;  Location: [address];  Service: General;  Laterality: N/A;    REMOVAL OF INTRAUTERINE DEVICE (IUD) N/A 6/3/2019    Procedure: REMOVAL, INTRAUTERINE DEVICE;  Surgeon: Ashley Greenberg MD;  Location: [facility] OR;  Service: OB/GYN;  Laterality: N/A;  RN PRE OP 19----BMI-51.29    TRANSFORAMINAL EPIDURAL INJECTION OF STEROID Right 2021    Procedure: INJECTION, STEROID, EPIDURAL, TRANSFORAMINAL, APPROACH , L5-S1 and S1;  Surgeon: Brandon Lee MD;  Location: [facility] PAIN MGT;  Service: Pain Management;  Laterality: Right;    TRANSFORAMINAL EPIDURAL INJECTION OF STEROID Right 2021    Procedure: INJECTION, STEROID, EPIDURAL, TRANSFORAMINAL APPROACH L5/S1, S1;  Surgeon: Brandon Lee MD;  Location: [facility] PAIN [facility]GT;  Service: Pain Management;  Laterality: Right;    TRANSFORAMINAL EPIDURAL INJECTION OF STEROID Right 2022    Procedure: INJECTION, STEROID, EPIDURAL, TRANSFORAMINAL APPROACH, RIGHT L5-S1 AND S1;  Surgeon: Brandon Lee MD;  Location: [facility] PAIN [facility]GT;  Service: Pain Management;  Laterality: Right;    WISDOM TOOTH EXTRACTION       OB History        4    Para   2    Term   2            AB        Living   2       SAB        IAB        Ectopic        Multiple        Live Births                   Family History   Problem Relation Age of Onset    Hypertension Mother     Hyperlipidemia Mother     Asthma Mother     Anxiety disorder Mother     Cancer Mother     No Known Problems Father     Liver cancer Maternal Grandfather     Celiac disease Neg Hx     Colon cancer Neg Hx     Colon polyps Neg  Hx     Crohn's disease Neg Hx     Cystic fibrosis Neg Hx     Esophageal cancer Neg Hx     Irritable bowel syndrome Neg Hx     Rectal cancer Neg Hx     Stomach cancer Neg Hx      Social History     Tobacco Use    Smoking status: Never Smoker    Smokeless tobacco: Never Used   Substance Use Topics    Alcohol use: No    Drug use: Never       Current Outpatient Medications   Medication Sig    CALCIUM ORAL Take by mouth every morning.     cetirizine (ZYRTEC) 10 MG tablet Take 1 tablet (10 mg total) by mouth once daily.    cyanocobalamin (VITAMIN B-12) 100 MCG tablet Take 100 mcg by mouth every morning.     EPINEPHrine (EPIPEN) 0.3 mg/0.3 mL AtIn Inject 0.3 mLs (0.3 mg total) into the muscle as needed.    ergocalciferol (ERGOCALCIFEROL) 50,000 unit Cap TAKE 1 CAPSULE BY MOUTH EVERY 7 DAYS    fish oil-omega-3 fatty acids 300-1,000 mg capsule Take by mouth every morning.     fluticasone propionate (FLONASE) 50 mcg/actuation nasal spray SHAKE LIQUID AND USE 1 SPRAY(50 MCG) IN EACH NOSTRIL TWICE DAILY FOR 14 DAYS    famotidine (PEPCID) 20 MG tablet Take 1 tablet (20 mg total) by mouth 2 (two) times daily. for 5 days    gabapentin (NEURONTIN) 600 MG tablet Take 1 tablet (600 mg total) by mouth 3 (three) times daily.    lidocaine (LIDODERM) 5 % Place 1 patch onto the skin once daily. Remove & Discard patch within 12 hours or as directed by MD (Patient not taking: Reported on 6/9/2022)    omeprazole (PRILOSEC) 40 MG capsule Take 1 capsule (40 mg total) by mouth 2 (two) times daily before meals. (Patient not taking: Reported on 6/9/2022)    omeprazole (PRILOSEC) 40 MG capsule Take 1 capsule (40 mg total) by mouth 2 (two) times daily before meals. (Patient not taking: Reported on 6/9/2022)    ursodioL (COSME FORTE) 500 MG tablet Crush one tablet daily for gall bladder. Please compound into liquid and supply for 90 days if insurance approves (Patient not taking: No sig reported)     Current  Facility-Administered Medications   Medication    medroxyPROGESTERone (DEPO-PROVERA) injection 150 mg         Review of Systems:  Review of Systems   Constitutional: Negative for chills and fever.   Eyes: Negative for visual disturbance.   Respiratory: Negative for cough and wheezing.    Cardiovascular: Negative for chest pain and palpitations.   Gastrointestinal: Negative for abdominal pain, nausea and vomiting.   Genitourinary: Positive for vaginal discharge. Negative for dysuria, frequency, hematuria, pelvic pain, vaginal bleeding and vaginal pain.   Neurological: Negative for headaches.   Psychiatric/Behavioral: Negative for depression.        OBJECTIVE:     Physical Exam:  Physical Exam  Vitals and nursing note reviewed. Exam conducted with a chaperone present.   Constitutional:       Appearance: She is well-developed.   Cardiovascular:      Rate and Rhythm: Normal rate.   Pulmonary:      Effort: Pulmonary effort is normal. No respiratory distress.   Chest:   Breasts: Breasts are symmetrical.       Abdominal:      General: There is no distension.      Palpations: Abdomen is soft.      Tenderness: There is no abdominal tenderness.   Genitourinary:     Vagina: Vaginal discharge present.   Skin:     General: Skin is warm and dry.   Neurological:      Mental Status: She is alert and oriented to person, place, and time.           ASSESSMENT:       ICD-10-CM ICD-9-CM    1. Well woman exam with routine gynecological exam  Z01.419 V72.31    2. Encounter for management and injection of depo-Provera  Z30.42 V25.49 medroxyPROGESTERone (DEPO-PROVERA) injection 150 mg   3. Negative pregnancy test  Z32.02 V72.41 POCT urine pregnancy   4. Vaginal odor  N89.8 625.8 Vaginosis Screen by DNA Probe      C. trachomatis/N. gonorrhoeae by AMP DNA     Plan:      Prisca was seen today for vaginal discharge and well woman.    Diagnoses and all orders for this visit:    Well woman exam with routine gynecological exam  - Pap smear  normal 2019    Encounter for management and injection of depo-Provera  -     medroxyPROGESTERone (DEPO-PROVERA) injection 150 mg  - Depo Provera injection today. Discusses possible irregular bleeding for first 3-6 months with injections. Pain, swelling and redness at injection site. Also discussed the possibility of irregular periods with injections or the possibility of amenorrhea. Patient understands that she must return to injection center every 3 months (90 days) for repeat injection.    Negative pregnancy test  -     POCT urine pregnancy    Vaginal odor  -     Vaginosis Screen by DNA Probe  -     C. trachomatis/N. gonorrhoeae by AMP DNA        Orders Placed This Encounter   Procedures    Vaginosis Screen by DNA Probe    C. trachomatis/N. gonorrhoeae by AMP DNA    POCT urine pregnancy       Follow up in about 1 year (around 6/9/2023) for Well Woman/Annual.    Counseling time: 15 minutes    Ashley Guerrier

## 2022-07-05 ENCOUNTER — PATIENT MESSAGE (OUTPATIENT)
Dept: BARIATRICS | Facility: CLINIC | Age: 37
End: 2022-07-05
Payer: MEDICARE

## 2022-07-19 ENCOUNTER — PATIENT MESSAGE (OUTPATIENT)
Dept: BARIATRICS | Facility: CLINIC | Age: 37
End: 2022-07-19
Payer: MEDICARE

## 2022-07-19 DIAGNOSIS — Z98.84 S/P GASTRIC BYPASS: Primary | ICD-10-CM

## 2022-08-02 ENCOUNTER — PATIENT MESSAGE (OUTPATIENT)
Dept: BARIATRICS | Facility: CLINIC | Age: 37
End: 2022-08-02
Payer: MEDICARE

## 2022-08-04 ENCOUNTER — PATIENT MESSAGE (OUTPATIENT)
Dept: BARIATRICS | Facility: CLINIC | Age: 37
End: 2022-08-04
Payer: MEDICARE

## 2022-08-09 ENCOUNTER — OFFICE VISIT (OUTPATIENT)
Dept: BARIATRICS | Facility: CLINIC | Age: 37
End: 2022-08-09
Payer: MEDICARE

## 2022-08-09 ENCOUNTER — CLINICAL SUPPORT (OUTPATIENT)
Dept: BARIATRICS | Facility: CLINIC | Age: 37
End: 2022-08-09
Payer: MEDICARE

## 2022-08-09 ENCOUNTER — LAB VISIT (OUTPATIENT)
Dept: LAB | Facility: HOSPITAL | Age: 37
End: 2022-08-09
Payer: MEDICARE

## 2022-08-09 VITALS
HEART RATE: 51 BPM | OXYGEN SATURATION: 97 % | TEMPERATURE: 97 F | HEIGHT: 57 IN | BODY MASS INDEX: 31.24 KG/M2 | DIASTOLIC BLOOD PRESSURE: 68 MMHG | SYSTOLIC BLOOD PRESSURE: 126 MMHG | WEIGHT: 144.81 LBS

## 2022-08-09 DIAGNOSIS — Z98.0 S/P BYPASS GASTROENTEROSTOMY: ICD-10-CM

## 2022-08-09 DIAGNOSIS — Z98.0 S/P BYPASS GASTROENTEROSTOMY: Primary | ICD-10-CM

## 2022-08-09 DIAGNOSIS — Z98.84 S/P GASTRIC BYPASS: Primary | ICD-10-CM

## 2022-08-09 DIAGNOSIS — R79.9 ABNORMAL FINDING OF BLOOD CHEMISTRY, UNSPECIFIED: ICD-10-CM

## 2022-08-09 DIAGNOSIS — K21.00 GASTROESOPHAGEAL REFLUX DISEASE WITH ESOPHAGITIS WITHOUT HEMORRHAGE: ICD-10-CM

## 2022-08-09 DIAGNOSIS — K25.9 GASTRIC ULCER, UNSPECIFIED CHRONICITY, UNSPECIFIED WHETHER GASTRIC ULCER HEMORRHAGE OR PERFORATION PRESENT: ICD-10-CM

## 2022-08-09 DIAGNOSIS — D53.9 NUTRITIONAL ANEMIA, UNSPECIFIED: ICD-10-CM

## 2022-08-09 DIAGNOSIS — Z71.3 DIETARY COUNSELING AND SURVEILLANCE: ICD-10-CM

## 2022-08-09 DIAGNOSIS — E66.9 OBESITY (BMI 30-39.9): ICD-10-CM

## 2022-08-09 LAB
25(OH)D3+25(OH)D2 SERPL-MCNC: 31 NG/ML (ref 30–96)
ALBUMIN SERPL BCP-MCNC: 3.8 G/DL (ref 3.5–5.2)
ALP SERPL-CCNC: 67 U/L (ref 55–135)
ALT SERPL W/O P-5'-P-CCNC: 16 U/L (ref 10–44)
ANION GAP SERPL CALC-SCNC: 8 MMOL/L (ref 8–16)
AST SERPL-CCNC: 17 U/L (ref 10–40)
BASOPHILS # BLD AUTO: 0.02 K/UL (ref 0–0.2)
BASOPHILS NFR BLD: 0.3 % (ref 0–1.9)
BILIRUB SERPL-MCNC: 0.9 MG/DL (ref 0.1–1)
BUN SERPL-MCNC: 8 MG/DL (ref 6–20)
CALCIUM SERPL-MCNC: 9.3 MG/DL (ref 8.7–10.5)
CHLORIDE SERPL-SCNC: 108 MMOL/L (ref 95–110)
CHOLEST SERPL-MCNC: 148 MG/DL (ref 120–199)
CHOLEST/HDLC SERPL: 2.8 {RATIO} (ref 2–5)
CO2 SERPL-SCNC: 24 MMOL/L (ref 23–29)
CREAT SERPL-MCNC: 0.8 MG/DL (ref 0.5–1.4)
DIFFERENTIAL METHOD: NORMAL
EOSINOPHIL # BLD AUTO: 0.1 K/UL (ref 0–0.5)
EOSINOPHIL NFR BLD: 1.4 % (ref 0–8)
ERYTHROCYTE [DISTWIDTH] IN BLOOD BY AUTOMATED COUNT: 13.6 % (ref 11.5–14.5)
EST. GFR  (NO RACE VARIABLE): >60 ML/MIN/1.73 M^2
GLUCOSE SERPL-MCNC: 69 MG/DL (ref 70–110)
HCT VFR BLD AUTO: 40.2 % (ref 37–48.5)
HDLC SERPL-MCNC: 52 MG/DL (ref 40–75)
HDLC SERPL: 35.1 % (ref 20–50)
HGB BLD-MCNC: 13.2 G/DL (ref 12–16)
IMM GRANULOCYTES # BLD AUTO: 0.01 K/UL (ref 0–0.04)
IMM GRANULOCYTES NFR BLD AUTO: 0.2 % (ref 0–0.5)
IRON SERPL-MCNC: 98 UG/DL (ref 30–160)
LDLC SERPL CALC-MCNC: 85.8 MG/DL (ref 63–159)
LYMPHOCYTES # BLD AUTO: 1.9 K/UL (ref 1–4.8)
LYMPHOCYTES NFR BLD: 30.5 % (ref 18–48)
MCH RBC QN AUTO: 30.6 PG (ref 27–31)
MCHC RBC AUTO-ENTMCNC: 32.8 G/DL (ref 32–36)
MCV RBC AUTO: 93 FL (ref 82–98)
MONOCYTES # BLD AUTO: 0.4 K/UL (ref 0.3–1)
MONOCYTES NFR BLD: 6.4 % (ref 4–15)
NEUTROPHILS # BLD AUTO: 3.8 K/UL (ref 1.8–7.7)
NEUTROPHILS NFR BLD: 61.2 % (ref 38–73)
NONHDLC SERPL-MCNC: 96 MG/DL
NRBC BLD-RTO: 0 /100 WBC
PLATELET # BLD AUTO: 234 K/UL (ref 150–450)
PMV BLD AUTO: 11.1 FL (ref 9.2–12.9)
POTASSIUM SERPL-SCNC: 3.9 MMOL/L (ref 3.5–5.1)
PROT SERPL-MCNC: 7.4 G/DL (ref 6–8.4)
RBC # BLD AUTO: 4.32 M/UL (ref 4–5.4)
SATURATED IRON: 23 % (ref 20–50)
SODIUM SERPL-SCNC: 140 MMOL/L (ref 136–145)
TOTAL IRON BINDING CAPACITY: 419 UG/DL (ref 250–450)
TRANSFERRIN SERPL-MCNC: 283 MG/DL (ref 200–375)
TRIGL SERPL-MCNC: 51 MG/DL (ref 30–150)
VIT B12 SERPL-MCNC: 425 PG/ML (ref 210–950)
WBC # BLD AUTO: 6.23 K/UL (ref 3.9–12.7)

## 2022-08-09 PROCEDURE — 85025 COMPLETE CBC W/AUTO DIFF WBC: CPT | Performed by: PHYSICIAN ASSISTANT

## 2022-08-09 PROCEDURE — 1159F MED LIST DOCD IN RCRD: CPT | Mod: CPTII,S$GLB,, | Performed by: PHYSICIAN ASSISTANT

## 2022-08-09 PROCEDURE — 1160F PR REVIEW ALL MEDS BY PRESCRIBER/CLIN PHARMACIST DOCUMENTED: ICD-10-PCS | Mod: CPTII,S$GLB,, | Performed by: PHYSICIAN ASSISTANT

## 2022-08-09 PROCEDURE — 99499 UNLISTED E&M SERVICE: CPT | Mod: 95,,, | Performed by: DIETITIAN, REGISTERED

## 2022-08-09 PROCEDURE — 99999 PR PBB SHADOW E&M-EST. PATIENT-LVL V: ICD-10-PCS | Mod: PBBFAC,,, | Performed by: PHYSICIAN ASSISTANT

## 2022-08-09 PROCEDURE — 82607 VITAMIN B-12: CPT | Performed by: PHYSICIAN ASSISTANT

## 2022-08-09 PROCEDURE — 80053 COMPREHEN METABOLIC PANEL: CPT | Performed by: PHYSICIAN ASSISTANT

## 2022-08-09 PROCEDURE — 3008F PR BODY MASS INDEX (BMI) DOCUMENTED: ICD-10-PCS | Mod: CPTII,S$GLB,, | Performed by: PHYSICIAN ASSISTANT

## 2022-08-09 PROCEDURE — 3078F DIAST BP <80 MM HG: CPT | Mod: CPTII,S$GLB,, | Performed by: PHYSICIAN ASSISTANT

## 2022-08-09 PROCEDURE — 3008F BODY MASS INDEX DOCD: CPT | Mod: CPTII,S$GLB,, | Performed by: PHYSICIAN ASSISTANT

## 2022-08-09 PROCEDURE — 36415 COLL VENOUS BLD VENIPUNCTURE: CPT | Performed by: PHYSICIAN ASSISTANT

## 2022-08-09 PROCEDURE — 99499 UNLISTED E&M SERVICE: CPT | Mod: S$GLB,,, | Performed by: PHYSICIAN ASSISTANT

## 2022-08-09 PROCEDURE — 1159F PR MEDICATION LIST DOCUMENTED IN MEDICAL RECORD: ICD-10-PCS | Mod: CPTII,S$GLB,, | Performed by: PHYSICIAN ASSISTANT

## 2022-08-09 PROCEDURE — 80061 LIPID PANEL: CPT | Performed by: PHYSICIAN ASSISTANT

## 2022-08-09 PROCEDURE — 99999 PR PBB SHADOW E&M-EST. PATIENT-LVL V: CPT | Mod: PBBFAC,,, | Performed by: PHYSICIAN ASSISTANT

## 2022-08-09 PROCEDURE — 3074F SYST BP LT 130 MM HG: CPT | Mod: CPTII,S$GLB,, | Performed by: PHYSICIAN ASSISTANT

## 2022-08-09 PROCEDURE — 84466 ASSAY OF TRANSFERRIN: CPT | Performed by: PHYSICIAN ASSISTANT

## 2022-08-09 PROCEDURE — 1160F RVW MEDS BY RX/DR IN RCRD: CPT | Mod: CPTII,S$GLB,, | Performed by: PHYSICIAN ASSISTANT

## 2022-08-09 PROCEDURE — 84425 ASSAY OF VITAMIN B-1: CPT | Mod: GA | Performed by: PHYSICIAN ASSISTANT

## 2022-08-09 PROCEDURE — 99213 PR OFFICE/OUTPT VISIT, EST, LEVL III, 20-29 MIN: ICD-10-PCS | Mod: S$GLB,,, | Performed by: PHYSICIAN ASSISTANT

## 2022-08-09 PROCEDURE — 82306 VITAMIN D 25 HYDROXY: CPT | Performed by: PHYSICIAN ASSISTANT

## 2022-08-09 PROCEDURE — 3074F PR MOST RECENT SYSTOLIC BLOOD PRESSURE < 130 MM HG: ICD-10-PCS | Mod: CPTII,S$GLB,, | Performed by: PHYSICIAN ASSISTANT

## 2022-08-09 PROCEDURE — 99213 OFFICE O/P EST LOW 20 MIN: CPT | Mod: S$GLB,,, | Performed by: PHYSICIAN ASSISTANT

## 2022-08-09 PROCEDURE — 99499 NO LOS: ICD-10-PCS | Mod: 95,,, | Performed by: DIETITIAN, REGISTERED

## 2022-08-09 PROCEDURE — 3078F PR MOST RECENT DIASTOLIC BLOOD PRESSURE < 80 MM HG: ICD-10-PCS | Mod: CPTII,S$GLB,, | Performed by: PHYSICIAN ASSISTANT

## 2022-08-09 PROCEDURE — 99499 RISK ADDL DX/OHS AUDIT: ICD-10-PCS | Mod: S$GLB,,, | Performed by: PHYSICIAN ASSISTANT

## 2022-08-09 NOTE — PROGRESS NOTES
Subjective:       Patient ID: Prisca Zhu is a 37 y.o. female.    Chief Complaint: No chief complaint on file.    HPI S/p bypass 7/10/20. Also has decreased appetite along with some epigastric burning (improved), states that she takes the pepcid as needed. Happy with weight lose wants to maintain.       Last EGD 3/3031:   Impression:  - 2 cm hiatal hernia.                        - Widely patent Schatzki ring.                        - Gastric bypass with a pouch 8 cm in length and                        intact staple line. Gastrojejunal anastomosis                        characterized by friable mucosa and ulceration.                        Biopsied.   Denies nausea or vomiting, dysphagia, globus sensation diarrhea     Will order another EGD based off Dr Villanueva's recommendation. Not taking PPI carafate or h2 blocker regularly.       Good weight loss: 106 lbs 81%EW  No vitamin regimen     .  Review of Systems   Constitutional: Negative for fever.   Respiratory: Negative for cough, chest tightness and shortness of breath.    Cardiovascular: Negative for chest pain and palpitations.   Gastrointestinal: Negative for constipation, diarrhea, nausea and vomiting.   Genitourinary: Negative for difficulty urinating and dysuria.   Neurological: Negative for light-headedness.       Objective:     Physical Exam  Vitals reviewed.   Constitutional:       Appearance: Normal appearance.   Neurological:      General: No focal deficit present.      Mental Status: She is alert and oriented to person, place, and time.   Psychiatric:         Behavior: Behavior normal.         Thought Content: Thought content normal.         Judgment: Judgment normal.         Assessment:   -intermittent reflux  Weight loss  - no vitamin regimen   - loose skin         Plan:   - repeat EGD   - plastics referral

## 2022-08-09 NOTE — PROGRESS NOTES
The patient location is:  Patient Home   The chief complaint leading to consultation is: unintended weight loss and loose skin  Visit type: Virtual visit with synchronous audio and video  Total time spent with patient: 15 mins  Each patient to whom he or she provides medical services by telemedicine is:  (1) informed of the relationship between the physician and patient and the respective role of any other health care provider with respect to management of the patient; and (2) notified that he or she may decline to receive medical services by telemedicine and may withdraw from such care at any time.  Nutrition Handout located in AVS section of pt's [facility] and/or sent as a message via [facility] portal.  Pt informed of handout and how to access this information in [employer] [facility].    NUTRITION NOTE  Referring Physician: Guillermo Villanueva M.D.   Reason for MNT Referral: Follow-up 2 years s/p Gastric Bypass    PAST MEDICAL HISTORY:  Denies nausea, vomiting, constipation and diarrhea.  Reports problems, including continued weight  loss and loose skin.    Past Medical History:   Diagnosis Date    Anemia     BMI 50.0-59.9, adult     Encounter for IUD removal 5/23/2019    GERD (gastroesophageal reflux disease) 4/2/2019    History of blood transfusion 2001    Malpositioned intrauterine device 3/21/2019    April 2019 OB/GYN - Dr Guzman - Discussed with patient that we will proceed with imaging to locate the IUD after which we can then proceed to book case in OR to retrieve displaced IUD    Obesity     OI (osteogenesis imperfecta)     Osteopetrosis     Tendonitis          CLINICAL DATA:  37 y.o.-year-old Black or  female.      Current Weight: 144 lbs  BMI: There is no height or weight on file to calculate BMI.   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 Protein shakes constipate pt  Meal Pattern: 0-1 meal(s) +0 snack(s) + 0 protein supplement(s)  Inadequate protein supplement intake.  Inadequate dairy intake.  Inadequate vegetable intake.  Inadequate fruit intake.  Starchy CHO: none reported  Beverages water    EXERCISE:  Exercise History  Exercises Regularly: (P) No      Restrictions to Exercise: None.    VITAMINS/MINERALS:  Multivitamins: Vitamins  Multi with Iron: (P) Twice a day (flintstone)  Calcium Citrate with Vitamin D: (P) Taking  Type: (P) Chewables (gummy)  Vitamin B12: (P) Not taking  B Complex: (P) Not taking  B Complex: (P) Not taking  Calcium Citrate with Vitamin D: (P) Taking  Multi with Iron: (P) Twice a day (flintstone)  Vitamin B12: (P) Not taking    ASSESSMENT:  Doing poorly overall.  Inadequate protein intake.  Adequate fluid intake.  Advancing diet inappropriately.  Not exercising.  Inadequate vitamins & minerals.    BARIATRIC DIET DISCUSSION:  Instructed and provided written materials on bariatric regular diet plan.  Reinforced post-op nutrition guidelines.    PLAN / RECOMMENDATIONS:  Continue bariatric regular diet.  Increase protein intake.  Increase fluid intake.  Begin light exercise.  Begin appropriate vitamins & minerals.  Adjust vitamins & minerals by as discussed and per guidelines.    Return to clinic as needed    SESSION TIME: 15 minutes

## 2022-08-09 NOTE — PATIENT INSTRUCTIONS
"Snacks: (100-200 calories; >5g protein)    - 1 low-fat cheese stick with 8 cherry tomatoes or 1 serving fresh fruit  - 4 thin slices fat-free turkey breast and 1 slice low-fat cheese  - 4 thin slices fat-free honey ham with wedge of melon  - 2 slices of turkey ivy  - Boiled eggs (can buy at [employer] already boiled w/ shell removed)  - for convenience,  [place] read, snack, go (deli meat and cheese rolls)  - P3 packets (Protein packs w/ cheese, nuts, lean deli meat)  - MHP Fit and Lean Protein Pudding (find at [employer] - per 1 cup serving = 100 calories, 15 g protein, 0 g sugar)  - 6-8 edamame pods (equivalent to about 1/4 cup edamame without pods).   - 1/4 cup unsalted nuts with ½ cup fruit  - 6-oz container Dannon Light n Fit vanilla yogurt, topped with 1oz unsalted nuts         - apple, celery or baby carrots spread with 2 Tbsp PB2  - apple slices with 1 oz slice low-fat cheese  - Apple slices dipped in 2 Tbsp of PB2  - 2 Tbsp PB2 mixed in light or greek yogurt or sugar-free pudding  - celery, cucumber, bell pepper or baby carrots dipped in ¼ cup hummus bean spread   - celery, cucumber, baby carrots dipped in high protein greek yogurt (Mix 16 oz plain greek yogurt + 1 packet of hidden valley ranch dip mix)  - Cesar Links Beef Steak - 14g protein! (similar to beef jerky but very lean)  - 2 wedges Laughing Cow - Light Herb & Garlic Cheese with sliced cucumber or green bell pepper  - 1/2 cup low-fat cottage cheese with ¼ cup fruit or ¼ cup salsa  - 1/2 cup low fat cottage cheese with 10-15 cherry tomatoes  - 8 oz glass of FAIRLIFE fat free milk (13 g protein)  - 8 oz glass of FAIRLIFE fat free milk + 1 packet of sugar-free hot cocoa  - Add Atkins advantage Cafe Caramel shake to decaf coffee. Serve hot or blend with ice for "frappaccino" like drink  - RTD Protein drinks: Atkins, Low Carb Slim Fast, EAS light, Muscle Milk Light, etc.  - Homemade Protein drinks: GNC Soy95, Isopure, Nectar, UNJURY, Whey " Gourmet, etc. Mix 1 scoop powder with 8oz skim/1% milk or light soymilk.  - Protein bars: Atkins, EAS, Pure Protein,  Quest, Think Thin, Detour, etc. Must have 0-4 grams sugar - Read the label.    ** Be CREATIVE. You can always snack on bites of grilled chicken or tuna salad made with low fat sanchez, if needed!

## 2022-08-15 LAB — VIT B1 BLD-MCNC: 57 UG/L (ref 38–122)

## 2022-09-01 ENCOUNTER — CLINICAL SUPPORT (OUTPATIENT)
Dept: OBSTETRICS AND GYNECOLOGY | Facility: CLINIC | Age: 37
End: 2022-09-01
Payer: MEDICARE

## 2022-09-01 ENCOUNTER — PATIENT MESSAGE (OUTPATIENT)
Dept: BARIATRICS | Facility: CLINIC | Age: 37
End: 2022-09-01
Payer: MEDICARE

## 2022-09-01 VITALS — BODY MASS INDEX: 31.58 KG/M2 | WEIGHT: 145.94 LBS

## 2022-09-01 DIAGNOSIS — Z30.42 ENCOUNTER FOR MANAGEMENT AND INJECTION OF DEPO-PROVERA: Primary | ICD-10-CM

## 2022-09-01 PROCEDURE — 99999 PR PBB SHADOW E&M-EST. PATIENT-LVL I: ICD-10-PCS | Mod: PBBFAC,,,

## 2022-09-01 PROCEDURE — 96372 THER/PROPH/DIAG INJ SC/IM: CPT | Mod: PBBFAC

## 2022-09-01 PROCEDURE — 99999 PR PBB SHADOW E&M-EST. PATIENT-LVL I: CPT | Mod: PBBFAC,,,

## 2022-09-01 RX ADMIN — MEDROXYPROGESTERONE ACETATE 150 MG: 150 INJECTION, SUSPENSION INTRAMUSCULAR at 04:09

## 2022-09-01 NOTE — PROGRESS NOTES
Depo-provera injection administered without difficutly. Pt instructed to sit in waiting room for 15 minutes to monitor for s/s of adverse reaction. Next appointment made, stressed importance of staying within brenda period. Pt verb understanding.

## 2022-09-03 DIAGNOSIS — K25.9 GASTRIC ULCER, UNSPECIFIED CHRONICITY, UNSPECIFIED WHETHER GASTRIC ULCER HEMORRHAGE OR PERFORATION PRESENT: Primary | ICD-10-CM

## 2022-09-07 ENCOUNTER — PATIENT MESSAGE (OUTPATIENT)
Dept: BARIATRICS | Facility: CLINIC | Age: 37
End: 2022-09-07
Payer: MEDICARE

## 2022-09-08 ENCOUNTER — PATIENT MESSAGE (OUTPATIENT)
Dept: ENDOSCOPY | Facility: HOSPITAL | Age: 37
End: 2022-09-08
Payer: MEDICARE

## 2022-09-19 ENCOUNTER — PATIENT MESSAGE (OUTPATIENT)
Dept: ADMINISTRATIVE | Facility: HOSPITAL | Age: 37
End: 2022-09-19
Payer: MEDICARE

## 2022-09-19 ENCOUNTER — PATIENT OUTREACH (OUTPATIENT)
Dept: ADMINISTRATIVE | Facility: HOSPITAL | Age: 37
End: 2022-09-19
Payer: MEDICARE

## 2022-10-05 ENCOUNTER — HOSPITAL ENCOUNTER (OUTPATIENT)
Facility: HOSPITAL | Age: 37
Discharge: HOME | End: 2022-10-05
Attending: INTERNAL MEDICINE | Admitting: INTERNAL MEDICINE
Payer: MEDICARE

## 2022-10-05 ENCOUNTER — ANESTHESIA (OUTPATIENT)
Dept: ENDOSCOPY | Facility: HOSPITAL | Age: 37
End: 2022-10-05
Payer: MEDICARE

## 2022-10-05 ENCOUNTER — ANESTHESIA EVENT (OUTPATIENT)
Dept: ENDOSCOPY | Facility: HOSPITAL | Age: 37
End: 2022-10-05
Payer: MEDICARE

## 2022-10-05 VITALS
OXYGEN SATURATION: 99 % | BODY MASS INDEX: 30.2 KG/M2 | WEIGHT: 140 LBS | RESPIRATION RATE: 18 BRPM | SYSTOLIC BLOOD PRESSURE: 121 MMHG | HEART RATE: 60 BPM | HEIGHT: 57 IN | TEMPERATURE: 98 F | DIASTOLIC BLOOD PRESSURE: 70 MMHG

## 2022-10-05 DIAGNOSIS — K25.9 GASTRIC ULCER: ICD-10-CM

## 2022-10-05 LAB
B-HCG UR QL: NEGATIVE
CTP QC/QA: YES

## 2022-10-05 PROCEDURE — 25000003 PHARM REV CODE 250: Performed by: INTERNAL MEDICINE

## 2022-10-05 PROCEDURE — 27201012 HC FORCEPS, HOT/COLD, DISP: Performed by: INTERNAL MEDICINE

## 2022-10-05 PROCEDURE — E9220 PRA ENDO ANESTHESIA: HCPCS | Mod: ,,, | Performed by: STUDENT IN AN ORGANIZED HEALTH CARE EDUCATION/TRAINING PROGRAM

## 2022-10-05 PROCEDURE — 88342 IMHCHEM/IMCYTCHM 1ST ANTB: CPT | Performed by: PATHOLOGY

## 2022-10-05 PROCEDURE — 00731 ANES UPR GI NDSC PX NOS: CPT | Performed by: INTERNAL MEDICINE

## 2022-10-05 PROCEDURE — 88305 TISSUE EXAM BY PATHOLOGIST: ICD-10-PCS | Mod: 26,,, | Performed by: PATHOLOGY

## 2022-10-05 PROCEDURE — 37000008 HC ANESTHESIA 1ST 15 MINUTES: Performed by: INTERNAL MEDICINE

## 2022-10-05 PROCEDURE — E9220 PRA ENDO ANESTHESIA: ICD-10-PCS | Mod: ,,, | Performed by: STUDENT IN AN ORGANIZED HEALTH CARE EDUCATION/TRAINING PROGRAM

## 2022-10-05 PROCEDURE — 88305 TISSUE EXAM BY PATHOLOGIST: CPT | Performed by: PATHOLOGY

## 2022-10-05 PROCEDURE — 43239 EGD BIOPSY SINGLE/MULTIPLE: CPT | Mod: ,,, | Performed by: INTERNAL MEDICINE

## 2022-10-05 PROCEDURE — 25000003 PHARM REV CODE 250: Performed by: STUDENT IN AN ORGANIZED HEALTH CARE EDUCATION/TRAINING PROGRAM

## 2022-10-05 PROCEDURE — 88342 CHG IMMUNOCYTOCHEMISTRY: ICD-10-PCS | Mod: 26,,, | Performed by: PATHOLOGY

## 2022-10-05 PROCEDURE — 63600175 PHARM REV CODE 636 W HCPCS: Performed by: STUDENT IN AN ORGANIZED HEALTH CARE EDUCATION/TRAINING PROGRAM

## 2022-10-05 PROCEDURE — 88342 IMHCHEM/IMCYTCHM 1ST ANTB: CPT | Mod: 26,,, | Performed by: PATHOLOGY

## 2022-10-05 PROCEDURE — 88305 TISSUE EXAM BY PATHOLOGIST: CPT | Mod: 26,,, | Performed by: PATHOLOGY

## 2022-10-05 PROCEDURE — 81025 URINE PREGNANCY TEST: CPT | Performed by: INTERNAL MEDICINE

## 2022-10-05 PROCEDURE — 43239 EGD BIOPSY SINGLE/MULTIPLE: CPT | Performed by: INTERNAL MEDICINE

## 2022-10-05 PROCEDURE — 43239 PR EGD, FLEX, W/BIOPSY, SGL/MULTI: ICD-10-PCS | Mod: ,,, | Performed by: INTERNAL MEDICINE

## 2022-10-05 PROCEDURE — 37000009 HC ANESTHESIA EA ADD 15 MINS: Performed by: INTERNAL MEDICINE

## 2022-10-05 RX ORDER — PROPOFOL 10 MG/ML
VIAL (ML) INTRAVENOUS CONTINUOUS PRN
Status: DISCONTINUED | OUTPATIENT
Start: 2022-10-05 | End: 2022-10-05

## 2022-10-05 RX ORDER — LIDOCAINE HYDROCHLORIDE 20 MG/ML
INJECTION, SOLUTION EPIDURAL; INFILTRATION; INTRACAUDAL; PERINEURAL
Status: DISCONTINUED | OUTPATIENT
Start: 2022-10-05 | End: 2022-10-05

## 2022-10-05 RX ORDER — PROPOFOL 10 MG/ML
VIAL (ML) INTRAVENOUS
Status: DISCONTINUED | OUTPATIENT
Start: 2022-10-05 | End: 2022-10-05

## 2022-10-05 RX ORDER — SODIUM CHLORIDE 9 MG/ML
INJECTION, SOLUTION INTRAVENOUS CONTINUOUS
Status: DISCONTINUED | OUTPATIENT
Start: 2022-10-05 | End: 2022-10-05 | Stop reason: HOSPADM

## 2022-10-05 RX ADMIN — SODIUM CHLORIDE: 0.9 INJECTION, SOLUTION INTRAVENOUS at 07:10

## 2022-10-05 RX ADMIN — Medication 100 MG: at 08:10

## 2022-10-05 RX ADMIN — LIDOCAINE HYDROCHLORIDE 100 MG: 20 INJECTION, SOLUTION EPIDURAL; INFILTRATION; INTRACAUDAL; PERINEURAL at 08:10

## 2022-10-05 RX ADMIN — SODIUM CHLORIDE: 9 INJECTION, SOLUTION INTRAVENOUS at 08:10

## 2022-10-05 RX ADMIN — PROPOFOL 250 MCG/KG/MIN: 10 INJECTION, EMULSION INTRAVENOUS at 08:10

## 2022-10-05 NOTE — TRANSFER OF CARE
"Anesthesia Transfer of Care Note    Patient: Prisca Zhu    Procedure(s) Performed: Procedure(s) (LRB):  EGD (ESOPHAGOGASTRODUODENOSCOPY) (N/A)    Patient location: PACU    Anesthesia Type: general    Transport from OR: Transported from OR on room air with adequate spontaneous ventilation    Post pain: adequate analgesia    Post assessment: no apparent anesthetic complications    Post vital signs: stable    Level of consciousness: responds to stimulation    Nausea/Vomiting: no nausea/vomiting    Complications: none    Transfer of care protocol was followed      Last vitals:   Visit Vitals  /61   Pulse 60   Temp 36.7 °C (98 °F)   Resp 17   Ht 4' 9" (1.448 m)   Wt 63.5 kg (140 lb)   LMP 09/04/2022 (Approximate)   SpO2 98%   Breastfeeding No   BMI 30.30 kg/m²     "

## 2022-10-05 NOTE — H&P
Short Stay Endoscopy History and Physical    PCP - Mathew Carpenter MD    Procedure - EGD  ASA - 2  Mallampati - per anesthesia  History of Anesthesia problems - no  Family history Anesthesia problems -  no     HPI:  This is a 37 y.o. female here for evaluation of :     Reflux - no  Dysphagia - no  Abdominal pain - yes  Diarrhea - no  Anemia - no  GI bleeding - no  Other: F/U gastric ulcer    ROS:  CONSTITUTIONAL: Denies weight change,  fatigue, fevers, chills, night sweats.  CARDIOVASCULAR: Denies chest pain, shortness of breath, orthopnea and edema.  RESPIRATORY: Denies cough, hemoptysis, dyspnea, and wheezing.  GI: See HPI.    Medical History:   Past Medical History:   Diagnosis Date    Anemia     BMI 50.0-59.9, adult     Encounter for IUD removal 2019    GERD (gastroesophageal reflux disease) 2019    History of blood transfusion 2001    Malpositioned intrauterine device 3/21/2019    2019 OB/GYN - Dr Guzman - Discussed with patient that we will proceed with imaging to locate the IUD after which we can then proceed to book case in OR to retrieve displaced IUD    Obesity     OI (osteogenesis imperfecta)     Osteopetrosis     Tendonitis        Surgical History:   Past Surgical History:   Procedure Laterality Date     SECTION      ,     ESOPHAGOGASTRODUODENOSCOPY N/A 2019    Procedure: ESOPHAGOGASTRODUODENOSCOPY (EGD);  Surgeon: Segun Dominguez MD;  Location: [address];  Service: Endoscopy;  Laterality: N/A;  BMI 51    ESOPHAGOGASTRODUODENOSCOPY N/A 2020    Procedure: EGD (ESOPHAGOGASTRODUODENOSCOPY);  Surgeon: Boom Farris MD;  Location: [facility];  Service: Endoscopy;  Laterality: N/A;  Dr. Jolly said we can do a rapid on her    ESOPHAGOGASTRODUODENOSCOPY N/A 3/4/2021    Procedure: EGD (ESOPHAGOGASTRODUODENOSCOPY), Please check for H. Pylori;  Surgeon: Nataliia Bradshaw MD;  Location: [facility];  Service: Endoscopy;  Laterality: N/A;  Please check for H.  Pylori  3/1-covid [facility]-inst portal-tb    FEMUR FRACTURE SURGERY Bilateral     hardware/rods in both legs    FRACTURE SURGERY      femur    LAPAROSCOPIC GASTROENTEROSTOMY N/A 7/10/2020    Procedure: GASTROENTEROSTOMY, LAPAROSCOPIC, with intraop EGD;  Surgeon: Guillermo Villanueva MD;  Location: [facility] OR [address] [place] [address];  Service: General;  Laterality: N/A;    REMOVAL OF INTRAUTERINE DEVICE (IUD) N/A 6/3/2019    Procedure: REMOVAL, INTRAUTERINE DEVICE;  Surgeon: Ashley Greenberg MD;  Location: [facility] OR;  Service: OB/GYN;  Laterality: N/A;  RN PRE OP 5-23-19----BMI-51.29    TRANSFORAMINAL EPIDURAL INJECTION OF STEROID Right 1/7/2021    Procedure: INJECTION, STEROID, EPIDURAL, TRANSFORAMINAL, APPROACH , L5-S1 and S1;  Surgeon: Brandon Lee MD;  Location: [facility] PAIN MGT;  Service: Pain Management;  Laterality: Right;    TRANSFORAMINAL EPIDURAL INJECTION OF STEROID Right 4/23/2021    Procedure: INJECTION, STEROID, EPIDURAL, TRANSFORAMINAL APPROACH L5/S1, S1;  Surgeon: Brandon Lee MD;  Location: [facility] PAIN [facility]GT;  Service: Pain Management;  Laterality: Right;    TRANSFORAMINAL EPIDURAL INJECTION OF STEROID Right 4/14/2022    Procedure: INJECTION, STEROID, EPIDURAL, TRANSFORAMINAL APPROACH, RIGHT L5-S1 AND S1;  Surgeon: Brandon Lee MD;  Location: [facility] PAIN [facility]GT;  Service: Pain Management;  Laterality: Right;    WISDOM TOOTH EXTRACTION         Family History:  Family History   Problem Relation Age of Onset    Hypertension Mother     Hyperlipidemia Mother     Asthma Mother     Anxiety disorder Mother     Cancer Mother     No Known Problems Father     Liver cancer Maternal Grandfather     Celiac disease Neg Hx     Colon cancer Neg Hx     Colon polyps Neg Hx     Crohn's disease Neg Hx     Cystic fibrosis Neg Hx     Esophageal cancer Neg Hx     Irritable bowel syndrome Neg Hx     Rectal cancer Neg Hx     Stomach cancer Neg Hx        Social History:   Social History     Tobacco Use    Smoking status: Never     Smokeless tobacco: Never   Substance Use Topics    Alcohol use: No    Drug use: Never       Allergies: Reviewed    Medications:   No current facility-administered medications on file prior to encounter.     Current Outpatient Medications on File Prior to Encounter   Medication Sig Dispense Refill    CALCIUM ORAL Take by mouth every morning.       cyanocobalamin (VITAMIN B-12) 100 MCG tablet Take 100 mcg by mouth every morning.       ergocalciferol (ERGOCALCIFEROL) 50,000 unit Cap TAKE 1 CAPSULE BY MOUTH EVERY 7 DAYS 4 capsule 2    fish oil-omega-3 fatty acids 300-1,000 mg capsule Take by mouth every morning.       fluticasone propionate (FLONASE) 50 mcg/actuation nasal spray SHAKE LIQUID AND USE 1 SPRAY(50 MCG) IN EACH NOSTRIL TWICE DAILY FOR 14 DAYS 48 g 0    gabapentin (NEURONTIN) 600 MG tablet Take 1 tablet (600 mg total) by mouth 3 (three) times daily. 90 tablet 2    omeprazole (PRILOSEC) 40 MG capsule Take 1 capsule (40 mg total) by mouth 2 (two) times daily before meals. 30 capsule 2    omeprazole (PRILOSEC) 40 MG capsule Take 1 capsule (40 mg total) by mouth 2 (two) times daily before meals. 60 capsule 12    ursodioL (COSME FORTE) 500 MG tablet Crush one tablet daily for gall bladder. Please compound into liquid and supply for 90 days if insurance approves 90 tablet 1    cetirizine (ZYRTEC) 10 MG tablet Take 1 tablet (10 mg total) by mouth once daily. 30 tablet 0    EPINEPHrine (EPIPEN) 0.3 mg/0.3 mL AtIn Inject 0.3 mLs (0.3 mg total) into the muscle as needed. 1 each 1    famotidine (PEPCID) 20 MG tablet Take 1 tablet (20 mg total) by mouth 2 (two) times daily. for 5 days 10 tablet 0    lidocaine (LIDODERM) 5 % Place 1 patch onto the skin once daily. Remove & Discard patch within 12 hours or as directed by MD (Patient not taking: No sig reported) 15 patch 0       Physical Exam:  Vital Signs:   Vitals:    10/05/22 0810   BP: 139/65   Pulse: (!) 55   Resp: 14   Temp: 97.9 °F (36.6 °C)     General Appearance:  Well appearing in no acute distress  ENT: OP clear  Chest: CTA B  CV: RRR, no m/r/g  Abd: s/nt/nd/nabs  Ext: no edema    Labs:  Reviewed    Plan:  I have explained the risks and benefits of endoscopy procedures to the patient including but not limited to bleeding, perforation, infection, and death. The patient wishes to proceed.

## 2022-10-05 NOTE — PROVATION PATIENT INSTRUCTIONS
Discharge Summary/Instructions after an Endoscopic Procedure  Patient Name: Prisca Zhu  Patient MRN: [id number]  Patient YOB: 1985 Wednesday, October 5, 2022  Colten Parks MD  Dear patient,  As a result of recent federal legislation (The Federal Cures Act), you may   receive lab or pathology results from your procedure in your [facility]   account before your physician is able to contact you. Your physician or   their representative will relay the results to you with their   recommendations at their soonest availability.  Thank you,  RESTRICTIONS:  During your procedure today, you received medications for sedation.  These   medications may affect your judgment, balance and coordination.  Therefore,   for 24 hours, you have the following restrictions:   - DO NOT drive a car, operate machinery, make legal/financial decisions,   sign important papers or drink alcohol.    ACTIVITY:  Today: no heavy lifting, straining or running due to procedural   sedation/anesthesia.  The following day: return to full activity including work.  DIET:  Eat and drink normally unless instructed otherwise.     TREATMENT FOR COMMON SIDE EFFECTS:  - Mild abdominal pain, nausea, belching, bloating or excessive gas:  rest,   eat lightly and use a heating pad.  - Sore Throat: treat with throat lozenges and/or gargle with warm salt   water.  - Because air was used during the procedure, expelling large amounts of air   from your rectum or belching is normal.  - If a bowel prep was taken, you may not have a bowel movement for 1-3 days.    This is normal.  SYMPTOMS TO WATCH FOR AND REPORT TO YOUR PHYSICIAN:  1. Abdominal pain or bloating, other than gas cramps.  2. Chest pain.  3. Back pain.  4. Signs of infection such as: chills or fever occurring within 24 hours   after the procedure.  5. Rectal bleeding, which would show as bright red, maroon, or black stools.   (A tablespoon of blood from the rectum is not serious,  especially if   hemorrhoids are present.)  6. Vomiting.  7. Weakness or dizziness.  GO DIRECTLY TO THE NEAREST EMERGENCY ROOM IF YOU HAVE ANY OF THE FOLLOWING:      Difficulty breathing              Chills and/or fever over 101 F   Persistent vomiting and/or vomiting blood   Severe abdominal pain   Severe chest pain   Black, tarry stools   Bleeding- more than one tablespoon   Any other symptom or condition that you feel may need urgent attention  Your doctor recommends these additional instructions:  If any biopsies were taken, your doctors clinic will contact you in 1 to 2   weeks with any results.  - Discharge patient to home.   - Resume previous diet today.   - Continue present medications.   - Await pathology results.   - Return to referring physician as previously scheduled.  For questions, problems or results please call your physician - Colten Parks MD at Work:  [phone].  [facility] [place], EMERGENCY ROOM PHONE NUMBER: [phone]  IF A COMPLICATION OR EMERGENCY SITUATION ARISES AND YOU ARE UNABLE TO REACH   YOUR PHYSICIAN - GO DIRECTLY TO THE EMERGENCY ROOM.  Colten Parks MD  10/5/2022 9:02:33 AM  This report has been verified and signed electronically.  Dear patient,  As a result of recent federal legislation (The Federal Cures Act), you may   receive lab or pathology results from your procedure in your [facility]   account before your physician is able to contact you. Your physician or   their representative will relay the results to you with their   recommendations at their soonest availability.  Thank you,  PROVATION

## 2022-10-05 NOTE — ANESTHESIA PREPROCEDURE EVALUATION
10/05/2022  Prisca Zhu is a 37 y.o., female.      Past Medical History:   Diagnosis Date    Anemia     BMI 50.0-59.9, adult     Encounter for IUD removal 2019    GERD (gastroesophageal reflux disease) 2019    History of blood transfusion 2001    Malpositioned intrauterine device 3/21/2019    2019 OB/GYN - Dr Guzman - Discussed with patient that we will proceed with imaging to locate the IUD after which we can then proceed to book case in OR to retrieve displaced IUD    Obesity     OI (osteogenesis imperfecta)     Osteopetrosis     Tendonitis      Past Surgical History:   Procedure Laterality Date     SECTION      ,     ESOPHAGOGASTRODUODENOSCOPY N/A 2019    Procedure: ESOPHAGOGASTRODUODENOSCOPY (EGD);  Surgeon: Segun Dominguez MD;  Location: [facility] ([address] [place] [address]);  Service: Endoscopy;  Laterality: N/A;  BMI 51    ESOPHAGOGASTRODUODENOSCOPY N/A 2020    Procedure: EGD (ESOPHAGOGASTRODUODENOSCOPY);  Surgeon: Boom Farris MD;  Location: [facility];  Service: Endoscopy;  Laterality: N/A;  Dr. Jolly said we can do a rapid on her    ESOPHAGOGASTRODUODENOSCOPY N/A 3/4/2021    Procedure: EGD (ESOPHAGOGASTRODUODENOSCOPY), Please check for H. Pylori;  Surgeon: Nataliia Bradshaw MD;  Location: [facility];  Service: Endoscopy;  Laterality: N/A;  Please check for H. Pylori  3/1-covid [facility]-inst portal-tb    FEMUR FRACTURE SURGERY Bilateral     hardware/rods in both legs    FRACTURE SURGERY      femur    LAPAROSCOPIC GASTROENTEROSTOMY N/A 7/10/2020    Procedure: GASTROENTEROSTOMY, LAPAROSCOPIC, with intraop EGD;  Surgeon: Guillermo Villanueva MD;  Location: [address];  Service: General;  Laterality: N/A;    REMOVAL OF INTRAUTERINE DEVICE (IUD) N/A 6/3/2019    Procedure: REMOVAL, INTRAUTERINE DEVICE;  Surgeon: Ashley Greenberg MD;  Location:  [facility] OR;  Service: OB/GYN;  Laterality: N/A;  RN PRE OP 5-23-19----BMI-51.29    TRANSFORAMINAL EPIDURAL INJECTION OF STEROID Right 1/7/2021    Procedure: INJECTION, STEROID, EPIDURAL, TRANSFORAMINAL, APPROACH , L5-S1 and S1;  Surgeon: Brandon Lee MD;  Location: [facility]IN [facility];  Service: Pain Management;  Laterality: Right;    TRANSFORAMINAL EPIDURAL INJECTION OF STEROID Right 4/23/2021    Procedure: INJECTION, STEROID, EPIDURAL, TRANSFORAMINAL APPROACH L5/S1, S1;  Surgeon: Brandon Lee MD;  Location: [facility] PAIN [facility];  Service: Pain Management;  Laterality: Right;    TRANSFORAMINAL EPIDURAL INJECTION OF STEROID Right 4/14/2022    Procedure: INJECTION, STEROID, EPIDURAL, TRANSFORAMINAL APPROACH, RIGHT L5-S1 AND S1;  Surgeon: Brandon Lee MD;  Location: [facility];  Service: Pain Management;  Laterality: Right;    WISDOM TOOTH EXTRACTION             Pre-op Assessment    I have reviewed the Patient Summary Reports.     I have reviewed the Nursing Notes. I have reviewed the NPO Status.   I have reviewed the Medications.     Review of Systems  Anesthesia Hx:  No problems with previous Anesthesia  Denies Family Hx of Anesthesia complications.   Denies Personal Hx of Anesthesia complications.   Social:  Non-Smoker    Hematology/Oncology:  Hematology Normal   Oncology Normal     EENT/Dental:EENT/Dental Normal   Cardiovascular:  Cardiovascular Normal     Pulmonary:  Pulmonary Normal    Renal/:  Renal/ Normal     Hepatic/GI:   GERD    Musculoskeletal:   OI  Osteoporosis   Neurological:   Neuromuscular Disease,    Endocrine:  Endocrine Normal    Dermatological:  Skin Normal    Psych:  Psychiatric Normal           Physical Exam  General: Well nourished, Cooperative, Alert and Oriented    Airway:  Mallampati: II / I  Mouth Opening: Normal  TM Distance: Normal  Tongue: Normal  Neck ROM: Normal ROM    Dental:  Intact        Anesthesia Plan  Type of Anesthesia, risks & benefits discussed:    Anesthesia Type: Gen  Natural Airway  Intra-op Monitoring Plan: Standard ASA Monitors  Post Op Pain Control Plan: multimodal analgesia  Induction:  IV  Informed Consent: Informed consent signed with the Patient and all parties understand the risks and agree with anesthesia plan.  All questions answered.   ASA Score: 2  Day of Surgery Review of History & Physical: H&P Update referred to the surgeon/provider.    Ready For Surgery From Anesthesia Perspective.     .

## 2022-10-05 NOTE — ANESTHESIA POSTPROCEDURE EVALUATION
Anesthesia Post Evaluation    Patient: Prisca Zhu    Procedure(s) Performed: Procedure(s) (LRB):  EGD (ESOPHAGOGASTRODUODENOSCOPY) (N/A)    Final Anesthesia Type: general      Patient location during evaluation: PACU  Patient participation: Yes- Able to Participate  Level of consciousness: awake  Post-procedure vital signs: reviewed and stable  Pain management: adequate  Airway patency: patent    PONV status at discharge: No PONV  Anesthetic complications: no      Cardiovascular status: blood pressure returned to baseline  Respiratory status: unassisted            Vitals Value Taken Time   /70 10/05/22 0935   Temp 36.7 °C (98 °F) 10/05/22 0908   Pulse 60 10/05/22 0935   Resp 18 10/05/22 0935   SpO2 99 % 10/05/22 0935         Event Time   Out of Recovery 09:38:05         Pain/Blake Score: Blake Score: 10 (10/5/2022  9:08 AM)

## 2022-10-06 ENCOUNTER — PATIENT MESSAGE (OUTPATIENT)
Dept: BARIATRICS | Facility: CLINIC | Age: 37
End: 2022-10-06
Payer: MEDICARE

## 2022-10-10 LAB
FINAL PATHOLOGIC DIAGNOSIS: NORMAL
GROSS: NORMAL
Lab: NORMAL

## 2022-10-16 ENCOUNTER — PATIENT MESSAGE (OUTPATIENT)
Dept: BARIATRICS | Facility: CLINIC | Age: 37
End: 2022-10-16
Payer: MEDICARE

## 2022-10-16 DIAGNOSIS — Z98.84 S/P GASTRIC BYPASS: Primary | ICD-10-CM

## 2022-10-16 DIAGNOSIS — R63.4 WEIGHT LOSS: ICD-10-CM

## 2022-10-17 NOTE — TELEPHONE ENCOUNTER
Phoned patient in response to patient portal message.  Place and external referral for plastic surgery to [facility].  Division of Plastic and Reconstructive Surgery, [facility] [facility] [address], [facility] [address] phone [phone] and will fax to [phone].  Faxed face sheet, referral order, clinic note from 8/9/2022 with Shasta Mahajan PA-C, and op note from 7/10/2020 with Dr Villanueva and received confirmation of transmission

## 2022-11-04 ENCOUNTER — PATIENT MESSAGE (OUTPATIENT)
Dept: BARIATRICS | Facility: CLINIC | Age: 37
End: 2022-11-04
Payer: MEDICARE

## 2022-12-07 ENCOUNTER — PATIENT MESSAGE (OUTPATIENT)
Dept: BARIATRICS | Facility: CLINIC | Age: 37
End: 2022-12-07
Payer: MEDICARE

## 2023-01-04 NOTE — ED NOTES
Pt. Taken to CT scan   Nasal Turnover Hinge Flap Text: The defect edges were debeveled with a #15 scalpel blade.  Given the size, depth, location of the defect and the defect being full thickness a nasal turnover hinge flap was deemed most appropriate.  Using a sterile surgical marker, an appropriate hinge flap was drawn incorporating the defect. The area thus outlined was incised with a #15 scalpel blade. The flap was designed to recreate the nasal mucosal lining and the alar rim. The skin margins were undermined to an appropriate distance in all directions utilizing iris scissors.

## 2023-01-09 ENCOUNTER — PATIENT MESSAGE (OUTPATIENT)
Dept: BARIATRICS | Facility: CLINIC | Age: 38
End: 2023-01-09
Payer: MEDICAID

## 2023-02-09 ENCOUNTER — PATIENT MESSAGE (OUTPATIENT)
Dept: BARIATRICS | Facility: CLINIC | Age: 38
End: 2023-02-09
Payer: MEDICAID

## 2023-02-14 ENCOUNTER — PATIENT MESSAGE (OUTPATIENT)
Dept: BARIATRICS | Facility: CLINIC | Age: 38
End: 2023-02-14
Payer: MEDICAID

## 2023-02-22 ENCOUNTER — PATIENT MESSAGE (OUTPATIENT)
Dept: BARIATRICS | Facility: CLINIC | Age: 38
End: 2023-02-22
Payer: MEDICAID

## 2023-03-07 ENCOUNTER — PATIENT MESSAGE (OUTPATIENT)
Dept: BARIATRICS | Facility: CLINIC | Age: 38
End: 2023-03-07
Payer: MEDICAID

## 2023-03-08 ENCOUNTER — PATIENT MESSAGE (OUTPATIENT)
Dept: BARIATRICS | Facility: CLINIC | Age: 38
End: 2023-03-08
Payer: MEDICAID

## 2023-03-14 ENCOUNTER — PATIENT MESSAGE (OUTPATIENT)
Dept: BARIATRICS | Facility: CLINIC | Age: 38
End: 2023-03-14
Payer: MEDICAID

## 2023-04-05 ENCOUNTER — PATIENT MESSAGE (OUTPATIENT)
Dept: BARIATRICS | Facility: CLINIC | Age: 38
End: 2023-04-05
Payer: MEDICARE

## 2023-04-06 ENCOUNTER — PATIENT MESSAGE (OUTPATIENT)
Dept: BARIATRICS | Facility: CLINIC | Age: 38
End: 2023-04-06
Payer: MEDICARE

## 2023-04-11 ENCOUNTER — PATIENT MESSAGE (OUTPATIENT)
Dept: BARIATRICS | Facility: CLINIC | Age: 38
End: 2023-04-11
Payer: MEDICARE

## 2023-05-03 ENCOUNTER — PATIENT MESSAGE (OUTPATIENT)
Dept: BARIATRICS | Facility: CLINIC | Age: 38
End: 2023-05-03
Payer: MEDICARE

## 2023-05-09 ENCOUNTER — PATIENT MESSAGE (OUTPATIENT)
Dept: BARIATRICS | Facility: CLINIC | Age: 38
End: 2023-05-09
Payer: MEDICARE

## 2023-06-07 ENCOUNTER — PATIENT MESSAGE (OUTPATIENT)
Dept: BARIATRICS | Facility: CLINIC | Age: 38
End: 2023-06-07
Payer: MEDICARE

## 2023-06-13 ENCOUNTER — PATIENT MESSAGE (OUTPATIENT)
Dept: BARIATRICS | Facility: CLINIC | Age: 38
End: 2023-06-13
Payer: MEDICARE

## 2023-06-14 ENCOUNTER — TELEPHONE (OUTPATIENT)
Dept: PLASTIC SURGERY | Facility: CLINIC | Age: 38
End: 2023-06-14
Payer: MEDICARE

## 2023-07-14 ENCOUNTER — TELEPHONE (OUTPATIENT)
Dept: FAMILY MEDICINE | Facility: CLINIC | Age: 38
End: 2023-07-14
Payer: MEDICAID

## 2023-07-14 NOTE — TELEPHONE ENCOUNTER
----- Message from Avis Clifford sent at 7/14/2023 11:24 AM CDT -----  Regarding: Patient call back  .Type: Patient Call Back    Who called:self     What is the request in detail:would like recommendation on plastic surgeons with [facility]     Can the clinic reply by [facility]?no    Would the patient rather a call back or a response via [facility]? Call     Best call back number:.[phone]      Additional Information:

## 2023-08-02 ENCOUNTER — PATIENT MESSAGE (OUTPATIENT)
Dept: BARIATRICS | Facility: CLINIC | Age: 38
End: 2023-08-02
Payer: MEDICAID

## 2023-08-22 ENCOUNTER — OFFICE VISIT (OUTPATIENT)
Dept: FAMILY MEDICINE | Facility: CLINIC | Age: 38
End: 2023-08-22
Payer: MEDICARE

## 2023-08-22 VITALS
OXYGEN SATURATION: 97 % | BODY MASS INDEX: 30.73 KG/M2 | HEART RATE: 88 BPM | SYSTOLIC BLOOD PRESSURE: 118 MMHG | DIASTOLIC BLOOD PRESSURE: 78 MMHG | WEIGHT: 142.44 LBS | RESPIRATION RATE: 20 BRPM | TEMPERATURE: 98 F | HEIGHT: 57 IN

## 2023-08-22 DIAGNOSIS — E66.9 OBESITY (BMI 30.0-34.9): ICD-10-CM

## 2023-08-22 DIAGNOSIS — R79.9 ABNORMAL FINDING OF BLOOD CHEMISTRY, UNSPECIFIED: ICD-10-CM

## 2023-08-22 DIAGNOSIS — E55.9 VITAMIN D DEFICIENCY: ICD-10-CM

## 2023-08-22 DIAGNOSIS — M53.87 SCIATICA OF RIGHT SIDE ASSOCIATED WITH DISORDER OF LUMBOSACRAL SPINE: ICD-10-CM

## 2023-08-22 DIAGNOSIS — K21.00 GASTROESOPHAGEAL REFLUX DISEASE WITH ESOPHAGITIS WITHOUT HEMORRHAGE: ICD-10-CM

## 2023-08-22 DIAGNOSIS — Z12.31 SCREENING MAMMOGRAM, ENCOUNTER FOR: ICD-10-CM

## 2023-08-22 DIAGNOSIS — J30.9 CHRONIC ALLERGIC RHINITIS: ICD-10-CM

## 2023-08-22 DIAGNOSIS — Q78.0 OSTEOGENESIS IMPERFECTA: ICD-10-CM

## 2023-08-22 DIAGNOSIS — K91.2 POSTSURGICAL MALABSORPTION, NOT ELSEWHERE CLASSIFIED: ICD-10-CM

## 2023-08-22 DIAGNOSIS — Z98.84 BARIATRIC SURGERY STATUS: ICD-10-CM

## 2023-08-22 DIAGNOSIS — Z00.00 ROUTINE ADULT HEALTH MAINTENANCE: Primary | ICD-10-CM

## 2023-08-22 DIAGNOSIS — L98.7 EXCESS SKIN OF ARM: ICD-10-CM

## 2023-08-22 PROCEDURE — 3074F PR MOST RECENT SYSTOLIC BLOOD PRESSURE < 130 MM HG: ICD-10-PCS | Mod: CPTII,S$GLB,, | Performed by: INTERNAL MEDICINE

## 2023-08-22 PROCEDURE — 3044F PR MOST RECENT HEMOGLOBIN A1C LEVEL <7.0%: ICD-10-PCS | Mod: CPTII,S$GLB,, | Performed by: INTERNAL MEDICINE

## 2023-08-22 PROCEDURE — 99999 PR PBB SHADOW E&M-EST. PATIENT-LVL IV: CPT | Mod: PBBFAC,,, | Performed by: INTERNAL MEDICINE

## 2023-08-22 PROCEDURE — 1159F PR MEDICATION LIST DOCUMENTED IN MEDICAL RECORD: ICD-10-PCS | Mod: CPTII,S$GLB,, | Performed by: INTERNAL MEDICINE

## 2023-08-22 PROCEDURE — 3008F BODY MASS INDEX DOCD: CPT | Mod: CPTII,S$GLB,, | Performed by: INTERNAL MEDICINE

## 2023-08-22 PROCEDURE — 99396 PREV VISIT EST AGE 40-64: CPT | Mod: GZ,S$GLB,, | Performed by: INTERNAL MEDICINE

## 2023-08-22 PROCEDURE — 3078F PR MOST RECENT DIASTOLIC BLOOD PRESSURE < 80 MM HG: ICD-10-PCS | Mod: CPTII,S$GLB,, | Performed by: INTERNAL MEDICINE

## 2023-08-22 PROCEDURE — 99999 PR PBB SHADOW E&M-EST. PATIENT-LVL IV: ICD-10-PCS | Mod: PBBFAC,,, | Performed by: INTERNAL MEDICINE

## 2023-08-22 PROCEDURE — 3078F DIAST BP <80 MM HG: CPT | Mod: CPTII,S$GLB,, | Performed by: INTERNAL MEDICINE

## 2023-08-22 PROCEDURE — 3044F HG A1C LEVEL LT 7.0%: CPT | Mod: CPTII,S$GLB,, | Performed by: INTERNAL MEDICINE

## 2023-08-22 PROCEDURE — 99213 PR OFFICE/OUTPT VISIT, EST, LEVL III, 20-29 MIN: ICD-10-PCS | Mod: 25,S$GLB,, | Performed by: INTERNAL MEDICINE

## 2023-08-22 PROCEDURE — 3008F PR BODY MASS INDEX (BMI) DOCUMENTED: ICD-10-PCS | Mod: CPTII,S$GLB,, | Performed by: INTERNAL MEDICINE

## 2023-08-22 PROCEDURE — 99396 PR PREVENTIVE VISIT,EST,40-64: ICD-10-PCS | Mod: GZ,S$GLB,, | Performed by: INTERNAL MEDICINE

## 2023-08-22 PROCEDURE — 99213 OFFICE O/P EST LOW 20 MIN: CPT | Mod: 25,S$GLB,, | Performed by: INTERNAL MEDICINE

## 2023-08-22 PROCEDURE — 1159F MED LIST DOCD IN RCRD: CPT | Mod: CPTII,S$GLB,, | Performed by: INTERNAL MEDICINE

## 2023-08-22 PROCEDURE — 3074F SYST BP LT 130 MM HG: CPT | Mod: CPTII,S$GLB,, | Performed by: INTERNAL MEDICINE

## 2023-08-22 RX ORDER — PNV NO.95/FERROUS FUM/FOLIC AC 28MG-0.8MG
100 TABLET ORAL EVERY MORNING
Qty: 30 TABLET | Refills: 11 | Status: SHIPPED | OUTPATIENT
Start: 2023-08-22

## 2023-08-22 RX ORDER — AZELASTINE 1 MG/ML
1 SPRAY, METERED NASAL 2 TIMES DAILY
Qty: 30 ML | Refills: 2 | Status: SHIPPED | OUTPATIENT
Start: 2023-08-22 | End: 2024-08-21

## 2023-08-22 RX ORDER — ERGOCALCIFEROL 1.25 MG/1
50000 CAPSULE ORAL
Qty: 4 CAPSULE | Refills: 2 | Status: SHIPPED | OUTPATIENT
Start: 2023-08-22

## 2023-08-22 RX ORDER — GABAPENTIN 600 MG/1
600 TABLET ORAL 3 TIMES DAILY
Qty: 90 TABLET | Refills: 2 | Status: SHIPPED | OUTPATIENT
Start: 2023-08-22 | End: 2024-03-21

## 2023-08-22 NOTE — PROGRESS NOTES
Chief Complaint  Chief Complaint   Patient presents with    Health Maintenance     Medicines/Arthritis       HPI  Prisca Zhu is a 38 y.o. female with multiple medical diagnoses as listed in the medical history and problem list that presents for routine maintenance.  Their last appointment with primary care was Visit date not found.      S/p gastric sleeve 7/10/20 - previously 350 lbs, 142 lbs today. Taking gummy multivitamins, no other vitamin regimen. Followed by nutritionist. Still has some epigastric burning, last EGD in 10/5/22 was normal.     Arthritis/tendonitis - Right hip,with flares of pain in elbow and hands. Previously saw Jamila Garcia (Orthopedic PA). On gabapentin 600 mg, Occasionally takes Tylenol.   No NSAIDs taken due to h/o of gastric ulcers and gastric bleed. No recent trauma or falls.     Allergic rhinitis - Notes nasal congestion, sore throat, and mucosal drip. Has not taken any OTC medications for this recently.     PAST MEDICAL HISTORY:  Past Medical History:   Diagnosis Date    Anemia     BMI 50.0-59.9, adult     Encounter for IUD removal 2019    GERD (gastroesophageal reflux disease) 2019    History of blood transfusion 2001    Malpositioned intrauterine device 3/21/2019    2019 OB/GYN - Dr Guzman - Discussed with patient that we will proceed with imaging to locate the IUD after which we can then proceed to book case in OR to retrieve displaced IUD    Obesity     OI (osteogenesis imperfecta)     Osteopetrosis     Tendonitis        PAST SURGICAL HISTORY:  Past Surgical History:   Procedure Laterality Date     SECTION      ,     ESOPHAGOGASTRODUODENOSCOPY N/A 2019    Procedure: ESOPHAGOGASTRODUODENOSCOPY (EGD);  Surgeon: Segun Dominguez MD;  Location: [address]);  Service: Endoscopy;  Laterality: N/A;  BMI 51    ESOPHAGOGASTRODUODENOSCOPY N/A 2020    Procedure: EGD (ESOPHAGOGASTRODUODENOSCOPY);  Surgeon: Boom Farris MD;  Location:  [facility] ENDO;  Service: Endoscopy;  Laterality: N/A;  Dr. Jolly said we can do a rapid on her    ESOPHAGOGASTRODUODENOSCOPY N/A 3/4/2021    Procedure: EGD (ESOPHAGOGASTRODUODENOSCOPY), Please check for H. Pylori;  Surgeon: Nataliia Bradshaw MD;  Location: [facility] ENDO;  Service: Endoscopy;  Laterality: N/A;  Please check for H. Pylori  3/1-covid [facility]-inst portal-tb    ESOPHAGOGASTRODUODENOSCOPY N/A 10/5/2022    Procedure: EGD (ESOPHAGOGASTRODUODENOSCOPY);  Surgeon: Colten Parks MD;  Location: [facility] ENDO (4TH FLR);  Service: Endoscopy;  Laterality: N/A;  BMI 31.34  fully vaccinated, instructions via portal & email-MS    FEMUR FRACTURE SURGERY Bilateral     hardware/rods in both legs    FRACTURE SURGERY      femur    LAPAROSCOPIC GASTROENTEROSTOMY N/A 7/10/2020    Procedure: GASTROENTEROSTOMY, LAPAROSCOPIC, with intraop EGD;  Surgeon: Guillermo Villanueva MD;  Location: [facility] 2ND FLR;  Service: General;  Laterality: N/A;    REMOVAL OF INTRAUTERINE DEVICE (IUD) N/A 6/3/2019    Procedure: REMOVAL, INTRAUTERINE DEVICE;  Surgeon: Ashley Greenberg MD;  Location: [facility] OR;  Service: OB/GYN;  Laterality: N/A;  RN PRE OP 5-23-19----BMI-51.29    TRANSFORAMINAL EPIDURAL INJECTION OF STEROID Right 1/7/2021    Procedure: INJECTION, STEROID, EPIDURAL, TRANSFORAMINAL, APPROACH , L5-S1 and S1;  Surgeon: Brandon Lee MD;  Location: [facility] PAIN MGT;  Service: Pain Management;  Laterality: Right;    TRANSFORAMINAL EPIDURAL INJECTION OF STEROID Right 4/23/2021    Procedure: INJECTION, STEROID, EPIDURAL, TRANSFORAMINAL APPROACH L5/S1, S1;  Surgeon: Brandon Lee MD;  Location: [facility] PAIN MGT;  Service: Pain Management;  Laterality: Right;    TRANSFORAMINAL EPIDURAL INJECTION OF STEROID Right 4/14/2022    Procedure: INJECTION, STEROID, EPIDURAL, TRANSFORAMINAL APPROACH, RIGHT L5-S1 AND S1;  Surgeon: Brandon Lee MD;  Location: [facility] PAIN MGT;  Service: Pain Management;  Laterality: Right;    WISDOM TOOTH EXTRACTION          SOCIAL HISTORY:  Social History     Socioeconomic History    Marital status: Single   Tobacco Use    Smoking status: Never    Smokeless tobacco: Never   Substance and Sexual Activity    Alcohol use: No    Drug use: Never    Sexual activity: Yes     Partners: Male     Birth control/protection: None, I.U.D.   Social History Narrative    Disabled 2/2 chronic left shoulder pain       FAMILY HISTORY:  Family History   Problem Relation Age of Onset    Hypertension Mother     Hyperlipidemia Mother     Asthma Mother     Anxiety disorder Mother     Breast cancer Mother     No Known Problems Father     Liver cancer Maternal Grandfather     Osteogenesis imperfecta Paternal Aunt     Osteogenesis imperfecta Daughter     Celiac disease Neg Hx     Colon cancer Neg Hx     Colon polyps Neg Hx     Crohn's disease Neg Hx     Cystic fibrosis Neg Hx     Esophageal cancer Neg Hx     Irritable bowel syndrome Neg Hx     Rectal cancer Neg Hx     Stomach cancer Neg Hx        ALLERGIES AND MEDICATIONS: updated and reviewed.  Review of patient's allergies indicates:   Allergen Reactions    Pcn [penicillins] Shortness Of Breath     Current Outpatient Medications   Medication Sig Dispense Refill    CALCIUM ORAL Take by mouth every morning.       fish oil-omega-3 fatty acids 300-1,000 mg capsule Take by mouth every morning.       fluticasone propionate (FLONASE) 50 mcg/actuation nasal spray SHAKE LIQUID AND USE 1 SPRAY(50 MCG) IN EACH NOSTRIL TWICE DAILY FOR 14 DAYS 48 g 0    omeprazole (PRILOSEC) 40 MG capsule Take 1 capsule (40 mg total) by mouth 2 (two) times daily before meals. 30 capsule 2    azelastine (ASTELIN) 137 mcg (0.1 %) nasal spray 1 spray (137 mcg total) by Nasal route 2 (two) times daily. 30 mL 2    cetirizine (ZYRTEC) 10 MG tablet Take 1 tablet (10 mg total) by mouth once daily. 30 tablet 0    cyanocobalamin (VITAMIN B-12) 100 MCG tablet Take 1 tablet (100 mcg total) by mouth every morning. 30 tablet 11    EPINEPHrine  "(EPIPEN) 0.3 mg/0.3 mL AtIn Inject 0.3 mLs (0.3 mg total) into the muscle as needed. 1 each 1    ergocalciferol (ERGOCALCIFEROL) 50,000 unit Cap Take 1 capsule (50,000 Units total) by mouth every 7 days. 4 capsule 2    famotidine (PEPCID) 20 MG tablet Take 1 tablet (20 mg total) by mouth 2 (two) times daily. for 5 days 10 tablet 0    gabapentin (NEURONTIN) 600 MG tablet Take 1 tablet (600 mg total) by mouth 3 (three) times daily. 90 tablet 2    omeprazole (PRILOSEC) 40 MG capsule Take 1 capsule (40 mg total) by mouth 2 (two) times daily before meals. 60 capsule 12     No current facility-administered medications for this visit.         ROS  Review of Systems   Constitutional:  Negative for activity change and fever.   HENT:  Negative for hearing loss, rhinorrhea and trouble swallowing.    Eyes:  Negative for discharge and visual disturbance.   Respiratory:  Negative for chest tightness and wheezing.    Cardiovascular:  Negative for chest pain and palpitations.   Gastrointestinal:  Negative for blood in stool, constipation, diarrhea and vomiting.   Endocrine: Negative for polydipsia and polyuria.   Genitourinary:  Negative for difficulty urinating, dysuria, hematuria and menstrual problem.   Musculoskeletal:  Positive for arthralgias and joint swelling. Negative for neck pain.   Neurological:  Negative for weakness and headaches.   Psychiatric/Behavioral:  Negative for confusion and dysphoric mood.        Physical Exam  Vitals:    08/22/23 1540   BP: 118/78   BP Location: Left arm   Patient Position: Sitting   BP Method: X-Large (Manual)   Pulse: 88   Resp: 20   Temp: 98.2 °F (36.8 °C)   TempSrc: Oral   SpO2: 97%   Weight: 64.6 kg (142 lb 6.7 oz)   Height: 4' 9" (1.448 m)    Body mass index is 30.82 kg/m².  Weight: 64.6 kg (142 lb 6.7 oz)   Height: 4' 9" (144.8 cm)   Physical Exam  Vitals reviewed.   Constitutional:       General: She is not in acute distress.     Appearance: Normal appearance. She is well-developed. " She is obese. She is not ill-appearing.   HENT:      Head: Normocephalic and atraumatic.      Mouth/Throat:      Comments: Poor dentition  Eyes:      Conjunctiva/sclera: Conjunctivae normal.      Pupils: Pupils are equal, round, and reactive to light.   Neck:      Thyroid: No thyromegaly.   Cardiovascular:      Rate and Rhythm: Normal rate.      Heart sounds: Normal heart sounds. No murmur heard.  Pulmonary:      Effort: Pulmonary effort is normal. No respiratory distress.      Breath sounds: Normal breath sounds.   Musculoskeletal:         General: No deformity.      Cervical back: Normal range of motion and neck supple.   Lymphadenopathy:      Cervical: No cervical adenopathy.   Skin:     General: Skin is warm and dry.   Neurological:      Mental Status: She is alert.      Cranial Nerves: No cranial nerve deficit.   Psychiatric:         Behavior: Behavior normal.           Health Maintenance         Date Due Completion Date    Mammogram 04/04/2018 4/4/2017    Hemoglobin A1c (Diabetic Prevention Screening) 05/10/2022 5/10/2019    COVID-19 Vaccine (4 - Pfizer series) 10/11/2022 8/16/2022    Influenza Vaccine (1) 09/01/2023 9/25/2021    Cervical Cancer Screening 03/20/2024 3/20/2019    TETANUS VACCINE 11/18/2029 11/18/2019              Assessment and Plan: Prisca Zhu was seen today for routine physical. Separate E/M Code for acute conditions discussed during today's routine physical examination have been documented in the assessment and plan below.    Routine adult health maintenance  Discussed healthy diet, regular exercise, necessary labs, age appropriate cancer screening, and routine vaccinations.      Obesity (BMI 30.0-34.9)  S/p gastric sleeve  Doing well - continued weight loss discussed   -     HEMOGLOBIN A1C; Future; Expected date: 08/22/2023  -     LIPID PANEL; Future; Expected date: 08/22/2023    Bariatric surgery status  Doing well - continued weight loss discussed   -     Ambulatory  referral/consult to Plastic Surgery; Future; Expected date: 08/29/2023  -     IRON AND TIBC; Future; Expected date: 08/22/2023  -     VITAMIN B1; Future; Expected date: 08/22/2023  -     VITAMIN B12; Future; Expected date: 08/22/2023  -     COMPREHENSIVE METABOLIC PANEL; Future; Expected date: 08/22/2023  -     CBC W/ AUTO DIFFERENTIAL; Future; Expected date: 08/22/2023  -     cyanocobalamin (VITAMIN B-12) 100 MCG tablet; Take 1 tablet (100 mcg total) by mouth every morning.  Dispense: 30 tablet; Refill: 11    Osteogenesis imperfecta  Continue to monitor if worsening will perform additional testing/evaluation    Gastroesophageal reflux disease with esophagitis without hemorrhage  Discussed likely GERD/gastritis symptoms and consideration of antacid/H2 blocker/PPI therapy for control of symptoms    Excess skin of arm  S/p gastric sleeve  Referred for evaluation for excess skin removal surgery  -     Ambulatory referral/consult to Plastic Surgery; Future; Expected date: 08/29/2023    Screening mammogram, encounter for  H/o breast cancer in mother (diagnosed in her 20s)  Counseled regarding risks and benefits of routine breast cancer screening. Referral for mammo placed.  -     Mammo Digital Screening Bilat; Future; Expected date: 08/22/2023    Abnormal finding of blood chemistry, unspecified  -     HEMOGLOBIN A1C; Future; Expected date: 08/22/2023  -     IRON AND TIBC; Future; Expected date: 08/22/2023  -     CBC W/ AUTO DIFFERENTIAL; Future; Expected date: 08/22/2023    Postsurgical malabsorption, not elsewhere classified  S/p gastric sleeve  -     VITAMIN B12; Future; Expected date: 08/22/2023    Chronic allergic rhinitis  Trial medications as noted.   -     azelastine (ASTELIN) 137 mcg (0.1 %) nasal spray; 1 spray (137 mcg total) by Nasal route 2 (two) times daily.  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   -     gabapentin (NEURONTIN) 600 MG tablet; Take 1 tablet (600 mg total) by mouth 3 (three) times daily.  Dispense: 90 tablet; Refill: 2    Vitamin D deficiency  S/p gastric sleeve  -     ergocalciferol (ERGOCALCIFEROL) 50,000 unit Cap; Take 1 capsule (50,000 Units total) by mouth every 7 days.  Dispense: 4 capsule; Refill: 2  -     Vitamin D; Future; Expected date: 08/22/2023          Health Maintenance reviewed, addressed as per orders    F/u in 4-6 months    1. The patient indicates understanding of these issues and agrees with the plan. Brief care plan is updated and reviewed with the patient as applicable.   2. The patient is given an After Visit Summary that lists all medications with directions, allergies, orders placed during this encounter and follow-up instructions.   3. I have reviewed the patient's medical information including past medical, family, and social history sections including the medications and allergies.   4. We discussed the patient's current medications. I reconciled the patient's medication list and prepared and supplied needed refills.      Sofia Jacobs, MS4     I hereby acknowledge that I am relying upon documentation authored by a medical student working under my supervision and further I hereby attest that I have verified the student documentation or findings by personally performing the physical exam and medical decision making activities of the Evaluation and Management service to be billed.      Mathew Carpenter MD  Internal Medicine-Pediatrics

## 2023-08-23 ENCOUNTER — LAB VISIT (OUTPATIENT)
Dept: LAB | Facility: HOSPITAL | Age: 38
End: 2023-08-23
Attending: INTERNAL MEDICINE
Payer: MEDICAID

## 2023-08-23 DIAGNOSIS — Z98.84 BARIATRIC SURGERY STATUS: ICD-10-CM

## 2023-08-23 DIAGNOSIS — R79.9 ABNORMAL FINDING OF BLOOD CHEMISTRY, UNSPECIFIED: ICD-10-CM

## 2023-08-23 DIAGNOSIS — E66.9 OBESITY (BMI 30.0-34.9): ICD-10-CM

## 2023-08-23 DIAGNOSIS — K91.2 POSTSURGICAL MALABSORPTION, NOT ELSEWHERE CLASSIFIED: ICD-10-CM

## 2023-08-23 LAB
ALBUMIN SERPL BCP-MCNC: 3.7 G/DL (ref 3.5–5.2)
ALP SERPL-CCNC: 73 U/L (ref 55–135)
ALT SERPL W/O P-5'-P-CCNC: 11 U/L (ref 10–44)
ANION GAP SERPL CALC-SCNC: 7 MMOL/L (ref 8–16)
AST SERPL-CCNC: 19 U/L (ref 10–40)
BASOPHILS # BLD AUTO: 0.04 K/UL (ref 0–0.2)
BASOPHILS NFR BLD: 0.8 % (ref 0–1.9)
BILIRUB SERPL-MCNC: 0.5 MG/DL (ref 0.1–1)
BUN SERPL-MCNC: 10 MG/DL (ref 6–20)
CALCIUM SERPL-MCNC: 9 MG/DL (ref 8.7–10.5)
CHLORIDE SERPL-SCNC: 106 MMOL/L (ref 95–110)
CHOLEST SERPL-MCNC: 173 MG/DL (ref 120–199)
CHOLEST/HDLC SERPL: 3 {RATIO} (ref 2–5)
CO2 SERPL-SCNC: 26 MMOL/L (ref 23–29)
CREAT SERPL-MCNC: 0.8 MG/DL (ref 0.5–1.4)
DIFFERENTIAL METHOD: ABNORMAL
EOSINOPHIL # BLD AUTO: 0.1 K/UL (ref 0–0.5)
EOSINOPHIL NFR BLD: 1.6 % (ref 0–8)
ERYTHROCYTE [DISTWIDTH] IN BLOOD BY AUTOMATED COUNT: 13.8 % (ref 11.5–14.5)
EST. GFR  (NO RACE VARIABLE): >60 ML/MIN/1.73 M^2
ESTIMATED AVG GLUCOSE: 97 MG/DL (ref 68–131)
GLUCOSE SERPL-MCNC: 71 MG/DL (ref 70–110)
HBA1C MFR BLD: 5 % (ref 4–5.6)
HCT VFR BLD AUTO: 41.8 % (ref 37–48.5)
HDLC SERPL-MCNC: 58 MG/DL (ref 40–75)
HDLC SERPL: 33.5 % (ref 20–50)
HGB BLD-MCNC: 12.9 G/DL (ref 12–16)
IMM GRANULOCYTES # BLD AUTO: 0.01 K/UL (ref 0–0.04)
IMM GRANULOCYTES NFR BLD AUTO: 0.2 % (ref 0–0.5)
IRON SERPL-MCNC: 96 UG/DL (ref 30–160)
LDLC SERPL CALC-MCNC: 104.4 MG/DL (ref 63–159)
LYMPHOCYTES # BLD AUTO: 1.9 K/UL (ref 1–4.8)
LYMPHOCYTES NFR BLD: 38.5 % (ref 18–48)
MCH RBC QN AUTO: 29.5 PG (ref 27–31)
MCHC RBC AUTO-ENTMCNC: 30.9 G/DL (ref 32–36)
MCV RBC AUTO: 95 FL (ref 82–98)
MONOCYTES # BLD AUTO: 0.4 K/UL (ref 0.3–1)
MONOCYTES NFR BLD: 7.1 % (ref 4–15)
NEUTROPHILS # BLD AUTO: 2.6 K/UL (ref 1.8–7.7)
NEUTROPHILS NFR BLD: 51.8 % (ref 38–73)
NONHDLC SERPL-MCNC: 115 MG/DL
NRBC BLD-RTO: 0 /100 WBC
PLATELET # BLD AUTO: 205 K/UL (ref 150–450)
PMV BLD AUTO: 12.3 FL (ref 9.2–12.9)
POTASSIUM SERPL-SCNC: 4.3 MMOL/L (ref 3.5–5.1)
PROT SERPL-MCNC: 7.1 G/DL (ref 6–8.4)
RBC # BLD AUTO: 4.38 M/UL (ref 4–5.4)
SATURATED IRON: 24 % (ref 20–50)
SODIUM SERPL-SCNC: 139 MMOL/L (ref 136–145)
TOTAL IRON BINDING CAPACITY: 394 UG/DL (ref 250–450)
TRANSFERRIN SERPL-MCNC: 266 MG/DL (ref 200–375)
TRIGL SERPL-MCNC: 53 MG/DL (ref 30–150)
VIT B12 SERPL-MCNC: 360 PG/ML (ref 210–950)
WBC # BLD AUTO: 5.04 K/UL (ref 3.9–12.7)

## 2023-08-23 PROCEDURE — 84466 ASSAY OF TRANSFERRIN: CPT | Performed by: INTERNAL MEDICINE

## 2023-08-23 PROCEDURE — 85025 COMPLETE CBC W/AUTO DIFF WBC: CPT | Performed by: INTERNAL MEDICINE

## 2023-08-23 PROCEDURE — 84425 ASSAY OF VITAMIN B-1: CPT | Performed by: INTERNAL MEDICINE

## 2023-08-23 PROCEDURE — 80061 LIPID PANEL: CPT | Performed by: INTERNAL MEDICINE

## 2023-08-23 PROCEDURE — 83036 HEMOGLOBIN GLYCOSYLATED A1C: CPT | Performed by: INTERNAL MEDICINE

## 2023-08-23 PROCEDURE — 82607 VITAMIN B-12: CPT | Performed by: INTERNAL MEDICINE

## 2023-08-23 PROCEDURE — 80053 COMPREHEN METABOLIC PANEL: CPT | Performed by: INTERNAL MEDICINE

## 2023-08-23 PROCEDURE — 83540 ASSAY OF IRON: CPT | Performed by: INTERNAL MEDICINE

## 2023-08-24 PROBLEM — K92.2 GI BLEED: Status: RESOLVED | Noted: 2020-11-05 | Resolved: 2023-08-24

## 2023-08-28 LAB — VIT B1 BLD-MCNC: 58 UG/L (ref 38–122)

## 2023-08-29 ENCOUNTER — TELEPHONE (OUTPATIENT)
Dept: FAMILY MEDICINE | Facility: CLINIC | Age: 38
End: 2023-08-29
Payer: MEDICAID

## 2023-08-29 NOTE — TELEPHONE ENCOUNTER
----- Message from Mathew Carpenter MD sent at 8/27/2023  3:23 PM CDT -----  Please call the patient regarding the following results:    Vitamin D was low - should continue taking high dose vitamin D weekly as prescribed.     Other labs were normal    Let me know if you have any questions or concerns.    Mathew Carpenter MD  Internal Medicine-Pediatrics

## 2023-08-31 ENCOUNTER — TELEPHONE (OUTPATIENT)
Dept: FAMILY MEDICINE | Facility: CLINIC | Age: 38
End: 2023-08-31
Payer: MEDICAID

## 2023-08-31 DIAGNOSIS — L98.7 EXCESS SKIN OF ARM: Primary | ICD-10-CM

## 2023-08-31 NOTE — TELEPHONE ENCOUNTER
Spoke with patient to inform her that referral had already been placed & to give her the number to the department to schedule her appointment. [phone]. Patient understood to call the department for her appointment.

## 2023-08-31 NOTE — TELEPHONE ENCOUNTER
----- Message from Mark Webster sent at 8/31/2023  1:53 PM CDT -----  Regarding: pt post opp  Name of Who is Calling:TIMOTHY SEGOVIA [[id number]]        What is the request in detail: Pt inquiring on post opp procedure,   Please advise and contact shortly            Can the clinic reply by [facility]: yes         [facility]: Telephone Information:  Mobile          [phone]

## 2023-08-31 NOTE — TELEPHONE ENCOUNTER
Spoke with patient in reference to a future surgery to get her excess skin removed from her arms & legs after her bariatric surgery.

## 2023-09-06 ENCOUNTER — PATIENT MESSAGE (OUTPATIENT)
Dept: BARIATRICS | Facility: CLINIC | Age: 38
End: 2023-09-06
Payer: MEDICAID

## 2023-09-06 ENCOUNTER — HOSPITAL ENCOUNTER (OUTPATIENT)
Dept: RADIOLOGY | Facility: HOSPITAL | Age: 38
Discharge: HOME | End: 2023-09-06
Attending: INTERNAL MEDICINE
Payer: MEDICARE

## 2023-09-06 DIAGNOSIS — Z12.31 SCREENING MAMMOGRAM, ENCOUNTER FOR: ICD-10-CM

## 2023-09-06 PROCEDURE — 77067 SCR MAMMO BI INCL CAD: CPT | Mod: TC,PO

## 2023-09-06 PROCEDURE — 77067 SCR MAMMO BI INCL CAD: CPT | Mod: 26,,, | Performed by: RADIOLOGY

## 2023-09-06 PROCEDURE — 77063 MAMMO DIGITAL SCREENING BILAT WITH TOMO: ICD-10-PCS | Mod: 26,,, | Performed by: RADIOLOGY

## 2023-09-06 PROCEDURE — 77067 MAMMO DIGITAL SCREENING BILAT WITH TOMO: ICD-10-PCS | Mod: 26,,, | Performed by: RADIOLOGY

## 2023-09-06 PROCEDURE — 77063 BREAST TOMOSYNTHESIS BI: CPT | Mod: 26,,, | Performed by: RADIOLOGY

## 2023-09-12 ENCOUNTER — PATIENT MESSAGE (OUTPATIENT)
Dept: BARIATRICS | Facility: CLINIC | Age: 38
End: 2023-09-12
Payer: MEDICAID

## 2023-10-04 ENCOUNTER — OFFICE VISIT (OUTPATIENT)
Dept: PLASTIC SURGERY | Facility: CLINIC | Age: 38
End: 2023-10-04
Payer: MEDICAID

## 2023-10-04 ENCOUNTER — PATIENT MESSAGE (OUTPATIENT)
Dept: BARIATRICS | Facility: CLINIC | Age: 38
End: 2023-10-04
Payer: MEDICAID

## 2023-10-04 VITALS
OXYGEN SATURATION: 98 % | HEIGHT: 57 IN | SYSTOLIC BLOOD PRESSURE: 113 MMHG | BODY MASS INDEX: 30.63 KG/M2 | DIASTOLIC BLOOD PRESSURE: 54 MMHG | WEIGHT: 142 LBS | HEART RATE: 59 BPM

## 2023-10-04 DIAGNOSIS — L98.7 EXCESSIVE AND REDUNDANT SKIN AND SUBCUTANEOUS TISSUE: ICD-10-CM

## 2023-10-04 DIAGNOSIS — L30.4 ERYTHEMA INTERTRIGO: Primary | ICD-10-CM

## 2023-10-04 DIAGNOSIS — E65 PANNUS, ABDOMINAL: ICD-10-CM

## 2023-10-04 PROCEDURE — 3008F BODY MASS INDEX DOCD: CPT | Mod: CPTII,,, | Performed by: SURGERY

## 2023-10-04 PROCEDURE — 3074F PR MOST RECENT SYSTOLIC BLOOD PRESSURE < 130 MM HG: ICD-10-PCS | Mod: CPTII,,, | Performed by: SURGERY

## 2023-10-04 PROCEDURE — 99204 PR OFFICE/OUTPT VISIT, NEW, LEVL IV, 45-59 MIN: ICD-10-PCS | Mod: S$PBB,,, | Performed by: SURGERY

## 2023-10-04 PROCEDURE — 3044F HG A1C LEVEL LT 7.0%: CPT | Mod: CPTII,,, | Performed by: SURGERY

## 2023-10-04 PROCEDURE — 1159F PR MEDICATION LIST DOCUMENTED IN MEDICAL RECORD: ICD-10-PCS | Mod: CPTII,,, | Performed by: SURGERY

## 2023-10-04 PROCEDURE — 99213 OFFICE O/P EST LOW 20 MIN: CPT | Mod: PBBFAC | Performed by: SURGERY

## 2023-10-04 PROCEDURE — 3044F PR MOST RECENT HEMOGLOBIN A1C LEVEL <7.0%: ICD-10-PCS | Mod: CPTII,,, | Performed by: SURGERY

## 2023-10-04 PROCEDURE — 99999 PR PBB SHADOW E&M-EST. PATIENT-LVL III: CPT | Mod: PBBFAC,,, | Performed by: SURGERY

## 2023-10-04 PROCEDURE — 3074F SYST BP LT 130 MM HG: CPT | Mod: CPTII,,, | Performed by: SURGERY

## 2023-10-04 PROCEDURE — 1159F MED LIST DOCD IN RCRD: CPT | Mod: CPTII,,, | Performed by: SURGERY

## 2023-10-04 PROCEDURE — 99204 OFFICE O/P NEW MOD 45 MIN: CPT | Mod: S$PBB,,, | Performed by: SURGERY

## 2023-10-04 PROCEDURE — 3078F PR MOST RECENT DIASTOLIC BLOOD PRESSURE < 80 MM HG: ICD-10-PCS | Mod: CPTII,,, | Performed by: SURGERY

## 2023-10-04 PROCEDURE — 99999 PR PBB SHADOW E&M-EST. PATIENT-LVL III: ICD-10-PCS | Mod: PBBFAC,,, | Performed by: SURGERY

## 2023-10-04 PROCEDURE — 3008F PR BODY MASS INDEX (BMI) DOCUMENTED: ICD-10-PCS | Mod: CPTII,,, | Performed by: SURGERY

## 2023-10-04 PROCEDURE — 3078F DIAST BP <80 MM HG: CPT | Mod: CPTII,,, | Performed by: SURGERY

## 2023-10-04 NOTE — PROGRESS NOTES
Subjective:      Prisca Zhu is a 38 y.o. year old female who presents to the Plastic Surgery Clinic on 10/04/2023 with a large abdominal pannus and chronic macerating rashes that she has treated with creams and ointments.  The patient also complains of chronic low back pain secondary to the weight of the pannus.  She has treated her chronic low back pain with muscle relaxants, narcotics, as well as Tylenol.  Patient reports that she had a gastric bypass years ago and has excess skin over her breasts, abdomen, upper arms, and legs. Denies fever, chills, nausea, vomiting, or other systemic signs of infection.    PMH: GERD, obesity s/p gastric bypass, osteogenesis imperfecta, and osteoporosis     Review of Systems:   Constitutional: Denies fever/chills  Eyes: Denies change in vision  ENT: Denies sore throat or rhinorrhea   Respiratory: Denies shortness of breath or cough  Cardiovascular: Denies chest pain or palpitations  Gastrointestinal: Denies abdominal pain, nausea, or vomiting  Genitourinary: Denies dysuria and flank pain  Skin: Denies new rash or skin lesions   Allergic/Immunologic: Denies adverse reactions to current medications  Neurological: Denies headaches or dizziness  Musculoskeletal: Denies arthralgias    Objective:     Physical Exam:  Vitals:    10/04/23 1215   BP: (!) 113/54   Pulse: (!) 59       WD WN NAD  VSS  Normal resp effort  Abdomen:  Abdominal pannus with excess subcutaneous tissue and skin.  Bilateral breasts:  Breast ptosis , nipples everted, no masses,     Assessment:       No diagnosis found.    Plan:   38 y.o. female who presents to plastic surgery clinic with a large abdominal pannus as well as bilateral breast ptosis.  The patient has chronic macerated rashes that she was treated with creams, ointments, and antifungals.  Patient also has chronic low back pain secondary to the weight of her abdominal pannus.  Her chronic back pain has been treated with Tylenol, narcotics, and muscle  relaxants.    - Patient was seen and evaluated by myself and Dr. Dinh Hobson    - We will obtain photos for insurance purposes today. We will have staff assist her with achieving insurance authorization and for panniculectomy.    - Patient given a quote for mastopexy   - Risks, benefits and alternatives to surgery were discussed. Will submit for insurance authorization.  - Office staff to coordinate surgery date once insurance authorization obtained  - My time with the patient includes face to face time and non-face to face time preparing to see the patient (eg, review of tests), obtaining and/or reviewing separately obtained history, documenting clinical information in the electronic or other health record, independently interpreting results and communicating results to the patient/family/caregiver, or care coordinator.    All questions were answered. The patient was advised to call the clinic with any questions or concerns prior to their next visit.     MIRIAM Larios MD  Resident Physician, PGY-1  10/04/2023

## 2023-10-10 ENCOUNTER — PATIENT MESSAGE (OUTPATIENT)
Dept: BARIATRICS | Facility: CLINIC | Age: 38
End: 2023-10-10
Payer: MEDICAID

## 2023-11-14 ENCOUNTER — PATIENT MESSAGE (OUTPATIENT)
Dept: BARIATRICS | Facility: CLINIC | Age: 38
End: 2023-11-14
Payer: MEDICAID

## 2023-12-08 ENCOUNTER — TELEPHONE (OUTPATIENT)
Dept: BARIATRICS | Facility: CLINIC | Age: 38
End: 2023-12-08
Payer: MEDICAID

## 2023-12-12 ENCOUNTER — PATIENT MESSAGE (OUTPATIENT)
Dept: BARIATRICS | Facility: CLINIC | Age: 38
End: 2023-12-12
Payer: MEDICAID

## 2023-12-13 ENCOUNTER — PATIENT MESSAGE (OUTPATIENT)
Dept: BARIATRICS | Facility: CLINIC | Age: 38
End: 2023-12-13
Payer: MEDICAID

## 2024-01-09 ENCOUNTER — PATIENT MESSAGE (OUTPATIENT)
Dept: BARIATRICS | Facility: CLINIC | Age: 39
End: 2024-01-09
Payer: MEDICAID

## 2024-02-08 ENCOUNTER — TELEPHONE (OUTPATIENT)
Dept: FAMILY MEDICINE | Facility: CLINIC | Age: 39
End: 2024-02-08

## 2024-02-08 ENCOUNTER — HOSPITAL ENCOUNTER (EMERGENCY)
Facility: HOSPITAL | Age: 39
Discharge: HOME | End: 2024-02-08
Attending: STUDENT IN AN ORGANIZED HEALTH CARE EDUCATION/TRAINING PROGRAM

## 2024-02-08 ENCOUNTER — NURSE TRIAGE (OUTPATIENT)
Dept: ADMINISTRATIVE | Facility: CLINIC | Age: 39
End: 2024-02-08

## 2024-02-08 VITALS
HEIGHT: 57 IN | DIASTOLIC BLOOD PRESSURE: 81 MMHG | TEMPERATURE: 99 F | BODY MASS INDEX: 30.2 KG/M2 | WEIGHT: 140 LBS | RESPIRATION RATE: 20 BRPM | HEART RATE: 53 BPM | OXYGEN SATURATION: 100 % | SYSTOLIC BLOOD PRESSURE: 177 MMHG

## 2024-02-08 DIAGNOSIS — K04.7 DENTAL ABSCESS: Primary | ICD-10-CM

## 2024-02-08 LAB
B-HCG UR QL: NEGATIVE
CTP QC/QA: YES

## 2024-02-08 PROCEDURE — 41800 DRAINAGE OF GUM LESION: CPT

## 2024-02-08 PROCEDURE — 81025 URINE PREGNANCY TEST: CPT

## 2024-02-08 PROCEDURE — 25000003 PHARM REV CODE 250: Performed by: PHYSICIAN ASSISTANT

## 2024-02-08 PROCEDURE — 99283 EMERGENCY DEPT VISIT LOW MDM: CPT | Mod: 25

## 2024-02-08 RX ORDER — CLINDAMYCIN HYDROCHLORIDE 150 MG/1
300 CAPSULE ORAL
Status: COMPLETED | OUTPATIENT
Start: 2024-02-08 | End: 2024-02-08

## 2024-02-08 RX ORDER — CLINDAMYCIN HYDROCHLORIDE 150 MG/1
300 CAPSULE ORAL 4 TIMES DAILY
Qty: 56 CAPSULE | Refills: 0 | Status: SHIPPED | OUTPATIENT
Start: 2024-02-08 | End: 2024-02-15

## 2024-02-08 RX ADMIN — CLINDAMYCIN HYDROCHLORIDE 300 MG: 150 CAPSULE ORAL at 05:02

## 2024-02-08 NOTE — TELEPHONE ENCOUNTER
----- Message from Patrick Barrios MA sent at 2/8/2024 11:48 AM CST -----  Type: Patient Call Back    Who called: self    What is the request in detail: pt. States she has a mouth infection and is asking to be scheduled ..     Can the clinic reply by [facility]?No    Would the patient rather a call back or a response via [facility]? Yes, call     Best call back number: [phone] (home)

## 2024-02-08 NOTE — DISCHARGE INSTRUCTIONS
Thank you for coming to our Emergency Department today. It is important to remember that some problems or medical conditions are difficult to diagnose and may not be found or addressed during your Emergency Department visit.  These conditions often start with non-specific symptoms and can only be diagnosed on follow up visits with your primary care physician or specialist when the symptoms continue or change. Please remember that all medical conditions can change, and we cannot predict how you will be feeling tomorrow or the next day. Return to the ER with any questions/concerns, new/concerning symptoms, worsening or failure to improve.       Be sure to follow up with your primary care doctor and review all labs/imaging/tests that were performed during your ER visit with them. It is very common for us to identify non-emergent incidental findings which must be followed up with your primary care physician.  Some labs/imaging/tests may be outside of the normal range, and require non-emergent follow-up and/or further investigation/treatment/procedures/testing to help diagnose/exclude/prevent complications or other potentially serious medical conditions. Some abnormalities may not have been discussed or addressed during your ER visit.     An ER visit does not replace a primary care visit, and many screening tests or follow-up tests cannot be ordered by an ER doctor or performed by the ER. Some tests may even require pre-approval.    If you do not have a primary care doctor, you may contact the one listed on your discharge paperwork or you may also call the [facility] Appointment Desk at [phone] , or [address] [place] [address] at  [phone] to schedule an appointment, or establish care with a primary care doctor or even a specialist and to obtain information about local resources. It is important to your health that you have a primary care doctor.    Please take all medications as directed. We have done our best to select  a medication for you that will treat your condition however, all medications may potentially have side-effects and it is impossible to predict which medications may give you side-effects or what those side-effects (if any) those medications may give you.  If you feel that you are having a negative effect or side-effect of any medication you should stop taking those medications immediately and seek medical attention. If you feel that you are having a life-threatening reaction call 911.        Do not drive, swim, climb to height, take a bath, operate heavy machinery, drink alcohol or take potentially sedating medications, sign any legal documents or make any important decisions for 24 hours if you have received any pain medications, sedatives or mood altering drugs during your ER visit or within 24 hours of taking them if they have been prescribed to you.     You can find additional resources for Dentists, hearing aids, durable medical equipment, low cost pharmacies and other resources at [email or web address]

## 2024-02-08 NOTE — TELEPHONE ENCOUNTER
Prisca c/o mouth infection, mouth sores in the roof of mouth, mouth pain & mouth swelling. Pt states she has OI which is a condition that makes her teeth decay faster & has multiple cracked teeth. Advised pt per triage protocol to go to nearest ED now for physician eval.V/u.     Reason for Disposition   Tongue (or mouth floor under the tongue) is very swollen and tender    Additional Information   Negative: SEVERE difficulty breathing (e.g., struggling for each breath, speaks in single words, stridor)   Negative: Sounds like a life-threatening emergency to the triager   Negative: Drooling or spitting out saliva (because can't swallow) and new-onset   Negative: Chemical or electrical burn of mouth    Protocols used: Mouth Pain-A-OH

## 2024-02-08 NOTE — ED PROVIDER NOTES
Encounter Date: 2024    SCRIBE #1 NOTE: I, Rubi Cazares, am scribing for, and in the presence of,  Shaggy Rhodes PA-C. I have scribed the following portions of the note - Other sections scribed: HPI,ROS.       History     Chief Complaint   Patient presents with    Dental Pain     Pt presents to ER with complaints of an abscess times 1 week. Pt rates pain 7/10 at this time. Pt denies taking any meds or any other issues at this time.     Prisca Zhu is a 38 y.o. female, with no pertinent PMHx, who presents to the ED with c/o dental pain from dental abscess that onset 1 week ago. Patient reports to have scheduled a dentist appointment on  following todays visit. Patient denies attempt of treatment with OTC medications.  No other exacerbating or alleviating factors. Denies fever or other associated symptoms. Patient states they are allergic to penicillin.       The history is provided by the patient.     Review of patient's allergies indicates:   Allergen Reactions    Pcn [penicillins] Shortness Of Breath     Past Medical History:   Diagnosis Date    Anemia     BMI 50.0-59.9, adult     Encounter for IUD removal 2019    GERD (gastroesophageal reflux disease) 2019    History of blood transfusion 2001    Malpositioned intrauterine device 3/21/2019    2019 OB/GYN - Dr Guzman - Discussed with patient that we will proceed with imaging to locate the IUD after which we can then proceed to book case in OR to retrieve displaced IUD    Obesity     OI (osteogenesis imperfecta)     Osteopetrosis     Tendonitis      Past Surgical History:   Procedure Laterality Date     SECTION      2016    ESOPHAGOGASTRODUODENOSCOPY N/A 2019    Procedure: ESOPHAGOGASTRODUODENOSCOPY (EGD);  Surgeon: Segun Dominguez MD;  Location: [address]);  Service: Endoscopy;  Laterality: N/A;  BMI 51    ESOPHAGOGASTRODUODENOSCOPY N/A 2020    Procedure: EGD  Subjective:       Patient ID: Beena Meyers is a 14 m.o. male.    AERODIGESTIVE EVALUATION    Chief Complaint: Follow-up    HPI   Status post T+A and PET.  Snoring less but not resolved.  Occasional coughing.    Review of Systems   Constitutional: Negative for activity change, appetite change and fever.   HENT: Negative for rhinorrhea.    Eyes: Negative for discharge.   Respiratory: Positive for cough. Negative for apnea, choking, wheezing and stridor.    Cardiovascular: Negative for leg swelling.   Gastrointestinal: Negative for diarrhea and vomiting.   Genitourinary: Negative for decreased urine volume.   Musculoskeletal: Negative for joint swelling.   Skin: Negative for rash.   Neurological: Negative for tremors and seizures.   Hematological: Does not bruise/bleed easily.   Psychiatric/Behavioral: Negative for sleep disturbance.       Objective:      Physical Exam   Constitutional: He appears well-developed and well-nourished. No distress.   HENT:   Nose: No nasal discharge.   Mouth/Throat: Mucous membranes are moist. Oropharynx is clear.   Eyes: Pupils are equal, round, and reactive to light. Conjunctivae and EOM are normal.   Neck: Normal range of motion.   Cardiovascular: Regular rhythm, S1 normal and S2 normal.   Pulmonary/Chest: Effort normal and breath sounds normal. He has no wheezes.   Abdominal: Soft.   Musculoskeletal: Normal range of motion.   Neurological: He is alert.   Skin: Skin is warm. No rash noted.   Nursing note and vitals reviewed.      Assessment:       1. Cough    2. Snoring        Minimal cough  Occasional snoring  Plan:    Monitor   Consider PSG        (ESOPHAGOGASTRODUODENOSCOPY);  Surgeon: Boom Farris MD;  Location: [facility] ENDO;  Service: Endoscopy;  Laterality: N/A;  Dr. Jolly said we can do a rapid on her    ESOPHAGOGASTRODUODENOSCOPY N/A 3/4/2021    Procedure: EGD (ESOPHAGOGASTRODUODENOSCOPY), Please check for H. Pylori;  Surgeon: Nataliia Bradshaw MD;  Location: [facility] ENDO;  Service: Endoscopy;  Laterality: N/A;  Please check for H. Pylori  3/1-covid [facility]-inst portal-tb    ESOPHAGOGASTRODUODENOSCOPY N/A 10/5/2022    Procedure: EGD (ESOPHAGOGASTRODUODENOSCOPY);  Surgeon: Colten Parks MD;  Location: [facility] (4TH FLR);  Service: Endoscopy;  Laterality: N/A;  BMI 31.34  fully vaccinated, instructions via portal & email-MS    FEMUR FRACTURE SURGERY Bilateral     hardware/rods in both legs    FRACTURE SURGERY      femur    LAPAROSCOPIC GASTROENTEROSTOMY N/A 7/10/2020    Procedure: GASTROENTEROSTOMY, LAPAROSCOPIC, with intraop EGD;  Surgeon: Guillermo Villanueva MD;  Location: [facility] 2ND FLR;  Service: General;  Laterality: N/A;    REMOVAL OF INTRAUTERINE DEVICE (IUD) N/A 6/3/2019    Procedure: REMOVAL, INTRAUTERINE DEVICE;  Surgeon: Ashley Greenberg MD;  Location: [facility] OR;  Service: OB/GYN;  Laterality: N/A;  RN PRE OP 5-23-19----BMI-51.29    TRANSFORAMINAL EPIDURAL INJECTION OF STEROID Right 1/7/2021    Procedure: INJECTION, STEROID, EPIDURAL, TRANSFORAMINAL, APPROACH , L5-S1 and S1;  Surgeon: Brandon Lee MD;  Location: [facility] PAIN MGT;  Service: Pain Management;  Laterality: Right;    TRANSFORAMINAL EPIDURAL INJECTION OF STEROID Right 4/23/2021    Procedure: INJECTION, STEROID, EPIDURAL, TRANSFORAMINAL APPROACH L5/S1, S1;  Surgeon: Brandon Lee MD;  Location: [facility] PAIN MGT;  Service: Pain Management;  Laterality: Right;    TRANSFORAMINAL EPIDURAL INJECTION OF STEROID Right 4/14/2022    Procedure: INJECTION, STEROID, EPIDURAL, TRANSFORAMINAL APPROACH, RIGHT L5-S1 AND S1;  Surgeon: Brandon Lee MD;  Location: [facility] PAIN MGT;   Service: Pain Management;  Laterality: Right;    WISDOM TOOTH EXTRACTION       Family History   Problem Relation Age of Onset    Hypertension Mother     Hyperlipidemia Mother     Asthma Mother     Anxiety disorder Mother     Breast cancer Mother     No Known Problems Father     Liver cancer Maternal Grandfather     Osteogenesis imperfecta Paternal Aunt     Osteogenesis imperfecta Daughter     Celiac disease Neg Hx     Colon cancer Neg Hx     Colon polyps Neg Hx     Crohn's disease Neg Hx     Cystic fibrosis Neg Hx     Esophageal cancer Neg Hx     Irritable bowel syndrome Neg Hx     Rectal cancer Neg Hx     Stomach cancer Neg Hx      Social History     Tobacco Use    Smoking status: Never    Smokeless tobacco: Never   Substance Use Topics    Alcohol use: No    Drug use: Never     Review of Systems   Constitutional:  Negative for chills and fever.   HENT:  Positive for dental problem. Negative for congestion and sore throat.    Eyes:  Negative for pain.   Respiratory:  Negative for shortness of breath and wheezing.    Cardiovascular:  Negative for chest pain.   Gastrointestinal:  Negative for abdominal pain.   Genitourinary:  Negative for flank pain.   Musculoskeletal:  Negative for myalgias.   Skin:  Negative for color change.   Neurological:  Negative for weakness and headaches.   Hematological:  Does not bruise/bleed easily.   Psychiatric/Behavioral:  Negative for suicidal ideas.    All other systems reviewed and are negative.      Physical Exam     Initial Vitals [02/08/24 1506]   BP Pulse Resp Temp SpO2   (!) 177/81 (!) 53 20 98.6 °F (37 °C) 100 %      MAP       --         Physical Exam    Nursing note and vitals reviewed.  Constitutional: She appears well-developed and well-nourished. She is not diaphoretic. No distress.   HENT:   Head: Atraumatic.   Right Ear: External ear normal.   Left Ear: External ear normal.   Very small abscess to the right upper central incisor without active drainage.  No facial  swelling.  No trismus, drooling, or changes in phonation.  Oropharynx normal.   Eyes: Conjunctivae and EOM are normal.   Neck: No tracheal deviation present.   Normal range of motion.  Cardiovascular:  Normal rate and regular rhythm.           Pulmonary/Chest: No accessory muscle usage or stridor. No tachypnea. No respiratory distress.   Musculoskeletal:         General: No edema. Normal range of motion.      Cervical back: Normal range of motion.     Neurological: She is alert and oriented to person, place, and time. She displays no tremor. She displays no seizure activity. Coordination and gait normal.   Skin: Skin is intact. Capillary refill takes less than 2 seconds. No rash noted. No erythema.         ED Course   I & D - Incision and Drainage    Date/Time: 2/8/2024 5:57 PM  Location procedure was performed: [facility] EMERGENCY DEPARTMENT    Performed by: Shaggy Rhodes PA-C  Authorized by: Avis Gonzalez DO  Consent Done: Yes  Consent: Verbal consent obtained.  Consent given by: patient  Type: abscess  Body area: mouth    Anesthesia:  Local Anesthetic: topical anesthetic  Scalpel size: bevel of 18g needle.  Incision type: single straight  Complexity: simple  Drainage: pus  Drainage amount: scant  Wound treatment: incision, wound left open and expression of material  Complications: No  Specimens: No  Implants: No  Patient tolerance: Patient tolerated the procedure well with no immediate complications        Labs Reviewed   POCT URINE PREGNANCY          Imaging Results    None          Medications   clindamycin capsule 300 mg (300 mg Oral Given 2/8/24 0587)     Medical Decision Making  Very small dental abscess drained in the ED without complication.  Started on antibiotics.  Digital follow-up as an outpatient.  No systemic symptoms or deep space infection    Risk  Prescription drug management.            Scribe Attestation:   Scribe #1: I performed the above scribed service and the documentation  accurately describes the services I performed. I attest to the accuracy of the note.                           I, Shaggy Rhodes PA-C, personally performed the services described in this documentation. All medical record entries made by the scribe were at my direction and in my presence. I have reviewed the chart and agree that the record reflects my personal performance and is accurate and complete.      Clinical Impression:  Final diagnoses:  [K04.7] Dental abscess (Primary)          ED Disposition Condition    Discharge Stable          ED Prescriptions       Medication Sig Dispense Start Date End Date Auth. Provider    clindamycin (CLEOCIN) 150 MG capsule Take 2 capsules (300 mg total) by mouth 4 (four) times daily. for 7 days 56 capsule 2/8/2024 2/15/2024 Shaggy Rhodes PA-C          Follow-up Information       Follow up With Specialties Details Why Contact Info    Samira Chopra  Schedule an appointment as soon as possible for a visit in 1 day For further evaluation of dental issues Address: [address] [place] [address]  Phone: [phone]  Appointments: [employer]    [facility] Oncology, Orthopedic Surgery, Genetics, Physical Medicine and Rehabilitation, Occupational Therapy, Radiology Go in 1 day as an alternative to specialist evaluation [address] [facility] [address]  [phone]      [facility] Emergency Dept Emergency Medicine Go to  If symptoms worsen or new symptoms develop [address] [facility] [place] [address] [phone]             Shaggy Rhodes PA-C  02/08/24 5119

## 2024-02-08 NOTE — TELEPHONE ENCOUNTER
Spoke to patient to inform her that her provider does not have any availability and she should go as earlier instructed by Nurse on call to to to the nearest Urgent Care or Emergency Room for further medical assessment.

## 2024-02-14 ENCOUNTER — PATIENT MESSAGE (OUTPATIENT)
Dept: BARIATRICS | Facility: CLINIC | Age: 39
End: 2024-02-14

## 2024-02-20 ENCOUNTER — PATIENT MESSAGE (OUTPATIENT)
Dept: BARIATRICS | Facility: CLINIC | Age: 39
End: 2024-02-20

## 2024-02-29 ENCOUNTER — PATIENT MESSAGE (OUTPATIENT)
Dept: BARIATRICS | Facility: CLINIC | Age: 39
End: 2024-02-29

## 2024-03-06 ENCOUNTER — PATIENT MESSAGE (OUTPATIENT)
Dept: BARIATRICS | Facility: CLINIC | Age: 39
End: 2024-03-06

## 2024-03-20 DIAGNOSIS — M53.87 SCIATICA OF RIGHT SIDE ASSOCIATED WITH DISORDER OF LUMBOSACRAL SPINE: ICD-10-CM

## 2024-03-21 RX ORDER — GABAPENTIN 600 MG/1
600 TABLET ORAL 3 TIMES DAILY
Qty: 90 TABLET | Refills: 2 | Status: SHIPPED | OUTPATIENT
Start: 2024-03-21

## 2024-03-21 NOTE — TELEPHONE ENCOUNTER
No care due was identified.  [facility] Embedded Care Due Messages. Reference number: [id number].   3/20/2024 7:01:14 PM CDT

## 2024-04-03 ENCOUNTER — PATIENT MESSAGE (OUTPATIENT)
Dept: BARIATRICS | Facility: CLINIC | Age: 39
End: 2024-04-03

## 2024-04-09 ENCOUNTER — PATIENT MESSAGE (OUTPATIENT)
Dept: BARIATRICS | Facility: CLINIC | Age: 39
End: 2024-04-09

## 2024-05-14 ENCOUNTER — PATIENT MESSAGE (OUTPATIENT)
Dept: BARIATRICS | Facility: CLINIC | Age: 39
End: 2024-05-14

## 2024-06-11 ENCOUNTER — PATIENT MESSAGE (OUTPATIENT)
Dept: BARIATRICS | Facility: CLINIC | Age: 39
End: 2024-06-11

## 2024-07-03 ENCOUNTER — PATIENT MESSAGE (OUTPATIENT)
Dept: BARIATRICS | Facility: CLINIC | Age: 39
End: 2024-07-03

## 2024-07-09 ENCOUNTER — PATIENT MESSAGE (OUTPATIENT)
Dept: BARIATRICS | Facility: CLINIC | Age: 39
End: 2024-07-09

## 2025-01-27 ENCOUNTER — PATIENT MESSAGE (OUTPATIENT)
Dept: BARIATRICS | Facility: CLINIC | Age: 40
End: 2025-01-27

## 2025-01-28 ENCOUNTER — PATIENT MESSAGE (OUTPATIENT)
Dept: BARIATRICS | Facility: CLINIC | Age: 40
End: 2025-01-28

## 2025-07-18 NOTE — ANESTHESIA POSTPROCEDURE EVALUATION
Anesthesia Post Evaluation    Patient: Prisca Zhu    Procedure(s) Performed: Procedure(s) (LRB):  REMOVAL, INTRAUTERINE DEVICE (N/A)    Final Anesthesia Type: general  Patient location during evaluation: PACU  Patient participation: Yes- Able to Participate  Level of consciousness: awake and alert  Post-procedure vital signs: reviewed and stable  Pain management: adequate  Airway patency: patent  PONV status at discharge: No PONV  Anesthetic complications: no      Cardiovascular status: blood pressure returned to baseline and hemodynamically stable  Respiratory status: unassisted and spontaneous ventilation  Hydration status: euvolemic  Follow-up not needed.          Vitals Value Taken Time   /77 6/3/2019 11:45 AM   Temp 36.6 °C (97.8 °F) 6/3/2019 11:45 AM   Pulse 54 6/3/2019 11:45 AM   Resp 17 6/3/2019 11:45 AM   SpO2 95 % 6/3/2019 11:45 AM         Event Time     Out of Recovery 11:42:05          Pain/Blake Score: No data recorded       Per patient juror # is [id number]. Dates are 7/30 and 7/31

## (undated) DEVICE — SEE MEDLINE ITEM 157150

## (undated) DEVICE — JELLY LUBRICANT STERILE 4 OZ

## (undated) DEVICE — SET CYSTO IRRIGATION UNIV SPIK

## (undated) DEVICE — NDL HYPO REG 25G X 1 1/2

## (undated) DEVICE — KIT ANTIFOG

## (undated) DEVICE — SEE MEDLINE ITEM 154981

## (undated) DEVICE — SEE MEDLINE ITEM 157181

## (undated) DEVICE — SUT 0 VICRYL / UR6 (J603)

## (undated) DEVICE — DRESSING TELFA N ADH 3X8

## (undated) DEVICE — RELOAD ECHELON FLEX WHT 60MM

## (undated) DEVICE — GLOVE SURGICAL LATEX SZ 6.5

## (undated) DEVICE — STAPLER ECHELON FLEX 60MM 44CM

## (undated) DEVICE — DRESSING LEUKOPLAST FLEX 1X3IN

## (undated) DEVICE — TROCAR ENDOPATH XCEL 5X100MM

## (undated) DEVICE — SEE MEDLINE ITEM 152531

## (undated) DEVICE — Device

## (undated) DEVICE — ELECTRODE REM PLYHSV RETURN 9

## (undated) DEVICE — COVER OVERHEAD SURG LT BLUE

## (undated) DEVICE — ADHESIVE MASTISOL VIAL 48/BX

## (undated) DEVICE — CANNULA ENDOPATH XCEL 5X100MM

## (undated) DEVICE — TUBING HF INSUFFLATION W/ FLTR

## (undated) DEVICE — SUT GUT PL. 4-0 27 FS-2

## (undated) DEVICE — CLOSURE SKIN STERI STRIP 1/2X4

## (undated) DEVICE — SEE L#120831

## (undated) DEVICE — UNDERGLOVE BIOGEL PI SZ 6.5 LF

## (undated) DEVICE — UNDERGLOVES BIOGEL PI SZ 7 LF

## (undated) DEVICE — TRAY MINOR GEN SURG

## (undated) DEVICE — PAD PREP 50/CA

## (undated) DEVICE — SEE MEDLINE ITEM 152487

## (undated) DEVICE — ENDOSTITCH INSTRUMENT

## (undated) DEVICE — IRRIGATOR ENDOSCOPY DISP.

## (undated) DEVICE — SYR 10CC LUER LOCK

## (undated) DEVICE — SEE MEDLINE ITEM 157117

## (undated) DEVICE — RELOAD ECHELON FLEX BLU 60MM

## (undated) DEVICE — WARMER DRAPE STERILE LF

## (undated) DEVICE — CATH SELF-CATH FEMALE 14FR 6IN

## (undated) DEVICE — SOL NS 1000CC

## (undated) DEVICE — BLADE SURG CARBON STEEL SZ11

## (undated) DEVICE — APPLICATOR CHLORAPREP ORN 26ML

## (undated) DEVICE — GOWN SURGICAL X-LARGE

## (undated) DEVICE — SEE MEDLINE ITEM 152622

## (undated) DEVICE — ENDOSTITCH POLYSORB 2-0 ES-9

## (undated) DEVICE — TROCAR ENDOPATH XCEL 12X100MM

## (undated) DEVICE — SHEARS HARMONIC 5CM 36CM

## (undated) DEVICE — LUBRICANT SURGILUBE 2 OZ

## (undated) DEVICE — SCISSOR 5MMX35CM DIRECT DRIVE

## (undated) DEVICE — DRAIN PENROSE XRAY 12 X 1/4 ST

## (undated) DEVICE — PAD SANITARY OB STERILE